# Patient Record
Sex: FEMALE | Race: WHITE | Employment: UNEMPLOYED | URBAN - METROPOLITAN AREA
[De-identification: names, ages, dates, MRNs, and addresses within clinical notes are randomized per-mention and may not be internally consistent; named-entity substitution may affect disease eponyms.]

---

## 2017-01-03 DIAGNOSIS — L60.9 NAIL ABNORMALITIES: Primary | ICD-10-CM

## 2017-02-06 RX ORDER — BLOOD SUGAR DIAGNOSTIC
STRIP MISCELLANEOUS
Qty: 300 STRIP | Refills: 2 | Status: SHIPPED | OUTPATIENT
Start: 2017-02-06 | End: 2018-01-07

## 2017-02-10 ENCOUNTER — OFFICE VISIT (OUTPATIENT)
Dept: FAMILY MEDICINE CLINIC | Facility: CLINIC | Age: 47
End: 2017-02-10

## 2017-02-10 VITALS
BODY MASS INDEX: 45.18 KG/M2 | WEIGHT: 251.81 LBS | SYSTOLIC BLOOD PRESSURE: 138 MMHG | HEART RATE: 82 BPM | DIASTOLIC BLOOD PRESSURE: 88 MMHG | HEIGHT: 62.6 IN

## 2017-02-10 DIAGNOSIS — E11.9 TYPE 2 DIABETES MELLITUS WITHOUT COMPLICATION, WITHOUT LONG-TERM CURRENT USE OF INSULIN (HCC): ICD-10-CM

## 2017-02-10 DIAGNOSIS — Z79.899 HIGH RISK MEDICATION USE: ICD-10-CM

## 2017-02-10 DIAGNOSIS — Z51.81 THERAPEUTIC DRUG MONITORING: Primary | ICD-10-CM

## 2017-02-10 DIAGNOSIS — E03.9 HYPOTHYROIDISM (ACQUIRED): ICD-10-CM

## 2017-02-10 DIAGNOSIS — Z71.3 ENCOUNTER FOR WEIGHT LOSS COUNSELING: ICD-10-CM

## 2017-02-10 DIAGNOSIS — E66.01 MORBID OBESITY WITH BMI OF 45.0-49.9, ADULT (HCC): ICD-10-CM

## 2017-02-10 PROCEDURE — 99214 OFFICE O/P EST MOD 30 MIN: CPT | Performed by: FAMILY MEDICINE

## 2017-02-10 RX ORDER — PHENTERMINE HYDROCHLORIDE 37.5 MG/1
TABLET ORAL
Qty: 30 TABLET | Refills: 0 | Status: SHIPPED | OUTPATIENT
Start: 2017-02-10 | End: 2017-03-10

## 2017-02-10 NOTE — PATIENT INSTRUCTIONS
Weight Management: Getting Started  Healthy bodies come in all shapes and sizes. Not all bodies are made to be thin. For some people, a healthy weight is higher than the average weight listed on weight charts.  Your healthcare provider can help you decide Encouragement from others can help make losing weight easier. Ask your family members and friends for support. They may even want to join you. Also look to your healthcare provider, registered dietitian, and  for help.  [facility]

## 2017-02-10 NOTE — PROGRESS NOTES
James Corcoran is a 40year old female. HPI:     Obesity: Morbidly obese for a few years now. She has been having difficulty losing weight. She is tried multiple diets.   She tried Victoza but stopped taking it because she did not see a significant zain MG Oral Tab Take 0.5 mg by mouth daily. Disp:  Rfl:    lansoprazole (PREVACID) 30 MG Oral Capsule Delayed Release TAKE 1 CAPSULE BY MOUTH EVERY MORNING BEFORE BREAKFAST. (Patient taking differently: TAKE 1  CAPSULE BY MOUTH TWICE DAILY. ) Disp: 30 capsule R 45.0-49.9, adult ([facility] [address] 7/13/2014   • Mixed hyperlipidemia 12/10/2014   • Chronic low back pain 3/8/2014   • Kidney stone      since age 16   • Abnormal EKG 1/24/2015   • Low HDL (under 40) 8/18/2015   • PONV (postoperative nausea and vomiting)    • Kidney chest pain on exertion   Gastrointestinal: Negative for nausea and vomiting. Skin: Negative for rash. Neurological: Negative for dizziness, light-headedness and headaches. Psychiatric/Behavioral: Negative for depressed mood.  The patient is not nervou verbalizes understanding. Questions encouraged and answered to patient's satisfaction.    - Phentermine HCl 37.5 MG Oral Tab; Start 1/2 tab po q am 30 mins prior to breakfast for 1 week, if tolerating, increase to 1 tab daily  Dispense: 30 tablet;  Refill:

## 2017-02-11 PROBLEM — E66.01 MORBID OBESITY WITH BMI OF 45.0-49.9, ADULT (HCC): Status: ACTIVE | Noted: 2017-02-11

## 2017-02-15 ENCOUNTER — TELEPHONE (OUTPATIENT)
Dept: FAMILY MEDICINE CLINIC | Facility: CLINIC | Age: 47
End: 2017-02-15

## 2017-02-15 NOTE — TELEPHONE ENCOUNTER
Hurt her back last week. She is looking for recommendations to what she can do? She is having lower back  Spasm's. She is not looking for meds.

## 2017-02-15 NOTE — TELEPHONE ENCOUNTER
Advised patient to avoid lifting heavy objects, Maintain moderate activity, avoid prolonged bed rest. Lower back stretches. Can try alternating ice and heat. Use ibuprofen/tylenol as needed. Follow-up in a couple days if no improvement.   Patient verbalizes

## 2017-02-21 ENCOUNTER — LAB ENCOUNTER (OUTPATIENT)
Dept: LAB | Age: 47
End: 2017-02-21
Attending: FAMILY MEDICINE
Payer: COMMERCIAL

## 2017-02-21 DIAGNOSIS — E03.9 HYPOTHYROIDISM (ACQUIRED): ICD-10-CM

## 2017-02-21 DIAGNOSIS — E11.9 TYPE 2 DIABETES MELLITUS WITHOUT COMPLICATION, WITHOUT LONG-TERM CURRENT USE OF INSULIN (HCC): ICD-10-CM

## 2017-02-21 DIAGNOSIS — E55.9 VITAMIN D DEFICIENCY: ICD-10-CM

## 2017-02-21 LAB
25-HYDROXYVITAMIN D (TOTAL): 23.5 NG/ML (ref 30–100)
ALBUMIN SERPL-MCNC: 3.5 G/DL (ref 3.5–4.8)
ALP LIVER SERPL-CCNC: 97 U/L (ref 37–98)
ALT SERPL-CCNC: 24 U/L (ref 14–54)
AST SERPL-CCNC: 13 U/L (ref 15–41)
BASOPHILS # BLD AUTO: 0.08 X10(3) UL (ref 0–0.1)
BASOPHILS NFR BLD AUTO: 1.2 %
BILIRUB SERPL-MCNC: 0.2 MG/DL (ref 0.1–2)
BUN BLD-MCNC: 14 MG/DL (ref 8–20)
CALCIUM BLD-MCNC: 8.7 MG/DL (ref 8.3–10.3)
CHLORIDE: 107 MMOL/L (ref 101–111)
CHOLEST SMN-MCNC: 152 MG/DL (ref ?–200)
CO2: 25 MMOL/L (ref 22–32)
CREAT BLD-MCNC: 0.72 MG/DL (ref 0.55–1.02)
CREAT UR-SCNC: 101 MG/DL
EOSINOPHIL # BLD AUTO: 0.22 X10(3) UL (ref 0–0.3)
EOSINOPHIL NFR BLD AUTO: 3.4 %
ERYTHROCYTE [DISTWIDTH] IN BLOOD BY AUTOMATED COUNT: 14.4 % (ref 11.5–16)
EST. AVERAGE GLUCOSE BLD GHB EST-MCNC: 134 MG/DL (ref 68–126)
FREE T4: 1.1 NG/DL (ref 0.9–1.8)
GLUCOSE BLD-MCNC: 104 MG/DL (ref 70–99)
HBA1C MFR BLD HPLC: 6.3 % (ref ?–5.7)
HCT VFR BLD AUTO: 38.7 % (ref 34–50)
HDLC SERPL-MCNC: 77 MG/DL (ref 45–?)
HDLC SERPL: 1.97 {RATIO} (ref ?–4.44)
HGB BLD-MCNC: 12.5 G/DL (ref 12–16)
IMMATURE GRANULOCYTE COUNT: 0.04 X10(3) UL (ref 0–1)
IMMATURE GRANULOCYTE RATIO %: 0.6 %
LDLC SERPL CALC-MCNC: 50 MG/DL (ref ?–130)
LYMPHOCYTES # BLD AUTO: 2.2 X10(3) UL (ref 0.9–4)
LYMPHOCYTES NFR BLD AUTO: 34.1 %
M PROTEIN MFR SERPL ELPH: 6.9 G/DL (ref 6.1–8.3)
MCH RBC QN AUTO: 30 PG (ref 27–33.2)
MCHC RBC AUTO-ENTMCNC: 32.3 G/DL (ref 31–37)
MCV RBC AUTO: 93 FL (ref 81–100)
MICROALBUMIN UR-MCNC: 0.8 MG/DL
MICROALBUMIN/CREAT 24H UR-RTO: 7.9 UG/MG (ref ?–30)
MONOCYTES # BLD AUTO: 0.32 X10(3) UL (ref 0.1–0.6)
MONOCYTES NFR BLD AUTO: 5 %
NEUTROPHIL ABS PRELIM: 3.59 X10 (3) UL (ref 1.3–6.7)
NEUTROPHILS # BLD AUTO: 3.59 X10(3) UL (ref 1.3–6.7)
NEUTROPHILS NFR BLD AUTO: 55.7 %
NONHDLC SERPL-MCNC: 75 MG/DL (ref ?–130)
PLATELET # BLD AUTO: 318 10(3)UL (ref 150–450)
POTASSIUM SERPL-SCNC: 3.9 MMOL/L (ref 3.6–5.1)
RBC # BLD AUTO: 4.16 X10(6)UL (ref 3.8–5.1)
RED CELL DISTRIBUTION WIDTH-SD: 49.4 FL (ref 35.1–46.3)
SODIUM SERPL-SCNC: 142 MMOL/L (ref 136–144)
TRIGLYCERIDES: 123 MG/DL (ref ?–150)
TSI SER-ACNC: 5.83 MIU/ML (ref 0.35–5.5)
VLDL: 25 MG/DL (ref 5–40)
WBC # BLD AUTO: 6.5 X10(3) UL (ref 4–13)

## 2017-02-21 PROCEDURE — 80061 LIPID PANEL: CPT

## 2017-02-21 PROCEDURE — 36415 COLL VENOUS BLD VENIPUNCTURE: CPT

## 2017-02-21 PROCEDURE — 84443 ASSAY THYROID STIM HORMONE: CPT

## 2017-02-21 PROCEDURE — 83036 HEMOGLOBIN GLYCOSYLATED A1C: CPT

## 2017-02-21 PROCEDURE — 85025 COMPLETE CBC W/AUTO DIFF WBC: CPT

## 2017-02-21 PROCEDURE — 82306 VITAMIN D 25 HYDROXY: CPT

## 2017-02-21 PROCEDURE — 80053 COMPREHEN METABOLIC PANEL: CPT

## 2017-02-21 PROCEDURE — 84439 ASSAY OF FREE THYROXINE: CPT

## 2017-02-21 PROCEDURE — 82043 UR ALBUMIN QUANTITATIVE: CPT

## 2017-02-21 PROCEDURE — 82570 ASSAY OF URINE CREATININE: CPT

## 2017-03-10 ENCOUNTER — OFFICE VISIT (OUTPATIENT)
Dept: FAMILY MEDICINE CLINIC | Facility: CLINIC | Age: 47
End: 2017-03-10

## 2017-03-10 VITALS
HEIGHT: 62.6 IN | OXYGEN SATURATION: 94 % | SYSTOLIC BLOOD PRESSURE: 128 MMHG | DIASTOLIC BLOOD PRESSURE: 78 MMHG | BODY MASS INDEX: 45.46 KG/M2 | HEART RATE: 85 BPM | WEIGHT: 253.38 LBS

## 2017-03-10 DIAGNOSIS — E66.01 MORBID OBESITY WITH BMI OF 45.0-49.9, ADULT (HCC): ICD-10-CM

## 2017-03-10 DIAGNOSIS — E03.9 HYPOTHYROIDISM (ACQUIRED): ICD-10-CM

## 2017-03-10 DIAGNOSIS — E11.9 TYPE 2 DIABETES MELLITUS WITHOUT COMPLICATION, WITHOUT LONG-TERM CURRENT USE OF INSULIN (HCC): Primary | ICD-10-CM

## 2017-03-10 DIAGNOSIS — E78.2 MIXED HYPERLIPIDEMIA: ICD-10-CM

## 2017-03-10 DIAGNOSIS — Z71.3 ENCOUNTER FOR WEIGHT LOSS COUNSELING: ICD-10-CM

## 2017-03-10 DIAGNOSIS — I10 ESSENTIAL HYPERTENSION: ICD-10-CM

## 2017-03-10 DIAGNOSIS — Z79.899 HIGH RISK MEDICATION USE: ICD-10-CM

## 2017-03-10 DIAGNOSIS — E78.6 LOW HDL (UNDER 40): ICD-10-CM

## 2017-03-10 DIAGNOSIS — M77.11 LATERAL EPICONDYLITIS OF RIGHT ELBOW: ICD-10-CM

## 2017-03-10 DIAGNOSIS — Z51.81 THERAPEUTIC DRUG MONITORING: ICD-10-CM

## 2017-03-10 PROCEDURE — 99214 OFFICE O/P EST MOD 30 MIN: CPT | Performed by: FAMILY MEDICINE

## 2017-03-10 RX ORDER — IBUPROFEN 600 MG/1
600 TABLET ORAL 3 TIMES DAILY
Qty: 30 TABLET | Refills: 0 | Status: SHIPPED | OUTPATIENT
Start: 2017-03-10 | End: 2017-04-07

## 2017-03-10 RX ORDER — LEVOTHYROXINE SODIUM 0.12 MG/1
125 TABLET ORAL
Qty: 90 TABLET | Refills: 0 | Status: SHIPPED | OUTPATIENT
Start: 2017-03-10 | End: 2017-05-28

## 2017-03-10 RX ORDER — LOSARTAN POTASSIUM 50 MG/1
50 TABLET ORAL EVERY EVENING
Qty: 90 TABLET | Refills: 1 | Status: SHIPPED | OUTPATIENT
Start: 2017-03-10 | End: 2017-11-19

## 2017-03-10 RX ORDER — PHENTERMINE HYDROCHLORIDE 37.5 MG/1
37.5 TABLET ORAL EVERY MORNING
Qty: 30 TABLET | Refills: 0 | Status: SHIPPED | OUTPATIENT
Start: 2017-03-10 | End: 2017-04-07

## 2017-03-10 RX ORDER — SIMVASTATIN 20 MG
20 TABLET ORAL NIGHTLY
Qty: 90 TABLET | Refills: 1 | Status: SHIPPED | OUTPATIENT
Start: 2017-03-10 | End: 2018-05-22

## 2017-03-10 NOTE — PATIENT INSTRUCTIONS
Please get your eye exam done before June 30, 2017. Weight Management: Overcoming Your Barriers  You may have many reasons why you’re not ready to lose weight. You may not feel you have the time or the skills.  You may be afraid of losing weight use it as an excuse for not losing weight. Ask your healthcare provider or dietitian about methods to lose weight that are safe for you. For example, even if you have severe arthritis, it may be easier for you to exercise in a pool.  Get advice from a vickie

## 2017-03-10 NOTE — PROGRESS NOTES
HPI:   Lynda Parks is a 40year old female    Hit right elbow on wall 3-4 weeks ago, now gradually getting worse. No pain at rest.  Pain is noticed when reaching for something or pulling on something. Pain up to 4-5 out of 10. It is a soreness. DIRECTED Disp: 300 strip Rfl: 2   Vitamin C 500 MG Oral Tab Take 500 mg by mouth daily.  Disp:  Rfl:    ACCU-CHEK MULTICLIX LANCETS Does not apply Misc Test blood sugars tid Disp: 306 each Rfl: 0   Blood Glucose Monitoring Suppl ([employer] CONTOUR NEXT MONITOR) Itching  Darvocet [Propoxyph*    Nausea and vomiting  Latex                   Rash, Swelling  Nuts                    Anaphylaxis    Comment:MARGOT/ABBI             Water chestnuts-Itching  Percocet [Oxycodone*    Rash  Ultram [Tramadol Hc*    Rash M. D. UPPER GI ENDOSCOPY PERFORMED  2008    REMOVAL OF KIDNEY STONE      Comment age 16, does not remember what kind of surgery. no abdominal or flank scar    HYSTERECTOMY  2006    Comment partial hystero.     OOPHORECTOMY Bilateral 2015    Comment ovarie Cholesterol      >45 mg/dL 77 69   LDL Cholesterol Calc      <130 mg/dL 50 79   VLDL      5-40 mg/dL 25 22   T.  CHOL/HDL RATIO      <4.44 1.97 2.46   NON HDL CHOL      <130 mg/dL 75 101   T4,Free (Direct)      0.9-1.8 ng/dL 1.1 1.2   TSH      0.350-5.500 m tablet; Refill: 0    4. Essential hypertension  Well controlled. Pt counseled on low sodium diet. - Losartan Potassium 50 MG Oral Tab; Take 1 tablet (50 mg total) by mouth every evening. Dispense: 90 tablet; Refill: 1    5.  Mixed hyperlipidemia  Control HCl 1000 MG Oral Tab 180 tablet 1      Sig: Take 1 tablet (1,000 mg total) by mouth 2 (two) times daily with meals. Phentermine HCl 37.5 MG Oral Tab 30 tablet 0      Sig: Take 1 tablet (37.5 mg total) by mouth every morning.       simvastatin 20 MG Ora

## 2017-03-13 ENCOUNTER — PATIENT MESSAGE (OUTPATIENT)
Dept: FAMILY MEDICINE CLINIC | Facility: CLINIC | Age: 47
End: 2017-03-13

## 2017-03-27 ENCOUNTER — OFFICE VISIT (OUTPATIENT)
Dept: FAMILY MEDICINE CLINIC | Facility: CLINIC | Age: 47
End: 2017-03-27

## 2017-03-27 VITALS
HEART RATE: 87 BPM | DIASTOLIC BLOOD PRESSURE: 84 MMHG | BODY MASS INDEX: 46 KG/M2 | SYSTOLIC BLOOD PRESSURE: 136 MMHG | WEIGHT: 257 LBS

## 2017-03-27 DIAGNOSIS — R00.2 PALPITATIONS: Primary | ICD-10-CM

## 2017-03-27 DIAGNOSIS — R30.0 DYSURIA: ICD-10-CM

## 2017-03-27 DIAGNOSIS — N30.01 ACUTE CYSTITIS WITH HEMATURIA: ICD-10-CM

## 2017-03-27 DIAGNOSIS — R07.89 CHEST PRESSURE: ICD-10-CM

## 2017-03-27 LAB
APPEARANCE: CLEAR
BILIRUBIN: NEGATIVE
GLUCOSE (URINE DIPSTICK): NEGATIVE MG/DL
KETONES (URINE DIPSTICK): NEGATIVE MG/DL
LEUKOCYTES: NEGATIVE
MULTISTIX LOT#: NORMAL NUMERIC
NITRITE, URINE: NEGATIVE
PH, URINE: 5.5 (ref 4.5–8)
PROTEIN (URINE DIPSTICK): NEGATIVE MG/DL
SPECIFIC GRAVITY: 1.02 (ref 1–1.03)
URINE-COLOR: YELLOW
UROBILINOGEN,SEMI-QN: 0.2 MG/DL (ref 0–1.9)

## 2017-03-27 PROCEDURE — 93000 ELECTROCARDIOGRAM COMPLETE: CPT | Performed by: FAMILY MEDICINE

## 2017-03-27 PROCEDURE — 99214 OFFICE O/P EST MOD 30 MIN: CPT | Performed by: FAMILY MEDICINE

## 2017-03-27 PROCEDURE — 81003 URINALYSIS AUTO W/O SCOPE: CPT | Performed by: FAMILY MEDICINE

## 2017-03-27 RX ORDER — NITROFURANTOIN 25; 75 MG/1; MG/1
100 CAPSULE ORAL 2 TIMES DAILY
Qty: 6 CAPSULE | Refills: 0 | Status: SHIPPED | OUTPATIENT
Start: 2017-03-27 | End: 2017-03-30

## 2017-03-27 RX ORDER — NITROFURANTOIN 25; 75 MG/1; MG/1
100 CAPSULE ORAL 2 TIMES DAILY
Qty: 6 CAPSULE | Refills: 0 | Status: SHIPPED | OUTPATIENT
Start: 2017-03-27 | End: 2017-03-27

## 2017-03-27 NOTE — PATIENT INSTRUCTIONS
Heart Palpitations    Palpitations are the feeling that your heart is beating hard, fast, or irregular. Some describe it as \"pounding\" or \"skipped beats. \" Palpitations may occur in someone with heart disease, but can also occur in a healthy person. Get prompt medical attention if you have palpitations and any of the following:  · Weakness  · Dizziness  · Lightheadedness  · Fainting  Date Last Reviewed: 4/27/2016  © 2014-5129 The [address] [place] [address].  All ri

## 2017-03-27 NOTE — PROGRESS NOTES
Sanam Hodgson is a 40year old female. HPI:       Burning with urination:  For a week and half to 2 weeks. Nothing seems to make it better or worse. Denies fever or chills. Denies nausea or vomiting    Palpitations associated with chest pressure.   [place] BREAKFAST. (Patient taking differently: TAKE 1  CAPSULE BY MOUTH TWICE DAILY. ) Disp: 30 capsule Rfl: 1   Cholecalciferol (VITAMIN D3) 2000 UNITS Oral Tab Take 1 tablet by mouth daily.    Disp:  Rfl:    Omega-3 Fatty Acids (OMEGA-3 OR) Take 115 mg by mouth d glaucoma of both eyes, moderate stage 6/1/2016   • Family history of skin cancer 7/13/2016     Multiple family members on maternal side including mother with large [address]    • History of hysterectomy 9/15/2015          Past Surgical History    TILT TABLE EVAL No distress. HENT:   Head: Normocephalic and atraumatic. Eyes: Conjunctivae and EOM are normal. Pupils are equal, round, and reactive to light. Cardiovascular: Normal rate, regular rhythm and normal heart sounds. No murmur heard.    Edema not prese maximal blood pressure of 176/78 mmHg and a maximal heart rate of 151 beats per minute. The exercise was discontinued due to fatigue.  The electrocardiographic response to exercise was normal. Th patient did complain of a \"dull ache\" in her left shoulder PROTEIN (URINE DIPSTICK) negative Negative/Trace mg/dL   UROBILINOGEN,SEMI-QN 0.2 0.0 - 1.9 mg/dL   NITRITE, URINE negative Negative   LEUKOCYTES negative Negative   APPEARANCE clear Clear   URINE-COLOR yellow Yellow   [employer] Lot# [id number] Numeric   Mul

## 2017-04-03 ENCOUNTER — HOSPITAL ENCOUNTER (OUTPATIENT)
Dept: CV DIAGNOSTICS | Facility: HOSPITAL | Age: 47
Discharge: HOME | End: 2017-04-03
Attending: FAMILY MEDICINE
Payer: COMMERCIAL

## 2017-04-03 DIAGNOSIS — R07.89 CHEST PRESSURE: ICD-10-CM

## 2017-04-03 DIAGNOSIS — R00.2 PALPITATIONS: ICD-10-CM

## 2017-04-03 PROCEDURE — 93226 XTRNL ECG REC<48 HR SCAN A/R: CPT

## 2017-04-03 PROCEDURE — 93227 XTRNL ECG REC<48 HR R&I: CPT | Performed by: INTERNAL MEDICINE

## 2017-04-03 PROCEDURE — 93225 XTRNL ECG REC<48 HRS REC: CPT

## 2017-04-07 ENCOUNTER — OFFICE VISIT (OUTPATIENT)
Dept: FAMILY MEDICINE CLINIC | Facility: CLINIC | Age: 47
End: 2017-04-07

## 2017-04-07 VITALS
HEART RATE: 83 BPM | BODY MASS INDEX: 45.64 KG/M2 | DIASTOLIC BLOOD PRESSURE: 82 MMHG | WEIGHT: 254.38 LBS | HEIGHT: 62.6 IN | SYSTOLIC BLOOD PRESSURE: 136 MMHG

## 2017-04-07 DIAGNOSIS — Z71.3 ENCOUNTER FOR WEIGHT LOSS COUNSELING: ICD-10-CM

## 2017-04-07 DIAGNOSIS — Z51.81 THERAPEUTIC DRUG MONITORING: Primary | ICD-10-CM

## 2017-04-07 DIAGNOSIS — E66.01 MORBID OBESITY WITH BMI OF 45.0-49.9, ADULT (HCC): ICD-10-CM

## 2017-04-07 DIAGNOSIS — Z79.899 HIGH RISK MEDICATION USE: ICD-10-CM

## 2017-04-07 PROCEDURE — 99213 OFFICE O/P EST LOW 20 MIN: CPT | Performed by: FAMILY MEDICINE

## 2017-04-07 RX ORDER — TOPIRAMATE 25 MG/1
TABLET ORAL
Qty: 70 TABLET | Refills: 0 | Status: SHIPPED | OUTPATIENT
Start: 2017-04-07 | End: 2017-06-02

## 2017-04-07 RX ORDER — PHENTERMINE HYDROCHLORIDE 37.5 MG/1
37.5 TABLET ORAL EVERY MORNING
Qty: 30 TABLET | Refills: 0 | Status: SHIPPED | OUTPATIENT
Start: 2017-04-07 | End: 2017-05-05

## 2017-04-07 NOTE — PROGRESS NOTES
Michael Rodriguez is a 40year old female. HPI:     Obesity: Lost 3# in the last 4 weeks. This is her 2nd month on the phentermine. pilar phentermine well, no SE x some dry mouth that is relieved with drinking more water.          Wt Readings from Last 6 Rfl: 1   Cholecalciferol (VITAMIN D3) 2000 UNITS Oral Tab Take 1 tablet by mouth daily. Disp:  Rfl:    Omega-3 Fatty Acids (OMEGA-3 OR) Take 115 mg by mouth daily.  Disp:  Rfl:    CycloSPORINE (RESTASIS) 0.05 % Ophthalmic Emulsion Place 1 drop into both e Melvina Montez M.D.    [place] age 16, does not remember what kind of surgery. no abdominal or flank scar    HYSTERECTOMY  2006    Comment partial hystero.     OOPHORECTOMY Bilateral 2015    C AND PLAN:       Therapeutic drug monitoring  (primary encounter diagnosis)  High risk medication use  Encounter for weight loss counseling  Morbid obesity with bmi of 45.0-49.9, adult (hcc)    1.  Therapeutic drug monitoring  Medication use, risks, benefits Prescriptions Disp Refills    Phentermine HCl 37.5 MG Oral Tab 30 tablet 0      Sig: Take 1 tablet (37.5 mg total) by mouth every morning.       topiramate 25 MG Oral Tab 70 tablet 0      Sig: Start 1 tab po qhs for 1 wk, then twice a day for 1 week, then o

## 2017-04-07 NOTE — PATIENT INSTRUCTIONS
Weight Management: Take It Off and Keep It Off  It’s easy to be motivated when you first start. The key is to stay motivated all along the way and to have realistic and achievable goals.  There are things you can do to keep yourself on the path to success · Make a list of the things that others like about you and that you like about yourself. Add something new from time to time. Keep this list to look at when you need a lift. Resources  · [employer]itness, Sports [employer] Nutritionwww. fitness. go

## 2017-05-05 ENCOUNTER — OFFICE VISIT (OUTPATIENT)
Dept: FAMILY MEDICINE CLINIC | Facility: CLINIC | Age: 47
End: 2017-05-05

## 2017-05-05 VITALS
OXYGEN SATURATION: 96 % | HEART RATE: 90 BPM | BODY MASS INDEX: 45 KG/M2 | WEIGHT: 253 LBS | DIASTOLIC BLOOD PRESSURE: 76 MMHG | SYSTOLIC BLOOD PRESSURE: 128 MMHG

## 2017-05-05 DIAGNOSIS — Z71.3 ENCOUNTER FOR WEIGHT LOSS COUNSELING: ICD-10-CM

## 2017-05-05 DIAGNOSIS — Z51.81 THERAPEUTIC DRUG MONITORING: Primary | ICD-10-CM

## 2017-05-05 DIAGNOSIS — Z79.899 HIGH RISK MEDICATION USE: ICD-10-CM

## 2017-05-05 DIAGNOSIS — E66.01 MORBID OBESITY WITH BMI OF 45.0-49.9, ADULT (HCC): ICD-10-CM

## 2017-05-05 DIAGNOSIS — K13.0 ANGULAR CHEILITIS: ICD-10-CM

## 2017-05-05 PROBLEM — R07.89 CHEST PRESSURE: Status: RESOLVED | Noted: 2017-03-27 | Resolved: 2017-05-05

## 2017-05-05 PROBLEM — N30.01 ACUTE CYSTITIS WITH HEMATURIA: Status: RESOLVED | Noted: 2017-03-27 | Resolved: 2017-05-05

## 2017-05-05 PROCEDURE — 99214 OFFICE O/P EST MOD 30 MIN: CPT | Performed by: FAMILY MEDICINE

## 2017-05-05 RX ORDER — CLOTRIMAZOLE AND BETAMETHASONE DIPROPIONATE 10; .64 MG/G; MG/G
1 CREAM TOPICAL 2 TIMES DAILY
Qty: 15 G | Refills: 0 | Status: SHIPPED | OUTPATIENT
Start: 2017-05-05 | End: 2017-08-31 | Stop reason: ALTCHOICE

## 2017-05-05 RX ORDER — PHENTERMINE HYDROCHLORIDE 37.5 MG/1
37.5 TABLET ORAL EVERY MORNING
Qty: 30 TABLET | Refills: 0 | Status: SHIPPED | OUTPATIENT
Start: 2017-05-05 | End: 2017-06-02

## 2017-05-05 NOTE — PROGRESS NOTES
Peter Roa is a 40year old female. HPI:     Obesity: Lost 1# in the last 4 weeks. This is her 3rd month on the phentermine 37.5mg. Karl phentermine well, no SE x some dry mouth that is relieved with drinking more water.      Did not start the t (NORCO) 5-325 MG Oral Tab Take 1-2 tablets by mouth every 6 (six) hours as needed for Pain. Disp: 40 tablet Rfl: 0   lansoprazole (PREVACID) 30 MG Oral Capsule Delayed Release TAKE 1 CAPSULE BY MOUTH EVERY MORNING BEFORE BREAKFAST.  (Patient taking differen moderate stage 6/1/2016   • Family history of skin cancer 7/13/2016     Multiple family members on maternal side including mother with large [address]    • History of hysterectomy 9/15/2015          Past Surgical History    TILT TABLE EVALUATION      Comment vei headaches/dizziness/fainting/weakness/change in vision  PSYCH: denies depression and anxiety      Immunization History  Administered            Date(s) Administered    TDAP                  05/14/2013      EXAM:   /76 mmHg  Pulse 90  Wt 253 lb  SpO2 total) by mouth every morning. Dispense: 30 tablet; Refill: 0              Meds & Refills for this Visit:  Signed Prescriptions Disp Refills    Phentermine HCl 37.5 MG Oral Tab 30 tablet 0      Sig: Take 1 tablet (37.5 mg total) by mouth every morning.

## 2017-05-07 NOTE — PATIENT INSTRUCTIONS
Weight Management: Overcoming Your Barriers  You may have many reasons why you’re not ready to lose weight. You may not feel you have the time or the skills. You may be afraid of losing weight and gaining it back again. Well, you can lose weight.  And you Do you have a health problem? If so, don’t use it as an excuse for not losing weight. Ask your healthcare provider or dietitian about methods to lose weight that are safe for you.  For example, even if you have severe arthritis, it may be easier for you to

## 2017-05-10 ENCOUNTER — TELEPHONE (OUTPATIENT)
Dept: FAMILY MEDICINE CLINIC | Facility: CLINIC | Age: 47
End: 2017-05-10

## 2017-05-10 NOTE — TELEPHONE ENCOUNTER
Headaches for 2 wks now, very tired. Stomach not feeling the best. Food not appealing. Wondering if it's something with her thyroid? She said it almost feels like she has a hangover but she's not drinking. She thought she was due for labs.  I told her no

## 2017-05-12 ENCOUNTER — APPOINTMENT (OUTPATIENT)
Dept: LAB | Age: 47
End: 2017-05-12
Attending: FAMILY MEDICINE
Payer: COMMERCIAL

## 2017-05-12 ENCOUNTER — OFFICE VISIT (OUTPATIENT)
Dept: FAMILY MEDICINE CLINIC | Facility: CLINIC | Age: 47
End: 2017-05-12

## 2017-05-12 VITALS
SYSTOLIC BLOOD PRESSURE: 130 MMHG | WEIGHT: 251 LBS | HEART RATE: 88 BPM | BODY MASS INDEX: 45.03 KG/M2 | DIASTOLIC BLOOD PRESSURE: 72 MMHG | RESPIRATION RATE: 18 BRPM | HEIGHT: 62.6 IN

## 2017-05-12 DIAGNOSIS — G43.009 MIGRAINE WITHOUT AURA AND WITHOUT STATUS MIGRAINOSUS, NOT INTRACTABLE: ICD-10-CM

## 2017-05-12 DIAGNOSIS — R53.83 FATIGUE, UNSPECIFIED TYPE: ICD-10-CM

## 2017-05-12 DIAGNOSIS — M25.50 POLYARTHRALGIA: ICD-10-CM

## 2017-05-12 DIAGNOSIS — M79.10 MYALGIA: ICD-10-CM

## 2017-05-12 DIAGNOSIS — G43.009 MIGRAINE WITHOUT AURA AND WITHOUT STATUS MIGRAINOSUS, NOT INTRACTABLE: Primary | ICD-10-CM

## 2017-05-12 PROCEDURE — 36415 COLL VENOUS BLD VENIPUNCTURE: CPT

## 2017-05-12 PROCEDURE — 85652 RBC SED RATE AUTOMATED: CPT

## 2017-05-12 PROCEDURE — 86140 C-REACTIVE PROTEIN: CPT

## 2017-05-12 PROCEDURE — 96372 THER/PROPH/DIAG INJ SC/IM: CPT | Performed by: FAMILY MEDICINE

## 2017-05-12 PROCEDURE — 99214 OFFICE O/P EST MOD 30 MIN: CPT | Performed by: FAMILY MEDICINE

## 2017-05-12 PROCEDURE — 86038 ANTINUCLEAR ANTIBODIES: CPT

## 2017-05-12 PROCEDURE — 82607 VITAMIN B-12: CPT

## 2017-05-12 RX ORDER — TIZANIDINE 4 MG/1
TABLET ORAL
Qty: 20 TABLET | Refills: 0 | Status: SHIPPED | OUTPATIENT
Start: 2017-05-12 | End: 2017-05-24

## 2017-05-12 RX ORDER — METHYLPREDNISOLONE 4 MG/1
TABLET ORAL
Qty: 1 PACKAGE | Refills: 0 | Status: SHIPPED | OUTPATIENT
Start: 2017-05-12 | End: 2017-05-24 | Stop reason: ALTCHOICE

## 2017-05-12 RX ORDER — KETOROLAC TROMETHAMINE 30 MG/ML
60 INJECTION, SOLUTION INTRAMUSCULAR; INTRAVENOUS ONCE
Status: COMPLETED | OUTPATIENT
Start: 2017-05-12 | End: 2017-05-12

## 2017-05-12 RX ADMIN — KETOROLAC TROMETHAMINE 60 MG: 30 INJECTION, SOLUTION INTRAMUSCULAR; INTRAVENOUS at 13:46:00

## 2017-05-12 NOTE — PROGRESS NOTES
Bryan Shi is a 40year old female. HPI:         Headache:    x 2 weeks   Feels all over, feels like she is wearing a headband. Pain is 6-7/10. No vomiting. Nausea but not sure if it is from one of her medications.   occ blurry vision when tired, with meals. Disp: 180 tablet Rfl: 1   simvastatin 20 MG Oral Tab Take 1 tablet (20 mg total) by mouth nightly.  Disp: 90 tablet Rfl: 1   ACCU-CHEK DARRICK PLUS In Vitro Strip USE TO TEST BLOOD 3 TIMES A DAY AS DIRECTED Disp: 300 strip Rfl: 2   ACCU-CHEK MULTI History of kidney stones 10/17/2013   • Essential hypertension, benign 7/13/2014   • Morbid obesity with BMI of 45.0-49.9, adult ([facility] [address] 7/13/2014   • Mixed hyperlipidemia 12/10/2014   • Chronic low back pain 3/8/2014   • Kidney stone      since age 16   • A swallowing. Eyes: Negative for pain and redness. Respiratory: Negative for cough and shortness of breath. Cardiovascular: Negative for chest pain and palpitations. Gastrointestinal: Negative for abdominal pain and diarrhea.    Genitourinary: Negat least 50% while in the office after receiving Toradol 60 mg IM ×1. Tizanidine to target possible muscular component. Medrol Dosepak to target inflammatory component. Check labs.     - Ketorolac Tromethamine (TORADOL) 60 MG/2ML injection 60 mg; Inject 2 m

## 2017-05-13 NOTE — PATIENT INSTRUCTIONS
Migraine Headache: Stages and Treatment    A migraine headache tends to progress in stages. Learning these stages can help you better understand what is happening. Then you can learn ways to reduce pain and relieve other symptoms.  Methods for relieving y Work with your healthcare provider to find the right medicines for you. Medicines for migraine may relieve pain (analgesics), relieve nausea, or attack the migraine's root causes (migraine-specific medicines).   Rebound headache  Taking analgesics each day,

## 2017-05-16 ENCOUNTER — PATIENT OUTREACH (OUTPATIENT)
Dept: FAMILY MEDICINE CLINIC | Facility: CLINIC | Age: 47
End: 2017-05-16

## 2017-05-24 ENCOUNTER — PRIOR ORIGINAL RECORDS (OUTPATIENT)
Dept: OTHER | Age: 47
End: 2017-05-24

## 2017-05-24 ENCOUNTER — OFFICE VISIT (OUTPATIENT)
Dept: FAMILY MEDICINE CLINIC | Facility: CLINIC | Age: 47
End: 2017-05-24

## 2017-05-24 ENCOUNTER — APPOINTMENT (OUTPATIENT)
Dept: LAB | Age: 47
End: 2017-05-24
Attending: FAMILY MEDICINE
Payer: COMMERCIAL

## 2017-05-24 VITALS
BODY MASS INDEX: 45.54 KG/M2 | WEIGHT: 253.81 LBS | RESPIRATION RATE: 16 BRPM | DIASTOLIC BLOOD PRESSURE: 84 MMHG | SYSTOLIC BLOOD PRESSURE: 122 MMHG | HEIGHT: 62.6 IN | HEART RATE: 93 BPM

## 2017-05-24 DIAGNOSIS — R53.81 MALAISE: ICD-10-CM

## 2017-05-24 DIAGNOSIS — E03.9 HYPOTHYROIDISM (ACQUIRED): ICD-10-CM

## 2017-05-24 DIAGNOSIS — G44.52 NEW DAILY PERSISTENT HEADACHE: Primary | ICD-10-CM

## 2017-05-24 DIAGNOSIS — E11.9 TYPE 2 DIABETES MELLITUS WITHOUT COMPLICATION, WITHOUT LONG-TERM CURRENT USE OF INSULIN (HCC): ICD-10-CM

## 2017-05-24 PROCEDURE — 83036 HEMOGLOBIN GLYCOSYLATED A1C: CPT

## 2017-05-24 PROCEDURE — 84439 ASSAY OF FREE THYROXINE: CPT

## 2017-05-24 PROCEDURE — 84443 ASSAY THYROID STIM HORMONE: CPT

## 2017-05-24 PROCEDURE — 36415 COLL VENOUS BLD VENIPUNCTURE: CPT

## 2017-05-24 PROCEDURE — 99213 OFFICE O/P EST LOW 20 MIN: CPT | Performed by: FAMILY MEDICINE

## 2017-05-24 RX ORDER — ESTRADIOL 1 MG/1
1 TABLET ORAL DAILY
COMMUNITY
End: 2019-05-09

## 2017-05-24 NOTE — PROGRESS NOTES
Josr Boles is a 40year old female. HPI:         Headache:    Started taking topiramate but it made her dizzy, when she stopped taking it the dizziness went away. The part that went away was when she laid down.   She was on the topiramate for a week Does not apply Misc Test blood sugars tid Disp: 306 each Rfl: 0   lansoprazole (PREVACID) 30 MG Oral Capsule Delayed Release TAKE 1 CAPSULE BY MOUTH EVERY MORNING BEFORE BREAKFAST. (Patient taking differently: TAKE 1  CAPSULE BY MOUTH TWICE DAILY. ) Disp: 3 since age 16   • Abnormal EKG 1/24/2015   • Low HDL (under 40) 8/18/2015   • PONV (postoperative nausea and vomiting)    • Kidney infection 11/30/15     currently on macrobid for kidney infection-instructed to inform surgeon of infection.    • Type 2 diabet Genitourinary: Negative for dysuria and urgency. Musculoskeletal:        As in HPI   Neurological: Negative for speech difficulty and weakness. Psychiatric/Behavioral: Negative for depressed mood. The patient is not nervous/anxious.         EXAM:   BP pg/mL         ASSESSMENT AND PLAN:     New daily persistent headache  (primary encounter diagnosis)  Malaise    1. New daily persistent headache  Maintain good hydration, she has been doing this. Sleep  Hygiene discussed.   Take the tizanidine that was rx'

## 2017-05-24 NOTE — PATIENT INSTRUCTIONS
Decrease phentermine to 1/2 tab daily for 3-4 days then discontinue for several days to see if that helps with the headaches and how your are feeling overall. As above, along with taking the tizanidine at night.     If not improving overall in the ne

## 2017-05-29 RX ORDER — LEVOTHYROXINE SODIUM 0.12 MG/1
TABLET ORAL
Qty: 90 TABLET | Refills: 0 | Status: SHIPPED | OUTPATIENT
Start: 2017-05-29 | End: 2017-09-03

## 2017-06-02 ENCOUNTER — OFFICE VISIT (OUTPATIENT)
Dept: FAMILY MEDICINE CLINIC | Facility: CLINIC | Age: 47
End: 2017-06-02

## 2017-06-02 VITALS
SYSTOLIC BLOOD PRESSURE: 134 MMHG | HEIGHT: 62.6 IN | WEIGHT: 254.63 LBS | RESPIRATION RATE: 16 BRPM | DIASTOLIC BLOOD PRESSURE: 82 MMHG | HEART RATE: 87 BPM | BODY MASS INDEX: 45.69 KG/M2

## 2017-06-02 DIAGNOSIS — G89.29 CHRONIC BILATERAL LOW BACK PAIN WITHOUT SCIATICA: ICD-10-CM

## 2017-06-02 DIAGNOSIS — E66.01 MORBID OBESITY WITH BMI OF 45.0-49.9, ADULT (HCC): ICD-10-CM

## 2017-06-02 DIAGNOSIS — Z82.49 FAMILY HISTORY OF CHF (CONGESTIVE HEART FAILURE): ICD-10-CM

## 2017-06-02 DIAGNOSIS — Z87.898 HISTORY OF PALPITATIONS: ICD-10-CM

## 2017-06-02 DIAGNOSIS — G44.52 NEW DAILY PERSISTENT HEADACHE: Primary | ICD-10-CM

## 2017-06-02 DIAGNOSIS — M70.61 TROCHANTERIC BURSITIS, RIGHT HIP: ICD-10-CM

## 2017-06-02 DIAGNOSIS — M54.50 CHRONIC BILATERAL LOW BACK PAIN WITHOUT SCIATICA: ICD-10-CM

## 2017-06-02 DIAGNOSIS — Z82.49 FAMILY HISTORY OF CARDIOMYOPATHY: ICD-10-CM

## 2017-06-02 PROCEDURE — 99214 OFFICE O/P EST MOD 30 MIN: CPT | Performed by: FAMILY MEDICINE

## 2017-06-02 RX ORDER — CETIRIZINE HYDROCHLORIDE 10 MG/1
10 TABLET ORAL DAILY PRN
COMMUNITY
End: 2017-08-31 | Stop reason: ALTCHOICE

## 2017-06-02 NOTE — PROGRESS NOTES
Linda Flores is a 40year old female. HPI:       HPI 5/24/2017  Headache:    Started taking topiramate but it made her dizzy, when she stopped taking it the dizziness went away. The part that went away was when she laid down.   She was on the topirama Allergies. Disp:  Rfl:    LEVOTHYROXINE SODIUM 125 MCG Oral Tab TAKE 1 TABLET (125 MCG TOTAL) BY MOUTH BEFORE BREAKFAST. Disp: 90 tablet Rfl: 0   estradiol 1 MG Oral Tab Take 1 mg by mouth daily.  Disp:  Rfl:    clotrimazole-betamethasone 1-0.05 % External Medical History   Diagnosis Date   • Glaucoma 1/7/2013   • Arthritis      back   • Hypothyroidism    • GERD without esophagitis      hiatal heia   • Dry eyes, bilateral    • Polycystic ovaries      ovary embedded in pelvic wall--right side   • Lung nodule of surgery. no abdominal or flank scar    HYSTERECTOMY  2006    Comment partial hystero.     OOPHORECTOMY Bilateral 2015    Comment ovaries removed after partial.      Social History:    Smoking Status: Never Smoker                      Smokeless Status: Ne no cervical adenopathy. Neurological: She is alert and oriented to person, place, and time. No cranial nerve deficit. Coordination normal.   Skin: Skin is warm and dry. Psychiatric: She has a normal mood and affect.  Her behavior is normal.           AS Family history of cardiomyopathy  Patient to follow-up with cardiologist  - Cardio Referral - In Network                Return in about 3 months (around 9/2/2017) for Chronic Conditions and as needed.

## 2017-06-02 NOTE — PATIENT INSTRUCTIONS
Exercise: Making the Most of Your Time  Your exercise goal is 30 minutes on most days. Aim for a total of 150 or more minutes a week. Not sure you can fit one 30-minute block of exercise time into your day?  Split it up into shorter 10- and 15-minute bloc · Set an alarm to tell you when it’s time for an activity break. Date Last Reviewed: 8/13/2015  © 7077-5716 The [address] [place] [address], [address] [place] [address]. All rights reserved.  This information is not intended as a substitute for pr

## 2017-06-04 PROBLEM — Z82.49 FAMILY HISTORY OF CHF (CONGESTIVE HEART FAILURE): Status: ACTIVE | Noted: 2017-06-04

## 2017-06-04 PROBLEM — Z87.898 HISTORY OF PALPITATIONS: Status: ACTIVE | Noted: 2017-06-04

## 2017-06-04 PROBLEM — Z82.49 FAMILY HISTORY OF CARDIOMYOPATHY: Status: ACTIVE | Noted: 2017-06-04

## 2017-06-08 ENCOUNTER — PRIOR ORIGINAL RECORDS (OUTPATIENT)
Dept: OTHER | Age: 47
End: 2017-06-08

## 2017-06-25 DIAGNOSIS — G43.009 MIGRAINE WITHOUT AURA AND WITHOUT STATUS MIGRAINOSUS, NOT INTRACTABLE: ICD-10-CM

## 2017-06-26 RX ORDER — TIZANIDINE 4 MG/1
TABLET ORAL
Qty: 20 TABLET | Refills: 0 | Status: SHIPPED | OUTPATIENT
Start: 2017-06-26 | End: 2017-08-06

## 2017-07-25 DIAGNOSIS — K92.1 MELENA: Primary | ICD-10-CM

## 2017-07-27 PROBLEM — M25.572 ACUTE LEFT ANKLE PAIN: Status: ACTIVE | Noted: 2017-07-27

## 2017-07-27 PROBLEM — S93.491A SPRAIN OF ANTERIOR TALOFIBULAR LIGAMENT OF RIGHT ANKLE: Status: ACTIVE | Noted: 2017-07-27

## 2017-07-27 PROBLEM — F33.0 MAJOR DEPRESSIVE DISORDER, RECURRENT EPISODE, MILD WITH ANXIOUS DISTRESS: Status: ACTIVE | Noted: 2017-07-27

## 2017-07-27 PROBLEM — M25.571 ACUTE RIGHT ANKLE PAIN: Status: ACTIVE | Noted: 2017-07-27

## 2017-07-27 PROBLEM — F33.0 MAJOR DEPRESSIVE DISORDER, RECURRENT EPISODE, MILD WITH ANXIOUS DISTRESS (HCC): Status: ACTIVE | Noted: 2017-07-27

## 2017-07-27 PROBLEM — M79.671 RIGHT FOOT PAIN: Status: ACTIVE | Noted: 2017-07-27

## 2017-07-27 NOTE — PATIENT INSTRUCTIONS
Treating Ankle Sprains  Treatment will depend on how bad your sprain is. For a severe sprain, healing may take 3 months or more. Right after your injury: Use R.I.C.E.  · Rest: At first, keep weight off the ankle as much as you can.  You may be given crut

## 2017-07-27 NOTE — PROGRESS NOTES
Torrie Prescott is a 40year old female. HPI:         Foot Pain:   Right foot, ongoing x1 month. Outside part of her foot that radiates towards the ankle. Pain up to 5-6 out of 10. Saw Dr. Ray Argueta and was told she had a partial tear of a tendon.   In tid Disp: 306 each Rfl: 0   HYDROcodone-acetaminophen (NORCO) 5-325 MG Oral Tab Take 1-2 tablets by mouth every 6 (six) hours as needed for Pain.  Disp: 40 tablet Rfl: 0   lansoprazole (PREVACID) 30 MG Oral Capsule Delayed Release TAKE 1 CAPSULE BY MOUTH EV persistent headache     Malaise     History of palpitations     Family history of CHF (congestive heart failure)     Family history of cardiomyopathy     Sprain of anterior talofibular ligament of right ankle     Right foot pain     Acute right ankle pain hyperlipidemia 12/10/2014   • Morbid obesity with BMI of 45.0-49.9, adult ([facility] [address] 7/13/2014   • Polycystic ovaries     ovary embedded in pelvic wall--right side   • PONV (postoperative nausea and vomiting)    • Primary open angle glaucoma of both eyes, moder Weight as of this encounter: 261 lb 12.8 oz. Physical Exam   Nursing note and vitals reviewed. Constitutional: She is oriented to person, place, and time and obese. She appears well-developed. No distress. HENT:   Head: Normocephalic and atraumatic. probably improve with use of walking boot and cane for opposing ankle/foot. Imaging & Consults:  BH NAVIGATOR    Return in about 4 weeks (around 8/24/2017) for 4-6  weeks for Right foot/ankle pain and as needed.

## 2017-08-01 LAB
FREE T4: 1.6 MG/DL
HEMOGLOBIN A1C: 6.4 %
THYROID STIMULATING HORMONE: 0.86 MLU/L

## 2017-08-02 ENCOUNTER — TELEPHONE (OUTPATIENT)
Dept: FAMILY MEDICINE CLINIC | Facility: CLINIC | Age: 47
End: 2017-08-02

## 2017-08-02 ENCOUNTER — HOSPITAL ENCOUNTER (OUTPATIENT)
Dept: CT IMAGING | Age: 47
Discharge: HOME | End: 2017-08-02
Attending: NURSE PRACTITIONER
Payer: COMMERCIAL

## 2017-08-02 DIAGNOSIS — G89.29 OTHER CHRONIC PAIN: ICD-10-CM

## 2017-08-02 DIAGNOSIS — M70.61 TROCHANTERIC BURSITIS, RIGHT HIP: ICD-10-CM

## 2017-08-02 DIAGNOSIS — M54.50 BILATERAL LOW BACK PAIN WITHOUT SCIATICA, UNSPECIFIED CHRONICITY: ICD-10-CM

## 2017-08-02 DIAGNOSIS — G44.52 NEW DAILY PERSISTENT HEADACHE: Primary | ICD-10-CM

## 2017-08-02 DIAGNOSIS — R10.9 LEFT FLANK PAIN: ICD-10-CM

## 2017-08-02 DIAGNOSIS — R10.32 LEFT LOWER QUADRANT PAIN: ICD-10-CM

## 2017-08-02 PROCEDURE — 74176 CT ABD & PELVIS W/O CONTRAST: CPT | Performed by: NURSE PRACTITIONER

## 2017-08-02 NOTE — TELEPHONE ENCOUNTER
Referral requested for more visits to see Dr. Sohail Martinez for treatment.   Referral placed in system

## 2017-08-06 DIAGNOSIS — G43.009 MIGRAINE WITHOUT AURA AND WITHOUT STATUS MIGRAINOSUS, NOT INTRACTABLE: ICD-10-CM

## 2017-08-07 RX ORDER — TIZANIDINE 4 MG/1
TABLET ORAL
Qty: 20 TABLET | Refills: 0 | Status: SHIPPED | OUTPATIENT
Start: 2017-08-07 | End: 2017-09-21

## 2017-08-31 ENCOUNTER — OFFICE VISIT (OUTPATIENT)
Dept: FAMILY MEDICINE CLINIC | Facility: CLINIC | Age: 47
End: 2017-08-31

## 2017-08-31 VITALS
SYSTOLIC BLOOD PRESSURE: 130 MMHG | BODY MASS INDEX: 47.37 KG/M2 | HEIGHT: 62.6 IN | HEART RATE: 84 BPM | DIASTOLIC BLOOD PRESSURE: 84 MMHG | WEIGHT: 264 LBS

## 2017-08-31 DIAGNOSIS — M25.572 ACUTE LEFT ANKLE PAIN: ICD-10-CM

## 2017-08-31 DIAGNOSIS — N76.0 VULVOVAGINITIS: Primary | ICD-10-CM

## 2017-08-31 DIAGNOSIS — Z28.21 INFLUENZA VACCINATION DECLINED: ICD-10-CM

## 2017-08-31 DIAGNOSIS — M79.671 RIGHT FOOT PAIN: ICD-10-CM

## 2017-08-31 DIAGNOSIS — E11.9 TYPE 2 DIABETES MELLITUS WITHOUT COMPLICATION, WITHOUT LONG-TERM CURRENT USE OF INSULIN (HCC): ICD-10-CM

## 2017-08-31 DIAGNOSIS — R30.0 BURNING WITH URINATION: ICD-10-CM

## 2017-08-31 LAB
APPEARANCE: CLEAR
BILIRUBIN: NEGATIVE
GLUCOSE (URINE DIPSTICK): NEGATIVE MG/DL
KETONES (URINE DIPSTICK): NEGATIVE MG/DL
LEUKOCYTES: NEGATIVE
MULTISTIX LOT#: NORMAL NUMERIC
NITRITE, URINE: NEGATIVE
PH, URINE: 5.5 (ref 4.5–8)
PROTEIN (URINE DIPSTICK): NEGATIVE MG/DL
SPECIFIC GRAVITY: 1.01 (ref 1–1.03)
URINE-COLOR: YELLOW
UROBILINOGEN,SEMI-QN: 0.2 MG/DL (ref 0–1.9)

## 2017-08-31 PROCEDURE — 81003 URINALYSIS AUTO W/O SCOPE: CPT | Performed by: FAMILY MEDICINE

## 2017-08-31 PROCEDURE — 99214 OFFICE O/P EST MOD 30 MIN: CPT | Performed by: FAMILY MEDICINE

## 2017-08-31 RX ORDER — LANSOPRAZOLE 15 MG/1
1 CAPSULE, DELAYED RELEASE ORAL DAILY
Refills: 0 | COMMUNITY
Start: 2017-08-01 | End: 2017-11-27 | Stop reason: ALTCHOICE

## 2017-08-31 RX ORDER — FLUCONAZOLE 150 MG/1
TABLET ORAL
Qty: 2 TABLET | Refills: 1 | Status: SHIPPED | OUTPATIENT
Start: 2017-08-31 | End: 2017-10-25

## 2017-08-31 NOTE — PROGRESS NOTES
Kian Tovar is a 40year old female. HPI:       Right foot pain:  Better, not 100%, but better. Wore boot for 2-3 weeks, stopped because it was throwing off her body mechanics. Left ankle pain:  Resolved.     Urogenital:  More itchiness than disco (RESTASIS) 0.05 % Ophthalmic Emulsion Place 1 drop into both eyes 2 (two) times daily. Disp:  Rfl:    HYDROcodone-acetaminophen (NORCO) 5-325 MG Oral Tab Take 1-2 tablets by mouth every 6 (six) hours as needed for Pain.  Disp: 40 tablet Rfl: 0      Parth anxious distress (HCC)       Berries                 Anaphylaxis  Casein                  Anaphylaxis  Levofloxacin            Rash    Comment:rash  Avocado                 Itching  Darvocet [Propoxyph*    Nausea and vomiting  Latex                   Rash, Past Surgical History:  No date: Trevon Warner      Comment: [facility]-24-12 [facility]  09/27/2013: COLONOSCOPY,BIOPSY      Comment: Sunshine Dawn M.D.   No date: [address] CERVICAL STUMP      Comment: 1996 and 1997 2006: HYSTERECTOMY      Commen regular rhythm and normal heart sounds. Pulmonary/Chest: Effort normal and breath sounds normal.   Abdominal: Soft. Bowel sounds are normal. There is no tenderness.    No CVA tenderness to percussion   Neurological: She is alert and oriented to person, p improved. 6. Acute left ankle pain  Resolved.             Orders Placed This Encounter      Urine Dip, auto without Micro      Comp Metabolic Panel (14) [E]      Hemoglobin A1C [E]      Lipid Panel [E]    Meds & Refills for this Visit:    Signed Prescrip

## 2017-09-05 RX ORDER — LEVOTHYROXINE SODIUM 0.12 MG/1
TABLET ORAL
Qty: 90 TABLET | Refills: 1 | Status: SHIPPED | OUTPATIENT
Start: 2017-09-05 | End: 2018-02-25

## 2017-09-06 ENCOUNTER — OFFICE VISIT (OUTPATIENT)
Dept: OCCUPATIONAL MEDICINE | Age: 47
End: 2017-09-06
Attending: PHYSICIAN ASSISTANT

## 2017-09-08 ENCOUNTER — OFFICE VISIT (OUTPATIENT)
Dept: OCCUPATIONAL MEDICINE | Age: 47
End: 2017-09-08
Attending: PHYSICIAN ASSISTANT

## 2017-09-12 ENCOUNTER — PATIENT OUTREACH (OUTPATIENT)
Dept: FAMILY MEDICINE CLINIC | Facility: CLINIC | Age: 47
End: 2017-09-12

## 2017-09-21 DIAGNOSIS — G43.009 MIGRAINE WITHOUT AURA AND WITHOUT STATUS MIGRAINOSUS, NOT INTRACTABLE: ICD-10-CM

## 2017-09-22 RX ORDER — TIZANIDINE 4 MG/1
TABLET ORAL
Qty: 20 TABLET | Refills: 0 | Status: SHIPPED | OUTPATIENT
Start: 2017-09-22 | End: 2017-11-26

## 2017-10-25 ENCOUNTER — OFFICE VISIT (OUTPATIENT)
Dept: INTERNAL MEDICINE CLINIC | Facility: CLINIC | Age: 47
End: 2017-10-25

## 2017-10-25 VITALS
HEART RATE: 78 BPM | WEIGHT: 261 LBS | DIASTOLIC BLOOD PRESSURE: 80 MMHG | HEIGHT: 61.5 IN | BODY MASS INDEX: 48.65 KG/M2 | SYSTOLIC BLOOD PRESSURE: 130 MMHG | RESPIRATION RATE: 16 BRPM

## 2017-10-25 DIAGNOSIS — E03.9 HYPOTHYROIDISM (ACQUIRED): ICD-10-CM

## 2017-10-25 DIAGNOSIS — Z51.81 THERAPEUTIC DRUG MONITORING: Primary | ICD-10-CM

## 2017-10-25 DIAGNOSIS — F33.0 MAJOR DEPRESSIVE DISORDER, RECURRENT EPISODE, MILD WITH ANXIOUS DISTRESS (HCC): ICD-10-CM

## 2017-10-25 DIAGNOSIS — E66.01 MORBID OBESITY WITH BMI OF 45.0-49.9, ADULT (HCC): ICD-10-CM

## 2017-10-25 PROCEDURE — 99214 OFFICE O/P EST MOD 30 MIN: CPT | Performed by: INTERNAL MEDICINE

## 2017-10-25 NOTE — PROGRESS NOTES
Maksim Leon is a 40year old female new to our office today.  Referred by Dr. Alma Scott phentermine a few times in the past  This most recent time it did not work well for her  Admits to stress eating as well     S:  Patient presents today with c warm, pink, dry without rashes to exposed area   EYES: conjunctiva pink, sclera non icteric, PERRLA  HEENT: atraumatic, normocephalic, O/p: Mallampati score- 2  NECK: supple, non tender, no adenopathy, no thyromegaly,   LUNGS: CTA in all fields, breathing Pal\" (other options are Lose it or [employer]) any of them will help you to monitor daily dietary intake and you will be able to see if you are eating the right amount of calories or too much or too little which would hinder weight loss.  When you set th

## 2017-10-26 NOTE — PATIENT INSTRUCTIONS
.Please try to work on the following dietary changes this first month  1. Drink lots of water and cut down on soda consumption if soda drinker  2.  Eat breakfast daily and focus on protein such as greek yogurt with fruit, cottage cheese, string cheese, hard

## 2017-11-01 ENCOUNTER — TELEPHONE (OUTPATIENT)
Dept: INTERNAL MEDICINE CLINIC | Facility: CLINIC | Age: 47
End: 2017-11-01

## 2017-11-01 NOTE — TELEPHONE ENCOUNTER
PA submitted to [facility] through [facility] for Contrave.  Awaiting approval or denial.

## 2017-11-01 NOTE — TELEPHONE ENCOUNTER
[id number] [place], [address] [facility] [address], [phone], [phone]    Patient states pharmacy is looking to clarify Contrave medication. Please f/u with patient.

## 2017-11-01 NOTE — TELEPHONE ENCOUNTER
Spoke with [facility] pharmacy who states that Contrave needs a prior Abad Grand. Karmen Ceballos  [phone]  Patient ID# [id number]    Please process.

## 2017-11-02 NOTE — TELEPHONE ENCOUNTER
Received notified from [facility] that Contrave was approved from 11/1/17-3/1/18. Called and notified pharmacy.

## 2017-11-06 ENCOUNTER — OFFICE VISIT (OUTPATIENT)
Dept: INTERNAL MEDICINE CLINIC | Facility: CLINIC | Age: 47
End: 2017-11-06

## 2017-11-06 VITALS — BODY MASS INDEX: 49 KG/M2 | WEIGHT: 261 LBS

## 2017-11-06 DIAGNOSIS — E66.01 OBESITY, CLASS III, BMI 40-49.9 (MORBID OBESITY) (HCC): ICD-10-CM

## 2017-11-06 PROCEDURE — 97802 MEDICAL NUTRITION INDIV IN: CPT | Performed by: DIETITIAN, REGISTERED

## 2017-11-06 NOTE — PROGRESS NOTES
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     Problem Morbid obesity with BMI of 45.0-49.9, adult (HCC)    Malaise    History of palpitations    Family history of CHF (congestive heart failure)    Family history of cardiomyopathy          Meds:    Current Outpatient Prescriptions on File Prior to Visit:  Cristo Rfl:    CycloSPORINE (RESTASIS) 0.05 % Ophthalmic Emulsion Place 1 drop into both eyes 2 (two) times daily. Disp:  Rfl:      No current facility-administered medications on file prior to visit.      Labs:   No components found for: HGBA1C    TRIGLYCERIDES peppers. Food/Beverage Intake: Oral recall  Breakfast: Eggs with vegetables, chorizo and toast today. Lunch: Eats at school on work days. If food contains allergen then she packs lunch  Dinner: Take out food most days. Emani Hamlin [facility] food. than eating    Monitoring/Evaluation:  Follow up appt scheduled with dietitian 12/4/17           Carlie Gonzalez MS, RD, LDN

## 2017-11-09 ENCOUNTER — APPOINTMENT (OUTPATIENT)
Dept: LAB | Age: 47
End: 2017-11-09
Attending: INTERNAL MEDICINE
Payer: COMMERCIAL

## 2017-11-09 DIAGNOSIS — E66.01 MORBID OBESITY WITH BMI OF 45.0-49.9, ADULT (HCC): ICD-10-CM

## 2017-11-09 PROCEDURE — 83036 HEMOGLOBIN GLYCOSYLATED A1C: CPT

## 2017-11-09 PROCEDURE — 82397 CHEMILUMINESCENT ASSAY: CPT

## 2017-11-09 PROCEDURE — 85025 COMPLETE CBC W/AUTO DIFF WBC: CPT

## 2017-11-09 PROCEDURE — 36415 COLL VENOUS BLD VENIPUNCTURE: CPT

## 2017-11-09 PROCEDURE — 84443 ASSAY THYROID STIM HORMONE: CPT

## 2017-11-09 PROCEDURE — 82607 VITAMIN B-12: CPT

## 2017-11-09 PROCEDURE — 84439 ASSAY OF FREE THYROXINE: CPT

## 2017-11-09 PROCEDURE — 82306 VITAMIN D 25 HYDROXY: CPT

## 2017-11-09 PROCEDURE — 80048 BASIC METABOLIC PNL TOTAL CA: CPT

## 2017-11-19 DIAGNOSIS — E11.9 TYPE 2 DIABETES MELLITUS WITHOUT COMPLICATION, WITHOUT LONG-TERM CURRENT USE OF INSULIN (HCC): ICD-10-CM

## 2017-11-19 DIAGNOSIS — I10 ESSENTIAL HYPERTENSION: ICD-10-CM

## 2017-11-20 RX ORDER — LOSARTAN POTASSIUM 50 MG/1
50 TABLET ORAL EVERY EVENING
Qty: 90 TABLET | Refills: 0 | Status: SHIPPED | OUTPATIENT
Start: 2017-11-20 | End: 2018-02-15

## 2017-11-21 ENCOUNTER — HOSPITAL ENCOUNTER (OUTPATIENT)
Dept: GENERAL RADIOLOGY | Age: 47
Discharge: HOME | End: 2017-11-21
Attending: FAMILY MEDICINE
Payer: COMMERCIAL

## 2017-11-21 ENCOUNTER — OFFICE VISIT (OUTPATIENT)
Dept: INTERNAL MEDICINE CLINIC | Facility: CLINIC | Age: 47
End: 2017-11-21

## 2017-11-21 ENCOUNTER — OFFICE VISIT (OUTPATIENT)
Dept: FAMILY MEDICINE CLINIC | Facility: CLINIC | Age: 47
End: 2017-11-21

## 2017-11-21 VITALS
HEART RATE: 80 BPM | DIASTOLIC BLOOD PRESSURE: 78 MMHG | RESPIRATION RATE: 16 BRPM | WEIGHT: 255 LBS | HEIGHT: 61.5 IN | BODY MASS INDEX: 47.53 KG/M2 | SYSTOLIC BLOOD PRESSURE: 122 MMHG

## 2017-11-21 VITALS
TEMPERATURE: 99 F | HEART RATE: 80 BPM | WEIGHT: 255 LBS | SYSTOLIC BLOOD PRESSURE: 152 MMHG | HEIGHT: 61.5 IN | DIASTOLIC BLOOD PRESSURE: 76 MMHG | RESPIRATION RATE: 16 BRPM | BODY MASS INDEX: 47.53 KG/M2

## 2017-11-21 DIAGNOSIS — E53.8 B12 DEFICIENCY: ICD-10-CM

## 2017-11-21 DIAGNOSIS — R10.2 PELVIC PAIN: Primary | ICD-10-CM

## 2017-11-21 DIAGNOSIS — Z87.19 HISTORY OF DIVERTICULITIS: ICD-10-CM

## 2017-11-21 DIAGNOSIS — Z51.81 THERAPEUTIC DRUG MONITORING: Primary | ICD-10-CM

## 2017-11-21 DIAGNOSIS — R10.2 PELVIC PAIN: ICD-10-CM

## 2017-11-21 DIAGNOSIS — Z87.442 HISTORY OF KIDNEY STONES: ICD-10-CM

## 2017-11-21 DIAGNOSIS — R31.29 MICROSCOPIC HEMATURIA: ICD-10-CM

## 2017-11-21 DIAGNOSIS — E66.01 MORBID OBESITY WITH BMI OF 45.0-49.9, ADULT (HCC): ICD-10-CM

## 2017-11-21 PROCEDURE — 74000 XR ABDOMEN (KUB) (1 AP VIEW)  (CPT=74000): CPT | Performed by: FAMILY MEDICINE

## 2017-11-21 PROCEDURE — 96372 THER/PROPH/DIAG INJ SC/IM: CPT | Performed by: INTERNAL MEDICINE

## 2017-11-21 PROCEDURE — 99213 OFFICE O/P EST LOW 20 MIN: CPT | Performed by: INTERNAL MEDICINE

## 2017-11-21 PROCEDURE — 87086 URINE CULTURE/COLONY COUNT: CPT | Performed by: FAMILY MEDICINE

## 2017-11-21 PROCEDURE — 81003 URINALYSIS AUTO W/O SCOPE: CPT | Performed by: FAMILY MEDICINE

## 2017-11-21 PROCEDURE — 99214 OFFICE O/P EST MOD 30 MIN: CPT | Performed by: FAMILY MEDICINE

## 2017-11-21 RX ORDER — CYANOCOBALAMIN 1000 UG/ML
1000 INJECTION INTRAMUSCULAR; SUBCUTANEOUS ONCE
Status: COMPLETED | OUTPATIENT
Start: 2017-11-21 | End: 2017-11-21

## 2017-11-21 RX ORDER — AMOXICILLIN AND CLAVULANATE POTASSIUM 875; 125 MG/1; MG/1
1 TABLET, FILM COATED ORAL EVERY 8 HOURS
Qty: 30 TABLET | Refills: 0 | Status: SHIPPED | OUTPATIENT
Start: 2017-11-21 | End: 2017-12-08 | Stop reason: SINTOL

## 2017-11-21 RX ORDER — ACETAMINOPHEN AND CODEINE PHOSPHATE 300; 30 MG/1; MG/1
TABLET ORAL
Qty: 40 TABLET | Refills: 0 | Status: SHIPPED | OUTPATIENT
Start: 2017-11-21 | End: 2018-01-18 | Stop reason: ALTCHOICE

## 2017-11-21 RX ADMIN — CYANOCOBALAMIN 1000 MCG: 1000 INJECTION INTRAMUSCULAR; SUBCUTANEOUS at 09:28:00

## 2017-11-21 NOTE — PATIENT INSTRUCTIONS
If you are feeling markedly better after completing 7 days of the antibiotic, stop taking it. Discharge Instructions for Diverticulitis  You have been diagnosed with diverticulitis.  This is a condition in which small pouches form in your colon your healthcare provider immediately if you have any of the following:  · Fever of 100.4°F (38.0°C) or higher, or as directed by your healthcare provider  · Chills  · Severe cramps in the belly, most commonly the lower left side  · Tenderness in the belly,

## 2017-11-21 NOTE — PROGRESS NOTES
Amita Perez is a 40year old female. HPI:       Pelvic pain:  Left. x2-3 days. Just above left groin. Not moving it is about a 5/10. Moving it goes up to an 8/10. Associated with nausea, no vomiting. Pressure on the area makes it worse.   Not li HYDROcodone-acetaminophen (NORCO) 5-325 MG Oral Tab Take 1-2 tablets by mouth every 6 (six) hours as needed for Pain.  Disp: 40 tablet Rfl: 0   lansoprazole (PREVACID) 30 MG Oral Capsule Delayed Release TAKE 1 CAPSULE BY MOUTH EVERY MORNING BEFORE BREAKFA Malaise     History of palpitations     Family history of CHF (congestive heart failure)     Family history of cardiomyopathy     Sprain of anterior talofibular ligament of right ankle     Right foot pain     Acute right ankle pain     Acute left ankle nancy pt unsure of which side   • Mixed hyperlipidemia 12/10/2014   • Morbid obesity with BMI of 45.0-49.9, adult ([facility] [address] 7/13/2014   • Polycystic ovaries     ovary embedded in pelvic wall--right side   • PONV (postoperative nausea and vomiting)    • Primary op 47.40 kg/m²  Estimated body mass index is 47.4 kg/m² as calculated from the following:    Height as of this encounter: 61.5\". Weight as of this encounter: 255 lb. Physical Exam   Nursing note and vitals reviewed.    Constitutional: She is oriented to p VIEW)  (CPT=74000)     INDICATIONS:  Y33.076 Personal history of urinary calculi R10.2 Pelvic and perineal pain     COMPARISON:  [facility], CT ABDOMEN+PELVIS(CPT=74176), 8/02/2017, 8:55. TECHNIQUE:  Supine AP view was obtained.      PATIENT STATED HIS hours  Dispense: 40 tablet; Refill: 0  - URINE CULTURE, ROUTINE; Future  - URINE CULTURE, ROUTINE    2. Microscopic hematuria  Urine culture pending.  - URINE CULTURE, ROUTINE; Future  - URINE CULTURE, ROUTINE    3.  History of diverticulitis  As above in #

## 2017-11-21 NOTE — PROGRESS NOTES
CC: Patient presents with:  Weight Check: down 6 lb       HPI:     Had some headaches and nausea. Drinking lots of water. 40 ounces a day.    About 20 ounces per day   Worked with [employer]           Current Outpatient Prescriptions:  Naltrexone-Bupropio CycloSPORINE (RESTASIS) 0.05 % Ophthalmic Emulsion Place 1 drop into both eyes 2 (two) times daily.    Disp:  Rfl:       Past Medical History:   Diagnosis Date   • Abnormal EKG 1/24/2015   • Arthritis     back   • Chronic low back pain 3/8/2014   • Colon medication use     Hypothyroidism (acquired)     Diverticulosis of large intestine without hemorrhage     Essential hypertension     Bilateral low back pain without sciatica     Bilateral thoracic back pain     Acute pain of both knees     Primary open ang CARDIO: RRR without murmur  GI: good BS's,NT/ND, no masses or HSM  EXTREMITIES: no cyanosis, no clubbing, no edema    No orders of the defined types were placed in this encounter.        Signed Prescriptions Disp Refills    Naltrexone-Bupropion HCl ER (CO

## 2017-11-26 DIAGNOSIS — G43.009 MIGRAINE WITHOUT AURA AND WITHOUT STATUS MIGRAINOSUS, NOT INTRACTABLE: ICD-10-CM

## 2017-11-26 PROBLEM — R31.29 MICROSCOPIC HEMATURIA: Status: ACTIVE | Noted: 2017-11-26

## 2017-11-26 PROBLEM — R10.2 PELVIC PAIN: Status: ACTIVE | Noted: 2017-11-26

## 2017-11-27 ENCOUNTER — TELEPHONE (OUTPATIENT)
Dept: FAMILY MEDICINE CLINIC | Facility: CLINIC | Age: 47
End: 2017-11-27

## 2017-11-27 ENCOUNTER — OFFICE VISIT (OUTPATIENT)
Dept: FAMILY MEDICINE CLINIC | Facility: CLINIC | Age: 47
End: 2017-11-27

## 2017-11-27 VITALS
SYSTOLIC BLOOD PRESSURE: 132 MMHG | BODY MASS INDEX: 47.12 KG/M2 | HEART RATE: 80 BPM | HEIGHT: 61.5 IN | WEIGHT: 252.81 LBS | DIASTOLIC BLOOD PRESSURE: 78 MMHG | RESPIRATION RATE: 16 BRPM | TEMPERATURE: 98 F

## 2017-11-27 DIAGNOSIS — G44.89 OTHER HEADACHE SYNDROME: ICD-10-CM

## 2017-11-27 DIAGNOSIS — R23.3 PETECHIAE: ICD-10-CM

## 2017-11-27 DIAGNOSIS — R10.32 LEFT LOWER QUADRANT PAIN: ICD-10-CM

## 2017-11-27 DIAGNOSIS — R11.2 NON-INTRACTABLE VOMITING WITH NAUSEA, UNSPECIFIED VOMITING TYPE: Primary | ICD-10-CM

## 2017-11-27 PROCEDURE — 99214 OFFICE O/P EST MOD 30 MIN: CPT | Performed by: FAMILY MEDICINE

## 2017-11-27 RX ORDER — ONDANSETRON 8 MG/1
8 TABLET, ORALLY DISINTEGRATING ORAL 2 TIMES DAILY PRN
Qty: 20 TABLET | Refills: 0 | Status: SHIPPED | OUTPATIENT
Start: 2017-11-27 | End: 2018-01-18

## 2017-11-27 NOTE — TELEPHONE ENCOUNTER
Spoke to patient and she said her blood sugar got low last night and then she threw up last night and now has some broken blood vessels in her face with the left side hurting more than the right. She was throwing up pretty hard and several times.     She s

## 2017-11-27 NOTE — PROGRESS NOTES
Cassy Haywood is a 40year old female. HPI:     Abdominal pain:  Left lower quadrant. Augmentin started 11/21/2017 at night, took 5 doses of the Augmentin. Last dose was 11/24/2017. The pain is \"finally a lot better\".   Nausea on and off started af 5-325 MG Oral Tab Take 1-2 tablets by mouth every 6 (six) hours as needed for Pain. Disp: 40 tablet Rfl: 0   lansoprazole (PREVACID) 30 MG Oral Capsule Delayed Release TAKE 1 CAPSULE BY MOUTH EVERY MORNING BEFORE BREAKFAST.  (Patient taking differently: ARMINDA knee     Trochanteric bursitis, right hip     Morbid obesity with BMI of 45.0-49.9, adult (HCC)     Palpitations     Angular cheilitis     Migraine without aura and without status migrainosus, not intractable     Polyarthralgia     Myalgia     New daily pe stones 10/17/2013   • History of palpitations 6/4/2017   • Hypothyroidism    • Kidney infection 11/30/15    currently on macrobid for kidney infection-instructed to inform surgeon of infection.    • Kidney stone     since age 16   • Low HDL (under 40) 8/18/ As in HPI   Genitourinary: Negative for dysuria, urgency, frequency and hematuria. Musculoskeletal: Negative for myalgias, joint swelling and joint pain.    Skin:        As in HPI   Neurological: Negative for dizziness, weakness, light-headedness a Supine AP view was obtained. PATIENT STATED HISTORY: (As transcribed by Technologist)  Lower left abdomen pain with  nausea for 2 days. History of left kidney stone 2016 and pelvic emoblism in 2006.           FINDINGS:    BOWEL GAS PATTERN:  Moderate to

## 2017-11-27 NOTE — TELEPHONE ENCOUNTER
Pankaj Love is calling to let Dr Shannen Valdovinos know that she is still not feeling well she got sick last night and broke blood vessels in her face, and she is having pain on her left side of her head, she said her whole head hurts but the left side is more noticeabl

## 2017-11-28 ENCOUNTER — TELEPHONE (OUTPATIENT)
Dept: FAMILY MEDICINE CLINIC | Facility: CLINIC | Age: 47
End: 2017-11-28

## 2017-11-28 RX ORDER — TIZANIDINE 4 MG/1
TABLET ORAL
Qty: 20 TABLET | Refills: 0 | Status: SHIPPED | OUTPATIENT
Start: 2017-11-28 | End: 2017-12-17

## 2017-11-29 ENCOUNTER — TELEPHONE (OUTPATIENT)
Dept: FAMILY MEDICINE CLINIC | Facility: CLINIC | Age: 47
End: 2017-11-29

## 2017-11-29 ENCOUNTER — HOSPITAL ENCOUNTER (OUTPATIENT)
Dept: CT IMAGING | Age: 47
Discharge: HOME | End: 2017-11-29
Attending: FAMILY MEDICINE
Payer: COMMERCIAL

## 2017-11-29 DIAGNOSIS — H53.419 VISUAL FIELD SCOTOMA, UNSPECIFIED LATERALITY: ICD-10-CM

## 2017-11-29 DIAGNOSIS — G44.84 EXERTIONAL HEADACHE: Primary | ICD-10-CM

## 2017-11-29 DIAGNOSIS — G44.84 EXERTIONAL HEADACHE: ICD-10-CM

## 2017-11-29 PROCEDURE — 70450 CT HEAD/BRAIN W/O DYE: CPT | Performed by: FAMILY MEDICINE

## 2017-11-29 NOTE — TELEPHONE ENCOUNTER
Giselle Cazares spoke to Dr. Samual Pallas last night. She was told to call and speak to Nicholas De Leon this a.m. Pt is still not feeling better.

## 2017-11-29 NOTE — TELEPHONE ENCOUNTER
Spoke to patient and she said she is still feeling the same. Her headache is just a little better but once she gets up it starts pounding again and getting nauseated.  She will contact Dr. Anabell Byrne office for an appt as she is still also experiencing

## 2017-11-29 NOTE — TELEPHONE ENCOUNTER
Spoke with the pt and informed her that Ct was negative for acute intracranial hemorrhage, per Dr. Linda Lundberg advised to call the office tomorrow with an update and if no better she needs to be seen in the office Friday.   Pt verbalized understanding and had n

## 2017-11-29 NOTE — TELEPHONE ENCOUNTER
Outgoing call made to pt. Acute headache different than her normal is not improving. As soon as she sits up she becomes nauseated and headache worsens. When she gets up to walk around headache worsens even further.   Denies numbness or tingling or weakne

## 2017-11-30 NOTE — PATIENT INSTRUCTIONS
Self-Care for Headaches  Most headaches aren't serious and can be relieved with self-care. But some headaches may be a sign of another health problem like eye trouble or high blood pressure.  To find the best treatment, learn what kind of headaches you ge · Bright spots, flashes, or other visual changes  · Pain or nausea so severe that you can't continue your daily activities  Call your healthcare provider   If you have any of the following symptoms, contact your healthcare provider:  · A headache that ling

## 2017-12-04 ENCOUNTER — OFFICE VISIT (OUTPATIENT)
Dept: INTERNAL MEDICINE CLINIC | Facility: CLINIC | Age: 47
End: 2017-12-04

## 2017-12-04 ENCOUNTER — TELEPHONE (OUTPATIENT)
Dept: FAMILY MEDICINE CLINIC | Facility: CLINIC | Age: 47
End: 2017-12-04

## 2017-12-04 VITALS — WEIGHT: 256 LBS | BODY MASS INDEX: 48 KG/M2

## 2017-12-04 DIAGNOSIS — E66.01 OBESITY, CLASS III, BMI 40-49.9 (MORBID OBESITY) (HCC): ICD-10-CM

## 2017-12-04 PROCEDURE — 97803 MED NUTRITION INDIV SUBSEQ: CPT | Performed by: DIETITIAN, REGISTERED

## 2017-12-04 NOTE — PROGRESS NOTES
FOLLOW UP NUTRITION CONSULTATION    Nutrition Assessment    Number of consultations with dietitian: 2    Height:  Ht Readings from Last 1 Encounters:  11/27/17 : 61.5\"      Weight:   Wt Readings from Last 2 Encounters:  12/04/17 : 256 lb  11/27/17 : 25

## 2017-12-04 NOTE — TELEPHONE ENCOUNTER
New RTW note printed  LMTCB-does patient want to  letter or would she like it faxed to employer.  Will need fax #

## 2017-12-04 NOTE — TELEPHONE ENCOUNTER
Salomón Dennis is calling to let Dr Jeanne Salcedo know that her headaches have gotten better, but she is still having them, she has not went back to work yet, so she will need a note for work, if Dr Jeanne Salcedo needs to see her again the note can wait till after she sees h

## 2017-12-08 ENCOUNTER — OFFICE VISIT (OUTPATIENT)
Dept: FAMILY MEDICINE CLINIC | Facility: CLINIC | Age: 47
End: 2017-12-08

## 2017-12-08 VITALS
SYSTOLIC BLOOD PRESSURE: 160 MMHG | OXYGEN SATURATION: 96 % | WEIGHT: 253 LBS | RESPIRATION RATE: 16 BRPM | DIASTOLIC BLOOD PRESSURE: 87 MMHG | BODY MASS INDEX: 47 KG/M2 | HEART RATE: 80 BPM

## 2017-12-08 DIAGNOSIS — G44.1 OTHER VASCULAR HEADACHE: Primary | ICD-10-CM

## 2017-12-08 DIAGNOSIS — R42 VERTIGO: ICD-10-CM

## 2017-12-08 DIAGNOSIS — R10.2 PELVIC PAIN: ICD-10-CM

## 2017-12-08 DIAGNOSIS — E11.9 TYPE 2 DIABETES MELLITUS WITHOUT COMPLICATION, WITHOUT LONG-TERM CURRENT USE OF INSULIN (HCC): ICD-10-CM

## 2017-12-08 DIAGNOSIS — H43.813 POSTERIOR VITREOUS DETACHMENT OF BOTH EYES: ICD-10-CM

## 2017-12-08 PROCEDURE — 81003 URINALYSIS AUTO W/O SCOPE: CPT | Performed by: FAMILY MEDICINE

## 2017-12-08 PROCEDURE — 99214 OFFICE O/P EST MOD 30 MIN: CPT | Performed by: FAMILY MEDICINE

## 2017-12-08 RX ORDER — MECLIZINE HYDROCHLORIDE 25 MG/1
25 TABLET ORAL 3 TIMES DAILY PRN
Qty: 21 TABLET | Refills: 0 | Status: SHIPPED | OUTPATIENT
Start: 2017-12-08 | End: 2017-12-16

## 2017-12-08 NOTE — PROGRESS NOTES
Linda Flores is a 56year old female. HPI:       Headache:  Improving. Headache started after severe Valsalva with vomiting. HA is about 50% better. \"at least now I can function\".   Having dizziness with standing and turning and with walking and tu Oils for allergies Disp:  Rfl:    simvastatin 20 MG Oral Tab Take 1 tablet (20 mg total) by mouth nightly.  Disp: 90 tablet Rfl: 1   ACCU-CHEK DARRICK PLUS In Vitro Strip USE TO TEST BLOOD 3 TIMES A DAY AS DIRECTED Disp: 300 strip Rfl: 2   ACCU-CHEK MULTICLIX Palpitations     Angular cheilitis     Migraine without aura and without status migrainosus, not intractable     Polyarthralgia     Myalgia     New daily persistent headache     Malaise     History of palpitations     Family history of CHF (congestive hear 10/17/2013   • History of palpitations 6/4/2017   • Hypothyroidism    • Kidney infection 11/30/15    currently on macrobid for kidney infection-instructed to inform surgeon of infection.    • Kidney stone     since age 16   • Low HDL (under 40) 8/18/2015 Negative for weakness and numbness. Psychiatric/Behavioral:        Mood is \"okay\" today.        EXAM:   /87 (BP Location: Left arm, Patient Position: Sitting, Cuff Size: large)   Pulse 80   Resp 16   Wt 253 lb   SpO2 96%   BMI 47.03 kg/m²  Estimat (H)   BUN      8 - 20 mg/dL 14   CREATININE      0.55 - 1.02 mg/dL 0.72   GFR      >=60 101   CALCIUM      8.3 - 10.3 mg/dL 8.7   ALKALINE PHOSPHATASE      37 - 98 U/L 97   AST (SGOT)      15 - 41 U/L 13 (L)   ALT (SGPT)      14 - 54 U/L 24   Total Bilirub instructed. 5. Type 2 diabetes mellitus without complication, without long-term current use of insulin (HCC)  Controlled. Continue metformin. Check labs in 3 months.    - CBC WITH DIFFERENTIAL WITH PLATELET; Future  - COMP METABOLIC PANEL (14);  Future

## 2017-12-16 DIAGNOSIS — R42 VERTIGO: ICD-10-CM

## 2017-12-17 DIAGNOSIS — G43.009 MIGRAINE WITHOUT AURA AND WITHOUT STATUS MIGRAINOSUS, NOT INTRACTABLE: ICD-10-CM

## 2017-12-18 RX ORDER — TIZANIDINE 4 MG/1
TABLET ORAL
Qty: 20 TABLET | Refills: 0 | Status: SHIPPED | OUTPATIENT
Start: 2017-12-18 | End: 2018-01-04

## 2017-12-18 RX ORDER — MECLIZINE HYDROCHLORIDE 25 MG/1
TABLET ORAL
Qty: 21 TABLET | Refills: 0 | Status: SHIPPED | OUTPATIENT
Start: 2017-12-18 | End: 2017-12-29

## 2017-12-21 ENCOUNTER — OFFICE VISIT (OUTPATIENT)
Dept: INTERNAL MEDICINE CLINIC | Facility: CLINIC | Age: 47
End: 2017-12-21

## 2017-12-21 VITALS
SYSTOLIC BLOOD PRESSURE: 103 MMHG | RESPIRATION RATE: 16 BRPM | WEIGHT: 252 LBS | HEIGHT: 61.5 IN | DIASTOLIC BLOOD PRESSURE: 86 MMHG | BODY MASS INDEX: 46.97 KG/M2 | HEART RATE: 80 BPM

## 2017-12-21 DIAGNOSIS — E53.8 VITAMIN B12 DEFICIENCY: ICD-10-CM

## 2017-12-21 DIAGNOSIS — Z51.81 THERAPEUTIC DRUG MONITORING: Primary | ICD-10-CM

## 2017-12-21 DIAGNOSIS — E66.01 MORBID OBESITY WITH BMI OF 45.0-49.9, ADULT (HCC): ICD-10-CM

## 2017-12-21 PROCEDURE — 96372 THER/PROPH/DIAG INJ SC/IM: CPT | Performed by: INTERNAL MEDICINE

## 2017-12-21 PROCEDURE — 99213 OFFICE O/P EST LOW 20 MIN: CPT | Performed by: INTERNAL MEDICINE

## 2017-12-21 RX ORDER — CYANOCOBALAMIN 1000 UG/ML
1000 INJECTION INTRAMUSCULAR; SUBCUTANEOUS ONCE
Status: COMPLETED | OUTPATIENT
Start: 2017-12-21 | End: 2017-12-21

## 2017-12-21 RX ADMIN — CYANOCOBALAMIN 1000 MCG: 1000 INJECTION INTRAMUSCULAR; SUBCUTANEOUS at 09:27:00

## 2017-12-21 NOTE — PROGRESS NOTES
CC: Patient presents with:  Weight Check: lost 3 pounds       HPI:     Having some nausea and headache over the past week. Saw her PCP and thought it was related to vertigo   Has been eating soup or toast for breakfast due to her nausea.          Pete Husain BREAKFAST. (Patient taking differently: TAKE 1  CAPSULE BY MOUTH  DAILY. ) Disp: 30 capsule Rfl: 1   Cholecalciferol (VITAMIN D3) 2000 UNITS Oral Tab Take 1 tablet by mouth daily.    Disp:  Rfl:    CycloSPORINE (RESTASIS) 0.05 % Ophthalmic Emulsion Place 1 d complication ([facility] [address] 10/3/8377      Patient Active Problem List:     Allergic rhinitis     History of kidney stones     H/O colonoscopy with polypectomy     Eczema     GERD (gastroesophageal reflux disease)     Chronic low back pain     Mixed hyperlipidemia chills or night sweats   RESPIRATORY: denies shortness of breath   CARDIOVASCULAR: denies chest pain  GI: denies abdominal pain    EXAM:   /86   Pulse 80   Resp 16   Ht 61.5\"   Wt 252 lb   BMI 46.84 kg/m²   GENERAL: well developed, well nourished,in

## 2017-12-26 RX ORDER — SIMVASTATIN 20 MG
TABLET ORAL
Qty: 90 TABLET | Refills: 0 | OUTPATIENT
Start: 2017-12-26

## 2017-12-29 DIAGNOSIS — R42 VERTIGO: ICD-10-CM

## 2017-12-29 RX ORDER — MECLIZINE HYDROCHLORIDE 25 MG/1
TABLET ORAL
Qty: 21 TABLET | Refills: 0 | Status: SHIPPED | OUTPATIENT
Start: 2017-12-29 | End: 2018-01-18

## 2018-01-02 RX ORDER — SIMVASTATIN 20 MG
TABLET ORAL
Qty: 90 TABLET | Refills: 0 | Status: SHIPPED | OUTPATIENT
Start: 2018-01-02 | End: 2018-01-18

## 2018-01-03 ENCOUNTER — PATIENT OUTREACH (OUTPATIENT)
Dept: FAMILY MEDICINE CLINIC | Facility: CLINIC | Age: 48
End: 2018-01-03

## 2018-01-04 ENCOUNTER — OFFICE VISIT (OUTPATIENT)
Dept: INTERNAL MEDICINE CLINIC | Facility: CLINIC | Age: 48
End: 2018-01-04

## 2018-01-04 VITALS — WEIGHT: 251 LBS | BODY MASS INDEX: 47 KG/M2

## 2018-01-04 DIAGNOSIS — G43.009 MIGRAINE WITHOUT AURA AND WITHOUT STATUS MIGRAINOSUS, NOT INTRACTABLE: ICD-10-CM

## 2018-01-04 DIAGNOSIS — E66.01 OBESITY, CLASS III, BMI 40-49.9 (MORBID OBESITY) (HCC): ICD-10-CM

## 2018-01-04 PROCEDURE — 97803 MED NUTRITION INDIV SUBSEQ: CPT | Performed by: DIETITIAN, REGISTERED

## 2018-01-04 RX ORDER — TIZANIDINE 4 MG/1
TABLET ORAL
Qty: 20 TABLET | Refills: 0 | Status: SHIPPED | OUTPATIENT
Start: 2018-01-04 | End: 2018-03-11

## 2018-01-04 NOTE — PROGRESS NOTES
FOLLOW UP NUTRITION CONSULTATION    Nutrition Assessment    Number of consultations with dietitian: 2    Height:  Ht Readings from Last 1 Encounters:  12/21/17 : 61.5\"      Weight:   Wt Readings from Last 2 Encounters:  01/04/18 : 251 lb  12/21/17 : 25

## 2018-01-08 ENCOUNTER — TELEPHONE (OUTPATIENT)
Dept: FAMILY MEDICINE CLINIC | Facility: CLINIC | Age: 48
End: 2018-01-08

## 2018-01-08 NOTE — TELEPHONE ENCOUNTER
Cecilia Chris is calling to see if she can be seen by Dr Adrian Tovar today, she has been shakey for the last 4 days and today she is having nausea with the shaking. Please call her at [phone] She would like something for later this afternoon.

## 2018-01-08 NOTE — TELEPHONE ENCOUNTER
Spoke to patient and she said that she has been \"supershaky\" for 4 days and today very nauseated and headache  Denies any cough, cold, fever. BS was 120 this AM per patient.   Will discuss with Dr. Francis Ramirez for further instructions

## 2018-01-08 NOTE — TELEPHONE ENCOUNTER
After discussing with Dr. Paty Lange pt is to start taking Vitamin C 500-1000mg twice a day, keep well hydrated and to call office tomorrow with an update. To also call office should she develop a fever. Pt verbalized understanding.   Per Dr. Paty Lange she may

## 2018-01-08 NOTE — TELEPHONE ENCOUNTER
Annamarie Carroll would like to know if Dr Don Hernandez can give her a note for work today since she was off, please send it through my chart and they can just print it off.

## 2018-01-09 NOTE — TELEPHONE ENCOUNTER
Pt is calling to let us know she is not doing better. Headache/slight fever -cough. Spoke to Dr. Ayoub Dom he said to continue with over the counter medication    I told Ev Gallegos to call us in a day or two if she is not getting better. Pt understood.

## 2018-01-12 ENCOUNTER — TELEPHONE (OUTPATIENT)
Dept: FAMILY MEDICINE CLINIC | Facility: CLINIC | Age: 48
End: 2018-01-12

## 2018-01-12 NOTE — TELEPHONE ENCOUNTER
Kiarra Chu is calling to see if Dr Colleen Desai can give her a note for work for this week and she will return to work on [address] Jan 16th, she would like it sent to My Chart so she can just print it off and take it to work

## 2018-01-15 ENCOUNTER — TELEPHONE (OUTPATIENT)
Dept: FAMILY MEDICINE CLINIC | Facility: CLINIC | Age: 48
End: 2018-01-15

## 2018-01-15 NOTE — TELEPHONE ENCOUNTER
Kiarra Chu is calling to see if Dr Colleen Desai can give her a note for work for this week, she thinks her flu came back and it is worse than last week, she is very shakey and cannot hold anything, she did make a appt for Thursday this week.

## 2018-01-18 ENCOUNTER — OFFICE VISIT (OUTPATIENT)
Dept: FAMILY MEDICINE CLINIC | Facility: CLINIC | Age: 48
End: 2018-01-18

## 2018-01-18 VITALS
HEART RATE: 83 BPM | OXYGEN SATURATION: 96 % | BODY MASS INDEX: 45 KG/M2 | DIASTOLIC BLOOD PRESSURE: 83 MMHG | SYSTOLIC BLOOD PRESSURE: 136 MMHG | WEIGHT: 242 LBS

## 2018-01-18 DIAGNOSIS — R11.2 NON-INTRACTABLE VOMITING WITH NAUSEA, UNSPECIFIED VOMITING TYPE: ICD-10-CM

## 2018-01-18 DIAGNOSIS — T50.905A ADVERSE EFFECT OF DRUG, INITIAL ENCOUNTER: ICD-10-CM

## 2018-01-18 DIAGNOSIS — E66.01 MORBID OBESITY WITH BMI OF 40.0-44.9, ADULT (HCC): ICD-10-CM

## 2018-01-18 DIAGNOSIS — R42 VERTIGO: Primary | ICD-10-CM

## 2018-01-18 DIAGNOSIS — R25.1 SHAKINESS: ICD-10-CM

## 2018-01-18 DIAGNOSIS — H43.813 POSTERIOR VITREOUS DETACHMENT OF BOTH EYES: ICD-10-CM

## 2018-01-18 PROCEDURE — 99214 OFFICE O/P EST MOD 30 MIN: CPT | Performed by: FAMILY MEDICINE

## 2018-01-18 RX ORDER — MECLIZINE HYDROCHLORIDE 25 MG/1
25 TABLET ORAL 3 TIMES DAILY PRN
Qty: 30 TABLET | Refills: 0 | Status: SHIPPED | OUTPATIENT
Start: 2018-01-18 | End: 2018-09-12

## 2018-01-18 RX ORDER — ONDANSETRON 8 MG/1
8 TABLET, ORALLY DISINTEGRATING ORAL 2 TIMES DAILY PRN
Qty: 20 TABLET | Refills: 0 | Status: SHIPPED | OUTPATIENT
Start: 2018-01-18 | End: 2018-09-12

## 2018-01-18 NOTE — PROGRESS NOTES
Jose Puentes is a 56year old female. HPI:         Patient has been feeling shaky,jittery,off balance x 3 weeks. Denies chest pain or palpitations. She feels it may be a side effect from the Contrave.     Request refill on meclizine, zofran for dizzi D3) 2000 UNITS Oral Tab Take 1 tablet by mouth daily. Disp:  Rfl:    CycloSPORINE (RESTASIS) 0.05 % Ophthalmic Emulsion Place 1 drop into both eyes 2 (two) times daily.    Disp:  Rfl:    ONETOUCH DELICA LANCETS 22O Does not apply Misc USE AS DIRECTED 3 TI ankle pain     Acute left ankle pain     Major depressive disorder, recurrent episode, mild with anxious distress (HCC)     B12 deficiency     Pelvic pain     Microscopic hematuria     Posterior vitreous detachment of both eyes     Vertigo     Non-intracta hyperlipidemia 12/10/2014   • Morbid obesity with BMI of 45.0-49.9, adult ([facility] [address] 7/13/2014   • Polycystic ovaries     ovary embedded in pelvic wall--right side   • PONV (postoperative nausea and vomiting)    • Posterior vitreous detachment of both eyes 12/8 136/83 (BP Location: Left arm, Patient Position: Sitting, Cuff Size: large)   Pulse 83   Wt 242 lb   SpO2 96%   BMI 44.99 kg/m²  Estimated body mass index is 44.99 kg/m² as calculated from the following:    Height as of 12/21/17: 61.5\".     Weight as of th (three) times daily as needed. Dispense: 30 tablet; Refill: 0    2. Non-intractable vomiting with nausea, unspecified vomiting type  Ondansetron to take on an as-needed basis when she feels nauseated and feels like she may vomit.     - ondansetron 8 MG Ora

## 2018-01-22 RX ORDER — LANCETS 33 GAUGE
EACH MISCELLANEOUS
Qty: 300 EACH | Refills: 0 | Status: SHIPPED | OUTPATIENT
Start: 2018-01-22 | End: 2018-06-25

## 2018-01-23 ENCOUNTER — OFFICE VISIT (OUTPATIENT)
Dept: INTERNAL MEDICINE CLINIC | Facility: CLINIC | Age: 48
End: 2018-01-23

## 2018-01-23 VITALS
HEART RATE: 78 BPM | BODY MASS INDEX: 45.48 KG/M2 | HEIGHT: 61.5 IN | DIASTOLIC BLOOD PRESSURE: 78 MMHG | WEIGHT: 244 LBS | RESPIRATION RATE: 16 BRPM | SYSTOLIC BLOOD PRESSURE: 126 MMHG

## 2018-01-23 DIAGNOSIS — E53.8 B12 DEFICIENCY: ICD-10-CM

## 2018-01-23 DIAGNOSIS — Z51.81 THERAPEUTIC DRUG MONITORING: Primary | ICD-10-CM

## 2018-01-23 DIAGNOSIS — E66.01 MORBID OBESITY WITH BMI OF 45.0-49.9, ADULT (HCC): ICD-10-CM

## 2018-01-23 PROBLEM — T50.905A DRUG REACTION: Status: ACTIVE | Noted: 2018-01-23

## 2018-01-23 PROBLEM — R42 VERTIGO: Status: ACTIVE | Noted: 2018-01-23

## 2018-01-23 PROBLEM — R25.1 SHAKINESS: Status: ACTIVE | Noted: 2018-01-23

## 2018-01-23 PROBLEM — R11.2 NON-INTRACTABLE VOMITING WITH NAUSEA: Status: ACTIVE | Noted: 2018-01-23

## 2018-01-23 PROCEDURE — 99213 OFFICE O/P EST LOW 20 MIN: CPT | Performed by: INTERNAL MEDICINE

## 2018-01-23 PROCEDURE — 96372 THER/PROPH/DIAG INJ SC/IM: CPT | Performed by: INTERNAL MEDICINE

## 2018-01-23 RX ORDER — CYANOCOBALAMIN 1000 UG/ML
1000 INJECTION INTRAMUSCULAR; SUBCUTANEOUS ONCE
Status: COMPLETED | OUTPATIENT
Start: 2018-01-23 | End: 2018-01-23

## 2018-01-23 RX ADMIN — CYANOCOBALAMIN 1000 MCG: 1000 INJECTION INTRAMUSCULAR; SUBCUTANEOUS at 17:01:00

## 2018-01-23 NOTE — PROGRESS NOTES
CC: Patient presents with:  Weight Check: down 8 lb       HPI:     Contrave was discontinued due to nausea and vomiting. Feeling much better today. Went back to work this week.  Not needing the meclizine anymore  Denies any chest pain or shortness of br 3/8/2014   • Colon polyp 09/27/2013    Prasanna Hwang M.D.   • Diverticulitis of sigmoid colon 10/17/2013   • Diverticulosis 09/27/2013    Prasanna Hwang M.D.   • Dry eyes, bilateral    • Essential hypertension 3/5/2016    New diagnosis    • Es sciatica     Bilateral thoracic back pain     Acute pain of both knees     Primary open angle glaucoma of both eyes, moderate stage     Morbid obesity with BMI of 40.0-44.9, adult (HCC)     Posterior pain of hip     Lateral pain of right hip     Bursitis supple,no adenopathy  LUNGS: clear to auscultation bilaterally   CARDIO: RRR without murmur  GI: good BS's,NT/ND, no masses or HSM  EXTREMITIES: no cyanosis, no clubbing, no edema    No orders of the defined types were placed in this encounter.        No pr

## 2018-01-25 ENCOUNTER — PATIENT MESSAGE (OUTPATIENT)
Dept: INTERNAL MEDICINE CLINIC | Facility: CLINIC | Age: 48
End: 2018-01-25

## 2018-01-25 NOTE — TELEPHONE ENCOUNTER
I tried to call patient but it went straight to voicemail. This is really common coming off contrave especially cold turkey. I would take it easy for the next week or so and symptoms should resolve.  I also noticed Star Christie has an appt with Nel Herndon today,

## 2018-01-25 NOTE — TELEPHONE ENCOUNTER
From: Malathi Hughes  To: Kim Rosario MD  Sent: 1/25/2018 8:39 AM CST  Subject: Visit Follow-up Question    Good morning Dr. Drew Patton,     I'm writing to ask if stopping the Contrave without weaning off like I should have can cause depression type feelings a

## 2018-01-25 NOTE — TELEPHONE ENCOUNTER
Please read patient message and advise. LOV: 1/23/18  lost 17 lbs thus far but no longer tolerating contrave.  Will continue with lifestyle modification   -encouraged exercising once patient is feeling better  -patient is highly interested in bariatric s

## 2018-02-15 ENCOUNTER — OFFICE VISIT (OUTPATIENT)
Dept: FAMILY MEDICINE CLINIC | Facility: CLINIC | Age: 48
End: 2018-02-15

## 2018-02-15 VITALS
SYSTOLIC BLOOD PRESSURE: 128 MMHG | HEART RATE: 72 BPM | HEIGHT: 61.5 IN | BODY MASS INDEX: 45.78 KG/M2 | RESPIRATION RATE: 16 BRPM | WEIGHT: 245.63 LBS | DIASTOLIC BLOOD PRESSURE: 88 MMHG

## 2018-02-15 DIAGNOSIS — M75.22 BICEPS TENDINITIS OF LEFT UPPER EXTREMITY: ICD-10-CM

## 2018-02-15 DIAGNOSIS — I10 ESSENTIAL HYPERTENSION: ICD-10-CM

## 2018-02-15 DIAGNOSIS — E11.9 TYPE 2 DIABETES MELLITUS WITHOUT COMPLICATION, WITHOUT LONG-TERM CURRENT USE OF INSULIN (HCC): ICD-10-CM

## 2018-02-15 DIAGNOSIS — R42 VERTIGO: ICD-10-CM

## 2018-02-15 DIAGNOSIS — H43.813 POSTERIOR VITREOUS DETACHMENT OF BOTH EYES: ICD-10-CM

## 2018-02-15 DIAGNOSIS — M25.512 ACUTE PAIN OF LEFT SHOULDER: Primary | ICD-10-CM

## 2018-02-15 PROCEDURE — 99214 OFFICE O/P EST MOD 30 MIN: CPT | Performed by: FAMILY MEDICINE

## 2018-02-15 RX ORDER — LOSARTAN POTASSIUM 50 MG/1
50 TABLET ORAL EVERY EVENING
Qty: 90 TABLET | Refills: 0 | Status: SHIPPED | OUTPATIENT
Start: 2018-02-15 | End: 2018-05-17

## 2018-02-15 NOTE — PROGRESS NOTES
Kal Hampton is a 56year old female. HPI:     Vertigo:  Much improved. Only used the meclizine twice since last ov. Has not had any further nausea and vomiting. Patient had such severe vomiting that she injured her eyes.   She has a follow-up appoin mouth 2 (two) times daily as needed for Nausea. Disp: 20 tablet Rfl: 0   Meclizine HCl 25 MG Oral Tab Take 1 tablet (25 mg total) by mouth 3 (three) times daily as needed.  Disp: 30 tablet Rfl: 0      Patient Active Problem List:     Allergic rhinitis     H anxious distress (HCC)     B12 deficiency     Pelvic pain     Microscopic hematuria     Posterior vitreous detachment of both eyes     Vertigo     Non-intractable vomiting with nausea     Shakiness     Drug reaction       Berries                 Anaphylaxi ovaries     ovary embedded in pelvic wall--right side   • PONV (postoperative nausea and vomiting)    • Posterior vitreous detachment of both eyes 12/8/2017    Right > Left   • Primary open angle glaucoma of both eyes, moderate stage 6/1/2016   • Type 2 di Constitutional: She is oriented to person, place, and time and obese. She appears well-developed. No distress. HENT:   Head: Normocephalic and atraumatic.    Mouth/Throat: Oropharynx is clear and moist.   Eyes: Conjunctivae and EOM are normal. No sclera

## 2018-02-16 ENCOUNTER — OFFICE VISIT (OUTPATIENT)
Dept: INTERNAL MEDICINE CLINIC | Facility: CLINIC | Age: 48
End: 2018-02-16

## 2018-02-16 VITALS
WEIGHT: 245 LBS | RESPIRATION RATE: 16 BRPM | HEIGHT: 61.5 IN | HEART RATE: 98 BPM | BODY MASS INDEX: 45.66 KG/M2 | SYSTOLIC BLOOD PRESSURE: 134 MMHG | DIASTOLIC BLOOD PRESSURE: 80 MMHG

## 2018-02-16 DIAGNOSIS — E66.01 MORBID OBESITY WITH BMI OF 40.0-44.9, ADULT (HCC): ICD-10-CM

## 2018-02-16 DIAGNOSIS — L30.4 INTERTRIGO: ICD-10-CM

## 2018-02-16 DIAGNOSIS — E53.8 VITAMIN B 12 DEFICIENCY: ICD-10-CM

## 2018-02-16 DIAGNOSIS — Z51.81 THERAPEUTIC DRUG MONITORING: Primary | ICD-10-CM

## 2018-02-16 PROCEDURE — 99213 OFFICE O/P EST LOW 20 MIN: CPT | Performed by: INTERNAL MEDICINE

## 2018-02-16 PROCEDURE — 96372 THER/PROPH/DIAG INJ SC/IM: CPT | Performed by: INTERNAL MEDICINE

## 2018-02-16 RX ORDER — CYANOCOBALAMIN 1000 UG/ML
1000 INJECTION INTRAMUSCULAR; SUBCUTANEOUS ONCE
Status: COMPLETED | OUTPATIENT
Start: 2018-02-16 | End: 2018-02-16

## 2018-02-16 RX ADMIN — CYANOCOBALAMIN 1000 MCG: 1000 INJECTION INTRAMUSCULAR; SUBCUTANEOUS at 12:53:00

## 2018-02-16 NOTE — PROGRESS NOTES
CC: Patient presents with:  Weight Check: up 1 lb       HPI:     Feels well overall   Denies any issues  Nausea is resolved  Feels that she is continuing to eat well.    Denies any regular exercise  Still leaning towards pursing bariatric surgery option (RESTASIS) 0.05 % Ophthalmic Emulsion Place 1 drop into both eyes 2 (two) times daily.    Disp:  Rfl:       Past Medical History:   Diagnosis Date   • Abnormal EKG 1/24/2015   • Arthritis     back   • Chronic low back pain 3/8/2014   • Colon polyp 09/27/201 adhesions     Influenza vaccination declined     High risk medication use     Hypothyroidism (acquired)     Diverticulosis of large intestine without hemorrhage     Essential hypertension     Bilateral low back pain without sciatica     Bilateral thoracic Resp 16   Ht 61.5\"   Wt 245 lb   BMI 45.54 kg/m²   GENERAL: well developed, well nourished,in no apparent distress, A/O x3  SKIN: no rashes,no suspicious lesions  HEENT: atraumatic, normocephalic, OP-clear, PERRLA  NECK: supple,no adenopathy  LUNGS: clear

## 2018-02-19 ENCOUNTER — OFFICE VISIT (OUTPATIENT)
Dept: INTERNAL MEDICINE CLINIC | Facility: CLINIC | Age: 48
End: 2018-02-19

## 2018-02-19 DIAGNOSIS — E66.01 OBESITY, CLASS III, BMI 40-49.9 (MORBID OBESITY) (HCC): ICD-10-CM

## 2018-02-19 PROCEDURE — 97803 MED NUTRITION INDIV SUBSEQ: CPT | Performed by: DIETITIAN, REGISTERED

## 2018-02-19 NOTE — PROGRESS NOTES
FOLLOW UP NUTRITION CONSULTATION    Nutrition Assessment    Number of consultations with dietitian: 4    Height:  Ht Readings from Last 1 Encounters:  02/16/18 : 61.5\"      Weight:   Wt Readings from Last 2 Encounters:  02/16/18 : 245 lb  02/15/18 : 24

## 2018-02-26 RX ORDER — LEVOTHYROXINE SODIUM 0.12 MG/1
TABLET ORAL
Qty: 90 TABLET | Refills: 0 | Status: SHIPPED | OUTPATIENT
Start: 2018-02-26 | End: 2018-06-03

## 2018-02-27 ENCOUNTER — TELEPHONE (OUTPATIENT)
Dept: INTERNAL MEDICINE CLINIC | Facility: CLINIC | Age: 48
End: 2018-02-27

## 2018-02-27 NOTE — TELEPHONE ENCOUNTER
Please print out the checklist so that when I am in the office on Wednesday, February 28, 2018 it can be completed in order to put in the referral for bariatric surgery.

## 2018-02-27 NOTE — TELEPHONE ENCOUNTER
We see González Leslie at the [facility]. She needs a referral for Bariatric Surgery. Dr Church Resides tried to put in the referral but it got denied since she is IHP the ref needs to come from the PCP.  Please call our office with any questions, than

## 2018-02-27 NOTE — TELEPHONE ENCOUNTER
We see Irvin Hudson at the [facility]. She needs a referral for Bariatric Surgery. Dr Emmie Louis tried to put in the referral but it got denied since she is IHP the ref needs to come from the PCP.  Please call our office with any questions, than

## 2018-02-28 ENCOUNTER — HOSPITAL ENCOUNTER (OUTPATIENT)
Dept: GENERAL RADIOLOGY | Age: 48
Discharge: HOME | End: 2018-02-28
Attending: FAMILY MEDICINE
Payer: COMMERCIAL

## 2018-02-28 ENCOUNTER — OFFICE VISIT (OUTPATIENT)
Dept: FAMILY MEDICINE CLINIC | Facility: CLINIC | Age: 48
End: 2018-02-28

## 2018-02-28 VITALS
HEART RATE: 80 BPM | WEIGHT: 245.81 LBS | HEIGHT: 61.5 IN | BODY MASS INDEX: 45.82 KG/M2 | DIASTOLIC BLOOD PRESSURE: 78 MMHG | RESPIRATION RATE: 16 BRPM | SYSTOLIC BLOOD PRESSURE: 122 MMHG

## 2018-02-28 DIAGNOSIS — M79.671 ACUTE FOOT PAIN, RIGHT: ICD-10-CM

## 2018-02-28 DIAGNOSIS — M79.671 ACUTE FOOT PAIN, RIGHT: Primary | ICD-10-CM

## 2018-02-28 PROCEDURE — 73630 X-RAY EXAM OF FOOT: CPT | Performed by: FAMILY MEDICINE

## 2018-02-28 PROCEDURE — 99213 OFFICE O/P EST LOW 20 MIN: CPT | Performed by: FAMILY MEDICINE

## 2018-02-28 NOTE — PROGRESS NOTES
Sanam Hodgson is a 56year old female. HPI:       Foot pain:  Right. Started 2 nights ago. Was walking on a flat surface at home and she heard a pop, then the pain started right away. Elevated and iced the foot.   Intermittently wearing a boot that Ophthalmic Emulsion Place 1 drop into both eyes 2 (two) times daily.    Disp:  Rfl:       Patient Active Problem List:     Allergic rhinitis     History of kidney stones     H/O colonoscopy with polypectomy     Eczema     GERD (gastroesophageal reflux disea detachment of both eyes     Vertigo     Non-intractable vomiting with nausea     Shakiness     Drug reaction       Berries                 Anaphylaxis  Casein                  Anaphylaxis  Levofloxacin            Rash    Comment:rash  Augmentin [Amoxicil* Posterior vitreous detachment of both eyes 12/8/2017    Right > Left   • Primary open angle glaucoma of both eyes, moderate stage 6/1/2016   • Type 2 diabetes mellitus without complication ([facility] 77/2/0623      Past Surgical History:  No date: ANGIOGRAM metatarsal with moderate to moderately severe tenderness to palpation. No edema or increased warmth   Neurological: She is alert and oriented to person, place, and time. No sensory deficit. Skin: Skin is warm and dry.    Skin of right foot and ankle: norm

## 2018-03-08 ENCOUNTER — HOSPITAL ENCOUNTER (OUTPATIENT)
Dept: MRI IMAGING | Facility: HOSPITAL | Age: 48
Discharge: HOME | End: 2018-03-08
Attending: PODIATRIST
Payer: COMMERCIAL

## 2018-03-08 DIAGNOSIS — M66.871 NONTRAUMATIC RUPTURE OF POSTERIOR TIBIAL TENDON, RIGHT: ICD-10-CM

## 2018-03-08 PROCEDURE — 73721 MRI JNT OF LWR EXTRE W/O DYE: CPT | Performed by: PODIATRIST

## 2018-03-11 DIAGNOSIS — G43.009 MIGRAINE WITHOUT AURA AND WITHOUT STATUS MIGRAINOSUS, NOT INTRACTABLE: ICD-10-CM

## 2018-03-12 RX ORDER — TIZANIDINE 4 MG/1
TABLET ORAL
Qty: 20 TABLET | Refills: 0 | Status: SHIPPED | OUTPATIENT
Start: 2018-03-12 | End: 2018-04-13

## 2018-03-20 ENCOUNTER — TELEPHONE (OUTPATIENT)
Dept: FAMILY MEDICINE CLINIC | Facility: CLINIC | Age: 48
End: 2018-03-20

## 2018-03-20 DIAGNOSIS — E11.9 TYPE 2 DIABETES MELLITUS WITHOUT COMPLICATION, WITHOUT LONG-TERM CURRENT USE OF INSULIN (HCC): Primary | ICD-10-CM

## 2018-03-20 NOTE — TELEPHONE ENCOUNTER
[facility]   Cc: [facility] Referral Pool             .Reason for the order/referral: visual field   PCP: Iza Mendes   Refer to Provider (first and last name): Usha Lopez   Specialty: ophthamology   Patient Insurance

## 2018-03-22 ENCOUNTER — TELEPHONE (OUTPATIENT)
Dept: FAMILY MEDICINE CLINIC | Facility: CLINIC | Age: 48
End: 2018-03-22

## 2018-03-22 ENCOUNTER — OFFICE VISIT (OUTPATIENT)
Dept: INTERNAL MEDICINE CLINIC | Facility: CLINIC | Age: 48
End: 2018-03-22

## 2018-03-22 VITALS
BODY MASS INDEX: 45.85 KG/M2 | RESPIRATION RATE: 16 BRPM | HEART RATE: 80 BPM | HEIGHT: 61.5 IN | WEIGHT: 246 LBS | SYSTOLIC BLOOD PRESSURE: 122 MMHG | DIASTOLIC BLOOD PRESSURE: 80 MMHG

## 2018-03-22 DIAGNOSIS — E66.01 MORBID OBESITY WITH BMI OF 45.0-49.9, ADULT (HCC): Primary | ICD-10-CM

## 2018-03-22 DIAGNOSIS — L30.4 INTERTRIGO: ICD-10-CM

## 2018-03-22 DIAGNOSIS — E66.01 MORBID OBESITY WITH BMI OF 45.0-49.9, ADULT (HCC): ICD-10-CM

## 2018-03-22 DIAGNOSIS — Z51.81 THERAPEUTIC DRUG MONITORING: Primary | ICD-10-CM

## 2018-03-22 DIAGNOSIS — E53.8 VITAMIN B 12 DEFICIENCY: ICD-10-CM

## 2018-03-22 PROCEDURE — 99213 OFFICE O/P EST LOW 20 MIN: CPT | Performed by: INTERNAL MEDICINE

## 2018-03-22 PROCEDURE — 96372 THER/PROPH/DIAG INJ SC/IM: CPT | Performed by: INTERNAL MEDICINE

## 2018-03-22 RX ORDER — CYANOCOBALAMIN 1000 UG/ML
1000 INJECTION INTRAMUSCULAR; SUBCUTANEOUS ONCE
Status: COMPLETED | OUTPATIENT
Start: 2018-03-22 | End: 2018-03-22

## 2018-03-22 RX ADMIN — CYANOCOBALAMIN 1000 MCG: 1000 INJECTION INTRAMUSCULAR; SUBCUTANEOUS at 12:08:00

## 2018-03-22 NOTE — PROGRESS NOTES
CC: Patient presents with:  Weight Check: up 1 lb       HPI:     Continues to feel healthy and balanced with what she is eating. Denies any chest pain or shortness of breath  Feels tired and fatigued.    Not exercising currently     Due for B12 inject by mouth daily. Disp:  Rfl:    CycloSPORINE (RESTASIS) 0.05 % Ophthalmic Emulsion Place 1 drop into both eyes 2 (two) times daily.    Disp:  Rfl:       Past Medical History:   Diagnosis Date   • Abnormal EKG 1/24/2015   • Arthritis     back   • Chronic lo Low HDL (under 40)     Lower abdominal adhesions     Influenza vaccination declined     High risk medication use     Hypothyroidism (acquired)     Diverticulosis of large intestine without hemorrhage     Essential hypertension     Bilateral low back pain w pain    EXAM:   /80   Pulse 80   Resp 16   Ht 61.5\"   Wt 246 lb   BMI 45.73 kg/m²   GENERAL: well developed, well nourished,in no apparent distress, A/O x3  SKIN: no rashes,no suspicious lesions  HEENT: atraumatic, normocephalic, OP-clear, PERRLA  N

## 2018-03-22 NOTE — TELEPHONE ENCOUNTER
Please enter a referral for patient to see Bariatric surgeon. Patient has [facility] insurance so referral needs to be entered by PCP. Patient has had 6 consecutive [facility] visits. 10/25/17  11/21/17  12/21/17  1/23/18  2/16/18  3/22/18    Dx:  Morbid Obesity wit

## 2018-03-23 NOTE — TELEPHONE ENCOUNTER
The bariatric referral MUST have specifics on it. Please copy and paste the below and place it in the comments section of referral, please and thank you.        Patient has been seen 6 consecutive months in the weight loss clinic:  10/25/17 - 261# BMI 48.52

## 2018-03-28 ENCOUNTER — TELEPHONE (OUTPATIENT)
Dept: FAMILY MEDICINE CLINIC | Facility: CLINIC | Age: 48
End: 2018-03-28

## 2018-03-28 NOTE — TELEPHONE ENCOUNTER
Wilmer You is calling to let Dr Juliocesar Johnson know that she has a spot by her temple, she thinks it may be eczema, but it does not look like any of her other eczema spots, she would like a referral to her derm, or if Dr Juliocesar Johnson needs to see her she would like to co

## 2018-03-29 ENCOUNTER — OFFICE VISIT (OUTPATIENT)
Dept: INTERNAL MEDICINE CLINIC | Facility: CLINIC | Age: 48
End: 2018-03-29

## 2018-03-29 DIAGNOSIS — E66.01 CLASS 3 SEVERE OBESITY DUE TO EXCESS CALORIES WITH SERIOUS COMORBIDITY AND BODY MASS INDEX (BMI) OF 45.0 TO 49.9 IN ADULT (HCC): Primary | ICD-10-CM

## 2018-03-29 PROCEDURE — 97803 MED NUTRITION INDIV SUBSEQ: CPT | Performed by: DIETITIAN, REGISTERED

## 2018-03-29 NOTE — TELEPHONE ENCOUNTER
Recommend observation for now, observe for 2 weeks. If does not resolve please have patient make an appointment to see me.

## 2018-03-30 ENCOUNTER — TELEPHONE (OUTPATIENT)
Dept: SURGERY | Facility: CLINIC | Age: 48
End: 2018-03-30

## 2018-03-30 NOTE — TELEPHONE ENCOUNTER
Spoke with pt regarding incoming referral for bariatric surgery consult. Pt registered for bariatric seminar on April 9, 2018. Faxed ID and insurance info to Jacob at [facility] on 3/30/18.

## 2018-04-01 VITALS — WEIGHT: 249.81 LBS | BODY MASS INDEX: 46 KG/M2

## 2018-04-01 NOTE — PROGRESS NOTES
FOLLOW UP NUTRITION CONSULTATION    Nutrition Assessment    Number of consultations with dietitian: 6    Height:  Ht Readings from Last 1 Encounters:  03/22/18 : 61.5\"      Weight:   Wt Readings from Last 2 Encounters:  03/29/18 : 249 lb 12.8 oz  03/22

## 2018-04-02 ENCOUNTER — TELEPHONE (OUTPATIENT)
Dept: SURGERY | Facility: CLINIC | Age: 48
End: 2018-04-02

## 2018-04-02 RX ORDER — SIMVASTATIN 20 MG
TABLET ORAL
Qty: 30 TABLET | Refills: 0 | Status: SHIPPED | OUTPATIENT
Start: 2018-04-02 | End: 2018-04-19

## 2018-04-02 NOTE — TELEPHONE ENCOUNTER
Simvastatin approved qty 30 NR  Patient past due to complete labs  Copy of lab orders mailed to patient

## 2018-04-05 ENCOUNTER — TELEPHONE (OUTPATIENT)
Dept: FAMILY MEDICINE CLINIC | Facility: CLINIC | Age: 48
End: 2018-04-05

## 2018-04-05 DIAGNOSIS — E11.9 TYPE 2 DIABETES MELLITUS WITHOUT COMPLICATION, WITHOUT LONG-TERM CURRENT USE OF INSULIN (HCC): Primary | ICD-10-CM

## 2018-04-05 NOTE — TELEPHONE ENCOUNTER
Tye Seymour [facility] Clinical Staff   Cc: Simona Fraga [facility]   Phone Number: [phone]             Patient Name: Phong Viramontes   : 72   Reason for the order/referral:   PCP: Schuyler Gipson,   Refer to Provider (first and la

## 2018-04-13 DIAGNOSIS — G43.009 MIGRAINE WITHOUT AURA AND WITHOUT STATUS MIGRAINOSUS, NOT INTRACTABLE: ICD-10-CM

## 2018-04-13 RX ORDER — TIZANIDINE 4 MG/1
TABLET ORAL
Qty: 20 TABLET | Refills: 0 | Status: SHIPPED | OUTPATIENT
Start: 2018-04-13 | End: 2018-05-07

## 2018-04-18 ENCOUNTER — OFFICE VISIT (OUTPATIENT)
Dept: INTERNAL MEDICINE CLINIC | Facility: CLINIC | Age: 48
End: 2018-04-18

## 2018-04-18 ENCOUNTER — TELEPHONE (OUTPATIENT)
Dept: SURGERY | Facility: CLINIC | Age: 48
End: 2018-04-18

## 2018-04-18 ENCOUNTER — OFFICE VISIT (OUTPATIENT)
Dept: SURGERY | Facility: CLINIC | Age: 48
End: 2018-04-18

## 2018-04-18 ENCOUNTER — TELEPHONE (OUTPATIENT)
Dept: FAMILY MEDICINE CLINIC | Facility: CLINIC | Age: 48
End: 2018-04-18

## 2018-04-18 VITALS
HEIGHT: 62 IN | DIASTOLIC BLOOD PRESSURE: 94 MMHG | HEART RATE: 81 BPM | SYSTOLIC BLOOD PRESSURE: 143 MMHG | WEIGHT: 246.88 LBS | BODY MASS INDEX: 45.43 KG/M2 | RESPIRATION RATE: 16 BRPM

## 2018-04-18 VITALS
BODY MASS INDEX: 46.04 KG/M2 | HEART RATE: 78 BPM | SYSTOLIC BLOOD PRESSURE: 128 MMHG | DIASTOLIC BLOOD PRESSURE: 80 MMHG | HEIGHT: 61.5 IN | WEIGHT: 247 LBS | RESPIRATION RATE: 16 BRPM

## 2018-04-18 DIAGNOSIS — Z01.810 ENCOUNTER FOR PRE-OPERATIVE CARDIOVASCULAR CLEARANCE: ICD-10-CM

## 2018-04-18 DIAGNOSIS — E66.01 MORBID OBESITY WITH BMI OF 40.0-44.9, ADULT (HCC): ICD-10-CM

## 2018-04-18 DIAGNOSIS — Z71.3 ENCOUNTER FOR WEIGHT LOSS COUNSELING: ICD-10-CM

## 2018-04-18 DIAGNOSIS — E11.9 TYPE 2 DIABETES MELLITUS WITHOUT COMPLICATION, WITHOUT LONG-TERM CURRENT USE OF INSULIN (HCC): ICD-10-CM

## 2018-04-18 DIAGNOSIS — Z82.49 FAMILY HISTORY OF CHF (CONGESTIVE HEART FAILURE): ICD-10-CM

## 2018-04-18 DIAGNOSIS — Z51.81 THERAPEUTIC DRUG MONITORING: Primary | ICD-10-CM

## 2018-04-18 DIAGNOSIS — E78.2 MIXED HYPERLIPIDEMIA: Primary | ICD-10-CM

## 2018-04-18 DIAGNOSIS — Z82.49 FAMILY HISTORY OF CARDIOMYOPATHY: ICD-10-CM

## 2018-04-18 DIAGNOSIS — R00.2 PALPITATIONS: ICD-10-CM

## 2018-04-18 DIAGNOSIS — E53.8 B12 DEFICIENCY: ICD-10-CM

## 2018-04-18 DIAGNOSIS — E66.01 MORBID OBESITY WITH BMI OF 45.0-49.9, ADULT (HCC): Primary | ICD-10-CM

## 2018-04-18 DIAGNOSIS — Z01.811 ENCOUNTER FOR PRE-OPERATIVE RESPIRATORY CLEARANCE: ICD-10-CM

## 2018-04-18 DIAGNOSIS — L30.4 INTERTRIGO: ICD-10-CM

## 2018-04-18 DIAGNOSIS — I10 ESSENTIAL HYPERTENSION: ICD-10-CM

## 2018-04-18 DIAGNOSIS — K21.9 GASTROESOPHAGEAL REFLUX DISEASE, ESOPHAGITIS PRESENCE NOT SPECIFIED: ICD-10-CM

## 2018-04-18 PROCEDURE — 99213 OFFICE O/P EST LOW 20 MIN: CPT | Performed by: INTERNAL MEDICINE

## 2018-04-18 RX ORDER — CYANOCOBALAMIN 1000 UG/ML
1000 INJECTION INTRAMUSCULAR; SUBCUTANEOUS ONCE
Status: COMPLETED | OUTPATIENT
Start: 2018-04-18 | End: 2018-04-19

## 2018-04-18 NOTE — H&P
[address], [facility]  [address] [place] [address]  Dept: [phone]    4/18/2018  Bariatric New Patient Evaluation    Chief Complaint:  Morbid obesity     History of Present Illness: 10/17/2013   • History of palpitations 6/4/2017   • Hypothyroidism    • Kidney infection 11/30/15    currently on macrobid for kidney infection-instructed to inform surgeon of infection.    • Kidney stone     since age 16   • Low HDL (under 40) 8/18/2015 bone   • Cancer Cousin      esoph,adenocarcinoma   • Cancer Cousin      Non Hodgkins Lymphoma   • Cancer Cousin      basal cell   • Cancer Paternal Aunt      lung       Social History:  Social History    Marital status:              Spouse name: breakfast., Disp: 30 capsule, Rfl: 3  •  estradiol 1 MG Oral Tab, Take 1 mg by mouth daily. , Disp: , Rfl:   •  PATIENT SUPPLIED MEDICATION, Essential Oils for allergies, Disp: , Rfl:   •  simvastatin 20 MG Oral Tab, Take 1 tablet (20 mg total) by mouth nig without assist device  Neuro: no focal deficits, cranial nerves grossly intact  Psych: alert and oriented, normal affect  Skin: warm, dry    Assessment: 56year old female with chronic morbid obesity, diabetes, GERD, HH, and HTN who would benefit from sign

## 2018-04-18 NOTE — TELEPHONE ENCOUNTER
Plan: The patient appears to be an excellent candidate for bariatric surgery. I have referred the patient for routine pre-operative cardiac, pulmonary, nutrition, and psychology evaluations.   She is due for colonoscopy now 5 years after her previous so I

## 2018-04-18 NOTE — PROGRESS NOTES
CC: Patient presents with:  Weight Check: up 1 lb       HPI:     Weight is the same from previous. Saw Dr. Micah Garrison in the morning. Needs clearance from cardiology, pulmonary, and GI. Recommended for EGD/ colonoscopy.    Dr. Georgina Cox is gastroenter UNITS Oral Tab Take 1 tablet by mouth daily. Disp:  Rfl:    CycloSPORINE (RESTASIS) 0.05 % Ophthalmic Emulsion Place 1 drop into both eyes 2 (two) times daily.    Disp:  Rfl:       Past Medical History:   Diagnosis Date   • Abnormal EKG 1/24/2015   • Arth Mixed hyperlipidemia     Low HDL (under 40)     Lower abdominal adhesions     Influenza vaccination declined     High risk medication use     Hypothyroidism (acquired)     Diverticulosis of large intestine without hemorrhage     Essential hypertension pain  GI: denies abdominal pain    EXAM:   /80   Pulse 78   Resp 16   Ht 61.5\"   Wt 247 lb   BMI 45.91 kg/m²   GENERAL: well developed, well nourished,in no apparent distress, A/O x3  SKIN: no rashes,no suspicious lesions  HEENT: atraumatic, normoce

## 2018-04-19 ENCOUNTER — PATIENT MESSAGE (OUTPATIENT)
Dept: INTERNAL MEDICINE CLINIC | Facility: CLINIC | Age: 48
End: 2018-04-19

## 2018-04-19 ENCOUNTER — PATIENT MESSAGE (OUTPATIENT)
Dept: FAMILY MEDICINE CLINIC | Facility: CLINIC | Age: 48
End: 2018-04-19

## 2018-04-19 ENCOUNTER — OFFICE VISIT (OUTPATIENT)
Dept: FAMILY MEDICINE CLINIC | Facility: CLINIC | Age: 48
End: 2018-04-19

## 2018-04-19 VITALS
SYSTOLIC BLOOD PRESSURE: 132 MMHG | DIASTOLIC BLOOD PRESSURE: 84 MMHG | RESPIRATION RATE: 16 BRPM | BODY MASS INDEX: 45.89 KG/M2 | TEMPERATURE: 98 F | HEIGHT: 61.5 IN | WEIGHT: 246.19 LBS | HEART RATE: 72 BPM

## 2018-04-19 DIAGNOSIS — Z12.11 ENCOUNTER FOR SCREENING COLONOSCOPY FOR NON-HIGH-RISK PATIENT: Primary | ICD-10-CM

## 2018-04-19 DIAGNOSIS — L30.9 DERMATITIS: Primary | ICD-10-CM

## 2018-04-19 DIAGNOSIS — J06.9 ACUTE UPPER RESPIRATORY INFECTION: ICD-10-CM

## 2018-04-19 PROCEDURE — 96372 THER/PROPH/DIAG INJ SC/IM: CPT | Performed by: INTERNAL MEDICINE

## 2018-04-19 PROCEDURE — 99214 OFFICE O/P EST MOD 30 MIN: CPT | Performed by: FAMILY MEDICINE

## 2018-04-19 RX ORDER — CETIRIZINE HYDROCHLORIDE 10 MG/1
10 TABLET ORAL DAILY
COMMUNITY
End: 2018-05-22 | Stop reason: ALTCHOICE

## 2018-04-19 RX ADMIN — CYANOCOBALAMIN 1000 MCG: 1000 INJECTION INTRAMUSCULAR; SUBCUTANEOUS at 13:04:00

## 2018-04-19 NOTE — TELEPHONE ENCOUNTER
----- Message from Sinai Ramirez sent at 4/19/2018 10:51 AM CDT -----  Regarding: Referral Request  Contact: [phone]  Good morning,     I saw the message about the referrals, thank you, but I'm wondering if you could please put in a referral for t

## 2018-04-19 NOTE — PROGRESS NOTES
Michael Rodriguez is a 56year old female. HPI:     URI:  ST x2 days. Right ear pain x 2 days. Positive for runny nose and nasal congestion. No sneezing. Possible post nasal drip. Cough dry for a couple of days.     c/o spot on her right temple x 3 mon D3) 2000 UNITS Oral Tab Take 1 tablet by mouth daily. Disp:  Rfl:    CycloSPORINE (RESTASIS) 0.05 % Ophthalmic Emulsion Place 1 drop into both eyes 2 (two) times daily.    Disp:  Rfl:    simvastatin 20 MG Oral Tab Take 1 tablet (20 mg total) by mouth mark pain     Acute left ankle pain     Major depressive disorder, recurrent episode, mild with anxious distress (HCC)     B12 deficiency     Pelvic pain     Microscopic hematuria     Posterior vitreous detachment of both eyes     Vertigo     Non-intractable vo nodule     pt unsure of which side   • Mixed hyperlipidemia 12/10/2014   • Morbid obesity with BMI of 45.0-49.9, adult ([facility] [address] 7/13/2014   • Polycystic ovaries     ovary embedded in pelvic wall--right side   • PONV (postoperative nausea and vomiting)    • Po mass index is 45.77 kg/m² as calculated from the following:    Height as of this encounter: 61.5\". Weight as of this encounter: 246 lb 3.2 oz. Physical Exam   Vitals reviewed. Constitutional: She is oriented to person, place, and time and obese.  She needed.

## 2018-04-19 NOTE — TELEPHONE ENCOUNTER
From: Cassy Haywood  To: Iftikhar Crockett MD  Sent: 4/19/2018 10:47 AM CDT  Subject: Visit Follow-up Question    Good morning Dr. Susan Brennan,     I don't remember if you put in an order for the labs that Dr Dinora Chapa wanted?  I want to schedule my blood draw soon and wo

## 2018-04-21 PROBLEM — L30.9 DERMATITIS: Status: ACTIVE | Noted: 2018-04-21

## 2018-04-21 PROBLEM — J06.9 ACUTE UPPER RESPIRATORY INFECTION: Status: ACTIVE | Noted: 2018-04-21

## 2018-04-24 ENCOUNTER — OFFICE VISIT (OUTPATIENT)
Dept: SURGERY | Facility: CLINIC | Age: 48
End: 2018-04-24

## 2018-04-24 DIAGNOSIS — E11.9 TYPE 2 DIABETES MELLITUS WITHOUT COMPLICATION, WITHOUT LONG-TERM CURRENT USE OF INSULIN (HCC): Primary | ICD-10-CM

## 2018-04-24 DIAGNOSIS — E66.01 MORBID OBESITY WITH BMI OF 45.0-49.9, ADULT (HCC): ICD-10-CM

## 2018-04-24 PROCEDURE — 97802 MEDICAL NUTRITION INDIV IN: CPT | Performed by: DIETITIAN, REGISTERED

## 2018-04-25 VITALS — HEIGHT: 61.5 IN | WEIGHT: 242.63 LBS | BODY MASS INDEX: 45.23 KG/M2

## 2018-04-25 NOTE — PROGRESS NOTES
[facility] AND WEIGHT LOSS CLINIC  [address] [place] [address]  Dept: [phone]  Loc: [phone]    04/25/18    Bariatric Initial Nutrition Assessment    Carly Watts is a 56year old female.     Refe history of cardiomyopathy     Sprain of anterior talofibular ligament of right ankle     Right foot pain     Acute right ankle pain     Acute left ankle pain     Major depressive disorder, recurrent episode, mild with anxious distress (HCC)     B12 deficie Primary open angle glaucoma of both eyes, moderate stage 6/1/2016   • Type 2 diabetes mellitus without complication ([facility] 65/3/3072       Weight history:  Struggled with weight  most of adult life, Pt's highest adult weight 270 lbs at 39 y/o, Pt's lowest a Anaphylaxis  Levofloxacin            Rash    Comment:rash  Augmentin [Amoxicil*    Nausea and vomiting  Avocado                 Itching  Darvocet [Propoxyph*    Nausea and vomiting  Latex                   Rash, Swelling  Nuts                    Anaphylaxi 02/21/2017   VLDL 25 02/21/2017   TCJACINTOO 1.97 02/21/2017   [place] [address] 02/21/2017   LENNY 4.1 08/18/2015       Vitamins/Minerals:    Lab Results  Component Value Date   B12 399 11/09/2017       Lab Results  Component Value Date   VITD 35.8 11/09/ MG Oral Tab, Take 1 tablet (20 mg total) by mouth nightly., Disp: 90 tablet, Rfl: 1  •  Cholecalciferol (VITAMIN D3) 2000 UNITS Oral Tab, Take 1 tablet by mouth daily.   , Disp: , Rfl:   •  CycloSPORINE (RESTASIS) 0.05 % Ophthalmic Emulsion, Place 1 drop in past. Had multiple side effects from weight loss medications and now wants surgery so she is not reliant on medication for weight maintenance.  Patient with extensive family hx of cancer and having DM2 herself, is seeking weight loss surgery to reduce the w dairy free yogurt with protein powder + carrot sticks, protein shake)   4. Increase PA. Walk on home treadmill 3 x week for 30 minutes.      Monitor/Evaluate     · Food/fluid intake/choices  · Nutrition Rx  · Anthropometric measurements  · Body composition-

## 2018-04-30 ENCOUNTER — APPOINTMENT (OUTPATIENT)
Dept: GENERAL RADIOLOGY | Age: 48
End: 2018-04-30
Attending: NURSE PRACTITIONER
Payer: COMMERCIAL

## 2018-04-30 ENCOUNTER — HOSPITAL ENCOUNTER (OUTPATIENT)
Age: 48
Discharge: HOME | End: 2018-04-30
Payer: COMMERCIAL

## 2018-04-30 VITALS
SYSTOLIC BLOOD PRESSURE: 147 MMHG | OXYGEN SATURATION: 100 % | TEMPERATURE: 98 F | DIASTOLIC BLOOD PRESSURE: 84 MMHG | RESPIRATION RATE: 20 BRPM | HEART RATE: 83 BPM

## 2018-04-30 DIAGNOSIS — S61.259A DOG BITE OF FINGER, INITIAL ENCOUNTER: Primary | ICD-10-CM
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
## 2018-04-30 PROCEDURE — 99214 OFFICE O/P EST MOD 30 MIN: CPT

## 2018-04-30 PROCEDURE — 99213 OFFICE O/P EST LOW 20 MIN: CPT

## 2018-04-30 PROCEDURE — 73140 X-RAY EXAM OF FINGER(S): CPT | Performed by: NURSE PRACTITIONER

## 2018-04-30 RX ORDER — CLINDAMYCIN HYDROCHLORIDE 300 MG/1
300 CAPSULE ORAL 3 TIMES DAILY
Qty: 21 CAPSULE | Refills: 0 | Status: SHIPPED | OUTPATIENT
Start: 2018-04-30 | End: 2018-05-07

## 2018-04-30 RX ORDER — DOXYCYCLINE HYCLATE 100 MG/1
100 CAPSULE ORAL 2 TIMES DAILY
Qty: 14 CAPSULE | Refills: 0 | Status: SHIPPED | OUTPATIENT
Start: 2018-04-30 | End: 2018-05-07

## 2018-04-30 NOTE — ED PROVIDER NOTES
Patient Seen in: [address] [facility] END    History   Patient presents with:  Bite (integumentary)    Stated Complaint: LACERATION LEFT INDEX FINGER    67-year-old female who presents to the the care with complaints of lacerations to the lef since age 16   • Low HDL (under 36) 8/18/2015   • Lung nodule     pt unsure of which side   • Mixed hyperlipidemia 12/10/2014   • Morbid obesity with BMI of 45.0-49.9, adult ([facility] 7/13/2014   • Polycystic ovaries     ovary embedded in pelvic wall--right s All other systems reviewed and negative except as noted above.     Physical Exam   ED Triage Vitals [04/30/18 1354]  BP: 147/84  Pulse: 83  Resp: 20  Temp: 98.1 °F (36.7 °C)  Temp src: Temporal  SpO2: 100 %  O2 Device: None (Room air)    Current:/84 CONCLUSION:  Soft tissue swelling proximally. See above description.      Dictated by: Saranya Garcia MD on 4/30/2018 at 14:34     Approved by: Saranya Garcia MD            ED Course as of Apr 30 1708  ------------------------------------------------------------

## 2018-05-03 ENCOUNTER — PATIENT OUTREACH (OUTPATIENT)
Dept: FAMILY MEDICINE CLINIC | Facility: CLINIC | Age: 48
End: 2018-05-03

## 2018-05-07 ENCOUNTER — PRIOR ORIGINAL RECORDS (OUTPATIENT)
Dept: OTHER | Age: 48
End: 2018-05-07

## 2018-05-07 ENCOUNTER — LABORATORY ENCOUNTER (OUTPATIENT)
Dept: LAB | Age: 48
End: 2018-05-07
Attending: FAMILY MEDICINE
Payer: COMMERCIAL

## 2018-05-07 DIAGNOSIS — E11.9 TYPE 2 DIABETES MELLITUS WITHOUT COMPLICATION, WITHOUT LONG-TERM CURRENT USE OF INSULIN (HCC): ICD-10-CM

## 2018-05-07 DIAGNOSIS — G43.009 MIGRAINE WITHOUT AURA AND WITHOUT STATUS MIGRAINOSUS, NOT INTRACTABLE: ICD-10-CM

## 2018-05-07 PROCEDURE — 84439 ASSAY OF FREE THYROXINE: CPT

## 2018-05-07 PROCEDURE — 83036 HEMOGLOBIN GLYCOSYLATED A1C: CPT

## 2018-05-07 PROCEDURE — 80061 LIPID PANEL: CPT

## 2018-05-07 PROCEDURE — 85025 COMPLETE CBC W/AUTO DIFF WBC: CPT

## 2018-05-07 PROCEDURE — 82043 UR ALBUMIN QUANTITATIVE: CPT

## 2018-05-07 PROCEDURE — 36415 COLL VENOUS BLD VENIPUNCTURE: CPT

## 2018-05-07 PROCEDURE — 82570 ASSAY OF URINE CREATININE: CPT

## 2018-05-07 PROCEDURE — 80053 COMPREHEN METABOLIC PANEL: CPT

## 2018-05-07 PROCEDURE — 84443 ASSAY THYROID STIM HORMONE: CPT

## 2018-05-08 RX ORDER — SIMVASTATIN 20 MG
TABLET ORAL
Qty: 90 TABLET | Refills: 0 | Status: SHIPPED | OUTPATIENT
Start: 2018-05-08 | End: 2018-08-04

## 2018-05-08 RX ORDER — TIZANIDINE 4 MG/1
TABLET ORAL
Qty: 20 TABLET | Refills: 0 | Status: SHIPPED | OUTPATIENT
Start: 2018-05-08 | End: 2018-06-11

## 2018-05-11 ENCOUNTER — PRIOR ORIGINAL RECORDS (OUTPATIENT)
Dept: OTHER | Age: 48
End: 2018-05-11

## 2018-05-11 ENCOUNTER — MYAURORA ACCOUNT LINK (OUTPATIENT)
Dept: OTHER | Age: 48
End: 2018-05-11

## 2018-05-14 LAB
ALBUMIN: 3.3 G/DL
BILIRUBIN TOTAL: 0.4 MG/DL
BUN: 19 MG/DL
CALCIUM: 8.8 MG/DL
CHLORIDE: 109 MEQ/L
CHOLESTEROL, TOTAL: 160 MG/DL
CREATININE, SERUM: 0.65 MG/DL
GLUCOSE: 92 MG/DL
HDL CHOLESTEROL: 65 MG/DL
HEMATOCRIT: 39.8 %
HEMOGLOBIN A1C: 6 %
HEMOGLOBIN: 12.3 G/DL
LDL CHOLESTEROL: 70 MG/DL
PLATELETS: 309 K/UL
POTASSIUM, SERUM: 4.3 MEQ/L
PROTEIN, TOTAL: 7 G/DL
RED BLOOD COUNT: 4.3 X 10-6/U
SGOT (AST): 12 IU/L
SGPT (ALT): 16 IU/L
SODIUM: 140 MEQ/L
THYROID STIMULATING HORMONE: 1.5 MLU/L
TRIGLYCERIDES: 126 MG/DL
WHITE BLOOD COUNT: 6.7 X 10-3/U

## 2018-05-17 DIAGNOSIS — E11.9 TYPE 2 DIABETES MELLITUS WITHOUT COMPLICATION, WITHOUT LONG-TERM CURRENT USE OF INSULIN (HCC): ICD-10-CM

## 2018-05-17 DIAGNOSIS — I10 ESSENTIAL HYPERTENSION: ICD-10-CM

## 2018-05-17 RX ORDER — LOSARTAN POTASSIUM 50 MG/1
50 TABLET ORAL EVERY EVENING
Qty: 90 TABLET | Refills: 0 | Status: SHIPPED | OUTPATIENT
Start: 2018-05-17 | End: 2018-08-16

## 2018-05-22 ENCOUNTER — OFFICE VISIT (OUTPATIENT)
Dept: FAMILY MEDICINE CLINIC | Facility: CLINIC | Age: 48
End: 2018-05-22

## 2018-05-22 VITALS
BODY MASS INDEX: 45 KG/M2 | SYSTOLIC BLOOD PRESSURE: 122 MMHG | DIASTOLIC BLOOD PRESSURE: 74 MMHG | HEART RATE: 78 BPM | WEIGHT: 243 LBS

## 2018-05-22 DIAGNOSIS — S86.301A INJURY OF PERONEAL TENDON OF RIGHT FOOT, INITIAL ENCOUNTER: Primary | ICD-10-CM

## 2018-05-22 PROCEDURE — 99213 OFFICE O/P EST LOW 20 MIN: CPT | Performed by: FAMILY MEDICINE

## 2018-05-22 RX ORDER — METHYLPREDNISOLONE 4 MG/1
TABLET ORAL
Qty: 1 KIT | Refills: 0 | Status: SHIPPED | OUTPATIENT
Start: 2018-05-22 | End: 2018-05-31 | Stop reason: ALTCHOICE

## 2018-05-22 NOTE — PROGRESS NOTES
Zaire Dacosta is a 56year old female. HPI:   Patient presents with: Foot Pain: pt states was walking and states she hurt her foot and now hurts on the top and side of the right foot. happened  last Saturday. denies swelling.  not iced at all. trying to 1 TABLET (125 MCG TOTAL) BY MOUTH BEFORE BREAKFAST.  Disp: 90 tablet Rfl: 0   ONETOUCH DELICA LANCETS 43E Does not apply Misc USE AS DIRECTED 3 TIMES A DAY Disp: 300 each Rfl: 0   ondansetron 8 MG Oral Tablet Dispersible Take 1 tablet (8 mg total) by mouth Kidney infection 11/30/15    currently on macrobid for kidney infection-instructed to inform surgeon of infection.    • Kidney stone     since age 16   • Low HDL (under 40) 8/18/2015   • Lung nodule     pt unsure of which side   • Mixed hyperlipidemia 12/10 Neurological: nerves II through XII grossly intact no sensorimotor deficit  Psychological: Mood and affect are normal.  Good communication skills.   ASSESSMENT AND PLAN:   Injury of peroneal tendon of right foot, initial encounter  (primary encounter diag

## 2018-05-23 ENCOUNTER — OFFICE VISIT (OUTPATIENT)
Dept: SURGERY | Facility: CLINIC | Age: 48
End: 2018-05-23

## 2018-05-23 ENCOUNTER — TELEPHONE (OUTPATIENT)
Dept: SURGERY | Facility: CLINIC | Age: 48
End: 2018-05-23

## 2018-05-23 VITALS
RESPIRATION RATE: 16 BRPM | SYSTOLIC BLOOD PRESSURE: 174 MMHG | HEIGHT: 62 IN | DIASTOLIC BLOOD PRESSURE: 90 MMHG | HEART RATE: 85 BPM | WEIGHT: 243.88 LBS | BODY MASS INDEX: 44.88 KG/M2

## 2018-05-23 DIAGNOSIS — E66.01 MORBID OBESITY WITH BMI OF 45.0-49.9, ADULT (HCC): ICD-10-CM

## 2018-05-23 DIAGNOSIS — K21.9 GASTROESOPHAGEAL REFLUX DISEASE, ESOPHAGITIS PRESENCE NOT SPECIFIED: ICD-10-CM

## 2018-05-23 DIAGNOSIS — E78.2 MIXED HYPERLIPIDEMIA: ICD-10-CM

## 2018-05-23 DIAGNOSIS — E11.9 TYPE 2 DIABETES MELLITUS WITHOUT COMPLICATION, WITHOUT LONG-TERM CURRENT USE OF INSULIN (HCC): Primary | ICD-10-CM

## 2018-05-23 PROBLEM — M79.671 RIGHT FOOT PAIN: Status: RESOLVED | Noted: 2017-07-27 | Resolved: 2018-05-23

## 2018-05-23 PROBLEM — M25.571 ACUTE RIGHT ANKLE PAIN: Status: RESOLVED | Noted: 2017-07-27 | Resolved: 2018-05-23

## 2018-05-23 PROBLEM — R25.1 SHAKINESS: Status: RESOLVED | Noted: 2018-01-23 | Resolved: 2018-05-23

## 2018-05-23 PROBLEM — J06.9 ACUTE UPPER RESPIRATORY INFECTION: Status: RESOLVED | Noted: 2018-04-21 | Resolved: 2018-05-23

## 2018-05-23 PROBLEM — S86.301A INJURY OF PERONEAL TENDON OF RIGHT FOOT: Status: ACTIVE | Noted: 2018-05-23

## 2018-05-23 PROBLEM — S93.491A SPRAIN OF ANTERIOR TALOFIBULAR LIGAMENT OF RIGHT ANKLE: Status: RESOLVED | Noted: 2017-07-27 | Resolved: 2018-05-23

## 2018-05-23 PROBLEM — R53.81 MALAISE: Status: RESOLVED | Noted: 2017-05-24 | Resolved: 2018-05-23

## 2018-05-30 ENCOUNTER — OFFICE VISIT (OUTPATIENT)
Dept: SURGERY | Facility: CLINIC | Age: 48
End: 2018-05-30

## 2018-05-30 VITALS — HEIGHT: 62 IN | BODY MASS INDEX: 43.89 KG/M2 | WEIGHT: 238.5 LBS

## 2018-05-30 DIAGNOSIS — E66.01 MORBID OBESITY WITH BMI OF 40.0-44.9, ADULT (HCC): ICD-10-CM

## 2018-05-30 DIAGNOSIS — E11.9 TYPE 2 DIABETES MELLITUS WITHOUT COMPLICATION, WITHOUT LONG-TERM CURRENT USE OF INSULIN (HCC): Primary | ICD-10-CM

## 2018-05-30 PROCEDURE — 97803 MED NUTRITION INDIV SUBSEQ: CPT | Performed by: DIETITIAN, REGISTERED

## 2018-05-30 PROCEDURE — 0358T BIA WHOLE BODY: CPT | Performed by: DIETITIAN, REGISTERED

## 2018-05-30 NOTE — PROGRESS NOTES
[facility] AND WEIGHT LOSS CLINIC  [address] [place] [address]  Dept: [phone]  Loc: [phone]    05/30/18      Bariatric Follow-up Nutrition Session    Marcela Nichole is a 56year old female.      Silvia Vitamins/Minerals:    Lab Results  Component Value Date   B12 399 11/09/2017       Lab Results  Component Value Date   VITD 35.8 11/09/2017   VITD25 24 (L) 12/21/2013     No results found for: THIAMINE   No results found for: VITB1  No results found fo (VITAMIN D3) 2000 UNITS Oral Tab, Take 1 tablet by mouth daily. , Disp: , Rfl:   •  CycloSPORINE (RESTASIS) 0.05 % Ophthalmic Emulsion, Place 1 drop into both eyes 2 (two) times daily.   , Disp: , Rfl:     Height:  Ht Readings from Last 1 Encounters:  05/3 this month. Patient noticed weight gain following 1400 kcal; per body comp BMR 1444, suggested to taper to 1200 kcal per day. Patient pleased with weight loss, though aware of needed changes.  Would benefit from one more RD visit prior to liquid protein to

## 2018-05-31 PROBLEM — Z80.0 FAMILY HISTORY OF COLON CANCER: Status: ACTIVE | Noted: 2018-05-31

## 2018-05-31 PROBLEM — Z86.010 PERSONAL HISTORY OF COLONIC POLYPS: Status: ACTIVE | Noted: 2018-05-31

## 2018-05-31 PROBLEM — Z86.0100 PERSONAL HISTORY OF COLONIC POLYPS: Status: ACTIVE | Noted: 2018-05-31

## 2018-05-31 PROBLEM — R12 HEARTBURN: Status: ACTIVE | Noted: 2018-05-31

## 2018-06-04 RX ORDER — LEVOTHYROXINE SODIUM 0.12 MG/1
TABLET ORAL
Qty: 90 TABLET | Refills: 1 | Status: SHIPPED | OUTPATIENT
Start: 2018-06-04 | End: 2018-11-19

## 2018-06-05 ENCOUNTER — HOSPITAL ENCOUNTER (OUTPATIENT)
Dept: MAMMOGRAPHY | Age: 48
Discharge: HOME | End: 2018-06-05
Attending: OBSTETRICS & GYNECOLOGY
Payer: COMMERCIAL

## 2018-06-05 DIAGNOSIS — Z12.39 SCREENING FOR MALIGNANT NEOPLASM OF BREAST: ICD-10-CM

## 2018-06-05 PROCEDURE — 77063 BREAST TOMOSYNTHESIS BI: CPT | Performed by: OBSTETRICS & GYNECOLOGY

## 2018-06-05 PROCEDURE — 77067 SCR MAMMO BI INCL CAD: CPT | Performed by: OBSTETRICS & GYNECOLOGY

## 2018-06-11 DIAGNOSIS — G43.009 MIGRAINE WITHOUT AURA AND WITHOUT STATUS MIGRAINOSUS, NOT INTRACTABLE: ICD-10-CM

## 2018-06-11 RX ORDER — TIZANIDINE 4 MG/1
TABLET ORAL
Qty: 20 TABLET | Refills: 0 | Status: SHIPPED | OUTPATIENT
Start: 2018-06-11 | End: 2018-07-30

## 2018-06-20 ENCOUNTER — TELEPHONE (OUTPATIENT)
Dept: SURGERY | Facility: CLINIC | Age: 48
End: 2018-06-20

## 2018-06-20 ENCOUNTER — OFFICE VISIT (OUTPATIENT)
Dept: SURGERY | Facility: CLINIC | Age: 48
End: 2018-06-20

## 2018-06-20 VITALS
HEIGHT: 62 IN | HEART RATE: 61 BPM | RESPIRATION RATE: 16 BRPM | SYSTOLIC BLOOD PRESSURE: 150 MMHG | BODY MASS INDEX: 44.7 KG/M2 | WEIGHT: 242.88 LBS | DIASTOLIC BLOOD PRESSURE: 95 MMHG

## 2018-06-20 DIAGNOSIS — Z71.3 ENCOUNTER FOR WEIGHT LOSS COUNSELING: ICD-10-CM

## 2018-06-20 DIAGNOSIS — E66.01 MORBID OBESITY WITH BMI OF 45.0-49.9, ADULT (HCC): ICD-10-CM

## 2018-06-20 DIAGNOSIS — I10 ESSENTIAL HYPERTENSION: ICD-10-CM

## 2018-06-20 DIAGNOSIS — E03.9 HYPOTHYROIDISM (ACQUIRED): ICD-10-CM

## 2018-06-20 DIAGNOSIS — E11.9 TYPE 2 DIABETES MELLITUS WITHOUT COMPLICATION, WITHOUT LONG-TERM CURRENT USE OF INSULIN (HCC): ICD-10-CM

## 2018-06-20 DIAGNOSIS — E53.8 B12 DEFICIENCY: ICD-10-CM

## 2018-06-20 DIAGNOSIS — K21.9 GASTROESOPHAGEAL REFLUX DISEASE, ESOPHAGITIS PRESENCE NOT SPECIFIED: ICD-10-CM

## 2018-06-20 DIAGNOSIS — E78.2 MIXED HYPERLIPIDEMIA: Primary | ICD-10-CM

## 2018-06-20 NOTE — PROGRESS NOTES
[address] [place] [address] Mike Guille [address]  Dept: [phone]      Bariatric Patient Follow-up Evaluation    Chief Complaint:  Morbid obesity     History of Present Illness:  Ca of urine    • Low HDL (under 40) 8/18/2015   • Lung nodule     pt unsure of which side   • Mixed hyperlipidemia 12/10/2014   • Morbid obesity with BMI of 45.0-49.9, adult ([facility] 7/13/2014   • Polycystic ovaries     ovary embedded in pelvic wall--right side Cousin      basal cell   • Cancer Paternal Aunt      lung       Social History:  Social History    Marital status:              Spouse name:                       Years of education:                 Number of children:               Social History M 3 (three) times daily as needed. , Disp: 30 tablet, Rfl: 0  •  ONETOUCH ULTRA BLUE In Vitro Strip, USE AS DIRECTED 3 TIMES A DAY, Disp: 300 strip, Rfl: 1  •  lansoprazole 30 MG Oral Capsule Delayed Release, Take 1 capsule (30 mg total) by mouth every mornin procedures  Extremities: normal strength, normal ROM, walks without assist device  Neuro: no focal deficits, cranial nerves grossly intact  Psych: alert and oriented, normal affect  Skin: warm, dry    Assessment: 56year old female with chronic morbid obes

## 2018-06-25 ENCOUNTER — OFFICE VISIT (OUTPATIENT)
Dept: FAMILY MEDICINE CLINIC | Facility: CLINIC | Age: 48
End: 2018-06-25

## 2018-06-25 VITALS
SYSTOLIC BLOOD PRESSURE: 128 MMHG | WEIGHT: 242.19 LBS | HEIGHT: 62 IN | DIASTOLIC BLOOD PRESSURE: 80 MMHG | RESPIRATION RATE: 16 BRPM | HEART RATE: 80 BPM | TEMPERATURE: 98 F | BODY MASS INDEX: 44.57 KG/M2

## 2018-06-25 DIAGNOSIS — I10 ESSENTIAL HYPERTENSION: ICD-10-CM

## 2018-06-25 DIAGNOSIS — E11.9 TYPE 2 DIABETES MELLITUS WITHOUT COMPLICATION, WITHOUT LONG-TERM CURRENT USE OF INSULIN (HCC): ICD-10-CM

## 2018-06-25 DIAGNOSIS — H00.014 HORDEOLUM EXTERNUM OF LEFT UPPER EYELID: Primary | ICD-10-CM

## 2018-06-25 PROCEDURE — 99214 OFFICE O/P EST MOD 30 MIN: CPT | Performed by: FAMILY MEDICINE

## 2018-06-25 RX ORDER — LANCETS 33 GAUGE
EACH MISCELLANEOUS
Qty: 100 EACH | Refills: 1 | Status: SHIPPED | OUTPATIENT
Start: 2018-06-25 | End: 2019-04-08

## 2018-06-25 NOTE — PROGRESS NOTES
Mariia Valladares is a 56year old female. HPI:       Complains of a stye of her left upper eyelid. Patient states she \"popped a pimple\" or \"stye\" of her left upper eyelid. Having some discomfort, swelling and redness. Denies changes in her vision. by mouth 3 (three) times daily as needed.  Disp: 30 tablet Rfl: 0   lansoprazole 30 MG Oral Capsule Delayed Release Take 1 capsule (30 mg total) by mouth every morning before breakfast. Disp: 30 capsule Rfl: 3   estradiol 1 MG Oral Tab Take 1 mg by mouth da surgeon of infection.    • Kidney stone     since age 16   • Leaking of urine    • Low HDL (under 40) 8/18/2015   • Lung nodule     pt unsure of which side   • Mixed hyperlipidemia 12/10/2014   • Morbid obesity with BMI of 45.0-49.9, adult ([facility] 7/13/2014 Father      colon 1998 and 2008/liver 1998/prostate 2010   • Cancer Maternal Aunt      basal cell   • Cancer Maternal Aunt      basal cell   • Cancer Maternal Aunt      bone   • Cancer Cousin      esoph,adenocarcinoma   • Cancer Cousin      Non Hodgkins Ly CHOLESTEROL, TOTAL      <200 mg/dL 160   152   Triglycerides      <150 mg/dL 126   123   HDL Cholesterol      >45 mg/dL 65   77   LDL Cholesterol Calc      <130 mg/dL 70   50   VLDL      5 - 40 mg/dL 25   25   T.  CHOL/HDL RATIO      <4.44 2.46   1.97   N about 4 weeks (around 7/23/2018) for Annual Wellness Visit and as needed or indicated.

## 2018-06-26 ENCOUNTER — OFFICE VISIT (OUTPATIENT)
Dept: SURGERY | Facility: CLINIC | Age: 48
End: 2018-06-26

## 2018-06-26 ENCOUNTER — TELEPHONE (OUTPATIENT)
Dept: FAMILY MEDICINE CLINIC | Facility: CLINIC | Age: 48
End: 2018-06-26

## 2018-06-26 VITALS — BODY MASS INDEX: 44.46 KG/M2 | HEIGHT: 62 IN | WEIGHT: 241.63 LBS

## 2018-06-26 DIAGNOSIS — E66.01 MORBID OBESITY WITH BMI OF 40.0-44.9, ADULT (HCC): ICD-10-CM

## 2018-06-26 DIAGNOSIS — E11.9 TYPE 2 DIABETES MELLITUS WITHOUT COMPLICATION, WITHOUT LONG-TERM CURRENT USE OF INSULIN (HCC): Primary | ICD-10-CM

## 2018-06-26 PROCEDURE — 97803 MED NUTRITION INDIV SUBSEQ: CPT | Performed by: DIETITIAN, REGISTERED

## 2018-06-26 RX ORDER — ERYTHROMYCIN 5 MG/G
OINTMENT OPHTHALMIC
Qty: 1 G | Refills: 0 | Status: SHIPPED | OUTPATIENT
Start: 2018-06-26 | End: 2018-06-28

## 2018-06-26 NOTE — TELEPHONE ENCOUNTER
Doc,    The antibiotic eye ointment you sent in yesterday to [facility] is on  backorder.      Please advise another treatment-thanks

## 2018-06-26 NOTE — PROGRESS NOTES
[facility] AND WEIGHT LOSS CLINIC  [address] [place] [address]  Dept: [phone]  Loc: [phone]    06/26/18      Bariatric Follow-up Nutrition Session    Ryley Anali is a 56year old female.      Silvia Vitamins/Minerals:    Lab Results  Component Value Date   B12 399 11/09/2017       Lab Results  Component Value Date   VITD 35.8 11/09/2017   VITD25 24 (L) 12/21/2013     No results found for: THIAMINE   No results found for: VITB1  No results found fo MG Oral Tab, Take 1 mg by mouth daily. , Disp: , Rfl:   •  PATIENT SUPPLIED MEDICATION, Essential Oils for allergies, Disp: , Rfl:   •  Cholecalciferol (VITAMIN D3) 2000 UNITS Oral Tab, Take 1 tablet by mouth daily.   , Disp: , Rfl:   •  CycloSPORINE (RESTAS psychologist before surgery. Pt did not tolerate protein shakes; discussed alternatives. Discussed post-op eating and drinking techniques.  Patient expressed mixed feelings about her readiness for surgery; states she wants surgery but continues to struggle

## 2018-06-27 ENCOUNTER — TELEPHONE (OUTPATIENT)
Dept: SURGERY | Facility: CLINIC | Age: 48
End: 2018-06-27

## 2018-06-27 ENCOUNTER — TELEPHONE (OUTPATIENT)
Dept: INTERNAL MEDICINE CLINIC | Facility: CLINIC | Age: 48
End: 2018-06-27

## 2018-06-27 DIAGNOSIS — E66.01 MORBID OBESITY WITH BODY MASS INDEX (BMI) OF 40.0 TO 44.9 IN ADULT (HCC): Primary | ICD-10-CM

## 2018-06-27 DIAGNOSIS — Z01.812 ENCOUNTER FOR PRE-OPERATIVE LABORATORY TESTING: ICD-10-CM

## 2018-06-27 PROBLEM — H00.014 HORDEOLUM EXTERNUM OF LEFT UPPER EYELID: Status: ACTIVE | Noted: 2018-06-27

## 2018-06-27 NOTE — TELEPHONE ENCOUNTER
FW: Pre-op labs   Received: 1 week ago   Message Contents   MD Adrianne Gill, RN             Can you order thanks!     Previous Messages      ----- Message -----   From: Sonali Ramirez RD   Sent: 6/20/2018  11:50 AM   To: Marsha Medel MD

## 2018-06-28 NOTE — TELEPHONE ENCOUNTER
Spoke to pt and she said that her eye seems to be getting better, she is using essential oils that act as an antibiotic and the swelling has gone done. She will continue to use these oils and call our office on Monday if not doing better.     Med cancelled

## 2018-06-28 NOTE — PATIENT INSTRUCTIONS
Sty (or Stye)  A sty is an infection of the oil gland of the eyelid. It may develop into a small pocket of pus (an abscess). This can cause pain, redness, and swelling.  In early stages, a sty is treated with antibiotic cream, eye drops, or a small towel © 8776-7232 [employer] [address]. [facility] [facility] [employer]. All rights reserved. This information is not intended as a substitute for professional medical care. Always follow your healthcare professional's instructions.

## 2018-07-03 ENCOUNTER — HOSPITAL ENCOUNTER (OUTPATIENT)
Dept: CV DIAGNOSTICS | Facility: HOSPITAL | Age: 48
Discharge: HOME | End: 2018-07-03
Attending: INTERNAL MEDICINE
Payer: COMMERCIAL

## 2018-07-03 DIAGNOSIS — I10 HTN (HYPERTENSION): ICD-10-CM

## 2018-07-03 DIAGNOSIS — R07.9 CHEST PAIN: ICD-10-CM

## 2018-07-03 DIAGNOSIS — Z01.818 PREOP TESTING: ICD-10-CM

## 2018-07-03 PROCEDURE — 93350 STRESS TTE ONLY: CPT | Performed by: INTERNAL MEDICINE

## 2018-07-03 PROCEDURE — 93017 CV STRESS TEST TRACING ONLY: CPT | Performed by: INTERNAL MEDICINE

## 2018-07-03 PROCEDURE — 93018 CV STRESS TEST I&R ONLY: CPT | Performed by: INTERNAL MEDICINE

## 2018-07-06 ENCOUNTER — PRIOR ORIGINAL RECORDS (OUTPATIENT)
Dept: OTHER | Age: 48
End: 2018-07-06

## 2018-07-10 ENCOUNTER — PRIOR ORIGINAL RECORDS (OUTPATIENT)
Dept: OTHER | Age: 48
End: 2018-07-10

## 2018-07-18 ENCOUNTER — PRIOR ORIGINAL RECORDS (OUTPATIENT)
Dept: OTHER | Age: 48
End: 2018-07-18

## 2018-07-18 ENCOUNTER — MYAURORA ACCOUNT LINK (OUTPATIENT)
Dept: OTHER | Age: 48
End: 2018-07-18

## 2018-07-19 PROCEDURE — 88305 TISSUE EXAM BY PATHOLOGIST: CPT | Performed by: STUDENT IN AN ORGANIZED HEALTH CARE EDUCATION/TRAINING PROGRAM

## 2018-07-20 ENCOUNTER — LAB REQUISITION (OUTPATIENT)
Dept: LAB | Facility: HOSPITAL | Age: 48
End: 2018-07-20
Payer: COMMERCIAL

## 2018-07-20 DIAGNOSIS — Z12.11 ENCOUNTER FOR SCREENING FOR MALIGNANT NEOPLASM OF COLON: ICD-10-CM

## 2018-07-20 DIAGNOSIS — Z80.0 FAMILY HISTORY OF MALIGNANT NEOPLASM OF DIGESTIVE ORGAN: ICD-10-CM

## 2018-07-20 DIAGNOSIS — K44.9 DIAPHRAGMATIC HERNIA WITHOUT OBSTRUCTION OR GANGRENE: ICD-10-CM

## 2018-07-24 ENCOUNTER — OFFICE VISIT (OUTPATIENT)
Dept: NUTRITION/OBESITY MEDICINE | Facility: HOSPITAL | Age: 48
End: 2018-07-24
Attending: SINGLE SPECIALTY
Payer: COMMERCIAL

## 2018-07-24 DIAGNOSIS — E66.01 MORBID OBESITY DUE TO EXCESS CALORIES (HCC): Primary | ICD-10-CM

## 2018-07-24 PROCEDURE — 96101 PSYCL TSTG PR HR F2F TIME W/PT: CPT | Performed by: OTHER

## 2018-07-24 NOTE — PROGRESS NOTES
Psychological Evaluation    Patient Name: Nitish Fam  Visit Date:  2018  :   1970    Reason for Referral:    Harshad Sterling is a 56year old   female who was referred for a psychological evaluation prior to being scheduled for ba issues, Chantel Cunningham noted that she suffers from GERD, hyperlipidemia, diabetes, hypothyroidism, hypertension, diverticulosis, bursitis, and a B12 deficiency.   Her current medications include: Tizanidine, Levothyroxine, Losartan, Metformin, Simvastatin, Meclizi portions. Regarding eating patterns, Blane Gómez reported that she has some issues with stress eating, generally once weekly, but she is working on curtailing this behavior.     Mental Status Exam:  Blane Gómez agreed to participate in the interview and she was co and Physical Condition factors.   Regarding the former, she endorsed holding negative feelings regarding her appetite, thighs, buttocks, legs, stomach,and weight, while regarding the latter she endorsed holding negative feelings regarding her physical stami Other psychosocial problems (health issues due to being overweight)  AXIS V: GAF=  (current)    Conclusions:    Kiarra Chu is a 56year old   female with a long history of weight problems, which began when she was a child.   She is concerned ab

## 2018-07-25 ENCOUNTER — OFFICE VISIT (OUTPATIENT)
Dept: SURGERY | Facility: CLINIC | Age: 48
End: 2018-07-25

## 2018-07-25 VITALS — HEIGHT: 62 IN | WEIGHT: 243.69 LBS | BODY MASS INDEX: 44.85 KG/M2

## 2018-07-25 DIAGNOSIS — E66.01 MORBID OBESITY WITH BMI OF 40.0-44.9, ADULT (HCC): ICD-10-CM

## 2018-07-25 DIAGNOSIS — E11.9 TYPE 2 DIABETES MELLITUS WITHOUT COMPLICATION, WITHOUT LONG-TERM CURRENT USE OF INSULIN (HCC): Primary | ICD-10-CM

## 2018-07-25 PROCEDURE — 97803 MED NUTRITION INDIV SUBSEQ: CPT | Performed by: DIETITIAN, REGISTERED

## 2018-07-25 NOTE — PROGRESS NOTES
[address] [place] [address] AND WEIGHT LOSS CLINIC  [address] [place] [address]  Dept: [phone]  Loc: [phone]    07/25/18      Bariatric Follow-up Nutrition Session    Mayer Curling is a 56year old female.      Silvia Vitamins/Minerals:    Lab Results  Component Value Date   B12 399 11/09/2017       Lab Results  Component Value Date   VITD 35.8 11/09/2017   VITD25 24 (L) 12/21/2013     No results found for: THIAMINE   No results found for: VITB1  No results found fo allergies, Disp: , Rfl:   •  Cholecalciferol (VITAMIN D3) 2000 UNITS Oral Tab, Take 1 tablet by mouth daily. , Disp: , Rfl:   •  CycloSPORINE (RESTASIS) 0.05 % Ophthalmic Emulsion, Place 1 drop into both eyes 2 (two) times daily.   , Disp: , Rfl:     Hetrudy discussed. Discussed appropriate goals, patient agreed. Would benefit from continued RD follow-up prior to liquid protein diet instruction.     Nutrition Diagnosis     Nutrition Diagnosis: Morbid obesity: Unresolved     Intervention     Recommendations/goal

## 2018-07-30 DIAGNOSIS — G43.009 MIGRAINE WITHOUT AURA AND WITHOUT STATUS MIGRAINOSUS, NOT INTRACTABLE: ICD-10-CM

## 2018-07-30 RX ORDER — TIZANIDINE 4 MG/1
TABLET ORAL
Qty: 20 TABLET | Refills: 0 | Status: SHIPPED | OUTPATIENT
Start: 2018-07-30 | End: 2018-11-01

## 2018-08-01 ENCOUNTER — TELEPHONE (OUTPATIENT)
Dept: SURGERY | Facility: CLINIC | Age: 48
End: 2018-08-01

## 2018-08-01 ENCOUNTER — TELEPHONE (OUTPATIENT)
Dept: INTERNAL MEDICINE CLINIC | Facility: CLINIC | Age: 48
End: 2018-08-01

## 2018-08-01 ENCOUNTER — OFFICE VISIT (OUTPATIENT)
Dept: SURGERY | Facility: CLINIC | Age: 48
End: 2018-08-01

## 2018-08-01 VITALS
HEIGHT: 62 IN | HEART RATE: 80 BPM | SYSTOLIC BLOOD PRESSURE: 163 MMHG | DIASTOLIC BLOOD PRESSURE: 91 MMHG | WEIGHT: 242.69 LBS | RESPIRATION RATE: 16 BRPM | BODY MASS INDEX: 44.66 KG/M2

## 2018-08-01 DIAGNOSIS — E53.8 B12 DEFICIENCY: ICD-10-CM

## 2018-08-01 DIAGNOSIS — E66.01 MORBID OBESITY WITH BMI OF 45.0-49.9, ADULT (HCC): ICD-10-CM

## 2018-08-01 DIAGNOSIS — E78.2 MIXED HYPERLIPIDEMIA: Primary | ICD-10-CM

## 2018-08-01 DIAGNOSIS — K21.9 GASTROESOPHAGEAL REFLUX DISEASE, ESOPHAGITIS PRESENCE NOT SPECIFIED: ICD-10-CM

## 2018-08-01 DIAGNOSIS — I10 ESSENTIAL HYPERTENSION: ICD-10-CM

## 2018-08-01 DIAGNOSIS — E11.9 TYPE 2 DIABETES MELLITUS WITHOUT COMPLICATION, WITHOUT LONG-TERM CURRENT USE OF INSULIN (HCC): ICD-10-CM

## 2018-08-01 NOTE — TELEPHONE ENCOUNTER
JUSTINA and sent [employer] message to patient. Patient needs to schedule a bariatric pre op surgery med review appt with Dr. Yaima Blackburn for the first or second week in September. 20 minute slot is OK per Dr. Yaima Blackburn.

## 2018-08-01 NOTE — PROGRESS NOTES
[address], [address]  Dept: [phone]      Bariatric Patient Follow-up Evaluation    Chief Complaint:  Morbid obesity     History of Present Illness:  Ca Low HDL (under 40) 8/18/2015   • Lung nodule     pt unsure of which side   • Mixed hyperlipidemia 12/10/2014   • Morbid obesity with BMI of 45.0-49.9, adult ([facility] 7/13/2014   • Polycystic ovaries     ovary embedded in pelvic wall--right side   • PONV (post cell   • Cancer Paternal Aunt      lung       Social History:  Social History    Marital status:              Spouse name:                       Years of education:                 Number of children:               Social History Main Topics    Smok total) by mouth 2 (two) times daily as needed for Nausea., Disp: 20 tablet, Rfl: 0  •  Meclizine HCl 25 MG Oral Tab, Take 1 tablet (25 mg total) by mouth 3 (three) times daily as needed. , Disp: 30 tablet, Rfl: 0  •  lansoprazole 30 MG Oral Capsule Delayed hernia/mass/organomegaly, well healed laparoscopic incisions for gyn procedures  Extremities: normal strength, normal ROM, walks without assist device  Neuro: no focal deficits, cranial nerves grossly intact  Psych: alert and oriented, normal affect  Skin:

## 2018-08-01 NOTE — TELEPHONE ENCOUNTER
MD Irene Nuñez RD; Payal Del Castillo, RN             Can we check if she has a med rec appt?    Dr. Cole Terry    Previous Messages      ----- Message -----   From: Jh Rubin MD   Sent: 8/1/2018  11:23 AM   To: Jose Pacheco DO, Neha Sh

## 2018-08-02 ENCOUNTER — TELEPHONE (OUTPATIENT)
Dept: SURGERY | Facility: CLINIC | Age: 48
End: 2018-08-02

## 2018-08-06 RX ORDER — SIMVASTATIN 20 MG
TABLET ORAL
Qty: 90 TABLET | Refills: 1 | Status: SHIPPED | OUTPATIENT
Start: 2018-08-06 | End: 2019-01-27

## 2018-08-13 ENCOUNTER — APPOINTMENT (OUTPATIENT)
Dept: LAB | Age: 48
End: 2018-08-13
Attending: INTERNAL MEDICINE
Payer: COMMERCIAL

## 2018-08-13 DIAGNOSIS — E66.01 MORBID OBESITY WITH BODY MASS INDEX (BMI) OF 40.0 TO 44.9 IN ADULT (HCC): ICD-10-CM

## 2018-08-13 DIAGNOSIS — Z01.812 ENCOUNTER FOR PRE-OPERATIVE LABORATORY TESTING: ICD-10-CM

## 2018-08-13 LAB
DEPRECATED HBV CORE AB SER IA-ACNC: 23.1 NG/ML (ref 12–240)
FOLATE SERPL-MCNC: 6.8 NG/ML (ref 5.9–?)
HAV IGM SER QL: 1.8 MG/DL (ref 1.8–2.5)
IRON SATURATION: 23 % (ref 15–50)
IRON: 91 UG/DL (ref 28–170)
PHOSPHATE SERPL-MCNC: 2.7 MG/DL (ref 2.5–4.9)
TOTAL IRON BINDING CAPACITY: 401 UG/DL (ref 240–450)
TRANSFERRIN SERPL-MCNC: 269 MG/DL (ref 200–360)

## 2018-08-13 PROCEDURE — 83735 ASSAY OF MAGNESIUM: CPT

## 2018-08-13 PROCEDURE — 36415 COLL VENOUS BLD VENIPUNCTURE: CPT

## 2018-08-13 PROCEDURE — 82728 ASSAY OF FERRITIN: CPT

## 2018-08-13 PROCEDURE — 84100 ASSAY OF PHOSPHORUS: CPT

## 2018-08-13 PROCEDURE — 83540 ASSAY OF IRON: CPT

## 2018-08-13 PROCEDURE — 83550 IRON BINDING TEST: CPT

## 2018-08-13 PROCEDURE — 84425 ASSAY OF VITAMIN B-1: CPT

## 2018-08-13 PROCEDURE — 82746 ASSAY OF FOLIC ACID SERUM: CPT

## 2018-08-16 DIAGNOSIS — E11.9 TYPE 2 DIABETES MELLITUS WITHOUT COMPLICATION, WITHOUT LONG-TERM CURRENT USE OF INSULIN (HCC): ICD-10-CM

## 2018-08-16 DIAGNOSIS — I10 ESSENTIAL HYPERTENSION: ICD-10-CM

## 2018-08-16 RX ORDER — LOSARTAN POTASSIUM 50 MG/1
50 TABLET ORAL EVERY EVENING
Qty: 90 TABLET | Refills: 0 | Status: SHIPPED | OUTPATIENT
Start: 2018-08-16 | End: 2018-10-18

## 2018-08-18 LAB — VITAMIN B1 (THIAMINE), WHOLE B: 110 NMOL/L

## 2018-08-22 ENCOUNTER — OFFICE VISIT (OUTPATIENT)
Dept: SURGERY | Facility: CLINIC | Age: 48
End: 2018-08-22

## 2018-08-22 VITALS — WEIGHT: 245.69 LBS | BODY MASS INDEX: 45.21 KG/M2 | HEIGHT: 62 IN

## 2018-08-22 DIAGNOSIS — E11.9 TYPE 2 DIABETES MELLITUS WITHOUT COMPLICATION, WITHOUT LONG-TERM CURRENT USE OF INSULIN (HCC): Primary | ICD-10-CM

## 2018-08-22 DIAGNOSIS — E66.01 MORBID OBESITY WITH BMI OF 40.0-44.9, ADULT (HCC): ICD-10-CM

## 2018-08-22 PROCEDURE — 97803 MED NUTRITION INDIV SUBSEQ: CPT | Performed by: DIETITIAN, REGISTERED

## 2018-08-22 NOTE — PROGRESS NOTES
[address] [place] [address] AND WEIGHT LOSS CLINIC  [address] [place] [address]  Dept: [phone]  Loc: [phone]    08/22/18      Bariatric Follow-up Nutrition Session    Benjamin Rosario is a 56year old female.      Silvia 05/07/2018   [place] [address] 05/07/2018   CHOLHDLRATIO 4.1 08/18/2015        Vitamins/Minerals:    Lab Results  Component Value Date   B12 399 11/09/2017       Lab Results  Component Value Date   VITD 35.8 11/09/2017   VITD25 24 (L) 12/21/2013       Lab Results breakfast., Disp: 30 capsule, Rfl: 3  •  estradiol 1 MG Oral Tab, Take 1 mg by mouth daily. , Disp: , Rfl:   •  PATIENT SUPPLIED MEDICATION, Essential Oils for allergies, Disp: , Rfl:   •  Cholecalciferol (VITAMIN D3) 2000 UNITS Oral Tab, Take 1 tablet by m diet. Reassured pt of her progress in adopting various bariatric lifestyle behaviors. Discussed post-op protein goals, reviewed post-op diet progression and answered pts questions regarding foods to avoid and/or consume in moderation after surgery.  Pt conc

## 2018-08-23 ENCOUNTER — OFFICE VISIT (OUTPATIENT)
Dept: FAMILY MEDICINE CLINIC | Facility: CLINIC | Age: 48
End: 2018-08-23

## 2018-08-23 VITALS
DIASTOLIC BLOOD PRESSURE: 78 MMHG | WEIGHT: 244 LBS | BODY MASS INDEX: 44.9 KG/M2 | RESPIRATION RATE: 16 BRPM | HEIGHT: 62 IN | HEART RATE: 91 BPM | SYSTOLIC BLOOD PRESSURE: 132 MMHG

## 2018-08-23 DIAGNOSIS — D22.60 MELANOCYTIC NEVUS OF UPPER EXTREMITY, UNSPECIFIED LATERALITY: ICD-10-CM

## 2018-08-23 DIAGNOSIS — L30.9 DERMATITIS: Primary | ICD-10-CM

## 2018-08-23 DIAGNOSIS — L73.8 SEBACEOUS HYPERPLASIA OF FACE: ICD-10-CM

## 2018-08-23 DIAGNOSIS — D18.00 ANGIOMA: ICD-10-CM

## 2018-08-23 DIAGNOSIS — L81.3 CAFE AU LAIT SPOTS: ICD-10-CM

## 2018-08-23 PROCEDURE — 99214 OFFICE O/P EST MOD 30 MIN: CPT | Performed by: FAMILY MEDICINE

## 2018-08-23 RX ORDER — CLOTRIMAZOLE AND BETAMETHASONE DIPROPIONATE 10; .64 MG/G; MG/G
1 CREAM TOPICAL 2 TIMES DAILY
Qty: 45 G | Refills: 0 | Status: SHIPPED | OUTPATIENT
Start: 2018-08-23 | End: 2018-09-11 | Stop reason: ALTCHOICE

## 2018-08-23 NOTE — PROGRESS NOTES
Zeynep Rodriguez is a 56year old female. HPI:       Rash:  Has had similar in the past.  But this is more widespread and more itchy. Patient states she was diagnosed with eczema in the past, but again this is worse. OTCs without relief.   Patient use h tablet (25 mg total) by mouth 3 (three) times daily as needed.  Disp: 30 tablet Rfl: 0   lansoprazole 30 MG Oral Capsule Delayed Release Take 1 capsule (30 mg total) by mouth every morning before breakfast. Disp: 30 capsule Rfl: 3   estradiol 1 MG Oral Tab Leaking of urine    • Low HDL (under 40) 8/18/2015   • Lung nodule     pt unsure of which side   • Mixed hyperlipidemia 12/10/2014   • Morbid obesity with BMI of 45.0-49.9, adult ([facility] 7/13/2014   • Polycystic ovaries     ovary embedded in pelvic wall--rig Cancer Maternal Aunt      basal cell   • Cancer Maternal Aunt      basal cell   • Cancer Maternal Aunt      bone   • Cancer Cousin      esoph,adenocarcinoma   • Cancer Cousin      Non Hodgkins Lymphoma   • Cancer Cousin      basal cell   • Cancer Paternal with dermatologist  - DERM - INTERNAL      Medication use, risks, benefits, side effects and precautions discussed, patient verbalizes understanding. Questions encouraged and answered to patient's satisfaction.         Meds & Refills for this Visit:  Signed

## 2018-08-25 PROBLEM — D18.00 ANGIOMA: Status: ACTIVE | Noted: 2018-08-25

## 2018-08-25 PROBLEM — L81.3 CAFE AU LAIT SPOTS: Status: ACTIVE | Noted: 2018-08-25

## 2018-08-25 PROBLEM — D22.60 MELANOCYTIC NEVUS OF UPPER EXTREMITY: Status: ACTIVE | Noted: 2018-08-25

## 2018-08-25 PROBLEM — L73.8 SEBACEOUS HYPERPLASIA OF FACE: Status: ACTIVE | Noted: 2018-08-25

## 2018-08-31 PROCEDURE — 88305 TISSUE EXAM BY PATHOLOGIST: CPT | Performed by: PHYSICIAN ASSISTANT

## 2018-09-01 ENCOUNTER — LAB REQUISITION (OUTPATIENT)
Dept: LAB | Facility: HOSPITAL | Age: 48
End: 2018-09-01
Attending: PHYSICIAN ASSISTANT
Payer: COMMERCIAL

## 2018-09-01 DIAGNOSIS — D48.5 NEOPLASM OF UNCERTAIN BEHAVIOR OF SKIN: ICD-10-CM

## 2018-09-05 ENCOUNTER — HOSPITAL ENCOUNTER (OUTPATIENT)
Age: 48
Discharge: HOME | End: 2018-09-05
Attending: EMERGENCY MEDICINE
Payer: COMMERCIAL

## 2018-09-05 ENCOUNTER — OFFICE VISIT (OUTPATIENT)
Dept: FAMILY MEDICINE CLINIC | Facility: CLINIC | Age: 48
End: 2018-09-05

## 2018-09-05 ENCOUNTER — TELEPHONE (OUTPATIENT)
Dept: FAMILY MEDICINE CLINIC | Facility: CLINIC | Age: 48
End: 2018-09-05

## 2018-09-05 VITALS
OXYGEN SATURATION: 98 % | DIASTOLIC BLOOD PRESSURE: 88 MMHG | WEIGHT: 244 LBS | HEART RATE: 92 BPM | HEIGHT: 62 IN | RESPIRATION RATE: 20 BRPM | BODY MASS INDEX: 44.9 KG/M2 | TEMPERATURE: 98 F | SYSTOLIC BLOOD PRESSURE: 122 MMHG

## 2018-09-05 VITALS
DIASTOLIC BLOOD PRESSURE: 72 MMHG | HEART RATE: 87 BPM | RESPIRATION RATE: 16 BRPM | SYSTOLIC BLOOD PRESSURE: 130 MMHG | TEMPERATURE: 98 F | OXYGEN SATURATION: 98 %

## 2018-09-05 DIAGNOSIS — M25.571 ACUTE BILATERAL ANKLE PAIN: Primary | ICD-10-CM

## 2018-09-05 DIAGNOSIS — M25.572 ACUTE BILATERAL ANKLE PAIN: Primary | ICD-10-CM

## 2018-09-05 PROCEDURE — 99212 OFFICE O/P EST SF 10 MIN: CPT

## 2018-09-05 PROCEDURE — 99213 OFFICE O/P EST LOW 20 MIN: CPT

## 2018-09-05 NOTE — PATIENT INSTRUCTIONS
Make an appt to see your PCP tomorrow. Arthralgia    Arthralgia is the term for pain in or around the joint. It is a symptom, not a disease. This pain may involve one or more joints. In some cases, the pain moves from joint to joint.   There are many

## 2018-09-05 NOTE — ED INITIAL ASSESSMENT (HPI)
Bilateral ankle - swelling started Monday, right ankle pain started yesterday, left ankle pain started today. Denies any injury.

## 2018-09-05 NOTE — TELEPHONE ENCOUNTER
Spoke to patient and informed her no availability and advised her to go the immediate care and pt verbalized understanding

## 2018-09-05 NOTE — TELEPHONE ENCOUNTER
Patient states she is having swelling/pain in both ankles but the right ankle hurts more than the left. States she really needs to be seen today or tomorrow.

## 2018-09-05 NOTE — PROGRESS NOTES
Chief Complaint  Adriano Bolton is a 56year old female. Patient presents with:  Swelling: bilateral 2 days 800mg of Motrin with ice  Ankle Pain: bilateral since today      HPI:   The patient complains of  bilat ankle pain.  Sx's started  2 days ago in 20 MG Oral Tab TAKE 1 TABLET BY MOUTH EVERY EVENING Disp: 90 tablet Rfl: 1   TIZANIDINE HCL 4 MG Oral Tab TAKE 1/2 TO 1 TABLET BY MOUTH DAILY AT BEDTIME AS NEEDED Disp: 20 tablet Rfl: 0   ONETOUCH DELICA LANCETS 26T Does not apply Misc Test once to twice a skin cancer 7/13/2016    Multiple family members on maternal side including mother with large [facility]    • GERD without esophagitis     hiatal heia   • Glaucoma 1/7/2013   • Hemorrhoids    • High cholesterol    • History of diverticulitis 9/15/2015   • History or edema; negative Puneet's sign bilat; no calf swelling, redness, or tenderness. ANKLE: slight non-pitting edema of lateral malleolus area bilat; No ecchymosis, erythema, or increased warmth. No joint crepitus.   Bilat lateral malleolus tender to palpati

## 2018-09-05 NOTE — ED PROVIDER NOTES
Patient Seen in: THE MEDICAL [facility]    History   Patient presents with:  Swelling  Ankle Pain    Stated Complaint: maria r ankle pain/swelling    HPI    Patient was to the walk-in clinic office today.   She was complaining of bilateral ankle cardiomyopathy 2017    2nd cousin  age 33yo    • Family history of CHF (congestive heart failure) 2017   • Family history of skin cancer 2016    Multiple family members on maternal side including mother with large BCC    • GERD without eso ENDOSCOPY PERFORMED    Family history reviewed and is not pertinent to presenting problem. Smoking status: Never Smoker                                                              Smokeless tobacco: Never Used                      Alcohol use:  Yes indiscretions with high salt foods/  restaurant food over the holiday weekend. I find no evidence of DVT as her calves are completely soft, nonswollen, nontender. I discussed with patient I do not believe that ultrasound imaging is indicated.   Patient ha

## 2018-09-11 ENCOUNTER — TELEPHONE (OUTPATIENT)
Dept: SURGERY | Facility: CLINIC | Age: 48
End: 2018-09-11

## 2018-09-11 ENCOUNTER — OFFICE VISIT (OUTPATIENT)
Dept: FAMILY MEDICINE CLINIC | Facility: CLINIC | Age: 48
End: 2018-09-11

## 2018-09-11 VITALS
SYSTOLIC BLOOD PRESSURE: 128 MMHG | HEART RATE: 76 BPM | HEIGHT: 62 IN | BODY MASS INDEX: 45.08 KG/M2 | DIASTOLIC BLOOD PRESSURE: 82 MMHG | WEIGHT: 245 LBS

## 2018-09-11 DIAGNOSIS — M25.572 ACUTE BILATERAL ANKLE PAIN: Primary | ICD-10-CM

## 2018-09-11 DIAGNOSIS — M25.571 ACUTE BILATERAL ANKLE PAIN: Primary | ICD-10-CM

## 2018-09-11 PROCEDURE — 99214 OFFICE O/P EST MOD 30 MIN: CPT | Performed by: FAMILY MEDICINE

## 2018-09-11 RX ORDER — CLOBETASOL PROPIONATE 0.5 MG/G
OINTMENT TOPICAL
COMMUNITY
Start: 2018-08-30 | End: 2018-10-02

## 2018-09-11 RX ORDER — KETOCONAZOLE 20 MG/ML
SHAMPOO TOPICAL
COMMUNITY
Start: 2018-08-30 | End: 2019-04-08

## 2018-09-11 NOTE — PROGRESS NOTES
Matias Reese is a 56year old female here for Patient presents with:  Edema: Bilateral ankle swelling & pain. Right is worse. Started approximately 1 week ago.  The patient started using a shampoo & states that's the only difference since the swelling o Morbid obesity with BMI of 45.0-49.9, adult ([facility] [address] 7/13/2014   • Polycystic ovaries     ovary embedded in pelvic wall--right side   • PONV (postoperative nausea and vomiting)    • Posterior vitreous detachment of both eyes 12/8/2017    Right > Left   • Prim status: Never Smoker      Smokeless tobacco: Never Used    Alcohol use:  Yes      Alcohol/week: 0.0 oz      Comment: three times per week-2 shots Tequila    Drug use: Yes      Types: Cannabis      Comment: rare       Medications (Active prior to today's vis 30 tablet Rfl: 0       Allergies:    Berries                 ANAPHYLAXIS  Casein                  ANAPHYLAXIS  Levofloxacin            RASH    Comment:rash  Augmentin [Amoxicil*    NAUSEA AND VOMITING  Avocado                 ITCHING  Darvocet [Propoxyph*

## 2018-09-11 NOTE — PATIENT INSTRUCTIONS
--ibuprofen 400mg-800mg 3x/day with food consistently for 2-3 days, then only as needed for pain (do not use for prolonged period)  -- ice daily  -- stretching  -- consider ankle brace  -- using hard soled shoes to help with support  -- limit anything th

## 2018-09-12 ENCOUNTER — OFFICE VISIT (OUTPATIENT)
Dept: INTERNAL MEDICINE CLINIC | Facility: CLINIC | Age: 48
End: 2018-09-12

## 2018-09-12 ENCOUNTER — OFFICE VISIT (OUTPATIENT)
Dept: SURGERY | Facility: CLINIC | Age: 48
End: 2018-09-12

## 2018-09-12 VITALS — WEIGHT: 246.13 LBS | BODY MASS INDEX: 45.87 KG/M2 | HEIGHT: 61.5 IN

## 2018-09-12 VITALS
HEIGHT: 61.5 IN | HEART RATE: 82 BPM | SYSTOLIC BLOOD PRESSURE: 140 MMHG | DIASTOLIC BLOOD PRESSURE: 84 MMHG | WEIGHT: 244 LBS | BODY MASS INDEX: 45.48 KG/M2 | RESPIRATION RATE: 16 BRPM

## 2018-09-12 DIAGNOSIS — E66.01 MORBID OBESITY WITH BMI OF 45.0-49.9, ADULT (HCC): Primary | ICD-10-CM

## 2018-09-12 DIAGNOSIS — E11.9 TYPE 2 DIABETES MELLITUS WITHOUT COMPLICATION, WITHOUT LONG-TERM CURRENT USE OF INSULIN (HCC): ICD-10-CM

## 2018-09-12 DIAGNOSIS — Z51.81 THERAPEUTIC DRUG MONITORING: Primary | ICD-10-CM

## 2018-09-12 DIAGNOSIS — I10 ESSENTIAL HYPERTENSION: ICD-10-CM

## 2018-09-12 DIAGNOSIS — E78.2 MIXED HYPERLIPIDEMIA: ICD-10-CM

## 2018-09-12 DIAGNOSIS — E03.9 HYPOTHYROIDISM (ACQUIRED): ICD-10-CM

## 2018-09-12 DIAGNOSIS — E66.01 MORBID OBESITY WITH BMI OF 40.0-44.9, ADULT (HCC): ICD-10-CM

## 2018-09-12 PROCEDURE — 97803 MED NUTRITION INDIV SUBSEQ: CPT | Performed by: DIETITIAN, REGISTERED

## 2018-09-12 PROCEDURE — 99214 OFFICE O/P EST MOD 30 MIN: CPT | Performed by: INTERNAL MEDICINE

## 2018-09-12 NOTE — PROGRESS NOTES
[address] [place] [address] AND WEIGHT LOSS CLINIC  [address] [place] [address]  Dept: [phone]  Loc: [phone]    09/12/18    Bariatric Liquid Protein Nutrition Session    Calvin Mclaughlin is a 56year old female    A Value Date    .0 05/07/2018    .0 11/09/2017    .0 02/21/2017        Diabetes:    HGBA1C:    Lab Results   Component Value Date    A1C 6.0 (H) 05/07/2018    A1C 6.2 (H) 11/09/2017    A1C 6.4 (H) 05/24/2017     05/07/2018       L (125 MCG TOTAL) BY MOUTH BEFORE BREAKFAST., Disp: 90 tablet, Rfl: 1  •  ondansetron 8 MG Oral Tablet Dispersible, Take 1 tablet (8 mg total) by mouth 2 (two) times daily as needed for Nausea., Disp: 20 tablet, Rfl: 0  •  Meclizine HCl 25 MG Oral Tab, Take (unable to obtain info on-line), speak with urologist for calcium recommendations d/t kidney stones and use of aspirin; add [address] as needed (min 1200 mg/day). A1C continues to improve.     Pt instructed to avoid Protein broth while i

## 2018-09-12 NOTE — PROGRESS NOTES
CC: Patient presents with:  Weight Check: down 3 lb       HPI:     Overall doing well today   Is down 3 lb   Has been unable to exercise due to ankle issues  Has been working on weight loss over the past month   Saw Connor Berkowitz today for liquid diet recomme Colon polyp 09/27/2013    Gilda Snellen, M.D.   • Decorative tattoo    • Diabetes mellitus [facility])    • Diverticulitis of sigmoid colon 10/17/2013   • Diverticulosis 09/27/2013    Gilda Snellen, M.D.   • Dry eyes, bilateral    • Essential hyperte (acquired)     Diverticulosis of large intestine without hemorrhage     Essential hypertension     Bilateral low back pain without sciatica     Primary open angle glaucoma of both eyes, moderate stage     Bursitis     Family history of skin cancer     Enco PERRLA  NECK: supple,no adenopathy  LUNGS: clear to auscultation bilaterally   CARDIO: RRR without murmur  GI: good BS's,NT/ND, no masses or HSM  EXTREMITIES: no cyanosis, no clubbing, no edema    No orders of the defined types were placed in this encounte

## 2018-09-12 NOTE — PATIENT INSTRUCTIONS
Outpatient Encounter Medications as of 9/12/2018:  Ketoconazole 2 % External Shampoo     Clobetasol Propionate 0.05 % External Ointment     LANSOPRAZOLE 30 MG Oral Capsule Delayed Release TAKE ONE CAPSULE BY MOUTH EVERY DAY Note (9/12/2018): Pre OP:  Ashlyn Griggs 2000 UNITS Oral Tab Take 1 tablet by mouth daily. Note (9/12/2018): Pre OP:  Hold day before surgery Post OP: Ok to resume on discharge form hospital     CycloSPORINE (RESTASIS) 0.05 % Ophthalmic Emulsion Place 1 drop into both eyes 2 (two) times daily.

## 2018-09-19 ENCOUNTER — TELEPHONE (OUTPATIENT)
Dept: FAMILY MEDICINE CLINIC | Facility: CLINIC | Age: 48
End: 2018-09-19

## 2018-09-19 DIAGNOSIS — Z87.442 HISTORY OF KIDNEY STONES: Primary | ICD-10-CM

## 2018-09-19 NOTE — TELEPHONE ENCOUNTER
[address] Clinical Staff   Cc: Ye [facility]   Phone Number: [phone]             .Reason for the order/referral: office visit   PCP:  Dr Raford Cogan   Refer to Provider (first and last name): Dr Elena Yepez

## 2018-09-22 DIAGNOSIS — E11.9 TYPE 2 DIABETES MELLITUS WITHOUT COMPLICATION, WITHOUT LONG-TERM CURRENT USE OF INSULIN (HCC): ICD-10-CM

## 2018-09-26 ENCOUNTER — OFFICE VISIT (OUTPATIENT)
Dept: SURGERY | Facility: CLINIC | Age: 48
End: 2018-09-26

## 2018-09-26 VITALS
BODY MASS INDEX: 44.66 KG/M2 | WEIGHT: 242.69 LBS | RESPIRATION RATE: 16 BRPM | DIASTOLIC BLOOD PRESSURE: 91 MMHG | HEIGHT: 62 IN | SYSTOLIC BLOOD PRESSURE: 173 MMHG | HEART RATE: 69 BPM

## 2018-09-26 NOTE — PROGRESS NOTES
[address] [place] [address]  [address] [address]  Dept: [phone]    9/26/2018     Bariatric Patient Follow-up Evaluation    Chief Complaint:  Morbid obesity     History of Present urine    • Low HDL (under 40) 8/18/2015   • Lung nodule     pt unsure of which side   • Mixed hyperlipidemia 12/10/2014   • Morbid obesity with BMI of 45.0-49.9, adult ([facility] [address] 7/13/2014   • Polycystic ovaries     ovary embedded in pelvic wall--right side   • basal cell   • Cancer Maternal Aunt         bone   • Cancer Cousin         esoph,adenocarcinoma   • Cancer Cousin         Non Hodgkins Lymphoma   • Cancer Cousin         basal cell   • Cancer Paternal Aunt         lung       Social History:  Social Release, TAKE ONE CAPSULE BY MOUTH EVERY DAY, Disp: 90 capsule, Rfl: 1  •  LOSARTAN POTASSIUM 50 MG Oral Tab, TAKE 1 TABLET (50 MG TOTAL) BY MOUTH EVERY EVENING.  (Patient taking differently: Take 50 mg by mouth 2 (two) times daily.  ), Disp: 90 tablet, Rfl negative. Physical Exam:  Vital Signs:  BP (!) 173/91   Pulse 69   Resp 16   Ht 5' 2\" (1.575 m)   Wt 242 lb 11.2 oz (110.1 kg)   BMI 44.39 kg/m²   BMI:  Body mass index is 44.39 kg/m².   General: no acute distress, well-nourished  HENT: normocephalic, re-herniation discussed as well. Pt is excited for her upcoming travel plans later this month. Med rec done by Dr. Jose Alberto Braun 9/12. Still planning to proceed to surgery on 10/15.      Nakita Grier, 09/26/18, 10:30 AM

## 2018-10-03 ENCOUNTER — OFFICE VISIT (OUTPATIENT)
Dept: FAMILY MEDICINE CLINIC | Facility: CLINIC | Age: 48
End: 2018-10-03

## 2018-10-03 ENCOUNTER — LAB ENCOUNTER (OUTPATIENT)
Dept: LAB | Age: 48
End: 2018-10-03
Attending: SINGLE SPECIALTY
Payer: COMMERCIAL

## 2018-10-03 VITALS
TEMPERATURE: 98 F | BODY MASS INDEX: 43.87 KG/M2 | RESPIRATION RATE: 16 BRPM | SYSTOLIC BLOOD PRESSURE: 142 MMHG | HEIGHT: 62 IN | DIASTOLIC BLOOD PRESSURE: 76 MMHG | HEART RATE: 60 BPM | WEIGHT: 238.38 LBS

## 2018-10-03 DIAGNOSIS — Z01.818 PREOP TESTING: ICD-10-CM

## 2018-10-03 DIAGNOSIS — R51.9 ACUTE NONINTRACTABLE HEADACHE, UNSPECIFIED HEADACHE TYPE: ICD-10-CM

## 2018-10-03 DIAGNOSIS — R42 VERTIGO: Primary | ICD-10-CM

## 2018-10-03 PROCEDURE — 86901 BLOOD TYPING SEROLOGIC RH(D): CPT

## 2018-10-03 PROCEDURE — 86900 BLOOD TYPING SEROLOGIC ABO: CPT

## 2018-10-03 PROCEDURE — 86850 RBC ANTIBODY SCREEN: CPT

## 2018-10-03 PROCEDURE — 99214 OFFICE O/P EST MOD 30 MIN: CPT | Performed by: FAMILY MEDICINE

## 2018-10-03 RX ORDER — MECLIZINE HYDROCHLORIDE 25 MG/1
25 TABLET ORAL 3 TIMES DAILY PRN
Qty: 30 TABLET | Refills: 0 | Status: SHIPPED | OUTPATIENT
Start: 2018-10-03 | End: 2020-06-19

## 2018-10-03 NOTE — PROGRESS NOTES
Alexia Smallwood is a 56year old female. HPI:       Headache:  Cannot take NSAIDs due to upcoming bariatric surgery. Decreased caffeine intake slowly over the last approx 3-4 months.   Tried slightly increasing intake of caffeine yesterday, that did not 2000 UNITS Oral Tab Take 1 tablet by mouth daily. Disp:  Rfl:    CycloSPORINE (RESTASIS) 0.05 % Ophthalmic Emulsion Place 1 drop into both eyes 2 (two) times daily.    Disp:  Rfl:       Patient Active Problem List:     Allergic rhinitis     History of kid face     Cafe au lait spots     Melanocytic nevus of upper extremity     Angioma     Acute nonintractable headache       Berries                 ANAPHYLAXIS  Casein                  ANAPHYLAXIS  Levofloxacin            RASH    Comment:rash  Augmentin [Amox OOPHORECTOMY; Bilateral      Comment:  ovaries removed after partial.  No date: REMOVAL OF KIDNEY STONE      Comment:  age 16, does not remember what kind of surgery.  no                abdominal or flank scar  12/10/2015: ROBOT-ASSISTED LAPAROSCOPIC CYSTEC clear and moist.   Eyes: Conjunctivae and EOM are normal. Pupils are equal, round, and reactive to light. Neck: Neck supple. Cardiovascular: Normal rate, regular rhythm and normal heart sounds. Edema not present.   Pulmonary/Chest: Effort normal and

## 2018-10-04 ENCOUNTER — TELEPHONE (OUTPATIENT)
Dept: SURGERY | Facility: CLINIC | Age: 48
End: 2018-10-04

## 2018-10-08 ENCOUNTER — OFFICE VISIT (OUTPATIENT)
Dept: INTERNAL MEDICINE CLINIC | Facility: CLINIC | Age: 48
End: 2018-10-08

## 2018-10-08 VITALS
WEIGHT: 237 LBS | DIASTOLIC BLOOD PRESSURE: 80 MMHG | RESPIRATION RATE: 16 BRPM | SYSTOLIC BLOOD PRESSURE: 122 MMHG | HEIGHT: 61.5 IN | HEART RATE: 80 BPM | BODY MASS INDEX: 44.17 KG/M2

## 2018-10-08 DIAGNOSIS — I10 ESSENTIAL HYPERTENSION: ICD-10-CM

## 2018-10-08 DIAGNOSIS — Z51.81 THERAPEUTIC DRUG MONITORING: Primary | ICD-10-CM

## 2018-10-08 DIAGNOSIS — E66.01 MORBID OBESITY WITH BMI OF 40.0-44.9, ADULT (HCC): ICD-10-CM

## 2018-10-08 PROCEDURE — 99213 OFFICE O/P EST LOW 20 MIN: CPT | Performed by: INTERNAL MEDICINE

## 2018-10-08 NOTE — PROGRESS NOTES
CC: Patient presents with:  Weight Check: down 7 lb       HPI:     Overall doing well today   Down 7 lbs from previous   Struggling with some cravings and thoughts about food with upcoming surgery   Worried about foods she may miss.    Concerned about t daily.   Disp:  Rfl:       Past Medical History:   Diagnosis Date   • Anxiety state    • Back problem    • Calculus of kidney    • Colon polyp 09/27/2013    Kleber Laurent M.D.   • Decorative tattoo    • Diabetes [facility])    • Disorder of thyroid    • D Chondromalacia patellae, right knee     Morbid obesity with BMI of 45.0-49.9, adult (HCC)     Palpitations     Angular cheilitis     Migraine without aura and without status migrainosus, not intractable     Polyarthralgia     Myalgia     New daily persiste [facility])  Essential hypertension     PLAN:  Therapeutic drug monitoring  (primary encounter diagnosis)  Morbid obesity with BMI of 40.0-44.9, adult ([address]  Essential hypertension   -initial consult: 261 lb   -down 24 lbs total   -plan for VSG with Dr Cassia Dela Cruz on 1

## 2018-10-15 ENCOUNTER — ANESTHESIA (OUTPATIENT)
Dept: SURGERY | Facility: HOSPITAL | Age: 48
DRG: 621 | End: 2018-10-15
Payer: COMMERCIAL

## 2018-10-15 ENCOUNTER — HOSPITAL ENCOUNTER (INPATIENT)
Facility: HOSPITAL | Age: 48
LOS: 2 days | Discharge: HOME | DRG: 621 | End: 2018-10-17
Attending: SINGLE SPECIALTY | Admitting: SINGLE SPECIALTY
Payer: COMMERCIAL

## 2018-10-15 ENCOUNTER — ANESTHESIA EVENT (OUTPATIENT)
Dept: SURGERY | Facility: HOSPITAL | Age: 48
DRG: 621 | End: 2018-10-15
Payer: COMMERCIAL

## 2018-10-15 DIAGNOSIS — Z01.818 PREOP TESTING: Primary | ICD-10-CM

## 2018-10-15 PROCEDURE — 0BQT4ZZ REPAIR DIAPHRAGM, PERCUTANEOUS ENDOSCOPIC APPROACH: ICD-10-PCS | Performed by: SINGLE SPECIALTY

## 2018-10-15 PROCEDURE — 0DB64Z3 EXCISION OF STOMACH, PERCUTANEOUS ENDOSCOPIC APPROACH, VERTICAL: ICD-10-PCS | Performed by: SINGLE SPECIALTY

## 2018-10-15 PROCEDURE — 99232 SBSQ HOSP IP/OBS MODERATE 35: CPT | Performed by: HOSPITALIST

## 2018-10-15 RX ORDER — CLINDAMYCIN PHOSPHATE 150 MG/ML
INJECTION, SOLUTION INTRAVENOUS AS NEEDED
Status: DISCONTINUED | OUTPATIENT
Start: 2018-10-15 | End: 2018-10-15 | Stop reason: SURG

## 2018-10-15 RX ORDER — SODIUM CHLORIDE, SODIUM LACTATE, POTASSIUM CHLORIDE, CALCIUM CHLORIDE 600; 310; 30; 20 MG/100ML; MG/100ML; MG/100ML; MG/100ML
INJECTION, SOLUTION INTRAVENOUS CONTINUOUS
Status: DISCONTINUED | OUTPATIENT
Start: 2018-10-15 | End: 2018-10-15 | Stop reason: HOSPADM

## 2018-10-15 RX ORDER — BUPIVACAINE HYDROCHLORIDE 2.5 MG/ML
INJECTION, SOLUTION EPIDURAL; INFILTRATION; INTRACAUDAL AS NEEDED
Status: DISCONTINUED | OUTPATIENT
Start: 2018-10-15 | End: 2018-10-15 | Stop reason: HOSPADM

## 2018-10-15 RX ORDER — MIDAZOLAM HYDROCHLORIDE 1 MG/ML
INJECTION INTRAMUSCULAR; INTRAVENOUS AS NEEDED
Status: DISCONTINUED | OUTPATIENT
Start: 2018-10-15 | End: 2018-10-15 | Stop reason: SURG

## 2018-10-15 RX ORDER — PANTOPRAZOLE SODIUM 40 MG/1
40 TABLET, DELAYED RELEASE ORAL
Status: DISCONTINUED | OUTPATIENT
Start: 2018-10-16 | End: 2018-10-17

## 2018-10-15 RX ORDER — MORPHINE SULFATE 2 MG/ML
2 INJECTION, SOLUTION INTRAMUSCULAR; INTRAVENOUS EVERY 2 HOUR PRN
Status: DISCONTINUED | OUTPATIENT
Start: 2018-10-15 | End: 2018-10-17

## 2018-10-15 RX ORDER — MORPHINE SULFATE 2 MG/ML
1 INJECTION, SOLUTION INTRAMUSCULAR; INTRAVENOUS EVERY 2 HOUR PRN
Status: DISCONTINUED | OUTPATIENT
Start: 2018-10-15 | End: 2018-10-17

## 2018-10-15 RX ORDER — ONDANSETRON 2 MG/ML
4 INJECTION INTRAMUSCULAR; INTRAVENOUS ONCE AS NEEDED
Status: DISCONTINUED | OUTPATIENT
Start: 2018-10-15 | End: 2018-10-15 | Stop reason: HOSPADM

## 2018-10-15 RX ORDER — 0.9 % SODIUM CHLORIDE 0.9 %
VIAL (ML) INJECTION
Status: COMPLETED
Start: 2018-10-15 | End: 2018-10-15

## 2018-10-15 RX ORDER — SODIUM CHLORIDE, SODIUM LACTATE, POTASSIUM CHLORIDE, CALCIUM CHLORIDE 600; 310; 30; 20 MG/100ML; MG/100ML; MG/100ML; MG/100ML
INJECTION, SOLUTION INTRAVENOUS CONTINUOUS
Status: DISCONTINUED | OUTPATIENT
Start: 2018-10-15 | End: 2018-10-16

## 2018-10-15 RX ORDER — SODIUM CHLORIDE 0.9 % (FLUSH) 0.9 %
10 SYRINGE (ML) INJECTION AS NEEDED
Status: DISCONTINUED | OUTPATIENT
Start: 2018-10-15 | End: 2018-10-17

## 2018-10-15 RX ORDER — METOCLOPRAMIDE HYDROCHLORIDE 5 MG/ML
10 INJECTION INTRAMUSCULAR; INTRAVENOUS EVERY 6 HOURS PRN
Status: DISCONTINUED | OUTPATIENT
Start: 2018-10-15 | End: 2018-10-17

## 2018-10-15 RX ORDER — NALOXONE HYDROCHLORIDE 0.4 MG/ML
80 INJECTION, SOLUTION INTRAMUSCULAR; INTRAVENOUS; SUBCUTANEOUS AS NEEDED
Status: DISCONTINUED | OUTPATIENT
Start: 2018-10-15 | End: 2018-10-15 | Stop reason: HOSPADM

## 2018-10-15 RX ORDER — HEPARIN SODIUM 5000 [USP'U]/ML
5000 INJECTION, SOLUTION INTRAVENOUS; SUBCUTANEOUS EVERY 8 HOURS SCHEDULED
Status: DISCONTINUED | OUTPATIENT
Start: 2018-10-15 | End: 2018-10-15 | Stop reason: HOSPADM

## 2018-10-15 RX ORDER — CLINDAMYCIN PHOSPHATE 900 MG/50ML
900 INJECTION INTRAVENOUS ONCE
Status: DISCONTINUED | OUTPATIENT
Start: 2018-10-15 | End: 2018-10-15 | Stop reason: HOSPADM

## 2018-10-15 RX ORDER — ROCURONIUM BROMIDE 10 MG/ML
INJECTION, SOLUTION INTRAVENOUS AS NEEDED
Status: DISCONTINUED | OUTPATIENT
Start: 2018-10-15 | End: 2018-10-15 | Stop reason: SURG

## 2018-10-15 RX ORDER — KETOROLAC TROMETHAMINE 15 MG/ML
15 INJECTION, SOLUTION INTRAMUSCULAR; INTRAVENOUS EVERY 6 HOURS
Status: DISCONTINUED | OUTPATIENT
Start: 2018-10-15 | End: 2018-10-17

## 2018-10-15 RX ORDER — METOCLOPRAMIDE 10 MG/1
10 TABLET ORAL ONCE
Status: COMPLETED | OUTPATIENT
Start: 2018-10-15 | End: 2018-10-15

## 2018-10-15 RX ORDER — DEXTROSE AND SODIUM CHLORIDE 5; .45 G/100ML; G/100ML
INJECTION, SOLUTION INTRAVENOUS CONTINUOUS
Status: DISCONTINUED | OUTPATIENT
Start: 2018-10-15 | End: 2018-10-16

## 2018-10-15 RX ORDER — FAMOTIDINE 20 MG/1
20 TABLET ORAL ONCE
Status: COMPLETED | OUTPATIENT
Start: 2018-10-15 | End: 2018-10-15

## 2018-10-15 RX ORDER — HALOPERIDOL 5 MG/ML
0.25 INJECTION INTRAMUSCULAR ONCE AS NEEDED
Status: DISCONTINUED | OUTPATIENT
Start: 2018-10-15 | End: 2018-10-15 | Stop reason: HOSPADM

## 2018-10-15 RX ORDER — KETOROLAC TROMETHAMINE 30 MG/ML
30 INJECTION, SOLUTION INTRAMUSCULAR; INTRAVENOUS EVERY 6 HOURS
Status: DISCONTINUED | OUTPATIENT
Start: 2018-10-15 | End: 2018-10-17

## 2018-10-15 RX ORDER — DEXTROSE MONOHYDRATE 25 G/50ML
50 INJECTION, SOLUTION INTRAVENOUS
Status: DISCONTINUED | OUTPATIENT
Start: 2018-10-15 | End: 2018-10-15 | Stop reason: HOSPADM

## 2018-10-15 RX ORDER — MORPHINE SULFATE 4 MG/ML
4 INJECTION, SOLUTION INTRAMUSCULAR; INTRAVENOUS EVERY 10 MIN PRN
Status: DISCONTINUED | OUTPATIENT
Start: 2018-10-15 | End: 2018-10-15 | Stop reason: HOSPADM

## 2018-10-15 RX ORDER — HYDROCODONE BITARTRATE AND ACETAMINOPHEN 5; 325 MG/1; MG/1
2 TABLET ORAL AS NEEDED
Status: DISCONTINUED | OUTPATIENT
Start: 2018-10-15 | End: 2018-10-15 | Stop reason: HOSPADM

## 2018-10-15 RX ORDER — MORPHINE SULFATE 4 MG/ML
4 INJECTION, SOLUTION INTRAMUSCULAR; INTRAVENOUS EVERY 2 HOUR PRN
Status: DISCONTINUED | OUTPATIENT
Start: 2018-10-15 | End: 2018-10-17

## 2018-10-15 RX ORDER — ONDANSETRON 2 MG/ML
INJECTION INTRAMUSCULAR; INTRAVENOUS AS NEEDED
Status: DISCONTINUED | OUTPATIENT
Start: 2018-10-15 | End: 2018-10-15 | Stop reason: SURG

## 2018-10-15 RX ORDER — ENOXAPARIN SODIUM 100 MG/ML
40 INJECTION SUBCUTANEOUS DAILY
Status: DISCONTINUED | OUTPATIENT
Start: 2018-10-15 | End: 2018-10-16

## 2018-10-15 RX ORDER — MORPHINE SULFATE 10 MG/ML
6 INJECTION, SOLUTION INTRAMUSCULAR; INTRAVENOUS EVERY 10 MIN PRN
Status: DISCONTINUED | OUTPATIENT
Start: 2018-10-15 | End: 2018-10-15 | Stop reason: HOSPADM

## 2018-10-15 RX ORDER — DEXTROSE MONOHYDRATE 25 G/50ML
50 INJECTION, SOLUTION INTRAVENOUS AS NEEDED
Status: DISCONTINUED | OUTPATIENT
Start: 2018-10-15 | End: 2018-10-17

## 2018-10-15 RX ORDER — ACETAMINOPHEN 10 MG/ML
1000 INJECTION, SOLUTION INTRAVENOUS EVERY 6 HOURS
Status: COMPLETED | OUTPATIENT
Start: 2018-10-15 | End: 2018-10-16

## 2018-10-15 RX ORDER — MORPHINE SULFATE 4 MG/ML
2 INJECTION, SOLUTION INTRAMUSCULAR; INTRAVENOUS EVERY 10 MIN PRN
Status: DISCONTINUED | OUTPATIENT
Start: 2018-10-15 | End: 2018-10-15 | Stop reason: HOSPADM

## 2018-10-15 RX ORDER — LIDOCAINE HYDROCHLORIDE 10 MG/ML
INJECTION, SOLUTION EPIDURAL; INFILTRATION; INTRACAUDAL; PERINEURAL AS NEEDED
Status: DISCONTINUED | OUTPATIENT
Start: 2018-10-15 | End: 2018-10-15 | Stop reason: SURG

## 2018-10-15 RX ORDER — HYDROCODONE BITARTRATE AND ACETAMINOPHEN 5; 325 MG/1; MG/1
1 TABLET ORAL AS NEEDED
Status: DISCONTINUED | OUTPATIENT
Start: 2018-10-15 | End: 2018-10-15 | Stop reason: HOSPADM

## 2018-10-15 RX ORDER — ONDANSETRON 2 MG/ML
4 INJECTION INTRAMUSCULAR; INTRAVENOUS EVERY 6 HOURS PRN
Status: DISCONTINUED | OUTPATIENT
Start: 2018-10-15 | End: 2018-10-17

## 2018-10-15 RX ORDER — DEXAMETHASONE SODIUM PHOSPHATE 4 MG/ML
VIAL (ML) INJECTION AS NEEDED
Status: DISCONTINUED | OUTPATIENT
Start: 2018-10-15 | End: 2018-10-15 | Stop reason: SURG

## 2018-10-15 RX ORDER — ACETAMINOPHEN 500 MG
1000 TABLET ORAL ONCE
Status: COMPLETED | OUTPATIENT
Start: 2018-10-15 | End: 2018-10-15

## 2018-10-15 RX ADMIN — SODIUM CHLORIDE, SODIUM LACTATE, POTASSIUM CHLORIDE, CALCIUM CHLORIDE: 600; 310; 30; 20 INJECTION, SOLUTION INTRAVENOUS at 11:43:00

## 2018-10-15 RX ADMIN — DEXAMETHASONE SODIUM PHOSPHATE 4 MG: 4 MG/ML VIAL (ML) INJECTION at 10:41:00

## 2018-10-15 RX ADMIN — SODIUM CHLORIDE, SODIUM LACTATE, POTASSIUM CHLORIDE, CALCIUM CHLORIDE: 600; 310; 30; 20 INJECTION, SOLUTION INTRAVENOUS at 12:00:00

## 2018-10-15 RX ADMIN — LIDOCAINE HYDROCHLORIDE 50 MG: 10 INJECTION, SOLUTION EPIDURAL; INFILTRATION; INTRACAUDAL; PERINEURAL at 10:35:00

## 2018-10-15 RX ADMIN — ROCURONIUM BROMIDE 35 MG: 10 INJECTION, SOLUTION INTRAVENOUS at 10:35:00

## 2018-10-15 RX ADMIN — SODIUM CHLORIDE, SODIUM LACTATE, POTASSIUM CHLORIDE, CALCIUM CHLORIDE: 600; 310; 30; 20 INJECTION, SOLUTION INTRAVENOUS at 11:44:00

## 2018-10-15 RX ADMIN — CLINDAMYCIN PHOSPHATE 900 MG: 150 INJECTION, SOLUTION INTRAVENOUS at 10:32:00

## 2018-10-15 RX ADMIN — ROCURONIUM BROMIDE 5 MG: 10 INJECTION, SOLUTION INTRAVENOUS at 11:23:00

## 2018-10-15 RX ADMIN — ONDANSETRON 4 MG: 2 INJECTION INTRAMUSCULAR; INTRAVENOUS at 11:39:00

## 2018-10-15 RX ADMIN — ONDANSETRON 4 MG: 2 INJECTION INTRAMUSCULAR; INTRAVENOUS at 12:00:00

## 2018-10-15 RX ADMIN — SODIUM CHLORIDE, SODIUM LACTATE, POTASSIUM CHLORIDE, CALCIUM CHLORIDE: 600; 310; 30; 20 INJECTION, SOLUTION INTRAVENOUS at 10:30:00

## 2018-10-15 RX ADMIN — MIDAZOLAM HYDROCHLORIDE 2 MG: 1 INJECTION INTRAMUSCULAR; INTRAVENOUS at 10:30:00

## 2018-10-15 NOTE — H&P
Romelia Ag / Elvira Rolle / Handy Hurt / Gurwinder Ho / Flavio Go / Ananda Salas - [phone]  Answering Service [phone]        Cesar Freedman Patient Status:  Surgery Admit    1970 MRN [id number]   [facility] Date Noted: 06/04/2017      Family history of cardiomyopathy         Date Noted: 06/04/2017      New daily persistent headache         Date Noted: 05/24/2017      Migraine without aura and without status migrainosus, not intractable         Date Noted: 0 Noted: 03/08/2014      Eczema         Date Noted: 11/03/2013      History of kidney stones         Date Noted: 10/17/2013      H/O colonoscopy with polypectomy         Date Noted: 10/17/2013      Allergic rhinitis         Date Noted: 09/02/2013      Past M Medications Prior to Admission:  Multiple Vitamins-Minerals (BARIATRIC MULTIVITAMINS/IRON OR) Take by mouth.  Disp:  Rfl:  10/14/2018 at Unknown time   Ketoconazole 2 % External Shampoo  Disp:  Rfl:  Taking   LANSOPRAZOLE 30 MG Oral Capsule Delayed Rele NAUSEA AND VOMITING  Avocado                 ITCHING  Darvocet [Propoxyph*    NAUSEA AND VOMITING  Latex                   RASH, SWELLING  Nuts                    ANAPHYLAXIS    Comment:PECANS/CASHEWS             Water chestnuts-Itching  Ultram [Tramadol H regular rate and rhythm, no murmur detected  Abdomen: soft, obese, non-tender, non-distended, no hernia/mass/organomegaly, lap incisions for gyn  Extremities: normal strength, normal ROM, walks without assist device  Neuro: no focal deficits, cranial nerve

## 2018-10-15 NOTE — ANESTHESIA PREPROCEDURE EVALUATION
Anesthesia PreOp Note    HPI:     Felicia Watts is a 56year old female who presents for preoperative consultation requested by: John Watkins MD    Date of Surgery: 10/15/2018    Procedure(s):  BARIATRIC SLEEVE GASTRECTOMY LAPAROSCOPIC  Indication:  Morb Noted: 06/04/2017      Family history of cardiomyopathy         Date Noted: 06/04/2017      New daily persistent headache         Date Noted: 05/24/2017      Migraine without aura and without status migrainosus, not intractable         Date Noted: 05/12/20 03/08/2014      Eczema         Date Noted: 11/03/2013      History of kidney stones         Date Noted: 10/17/2013      H/O colonoscopy with polypectomy         Date Noted: 10/17/2013      Allergic rhinitis         Date Noted: 09/02/2013        Past [facility]ica Medications Prior to Admission:  Multiple Vitamins-Minerals (BARIATRIC MULTIVITAMINS/IRON OR) Take by mouth.  Disp:  Rfl:  10/14/2018 at Unknown time   Ketoconazole 2 % External Shampoo  Disp:  Rfl:  Taking   LANSOPRAZOLE 30 MG Oral Capsule Delayed mL/hr at 10/15/18 0821   Heparin Sodium (Porcine) 5000 UNIT/ML injection 5,000 Units 5,000 Units Subcutaneous [facility] Torri Collins MD 5,000 Units at 10/15/18 0820   clindamycin (CLEOCIN) in D5W 900mg/50ml premix 900 mg Intravenous Once Torri Collins MD - medical: Not on file      Transportation needs - non-medical: Not on file    Occupational History      Not on file    Tobacco Use      Smoking status: Never Smoker      Smokeless tobacco: Never Used    Substance and Sexual Activity      Alcohol use:  Yes Endo/Other    (+) diabetes mellitus,   Abdominal   (+) obese,              Anesthesia Plan:   ASA:  3  Plan:   General  Informed Consent Plan and Risks Discussed With:  Patient      I have informed Faythe  and/or legal guardian or family member of

## 2018-10-15 NOTE — OPERATIVE REPORT
Phoebe Scott / Samir Morataya / Leslee Espinoza / Jacob Zambrano / Leesa Zheng / [facility] [phone]  [facility] Service [phone]        Date: 10/15/2018  Preop Diagnosis: Morbid Obesity secondary to excess calories   Postop and agrees to proceed. Operative Summary: Patient was brought to the operating room and placed under general anesthesia. Preoperative antibiotics and heparin were given and was secured to the operating room table.   Prepped and draped in a sterile fashi antrum. We stopped the dissection at the starting point of our sleeve gastrectomy, which was about 6 cm proximal to the pylorus. We then advanced a 40 [place] Melly Visi G bougie further down along the lesser curve towards the antrum.   We then performed our s

## 2018-10-15 NOTE — PLAN OF CARE
Diabetes/Glucose Control    • Glucose maintained within prescribed range Progressing        Accu checks q6h with SS coverage. Npo  D5 0.45 NS at 150ml an hour.   A1C in am    DISCHARGE PLANNING    • Discharge to home or other facility with appropriate re Ambulated 1 complete lap around the floor late this afternoon.

## 2018-10-15 NOTE — ANESTHESIA PROCEDURE NOTES
ANESTHESIA INTUBATION  Urgency: elective      General Information and Staff    Patient location during procedure: OR  Anesthesiologist: Azul Hernandez MD  Resident/CRNA: Altagracia Segal CRNA  Performed: CRNA     Indications and Patient Condition  Indic

## 2018-10-15 NOTE — PROGRESS NOTES
[facility]    Progress Note    Malathi Hughes Patient Status:  Surgery Admit    1970 MRN [id number]   Location [address] UNIT Attending Ricardo Donohue MD   Hosp Day # 0 PCP Raoul Sheridan DO Hypothyroidism  CONT HOME MEDS WHEN ABLE TO TOLERATE PO. Essential hypertension  CONT HOME MEDS WHEN ABLE TO TOLERATE PO. Diabetes mellitus, type 2 ([facility] [address]  MONITOR ACCU CHECKS.              Results:     Lab Results   Component Value Date    WBC 6

## 2018-10-16 PROCEDURE — 99232 SBSQ HOSP IP/OBS MODERATE 35: CPT | Performed by: HOSPITALIST

## 2018-10-16 RX ORDER — ENOXAPARIN SODIUM 100 MG/ML
30 INJECTION SUBCUTANEOUS EVERY 12 HOURS SCHEDULED
Status: DISCONTINUED | OUTPATIENT
Start: 2018-10-16 | End: 2018-10-17

## 2018-10-16 RX ORDER — SODIUM CHLORIDE 9 MG/ML
INJECTION, SOLUTION INTRAVENOUS CONTINUOUS
Status: DISCONTINUED | OUTPATIENT
Start: 2018-10-16 | End: 2018-10-17

## 2018-10-16 RX ORDER — HYDRALAZINE HYDROCHLORIDE 20 MG/ML
10 INJECTION INTRAMUSCULAR; INTRAVENOUS EVERY 4 HOURS PRN
Status: DISCONTINUED | OUTPATIENT
Start: 2018-10-16 | End: 2018-10-17

## 2018-10-16 RX ORDER — 0.9 % SODIUM CHLORIDE 0.9 %
VIAL (ML) INJECTION
Status: COMPLETED
Start: 2018-10-16 | End: 2018-10-16

## 2018-10-16 RX ORDER — LOSARTAN POTASSIUM 50 MG/1
50 TABLET ORAL 2 TIMES DAILY
Status: DISCONTINUED | OUTPATIENT
Start: 2018-10-16 | End: 2018-10-17

## 2018-10-16 RX ORDER — ECHINACEA PURPUREA EXTRACT 125 MG
1 TABLET ORAL
Status: DISCONTINUED | OUTPATIENT
Start: 2018-10-16 | End: 2018-10-17

## 2018-10-16 NOTE — BH NUTRITION NOTE
Bariatric Inpatient Nutrition Note      Daya Hunter is a 56year old female.     Procedure: Sleeve gastrectomy  Surgery Date: 10/15/18  Medical Diagnosis: Morbid obesity    Height:  Ht Readings from Last 1 Encounters:  10/02/18 : 5' 2\" (1.575 m) R) 100 UNIT/ML injection 1-7 Units 1-7 Units Subcutaneous TID AC and HS   lactated ringers infusion  Intravenous Continuous   [COMPLETED] acetaminophen (TYLENOL EXTRA STRENGTH) tab 1,000 mg 1,000 mg Oral Once   [COMPLETED] famoTIDine (PEPCID) tab 20 mg 20 instructed, Call the registered dietitian for questions and Return to clinic  3-4 weeks post-op    Monitor/Evaluate: Anthropometric measurements, Food/fluid intake/choices, Food intolerances, Activity level, Vitamin/mineral supplementation, Reinforce goals

## 2018-10-16 NOTE — PLAN OF CARE
Diabetes/Glucose Control    • Glucose maintained within prescribed range Progressing    q6 accuchecks     DISCHARGE PLANNING    • Discharge to home or other facility with appropriate resources Progressing    Plan for home when stable    GASTROINTESTINAL -

## 2018-10-16 NOTE — PROGRESS NOTES
[facility] Note:  Weight Dose Adjustment for: enoxaparin     Irene Méndez is a 56year old female who has been prescribed enoxaparin  40 mg every 24 hours. CrCl is estimated creatinine clearance is 79.4 mL/min (based on SCr of 0.7 mg/dL).  and pt has

## 2018-10-16 NOTE — PROGRESS NOTES
Romelia Ag / Elvira Rolle / Handy Hurt / Gurwinder Ho / Flavio Go / Ananda Salas - [phone]  Answering Service [phone]      Progress Note    Cesar Freedman Patient Status:  Inpatient    1970 MRN [id number] Q6H PRN   Metoclopramide HCl (REGLAN) injection 10 mg 10 mg Intravenous Q6H PRN   Pantoprazole Sodium (PROTONIX) EC tab 40 mg 40 mg Oral QAM AC   dextrose 50 % injection 50 mL 50 mL Intravenous PRN   Glucose-Vitamin C (DEX-4) 4-0.006 g chewable tab 4 table

## 2018-10-16 NOTE — PROGRESS NOTES
[facility]  Progress Note     Lynda Parks  : 1970    Status: Inpatient  Day #: 1    Attending: Debby Schreiber MD  PCP: Daniel Ortega DO      Assessment and Plan     Morbid obesity Body mass index is 42.92 kg/m².  -s/p laparoscop 30 mg Intravenous Q6H   • acetaminophen  1,000 mg Intravenous Q6H   • Pantoprazole Sodium  40 mg Oral QAM AC      PRN Meds: Normal Saline Flush, HYDROcodone-acetaminophen **OR** HYDROcodone-acetaminophen, morphINE sulfate **OR** morphINE sulfate **OR** mor

## 2018-10-17 VITALS
SYSTOLIC BLOOD PRESSURE: 144 MMHG | WEIGHT: 230.88 LBS | TEMPERATURE: 98 F | HEIGHT: 62 IN | RESPIRATION RATE: 16 BRPM | HEART RATE: 80 BPM | BODY MASS INDEX: 42.49 KG/M2 | DIASTOLIC BLOOD PRESSURE: 83 MMHG | OXYGEN SATURATION: 98 %

## 2018-10-17 PROCEDURE — 99239 HOSP IP/OBS DSCHRG MGMT >30: CPT | Performed by: HOSPITALIST

## 2018-10-17 RX ORDER — PANTOPRAZOLE SODIUM 40 MG/1
40 TABLET, DELAYED RELEASE ORAL
Qty: 30 TABLET | Refills: 2 | Status: SHIPPED | OUTPATIENT
Start: 2018-10-18 | End: 2019-04-15

## 2018-10-17 NOTE — PLAN OF CARE
Diabetes/Glucose Control    • Glucose maintained within prescribed range Progressing    accuchecks achs    DISCHARGE PLANNING    • Discharge to home or other facility with appropriate resources Progressing    Plan for home 10/17    GASTROINTESTINAL - ADULT

## 2018-10-17 NOTE — DISCHARGE PLANNING
Discharge Instructions    Seen patient in hospital to discuss discharge care. Covered the postoperative sleeve gastrectomy discharge instructions. *Covered fluid intake of 64 oz/day and to call if not meeting at 30-40 oz/day.   *Monitor incision for any r

## 2018-10-17 NOTE — PROGRESS NOTES
Lul Yo / Daisha Dailey / Oj Haney / Tahir Echevarria / Ashley Dunn / Melony economy - [phone]  Answering Service [phone]      Progress Note    Hair Real Patient Status:  Inpatient    1970 MRN [id number] mL Oral Q4H PRN   Or      HYDROcodone-acetaminophen 7.5-325 MG/15ML solution 20 mL 20 mL Oral Q4H PRN   morphINE sulfate (PF) 2 MG/ML injection 1 mg 1 mg Intravenous Q2H PRN   Or      morphINE sulfate (PF) 2 MG/ML injection 2 mg 2 mg Intravenous Q2H PRN

## 2018-10-18 DIAGNOSIS — E11.9 TYPE 2 DIABETES MELLITUS WITHOUT COMPLICATION, WITHOUT LONG-TERM CURRENT USE OF INSULIN (HCC): ICD-10-CM

## 2018-10-18 DIAGNOSIS — I10 ESSENTIAL HYPERTENSION: ICD-10-CM

## 2018-10-18 NOTE — DISCHARGE SUMMARY
[place] [facility]    Discharge Summary    Felicia Watts Patient Status:  Inpatient    1970 MRN [id number]   Location [facility] 4W/SW/SE Attending No att. providers found   Hosp Day # 2 PCP Susan Valles, DO     Date of Admi Major depressive disorder, recurrent episode, mild with anxious distress (HCC)     B12 deficiency     Pelvic pain     Microscopic hematuria     Posterior vitreous detachment of both eyes     Vertigo     Non-intractable vomiting with nausea     Drug reactio medications      Instructions Prescription details   Losartan Potassium 50 MG Tabs  Commonly known as:  COZAAR  What changed:  when to take this      TAKE 1 TABLET (50 MG TOTAL) BY MOUTH EVERY EVENING.    Quantity:  90 tablet  Refills:  0        CONTINUE ta location directed by your doctor or nurse    Bring a paper prescription for each of these medications  · HYDROcodone-acetaminophen 7.5-325 MG/15ML Soln  · Pantoprazole Sodium 40 MG Tbec         Follow up Visits:     Follow-up Information     [facility]

## 2018-10-19 RX ORDER — LOSARTAN POTASSIUM 50 MG/1
50 TABLET ORAL EVERY EVENING
Qty: 90 TABLET | Refills: 0 | Status: SHIPPED | OUTPATIENT
Start: 2018-10-19 | End: 2018-11-07

## 2018-10-24 ENCOUNTER — TELEPHONE (OUTPATIENT)
Dept: SURGERY | Facility: CLINIC | Age: 48
End: 2018-10-24

## 2018-10-24 ENCOUNTER — OFFICE VISIT (OUTPATIENT)
Dept: SURGERY | Facility: CLINIC | Age: 48
End: 2018-10-24

## 2018-10-24 VITALS
RESPIRATION RATE: 16 BRPM | WEIGHT: 227.19 LBS | BODY MASS INDEX: 41.81 KG/M2 | HEIGHT: 62 IN | HEART RATE: 79 BPM | SYSTOLIC BLOOD PRESSURE: 160 MMHG | DIASTOLIC BLOOD PRESSURE: 96 MMHG

## 2018-10-24 PROBLEM — Z98.84 S/P LAPAROSCOPIC SLEEVE GASTRECTOMY: Status: ACTIVE | Noted: 2018-10-24

## 2018-10-24 NOTE — PROGRESS NOTES
[address], [address] [address]  Dept: [phone]    10/24/2018     Bariatric Patient Post-op Evaluation    Chief Complaint:  Post-op, sleeve/HH, 242 lbs preop     H Laterality Date   • ANGIOGRAM      2-24-12 [place]   • ANGIOPLASTY (CORONARY)     • BARIATRIC SLEEVE GASTRECTOMY LAPAROSCOPIC N/A 10/15/2018    Performed by Whit Ross MD at [facility] OR   • COLONOSCOPY     • COLONOSCOPY  07/19/2018   • [facility] on file    Occupational History      Not on file    Tobacco Use      Smoking status: Never Smoker      Smokeless tobacco: Never Used    Substance and Sexual Activity      Alcohol use:  Yes        Alcohol/week: 0.0 oz        Comment: three times per week-2 s Solution, Take 10 mL by mouth every 4 (four) hours as needed. , Disp: 300 mL, Rfl: 0  •  Meclizine HCl 25 MG Oral Tab, Take 1 tablet (25 mg total) by mouth 3 (three) times daily as needed for Dizziness. , Disp: 30 tablet, Rfl: 0  •  Glucose Blood (ONETOUCH U normal ROM, walks without assist device  Neuro: no focal deficits, cranial nerves grossly intact  Psych: alert and oriented, normal affect  Skin: warm, dry    Assessment: 56year old female with chronic morbid obesity, diabetes, GERD, HH, and HTN, doing we

## 2018-10-26 ENCOUNTER — TELEPHONE (OUTPATIENT)
Dept: SURGERY | Facility: CLINIC | Age: 48
End: 2018-10-26

## 2018-10-31 ENCOUNTER — OFFICE VISIT (OUTPATIENT)
Dept: SURGERY | Facility: CLINIC | Age: 48
End: 2018-10-31

## 2018-10-31 VITALS
WEIGHT: 222.31 LBS | TEMPERATURE: 97 F | SYSTOLIC BLOOD PRESSURE: 123 MMHG | RESPIRATION RATE: 16 BRPM | DIASTOLIC BLOOD PRESSURE: 83 MMHG | HEIGHT: 62 IN | BODY MASS INDEX: 40.91 KG/M2 | HEART RATE: 85 BPM

## 2018-10-31 VITALS — BODY MASS INDEX: 40.85 KG/M2 | HEIGHT: 62 IN | WEIGHT: 222 LBS

## 2018-10-31 DIAGNOSIS — E66.01 CLASS 3 SEVERE OBESITY DUE TO EXCESS CALORIES WITH SERIOUS COMORBIDITY AND BODY MASS INDEX (BMI) OF 40.0 TO 44.9 IN ADULT (HCC): ICD-10-CM

## 2018-10-31 DIAGNOSIS — Z98.84 S/P LAPAROSCOPIC SLEEVE GASTRECTOMY: Primary | ICD-10-CM

## 2018-10-31 PROCEDURE — 97803 MED NUTRITION INDIV SUBSEQ: CPT | Performed by: DIETITIAN, REGISTERED

## 2018-10-31 RX ORDER — PYRIDOXINE HCL (VITAMIN B6) 100 MG
1000 TABLET ORAL
COMMUNITY
End: 2021-04-16 | Stop reason: ALTCHOICE

## 2018-10-31 NOTE — PROGRESS NOTES
[address], [address]  [address] [address]  Dept: [phone]    10/31/2018     Bariatric Patient Post-op Evaluation    Chief Complaint:  Post-op, sleeve/HH, 242 lbs preop     H [place]   • ANGIOPLASTY (CORONARY)     • BARIATRIC SLEEVE GASTRECTOMY LAPAROSCOPIC N/A 10/15/2018    Performed by Gabby Freed MD at [facility] OR   • COLONOSCOPY     • COLONOSCOPY  07/19/2018   • [address] [place] [address] file    Tobacco Use      Smoking status: Never Smoker      Smokeless tobacco: Never Used    Substance and Sexual Activity      Alcohol use:  Yes        Alcohol/week: 0.0 oz        Comment: three times per week-2 shots Tequila      Drug use: No      Sexual a daily., Disp: , Rfl:   •  Cholecalciferol (VITAMIN D3) 2000 UNITS Oral Tab, Take 1 tablet by mouth daily. , Disp: , Rfl:   •  CycloSPORINE (RESTASIS) 0.05 % Ophthalmic Emulsion, Place 1 drop into both eyes 2 (two) times daily.   , Disp: , Rfl:   •  HYDROco bit of superficial redness, not spreading, left lateral in addition has some superficial dehiscence, no drainage expressed from either one, rest of incisions are well-healed  Extremities: normal strength, normal ROM, walks without assist device  Neuro: no

## 2018-10-31 NOTE — PROGRESS NOTES
[address]  [address] [address]  Dept: [phone]  Loc: [phone]    10/31/18      Bariatric Follow-up Nutrition Session    Bryan Shi is a 56year old female.      As 05/07/2018    HDL 65 05/07/2018    LDL 70 05/07/2018    VLDL 25 05/07/2018    TCJORGE L 2.46 05/07/2018    [place] 95 05/07/2018    CHOLJORGE L 4.1 08/18/2015        Vitamins/Minerals:  Lab Results   Component Value Date    B12 399 11/09/2017     Lab Res Rfl:   •  PATIENT SUPPLIED MEDICATION, Essential Oils for allergies, Disp: , Rfl:   •  Cholecalciferol (VITAMIN D3) 2000 UNITS Oral Tab, Take 1 tablet by mouth daily.   , Disp: , Rfl:   •  CycloSPORINE (RESTASIS) 0.05 % Ophthalmic Emulsion, Place 1 drop int sugars, especially before exercise. Pt eating between 450-600 kcal per day, 60g of protein, 30-50g CHO, and 54oz of fluids.  Pt also following 30 minute rule with fluids and is taking one Procare chewable per day + 1.5 Calcium citrate tablets daily (1500 mg

## 2018-11-01 DIAGNOSIS — G43.009 MIGRAINE WITHOUT AURA AND WITHOUT STATUS MIGRAINOSUS, NOT INTRACTABLE: ICD-10-CM

## 2018-11-01 RX ORDER — TIZANIDINE 4 MG/1
TABLET ORAL
Qty: 20 TABLET | Refills: 0 | Status: SHIPPED | OUTPATIENT
Start: 2018-11-01 | End: 2019-01-18

## 2018-11-01 NOTE — TELEPHONE ENCOUNTER
This writer made a scheduling error, as the patient was scheduled for today, but this writer did not have Ms. Kitty Cintron in her schedule.   The client was scheduled for 11:30 and this writer's 12:00 appointment cancelled so she was offered this time, but was no

## 2018-11-07 ENCOUNTER — OFFICE VISIT (OUTPATIENT)
Dept: INTERNAL MEDICINE CLINIC | Facility: CLINIC | Age: 48
End: 2018-11-07

## 2018-11-07 VITALS
WEIGHT: 221 LBS | HEART RATE: 78 BPM | DIASTOLIC BLOOD PRESSURE: 70 MMHG | SYSTOLIC BLOOD PRESSURE: 118 MMHG | RESPIRATION RATE: 16 BRPM | HEIGHT: 61.5 IN | BODY MASS INDEX: 41.19 KG/M2

## 2018-11-07 DIAGNOSIS — T81.89XA SUTURE REACTION, INITIAL ENCOUNTER: ICD-10-CM

## 2018-11-07 DIAGNOSIS — Z51.81 THERAPEUTIC DRUG MONITORING: Primary | ICD-10-CM

## 2018-11-07 DIAGNOSIS — E03.9 HYPOTHYROIDISM (ACQUIRED): ICD-10-CM

## 2018-11-07 DIAGNOSIS — E11.9 TYPE 2 DIABETES MELLITUS WITHOUT COMPLICATION, WITHOUT LONG-TERM CURRENT USE OF INSULIN (HCC): ICD-10-CM

## 2018-11-07 DIAGNOSIS — I10 ESSENTIAL HYPERTENSION: ICD-10-CM

## 2018-11-07 DIAGNOSIS — E66.01 MORBID OBESITY WITH BMI OF 40.0-44.9, ADULT (HCC): ICD-10-CM

## 2018-11-07 PROCEDURE — 99214 OFFICE O/P EST MOD 30 MIN: CPT | Performed by: INTERNAL MEDICINE

## 2018-11-07 NOTE — PROGRESS NOTES
CC: Patient presents with:  Weight Check: down 16 lb       HPI:     Overall doing well today. Underwent bariatric surgery on 10/15/18. Weight on surgery date: 230 lbs.    Down 16 lbs since discharge  Doing good fluid intake and vitamin intake is shaan (VITAMIN D3) 2000 UNITS Oral Tab Take 1 tablet by mouth daily. Disp:  Rfl:    CycloSPORINE (RESTASIS) 0.05 % Ophthalmic Emulsion Place 1 drop into both eyes 2 (two) times daily.    Disp:  Rfl:       Past Medical History:   Diagnosis Date   • Anxiety state weight loss counseling     Iliotibial band tendinitis of both sides     Trochanteric bursitis of both hips     Chronic pain of both knees     Diabetes mellitus, type 2 (HCC)     Chondromalacia patellae, right knee     Morbid obesity with BMI of 45.0-49.9, edema    No orders of the defined types were placed in this encounter.   vge   Requested Prescriptions      No prescriptions requested or ordered in this encounter      None     ASSESSMENT:   Therapeutic drug monitoring  (primary encounter diagnosis)  Glenn Bolton

## 2018-11-07 NOTE — PATIENT INSTRUCTIONS
Decrease losartan to 25 mg q day over the next 2 weeks.   Goal bp less then 140/90   Low blood pressure: 120/80

## 2018-11-14 ENCOUNTER — OFFICE VISIT (OUTPATIENT)
Dept: SURGERY | Facility: CLINIC | Age: 48
End: 2018-11-14

## 2018-11-14 VITALS
SYSTOLIC BLOOD PRESSURE: 137 MMHG | RESPIRATION RATE: 16 BRPM | DIASTOLIC BLOOD PRESSURE: 86 MMHG | HEIGHT: 62 IN | WEIGHT: 219 LBS | HEART RATE: 70 BPM | BODY MASS INDEX: 40.3 KG/M2

## 2018-11-14 DIAGNOSIS — Z98.84 S/P LAPAROSCOPIC SLEEVE GASTRECTOMY: Primary | ICD-10-CM

## 2018-11-14 NOTE — PROGRESS NOTES
[address], [facility]  [address] [address]  Dept: [phone]    11/14/2018     Bariatric Patient Post-op Evaluation    Chief Complaint:  Post-op, sleeve/HH, 242 lbs preop     H BARIATRIC SLEEVE GASTRECTOMY LAPAROSCOPIC N/A 10/15/2018    Performed by John Watkins MD at [facility] MAIN OR   • COLONOSCOPY     • COLONOSCOPY  07/19/2018   • [address] and 1997   • HYSTERECTOMY  2006    partial hystero.    • Never Smoker      Smokeless tobacco: Never Used    Substance and Sexual Activity      Alcohol use:  Yes        Alcohol/week: 0.0 oz        Comment: three times per week-2 shots Tequila      Drug use: No      Sexual activity: Not on file    Other Topics TABLET (125 MCG TOTAL) BY MOUTH BEFORE BREAKFAST., Disp: 90 tablet, Rfl: 1  •  estradiol 1 MG Oral Tab, Take 1 mg by mouth daily. , Disp: , Rfl:   •  PATIENT SUPPLIED MEDICATION, Essential Oils for allergies, Disp: , Rfl:   •  Cholecalciferol (VITAMIN D3) 2 assist device  Neuro: no focal deficits, cranial nerves grossly intact  Psych: alert and oriented, normal affect  Skin: warm, dry    Assessment: 56year old female with chronic morbid obesity, diabetes, GERD, HH, and HTN, doing ok s/p lap sleeve with HH re

## 2018-11-15 DIAGNOSIS — E11.9 TYPE 2 DIABETES MELLITUS WITHOUT COMPLICATION, WITHOUT LONG-TERM CURRENT USE OF INSULIN (HCC): ICD-10-CM

## 2018-11-15 NOTE — TELEPHONE ENCOUNTER
Left message for patient to call office regarding refill of metformin  Metformin discontinued on 10/17/18 by Dr Camille Gifford. Refill request received  For metformin. Does patient need this refilled? Patient is no longer taking metformin. Refill denied.

## 2018-11-19 ENCOUNTER — TELEPHONE (OUTPATIENT)
Dept: FAMILY MEDICINE CLINIC | Facility: CLINIC | Age: 48
End: 2018-11-19

## 2018-11-19 DIAGNOSIS — E03.9 HYPOTHYROIDISM, UNSPECIFIED TYPE: Primary | ICD-10-CM

## 2018-11-19 RX ORDER — LEVOTHYROXINE SODIUM 0.12 MG/1
TABLET ORAL
Qty: 90 TABLET | Refills: 0 | Status: SHIPPED | OUTPATIENT
Start: 2018-11-19 | End: 2019-02-18

## 2018-11-30 ENCOUNTER — TELEPHONE (OUTPATIENT)
Dept: FAMILY MEDICINE CLINIC | Facility: CLINIC | Age: 48
End: 2018-11-30

## 2018-11-30 NOTE — TELEPHONE ENCOUNTER
Pt called back and she said she is going to try OTC magnesium citrate and if that does not produce results she will call the office

## 2018-11-30 NOTE — TELEPHONE ENCOUNTER
Winston Gardner is calling to see what Dr Julio C Gonzalez wants her to do, she is having a very painful constipation, she tried taking Miralax and it just made her cramp really bad and nothing happened, she feels like something is stuck, she would like a call from the off

## 2018-11-30 NOTE — TELEPHONE ENCOUNTER
Called pt and lmom that will forward this message on to Dr. Heike Ding and will contact pt back later with instructions. If pt had more she wanted to add then to please call our office as well. Doc,    Please advise.

## 2018-12-05 ENCOUNTER — TELEPHONE (OUTPATIENT)
Dept: SURGERY | Facility: CLINIC | Age: 48
End: 2018-12-05

## 2018-12-12 ENCOUNTER — HOSPITAL ENCOUNTER (OUTPATIENT)
Age: 48
Discharge: HOME | End: 2018-12-12
Payer: COMMERCIAL

## 2018-12-12 ENCOUNTER — APPOINTMENT (OUTPATIENT)
Dept: GENERAL RADIOLOGY | Age: 48
End: 2018-12-12
Attending: PHYSICIAN ASSISTANT
Payer: COMMERCIAL

## 2018-12-12 ENCOUNTER — PATIENT MESSAGE (OUTPATIENT)
Dept: INTERNAL MEDICINE CLINIC | Facility: CLINIC | Age: 48
End: 2018-12-12

## 2018-12-12 VITALS
TEMPERATURE: 98 F | DIASTOLIC BLOOD PRESSURE: 102 MMHG | BODY MASS INDEX: 37 KG/M2 | OXYGEN SATURATION: 98 % | HEART RATE: 65 BPM | SYSTOLIC BLOOD PRESSURE: 154 MMHG | WEIGHT: 205 LBS | RESPIRATION RATE: 18 BRPM

## 2018-12-12 DIAGNOSIS — Z51.81 ENCOUNTER FOR THERAPEUTIC DRUG MONITORING: Primary | ICD-10-CM

## 2018-12-12 DIAGNOSIS — M79.671 RIGHT FOOT PAIN: Primary | ICD-10-CM

## 2018-12-12 DIAGNOSIS — Z98.84 S/P LAPAROSCOPIC SLEEVE GASTRECTOMY: ICD-10-CM

## 2018-12-12 DIAGNOSIS — E66.01 MORBID OBESITY WITH BMI OF 40.0-44.9, ADULT (HCC): ICD-10-CM

## 2018-12-12 PROCEDURE — 99213 OFFICE O/P EST LOW 20 MIN: CPT

## 2018-12-12 PROCEDURE — 73630 X-RAY EXAM OF FOOT: CPT | Performed by: PHYSICIAN ASSISTANT

## 2018-12-12 RX ORDER — MULTIVITAMIN WITH IRON
250 TABLET ORAL DAILY
COMMUNITY

## 2018-12-12 NOTE — ED INITIAL ASSESSMENT (HPI)
Pt. With with Rt. Foot pain since this weekend. Pt. States she does have a lateral tendon that has popped in the past. Has seen a podiatrist. Pt. Denies known popping of that area or injury. She was walking at the [place] [address] this past weekend.

## 2018-12-12 NOTE — TELEPHONE ENCOUNTER
From: Suman Rubio  To: Gudelia Caba MD  Sent: 12/12/2018 9:02 AM CST  Subject: Non-Urgent Medical Question    Good morning Dr. Myra Israel,     I have an appointment coming up with you in a couple weeks and I can't remember if I'm supposed to have a blood gabo

## 2018-12-12 NOTE — ED PROVIDER NOTES
Patient Seen in: [facility]    History   Patient presents with:   Foot Pain: Rt.    Stated Complaint: right foot x 3 days    HPI    CHIEF COMPLAINT: Right foot pain for the past 3 days     HISTORY OF PRESENT ILLNESS: Recent is a Gudelia Ryan M.D.   • Dry eyes, bilateral    • Endometriosis    • Esophageal reflux    • Glaucoma 1/7/2013   • Hemorrhoids    • High blood pressure    • High cholesterol    • Incontinence    • Kidney infection 11/30/15    currently on macrobid for All other systems reviewed and negative except as noted above.     Physical Exam     ED Triage Vitals [12/12/18 1049]   BP (!) 133/93   Pulse 68   Resp 18   Temp 98 °F (36.7 °C)   Temp src Oral   SpO2 98 %   O2 Device None (Room air)       Current:BP Constancia George pain for the past 3 days. Patient had an x-ray that was negative. She has a postop shoe at home from the last time this happened. For now we will Ace wrap her for comfort. She can ice and elevate the affected area. Wear the postop shoe as needed.   Act

## 2018-12-19 ENCOUNTER — LAB ENCOUNTER (OUTPATIENT)
Dept: LAB | Age: 48
End: 2018-12-19
Attending: INTERNAL MEDICINE
Payer: COMMERCIAL

## 2018-12-19 DIAGNOSIS — E03.9 HYPOTHYROIDISM, UNSPECIFIED TYPE: ICD-10-CM

## 2018-12-19 DIAGNOSIS — E66.01 MORBID OBESITY WITH BMI OF 40.0-44.9, ADULT (HCC): ICD-10-CM

## 2018-12-19 DIAGNOSIS — Z98.84 S/P LAPAROSCOPIC SLEEVE GASTRECTOMY: ICD-10-CM

## 2018-12-19 DIAGNOSIS — Z51.81 ENCOUNTER FOR THERAPEUTIC DRUG MONITORING: ICD-10-CM

## 2018-12-19 PROCEDURE — 84443 ASSAY THYROID STIM HORMONE: CPT

## 2018-12-19 PROCEDURE — 82607 VITAMIN B-12: CPT

## 2018-12-19 PROCEDURE — 82728 ASSAY OF FERRITIN: CPT

## 2018-12-19 PROCEDURE — 83550 IRON BINDING TEST: CPT

## 2018-12-19 PROCEDURE — 83540 ASSAY OF IRON: CPT

## 2018-12-19 PROCEDURE — 82306 VITAMIN D 25 HYDROXY: CPT

## 2018-12-19 PROCEDURE — 80061 LIPID PANEL: CPT

## 2018-12-19 PROCEDURE — 85025 COMPLETE CBC W/AUTO DIFF WBC: CPT

## 2018-12-19 PROCEDURE — 84425 ASSAY OF VITAMIN B-1: CPT

## 2018-12-19 PROCEDURE — 36415 COLL VENOUS BLD VENIPUNCTURE: CPT

## 2018-12-19 PROCEDURE — 82746 ASSAY OF FOLIC ACID SERUM: CPT

## 2018-12-19 PROCEDURE — 83735 ASSAY OF MAGNESIUM: CPT

## 2018-12-19 PROCEDURE — 80053 COMPREHEN METABOLIC PANEL: CPT

## 2018-12-19 PROCEDURE — 84439 ASSAY OF FREE THYROXINE: CPT

## 2018-12-20 ENCOUNTER — OFFICE VISIT (OUTPATIENT)
Dept: FAMILY MEDICINE CLINIC | Facility: CLINIC | Age: 48
End: 2018-12-20

## 2018-12-20 VITALS
BODY MASS INDEX: 37 KG/M2 | SYSTOLIC BLOOD PRESSURE: 120 MMHG | DIASTOLIC BLOOD PRESSURE: 86 MMHG | WEIGHT: 205 LBS | HEART RATE: 86 BPM

## 2018-12-20 DIAGNOSIS — M77.31 CALCANEAL SPUR OF RIGHT FOOT: ICD-10-CM

## 2018-12-20 DIAGNOSIS — M79.671 ACUTE FOOT PAIN, RIGHT: Primary | ICD-10-CM

## 2018-12-20 DIAGNOSIS — M77.41 METATARSALGIA OF RIGHT FOOT: ICD-10-CM

## 2018-12-20 PROCEDURE — 99213 OFFICE O/P EST LOW 20 MIN: CPT | Performed by: FAMILY MEDICINE

## 2018-12-20 NOTE — PROGRESS NOTES
James Corcoran is a 53year old female. HPI:       Foot pain:  Started while walking about 10 days ago. Has stayed about the same. Pain up to about 2-3/10, end of day 5-6/10. Tylenol without relief.   She has had similar or same pain in the past. PATIENT SUPPLIED MEDICATION Essential Oils for allergies Disp:  Rfl:    Cholecalciferol (VITAMIN D3) 2000 UNITS Oral Tab Take 1 tablet by mouth daily.    Disp:  Rfl:    CycloSPORINE (RESTASIS) 0.05 % Ophthalmic Emulsion Place 1 drop into both eyes 2 (two) remember what kind of surgery. no abdominal or flank scar   • ROBOT-ASSISTED LAPAROSCOPIC CYSTECTOMY Left 12/10/2015    Performed by Angelika Mercado MD at [address]   • TILT TABLE EVALUATION      veinogram 7-6-12 Deann Anthony.  General   • UPPER GI ENDOSCO right foot. PSYCH: affect normal, normal thought content. DATA:    Interpretation   PROCEDURE:  XR FOOT, COMPLETE (MIN 3 VIEWS), RIGHT (CPT=27910)     TECHNIQUE:  AP, oblique, and lateral views were obtained.      COMPARISON:  [facility], XR FOOT,

## 2018-12-31 ENCOUNTER — OFFICE VISIT (OUTPATIENT)
Dept: INTERNAL MEDICINE CLINIC | Facility: CLINIC | Age: 48
End: 2018-12-31

## 2018-12-31 VITALS
RESPIRATION RATE: 16 BRPM | BODY MASS INDEX: 38.02 KG/M2 | HEIGHT: 61.5 IN | DIASTOLIC BLOOD PRESSURE: 78 MMHG | HEART RATE: 84 BPM | SYSTOLIC BLOOD PRESSURE: 122 MMHG | WEIGHT: 204 LBS

## 2018-12-31 DIAGNOSIS — E78.2 MIXED HYPERLIPIDEMIA: ICD-10-CM

## 2018-12-31 DIAGNOSIS — E53.8 B12 DEFICIENCY: ICD-10-CM

## 2018-12-31 DIAGNOSIS — Z51.81 ENCOUNTER FOR THERAPEUTIC DRUG MONITORING: Primary | ICD-10-CM

## 2018-12-31 DIAGNOSIS — R21 RASH: ICD-10-CM

## 2018-12-31 DIAGNOSIS — E66.01 MORBID OBESITY WITH BMI OF 40.0-44.9, ADULT (HCC): ICD-10-CM

## 2018-12-31 DIAGNOSIS — E11.9 TYPE 2 DIABETES MELLITUS WITHOUT COMPLICATION, WITHOUT LONG-TERM CURRENT USE OF INSULIN (HCC): ICD-10-CM

## 2018-12-31 DIAGNOSIS — Z98.84 S/P LAPAROSCOPIC SLEEVE GASTRECTOMY: ICD-10-CM

## 2018-12-31 PROCEDURE — 99214 OFFICE O/P EST MOD 30 MIN: CPT | Performed by: INTERNAL MEDICINE

## 2018-12-31 NOTE — PROGRESS NOTES
CC: Patient presents with:  Weight Check: down 17 lb       HPI:     Overall doing well today. Underwent bariatric surgery on 10/15/18. Weight on surgery date: 230 lbs.    Down 17 lb from previous  Does do shakes and eggs/ sausage for breakfast.   Cu Rfl: 1   estradiol 1 MG Oral Tab Take 1 mg by mouth daily. Disp:  Rfl:    PATIENT SUPPLIED MEDICATION Essential Oils for allergies Disp:  Rfl:    Cholecalciferol (VITAMIN D3) 2000 UNITS Oral Tab Take 1 tablet by mouth daily.    Disp:  Rfl:    CycloSPORINE ( angle glaucoma of both eyes, moderate stage     Morbid obesity with BMI of 40.0-44.9, adult ([facility] [address]     Bursitis     Family history of skin cancer     Encounter for weight loss counseling     Iliotibial band tendinitis of both sides     Trochanteric bursitis atraumatic, normocephalic, OP-clear, PERRLA  NECK: supple,no adenopathy  LUNGS: clear to auscultation bilaterally   CARDIO: RRR without murmur  GI: good BS, denies any constipation or diarrhea    EXTREMITIES: no cyanosis, no clubbing, no edema    Orders Pl wnl  Recheck lipid panel   Will switch rx to noascobal.             The patient indicates understanding of these issues and agrees to the plan. Return in about 4 weeks (around 1/28/2019) for weight management.

## 2019-01-02 ENCOUNTER — OFFICE VISIT (OUTPATIENT)
Dept: SURGERY | Facility: CLINIC | Age: 49
End: 2019-01-02

## 2019-01-02 VITALS
WEIGHT: 204.13 LBS | HEART RATE: 76 BPM | BODY MASS INDEX: 37.56 KG/M2 | RESPIRATION RATE: 16 BRPM | DIASTOLIC BLOOD PRESSURE: 94 MMHG | SYSTOLIC BLOOD PRESSURE: 143 MMHG | HEIGHT: 62 IN

## 2019-01-02 VITALS — BODY MASS INDEX: 37.51 KG/M2 | WEIGHT: 203.81 LBS | HEIGHT: 62 IN

## 2019-01-02 DIAGNOSIS — K21.9 GASTROESOPHAGEAL REFLUX DISEASE, ESOPHAGITIS PRESENCE NOT SPECIFIED: ICD-10-CM

## 2019-01-02 DIAGNOSIS — M25.562 CHRONIC PAIN OF BOTH KNEES: ICD-10-CM

## 2019-01-02 DIAGNOSIS — M25.561 CHRONIC PAIN OF BOTH KNEES: ICD-10-CM

## 2019-01-02 DIAGNOSIS — E78.2 MIXED HYPERLIPIDEMIA: Primary | ICD-10-CM

## 2019-01-02 DIAGNOSIS — Z98.84 S/P LAPAROSCOPIC SLEEVE GASTRECTOMY: ICD-10-CM

## 2019-01-02 DIAGNOSIS — G89.29 CHRONIC PAIN OF BOTH KNEES: ICD-10-CM

## 2019-01-02 DIAGNOSIS — Z87.442 HISTORY OF KIDNEY STONES: ICD-10-CM

## 2019-01-02 DIAGNOSIS — K66.0 LOWER ABDOMINAL ADHESIONS: ICD-10-CM

## 2019-01-02 DIAGNOSIS — I10 ESSENTIAL HYPERTENSION: ICD-10-CM

## 2019-01-02 DIAGNOSIS — Z79.899 HIGH RISK MEDICATION USE: ICD-10-CM

## 2019-01-02 DIAGNOSIS — E53.8 B12 DEFICIENCY: ICD-10-CM

## 2019-01-02 DIAGNOSIS — E66.01 CLASS 2 SEVERE OBESITY DUE TO EXCESS CALORIES WITH SERIOUS COMORBIDITY AND BODY MASS INDEX (BMI) OF 37.0 TO 37.9 IN ADULT (HCC): Primary | ICD-10-CM

## 2019-01-02 PROCEDURE — 97803 MED NUTRITION INDIV SUBSEQ: CPT | Performed by: DIETITIAN, REGISTERED

## 2019-01-02 NOTE — PROGRESS NOTES
[address] [id number] [facility] [place] [address]  Dept: [phone]    1/2/2019     Bariatric Patient Post-op Evaluation    Chief Complaint:  Post-op, sleeve/HH, 242 lbs preop     His Laterality Date   • ANGIOGRAM      2-24-12 [place]   • ANGIOPLASTY (CORONARY)     • BARIATRIC SLEEVE GASTRECTOMY LAPAROSCOPIC N/A 10/15/2018    Performed by Paul Lacy MD at [facility] OR   • COLONOSCOPY     • COLONOSCOPY  07/19/2018   • [address] Alcohol use:  Yes        Alcohol/week: 0.0 oz        Comment: three times per week-2 shots Tequila      Drug use: No    Other Topics      Concerns:        Caffeine Concern: Yes          8oz coffee per day        Exercise: No      Medications:   Current Outp Disp: , Rfl:   •  HYDROcodone-acetaminophen 7.5-325 MG/15ML Oral Solution, Take 10 mL by mouth every 4 (four) hours as needed. , Disp: 300 mL, Rfl: 0     Allergies:    Berries                 ANAPHYLAXIS  Casein                  ANAPHYLAXIS  Levofloxacin migraine not ideal as it has ASA, recommended to seek alternatives from her PCP, in the meantime if absolutely needed can be used sparingly as bridge therapy  Exercise - from a surgical standpoint has no restrictions however various lower body injuries may

## 2019-01-02 NOTE — PROGRESS NOTES
[address] [place] [address] AND [facility] CLINIC  [address] [place] [address]  Dept: [phone]  Loc: [phone]    01/02/19      Bariatric Follow-up Nutrition Session    Calvin Mclaughlin is a 53year old female.      As Component Value Date    CHOLEST 159 12/19/2018    TRIG 113 12/19/2018    HDL 53 12/19/2018    LDL 83 12/19/2018    VLDL 23 12/19/2018    TCHDLRATIO 2.46 05/07/2018    NONHDLC 106 12/19/2018    CHOLHDLRATIO 4.1 08/18/2015        Vitamins/Minerals:  Lab Re EVENING, Disp: 90 tablet, Rfl: 1  •  ONETOUCH DELICA LANCETS 95A Does not apply Misc, Test once to twice a day, Disp: 100 each, Rfl: 1  •  estradiol 1 MG Oral Tab, Take 1 mg by mouth daily. , Disp: , Rfl:   •  PATIENT SUPPLIED MEDICATION, Essential Oils for be switching to BariActiv vitamins since they are covered by her insurance. Blood sugars are good, are under 100 mg/dL most days. Pt was walking for 30 minutes five days per week, but has hurt her foot- has found an alternative w/ rowing and biking.  Pt w/

## 2019-01-08 ENCOUNTER — OFFICE VISIT (OUTPATIENT)
Dept: PODIATRY CLINIC | Facility: CLINIC | Age: 49
End: 2019-01-08

## 2019-01-08 DIAGNOSIS — M76.71 PERONEAL TENDINITIS OF RIGHT LOWER EXTREMITY: Primary | ICD-10-CM

## 2019-01-08 PROCEDURE — 99213 OFFICE O/P EST LOW 20 MIN: CPT | Performed by: PODIATRIST

## 2019-01-13 NOTE — PROGRESS NOTES
Jose Puentes is a 53year old female. Patient presents with: Foot Pain: right -- Xray taken 12/12/18. Onset for about 1 mth. Denies injury. Rates pain 2-6/10. Seen at [facility] office.          HPI:   Patient returns to the clinic at today's visit s (VITAMIN D3) 2000 UNITS Oral Tab Take 1 tablet by mouth daily. Disp:  Rfl:    CycloSPORINE (RESTASIS) 0.05 % Ophthalmic Emulsion Place 1 drop into both eyes 2 (two) times daily.    Disp:  Rfl:    Cyanocobalamin (NASCOBAL) 500 MCG/0.1ML Nasal Solution 1 sp COLONOSCOPY  07/19/2018   • DILATION & CURETTAGE CERVICAL STUMP      1996 and 1997   • HERNIA SURGERY  10/18/2018    Hiatal hernia Dr. Bianca Darby    • HYSTERECTOMY  2006    partial hystero.    • LAP SLEEVE GASTRECTOMY  10/18/2018    Dr. Baltazar Monsalve SYSTEMS:   Review of Systems  GENERAL HEALTH: feels well otherwise  SKIN: denies any unusual skin lesions or rashes  RESPIRATORY: denies shortness of breath with exertion  CARDIOVASCULAR: denies chest pain on exertion  GI: denies abdominal pain and denies

## 2019-01-14 ENCOUNTER — TELEPHONE (OUTPATIENT)
Dept: PODIATRY CLINIC | Facility: CLINIC | Age: 49
End: 2019-01-14

## 2019-01-14 NOTE — TELEPHONE ENCOUNTER
Called boris at  and [facility] that referrals are processed through [facility] and left phone # to [facility] if any q's or concerns about referral.

## 2019-01-17 ENCOUNTER — TELEPHONE (OUTPATIENT)
Dept: SURGERY | Facility: CLINIC | Age: 49
End: 2019-01-17

## 2019-01-18 ENCOUNTER — OFFICE VISIT (OUTPATIENT)
Dept: FAMILY MEDICINE CLINIC | Facility: CLINIC | Age: 49
End: 2019-01-18

## 2019-01-18 VITALS
SYSTOLIC BLOOD PRESSURE: 126 MMHG | HEART RATE: 66 BPM | WEIGHT: 198 LBS | DIASTOLIC BLOOD PRESSURE: 82 MMHG | HEIGHT: 62 IN | BODY MASS INDEX: 36.44 KG/M2

## 2019-01-18 DIAGNOSIS — E11.9 TYPE 2 DIABETES MELLITUS WITHOUT COMPLICATION, WITHOUT LONG-TERM CURRENT USE OF INSULIN (HCC): ICD-10-CM

## 2019-01-18 DIAGNOSIS — G43.009 MIGRAINE WITHOUT AURA AND WITHOUT STATUS MIGRAINOSUS, NOT INTRACTABLE: Primary | ICD-10-CM

## 2019-01-18 DIAGNOSIS — I10 ESSENTIAL HYPERTENSION: ICD-10-CM

## 2019-01-18 DIAGNOSIS — Z76.89 ENCOUNTER FOR NEW MEDICATION PRESCRIPTION: ICD-10-CM

## 2019-01-18 PROCEDURE — 99214 OFFICE O/P EST MOD 30 MIN: CPT | Performed by: FAMILY MEDICINE

## 2019-01-18 RX ORDER — TIZANIDINE 4 MG/1
4 TABLET ORAL 2 TIMES DAILY PRN
Qty: 30 TABLET | Refills: 1 | Status: SHIPPED | OUTPATIENT
Start: 2019-01-18 | End: 2019-03-16

## 2019-01-18 RX ORDER — TOPIRAMATE 25 MG/1
TABLET ORAL
Qty: 70 TABLET | Refills: 0 | Status: SHIPPED | OUTPATIENT
Start: 2019-01-18 | End: 2019-03-15 | Stop reason: SINTOL

## 2019-01-18 NOTE — PROGRESS NOTES
Luis Fernando Argueta is a 53year old female. HPI:       Migraines:  More frequent HAs since bariatric surgery 10/15/2018. Increased frequency and intensity. Unable to take Excedrine for Migraine.   At least 6-9 days out of the last 30 days she has had a Glucose Blood (ONETOUCH ULTRA BLUE) In Vitro Strip USE TO TEST ONCE TO TWICE A DAY Disp: 100 strip Rfl: 1   Ketoconazole 2 % External Shampoo  Disp:  Rfl:    SIMVASTATIN 20 MG Oral Tab TAKE 1 TABLET BY MOUTH EVERY EVENING Disp: 90 tablet Rfl: 1   ONETOUC Mayra Martinez MD at [address] [facility]   • COLONOSCOPY     • COLONOSCOPY  2018   • [address] and    • HERNIA SURGERY  10/18/2018    Hiatal hernia Dr. Andrena Apgar    • HYSTERECTOMY  2006    partial hystero.    • LAP SLEEVE ESTEVAN abdominal pain/nausea/vomiting/blood in stool/black stool/bloating/constipation/diarrhea  : no urinary symptoms  NEURO: As in HPI.   PSYCH: denies depression and anxiety      Immunization History  Administered            Date(s) Administered    TDAP for 1 week, then one tab in the morning and 2 tabs qhs for week, then 2 tabs twice a day. Dispense: 70 tablet;  Refill: 0          Meds & Refills for this Visit:  Requested Prescriptions     Signed Prescriptions Disp Refills   • TiZANidine HCl 4 MG Oral Ta

## 2019-01-21 RX ORDER — LOSARTAN POTASSIUM 50 MG/1
50 TABLET ORAL EVERY EVENING
Qty: 90 TABLET | Refills: 0 | OUTPATIENT
Start: 2019-01-21

## 2019-01-22 ENCOUNTER — OFFICE VISIT (OUTPATIENT)
Dept: PHYSICAL THERAPY | Age: 49
End: 2019-01-22
Attending: PODIATRIST
Payer: COMMERCIAL

## 2019-01-22 DIAGNOSIS — M76.71 PERONEAL TENDINITIS OF RIGHT LOWER EXTREMITY: ICD-10-CM

## 2019-01-22 PROCEDURE — 97161 PT EVAL LOW COMPLEX 20 MIN: CPT

## 2019-01-22 PROCEDURE — 97112 NEUROMUSCULAR REEDUCATION: CPT

## 2019-01-22 NOTE — PROGRESS NOTES
ANKLE EVALUATION:     Referring Physician: Dr. Carmenza Pimentel  Diagnosis: peroneal tendonitis of right lower extremity.       Date of Service: 1/22/2019     PATIENT Dina Rangel is a 53year old y/o female who presents to therapy today with complain report a reduction in pain and continues with high reported pain with strength testing. Pt has partial tear of peroneal muscle per MD note. It is medically necessary for pt to continue PT to reach functional goals.      In agreement with FOTO score and cl Frequency / Duration: Patient will be seen for 2 x/week or a total of 8 visits over a 90 day period. Treatment will include: Manual Therapy; Therapeutic Exercises; Neuromuscular Re-education;  Therapeutic Activity; Gait Training; Pt education; Home exe

## 2019-01-23 ENCOUNTER — APPOINTMENT (OUTPATIENT)
Dept: LAB | Age: 49
End: 2019-01-23
Attending: INTERNAL MEDICINE
Payer: COMMERCIAL

## 2019-01-23 DIAGNOSIS — Z98.84 S/P LAPAROSCOPIC SLEEVE GASTRECTOMY: ICD-10-CM

## 2019-01-23 DIAGNOSIS — E78.2 MIXED HYPERLIPIDEMIA: ICD-10-CM

## 2019-01-23 DIAGNOSIS — R21 RASH: ICD-10-CM

## 2019-01-23 DIAGNOSIS — E11.9 TYPE 2 DIABETES MELLITUS WITHOUT COMPLICATION, WITHOUT LONG-TERM CURRENT USE OF INSULIN (HCC): ICD-10-CM

## 2019-01-23 DIAGNOSIS — E53.8 B12 DEFICIENCY: ICD-10-CM

## 2019-01-23 DIAGNOSIS — Z51.81 ENCOUNTER FOR THERAPEUTIC DRUG MONITORING: ICD-10-CM

## 2019-01-23 DIAGNOSIS — E66.01 MORBID OBESITY WITH BMI OF 40.0-44.9, ADULT (HCC): ICD-10-CM

## 2019-01-23 LAB
CHOLEST SMN-MCNC: 161 MG/DL (ref ?–200)
EST. AVERAGE GLUCOSE BLD GHB EST-MCNC: 117 MG/DL (ref 68–126)
HBA1C MFR BLD HPLC: 5.7 % (ref ?–5.7)
HDLC SERPL-MCNC: 60 MG/DL (ref 40–59)
LDLC SERPL CALC-MCNC: 83 MG/DL (ref ?–100)
NONHDLC SERPL-MCNC: 101 MG/DL (ref ?–130)
TRIGL SERPL-MCNC: 92 MG/DL (ref 30–149)
VLDLC SERPL CALC-MCNC: 18 MG/DL (ref 0–30)

## 2019-01-23 PROCEDURE — 36415 COLL VENOUS BLD VENIPUNCTURE: CPT

## 2019-01-23 PROCEDURE — 80061 LIPID PANEL: CPT

## 2019-01-23 PROCEDURE — 82525 ASSAY OF COPPER: CPT

## 2019-01-23 PROCEDURE — 84630 ASSAY OF ZINC: CPT

## 2019-01-23 PROCEDURE — 83036 HEMOGLOBIN GLYCOSYLATED A1C: CPT

## 2019-01-24 ENCOUNTER — OFFICE VISIT (OUTPATIENT)
Dept: PHYSICAL THERAPY | Age: 49
End: 2019-01-24
Attending: PODIATRIST
Payer: COMMERCIAL

## 2019-01-24 DIAGNOSIS — M76.71 PERONEAL TENDINITIS OF RIGHT LOWER EXTREMITY: ICD-10-CM

## 2019-01-24 PROCEDURE — 97140 MANUAL THERAPY 1/> REGIONS: CPT

## 2019-01-24 PROCEDURE — 97112 NEUROMUSCULAR REEDUCATION: CPT

## 2019-01-24 PROCEDURE — 97110 THERAPEUTIC EXERCISES: CPT

## 2019-01-24 NOTE — PROGRESS NOTES
Dx: peroneal tendonitis of right lower extremity.          Authorized # of Visits:  8         Next MD visit: none scheduled  Fall Risk: standard         Precautions: n/a             Subjective: Pt states after the last  Visit her ankle was reallybothering h Total Timed Treatment: 35 min  Total Treatment Time: 45 min

## 2019-01-25 LAB
COPPER, SERUM: 131 UG/DL
ZINC: 79 UG/DL

## 2019-01-28 ENCOUNTER — APPOINTMENT (OUTPATIENT)
Dept: PHYSICAL THERAPY | Age: 49
End: 2019-01-28
Attending: PODIATRIST
Payer: COMMERCIAL

## 2019-01-28 RX ORDER — SIMVASTATIN 20 MG
TABLET ORAL
Qty: 90 TABLET | Refills: 1 | Status: SHIPPED | OUTPATIENT
Start: 2019-01-28 | End: 2019-07-19

## 2019-01-29 ENCOUNTER — APPOINTMENT (OUTPATIENT)
Dept: PHYSICAL THERAPY | Age: 49
End: 2019-01-29
Attending: PODIATRIST
Payer: COMMERCIAL

## 2019-01-30 ENCOUNTER — APPOINTMENT (OUTPATIENT)
Dept: PHYSICAL THERAPY | Age: 49
End: 2019-01-30
Attending: PODIATRIST
Payer: COMMERCIAL

## 2019-01-31 ENCOUNTER — APPOINTMENT (OUTPATIENT)
Dept: PHYSICAL THERAPY | Age: 49
End: 2019-01-31
Attending: PODIATRIST
Payer: COMMERCIAL

## 2019-02-01 ENCOUNTER — HOSPITAL ENCOUNTER (EMERGENCY)
Facility: HOSPITAL | Age: 49
Discharge: LEFT BEFORE DISCHARGE | End: 2019-02-01
Payer: COMMERCIAL

## 2019-02-01 ENCOUNTER — OFFICE VISIT (OUTPATIENT)
Dept: INTERNAL MEDICINE CLINIC | Facility: CLINIC | Age: 49
End: 2019-02-01

## 2019-02-01 ENCOUNTER — TELEPHONE (OUTPATIENT)
Dept: FAMILY MEDICINE CLINIC | Facility: CLINIC | Age: 49
End: 2019-02-01

## 2019-02-01 VITALS
WEIGHT: 195 LBS | HEART RATE: 65 BPM | HEIGHT: 62 IN | SYSTOLIC BLOOD PRESSURE: 160 MMHG | TEMPERATURE: 96 F | RESPIRATION RATE: 16 BRPM | BODY MASS INDEX: 35.88 KG/M2 | OXYGEN SATURATION: 98 % | DIASTOLIC BLOOD PRESSURE: 81 MMHG

## 2019-02-01 VITALS
RESPIRATION RATE: 16 BRPM | WEIGHT: 197 LBS | HEART RATE: 90 BPM | BODY MASS INDEX: 36.72 KG/M2 | SYSTOLIC BLOOD PRESSURE: 148 MMHG | DIASTOLIC BLOOD PRESSURE: 80 MMHG | HEIGHT: 61.5 IN

## 2019-02-01 DIAGNOSIS — R10.2 PELVIC PAIN: ICD-10-CM

## 2019-02-01 DIAGNOSIS — R10.31 RIGHT LOWER QUADRANT ABDOMINAL PAIN: ICD-10-CM

## 2019-02-01 DIAGNOSIS — Z98.84 S/P LAPAROSCOPIC SLEEVE GASTRECTOMY: ICD-10-CM

## 2019-02-01 DIAGNOSIS — L30.4 INTERTRIGO: Primary | ICD-10-CM

## 2019-02-01 DIAGNOSIS — E66.01 MORBID OBESITY WITH BMI OF 40.0-44.9, ADULT (HCC): ICD-10-CM

## 2019-02-01 PROCEDURE — 99214 OFFICE O/P EST MOD 30 MIN: CPT | Performed by: INTERNAL MEDICINE

## 2019-02-01 RX ORDER — NYSTATIN 100000 [USP'U]/G
1 POWDER TOPICAL 4 TIMES DAILY
Qty: 60 G | Refills: 1 | Status: SHIPPED | OUTPATIENT
Start: 2019-02-01 | End: 2021-05-19

## 2019-02-01 NOTE — PROGRESS NOTES
CC: Patient presents with:  Weight Check: down 7 lb       HPI:     Overall doing well today. Underwent bariatric surgery on 10/15/18. Weight on surgery date: 230 lbs.    Down  7 lb from previous   Having severe pelvic pain   Blood pressure up   Naus (three) times daily as needed for Dizziness.  Disp: 30 tablet Rfl: 0   Glucose Blood (ONETOUCH ULTRA BLUE) In Vitro Strip USE TO TEST ONCE TO TWICE A DAY Disp: 100 strip Rfl: 1   Ketoconazole 2 % External Shampoo  Disp:  Rfl:    ONETOUCH DELICA LANCETS 54R Mixed hyperlipidemia     Low HDL (under 40)     Lower abdominal adhesions     Influenza vaccination declined     High risk medication use     Hypothyroidism (acquired)     Diverticulosis of large intestine without hemorrhage     Essential hypertension shortness of breath   CARDIOVASCULAR: denies chest pain  GI: denies abdominal pain    EXAM:   /80   Pulse 90   Resp 16   Ht 61.5\"   Wt 197 lb   BMI 36.62 kg/m²   GENERAL: well developed, well nourished,in no apparent distress, A/O x3  SKIN: + bilate

## 2019-02-01 NOTE — TELEPHONE ENCOUNTER
Wilmer You is calling to let Dr Juliocesar Johnson know that she experienced lower front pelvic pain with nausea, the pain has subsided, she does have a appt in 1 hour with her weight loss  she is just wondering if she should keep her appt with her weight loss Dr uriel groves

## 2019-02-01 NOTE — TELEPHONE ENCOUNTER
Noted.  Pt was instructed to keep her appt because her symptoms had subsided.   Pt should continue to monitor the symptoms and if they return pt to call office

## 2019-02-04 ENCOUNTER — OFFICE VISIT (OUTPATIENT)
Dept: PHYSICAL THERAPY | Age: 49
End: 2019-02-04
Attending: PODIATRIST
Payer: COMMERCIAL

## 2019-02-04 PROCEDURE — 97110 THERAPEUTIC EXERCISES: CPT

## 2019-02-04 PROCEDURE — 97140 MANUAL THERAPY 1/> REGIONS: CPT

## 2019-02-04 PROCEDURE — 97112 NEUROMUSCULAR REEDUCATION: CPT

## 2019-02-04 NOTE — PROGRESS NOTES
Dx: peroneal tendonitis of right lower extremity. Authorized # of Visits:  8         Next MD visit: none scheduled  Fall Risk: standard         Precautions: n/a             Subjective: Pt states she went [place] and did some hiking.   She sta bridges 2x10 bridges 2x10        SB TA 2sx20 SB TA 2sx20        Manual   Tool assisted STM to lateral leg  TC and subtalar light distraction Seated gastroc stretch 3x30s        MHPx10 min Shuttle DLP 4c 20x         Manual   Tool assisted STM to lateral l

## 2019-02-05 ENCOUNTER — OFFICE VISIT (OUTPATIENT)
Dept: FAMILY MEDICINE CLINIC | Facility: CLINIC | Age: 49
End: 2019-02-05

## 2019-02-05 ENCOUNTER — HOSPITAL ENCOUNTER (OUTPATIENT)
Dept: CT IMAGING | Age: 49
Discharge: HOME | End: 2019-02-05
Attending: FAMILY MEDICINE
Payer: COMMERCIAL

## 2019-02-05 VITALS
HEART RATE: 76 BPM | DIASTOLIC BLOOD PRESSURE: 80 MMHG | WEIGHT: 195.38 LBS | TEMPERATURE: 98 F | BODY MASS INDEX: 35.95 KG/M2 | SYSTOLIC BLOOD PRESSURE: 130 MMHG | HEIGHT: 61.81 IN

## 2019-02-05 DIAGNOSIS — J00 ACUTE NASOPHARYNGITIS: ICD-10-CM

## 2019-02-05 DIAGNOSIS — R10.31 RLQ ABDOMINAL PAIN: ICD-10-CM

## 2019-02-05 DIAGNOSIS — R10.31 RLQ ABDOMINAL PAIN: Primary | ICD-10-CM

## 2019-02-05 DIAGNOSIS — R30.0 DYSURIA: ICD-10-CM

## 2019-02-05 LAB — CREAT SERPL-MCNC: 0.9 MG/DL (ref 0.55–1.02)

## 2019-02-05 PROCEDURE — 82565 ASSAY OF CREATININE: CPT

## 2019-02-05 PROCEDURE — 81003 URINALYSIS AUTO W/O SCOPE: CPT | Performed by: FAMILY MEDICINE

## 2019-02-05 PROCEDURE — 99215 OFFICE O/P EST HI 40 MIN: CPT | Performed by: FAMILY MEDICINE

## 2019-02-05 PROCEDURE — 74177 CT ABD & PELVIS W/CONTRAST: CPT | Performed by: FAMILY MEDICINE

## 2019-02-05 PROCEDURE — 87086 URINE CULTURE/COLONY COUNT: CPT | Performed by: FAMILY MEDICINE

## 2019-02-05 NOTE — PROGRESS NOTES
Matias Reese is a 53year old female here for Patient presents with:  Abdominal Pain: Lower right abdominal pain      HPI:       Abd Pain/Discomfort  -Location: RLQ  -Present for 3 days ago - seems to be improving   -Symptoms include dull pain at basel 1996 and 1997   • HERNIA SURGERY  10/18/2018    Hiatal hernia Dr. Angie De La O    • HYSTERECTOMY  2006    partial hystero.    • LAP SLEEVE GASTRECTOMY  10/18/2018    Dr. Elda mooney    • OOPHORECTOMY Bilateral 2015    ovaries removed after partial.   • REMOVAL twice a day for 1 week, then one tab in the morning and 2 tabs qhs for week, then 2 tabs twice a day. Disp: 70 tablet Rfl: 0   magnesium 250 MG Oral Tab Take 250 mg by mouth. Disp:  Rfl:    Pyridoxine HCl (VITAMIN B-6 OR) Take by mouth.  Disp:  Rfl:    LEVO VOMITING  Avocado                 ITCHING  Darvocet [Propoxyph*    NAUSEA AND VOMITING  Latex                   RASH, SWELLING  Nuts                    ANAPHYLAXIS    Comment:PECANS/CASHEWS             Water chestnuts-Itching  Ultram [Tramadol Hc*    RASH

## 2019-02-05 NOTE — PROGRESS NOTES
Informed patient of CT scan results. Informed patient to start Miralax qd and to take Ibuprofen or Tylenol for the pain. Advised patient to schedule appointment for 1 week, if she feels worse to come back before the weekend.  Patient understood and no furth

## 2019-02-05 NOTE — PATIENT INSTRUCTIONS
-- we will call with urine culture tests   -- go to get CT done today  -- we will let you know of the results  -- if negative, we will get an ultrasound

## 2019-02-06 ENCOUNTER — OFFICE VISIT (OUTPATIENT)
Dept: PHYSICAL THERAPY | Age: 49
End: 2019-02-06
Attending: PODIATRIST
Payer: COMMERCIAL

## 2019-02-06 LAB
BILIRUB UR QL STRIP.AUTO: NEGATIVE
CLARITY UR REFRACT.AUTO: CLEAR
COLOR UR AUTO: YELLOW
GLUCOSE UR STRIP.AUTO-MCNC: NEGATIVE MG/DL
KETONES UR STRIP.AUTO-MCNC: NEGATIVE MG/DL
LEUKOCYTE ESTERASE UR QL STRIP.AUTO: NEGATIVE
NITRITE UR QL STRIP.AUTO: NEGATIVE
PH UR STRIP.AUTO: 7 [PH] (ref 4.5–8)
PROT UR STRIP.AUTO-MCNC: NEGATIVE MG/DL
RBC UR QL AUTO: NEGATIVE
SP GR UR STRIP.AUTO: 1.02 (ref 1–1.03)
UROBILINOGEN UR STRIP.AUTO-MCNC: <2 MG/DL

## 2019-02-06 PROCEDURE — 97112 NEUROMUSCULAR REEDUCATION: CPT

## 2019-02-06 PROCEDURE — 97110 THERAPEUTIC EXERCISES: CPT

## 2019-02-06 PROCEDURE — 97140 MANUAL THERAPY 1/> REGIONS: CPT

## 2019-02-06 NOTE — PROGRESS NOTES
Dx: peroneal tendonitis of right lower extremity.          Authorized # of Visits:  8         Next MD visit: none scheduled  Fall Risk: standard         Precautions: n/a             Subjective: Pt states      Objective: Strength/MMT:From IE  Ankle Hip   Renny stretch 3x30s       MHPx10 min Shuttle DLP 4c 20x Shuttle DLP 4c 20x        Manual   Tool assisted STM to lateral leg  TC and subtalar light distraction Manual   Tool assisted STM to lateral leg  TC and subtalar light distraction  Cuboid MET        MHPx10

## 2019-02-11 ENCOUNTER — OFFICE VISIT (OUTPATIENT)
Dept: PHYSICAL THERAPY | Age: 49
End: 2019-02-11
Attending: PODIATRIST
Payer: COMMERCIAL

## 2019-02-11 PROCEDURE — 97140 MANUAL THERAPY 1/> REGIONS: CPT

## 2019-02-11 PROCEDURE — 97112 NEUROMUSCULAR REEDUCATION: CPT

## 2019-02-11 PROCEDURE — 97110 THERAPEUTIC EXERCISES: CPT

## 2019-02-11 NOTE — PROGRESS NOTES
Dx: peroneal tendonitis of right lower extremity.          Authorized # of Visits:  8         Next MD visit: none scheduled  Fall Risk: standard         Precautions: n/a             Subjective: Pt states she was able to wear heels for the first time in over 2x10 SB HS curl 2x10 Seated wobble board A/P and M/L 10x ea slow and controlled pace      prone quad stretch 3x30s prone quad stretch 3x30s prone quad stretch 3x30s -      YTB eversion 3x10 YTB eversion 3x10 YTB eversion 3x10 RTB eversion 2x10      bridges

## 2019-02-13 ENCOUNTER — OFFICE VISIT (OUTPATIENT)
Dept: PHYSICAL THERAPY | Age: 49
End: 2019-02-13
Attending: PODIATRIST
Payer: COMMERCIAL

## 2019-02-13 PROCEDURE — 97112 NEUROMUSCULAR REEDUCATION: CPT

## 2019-02-13 PROCEDURE — 97110 THERAPEUTIC EXERCISES: CPT

## 2019-02-13 PROCEDURE — 97140 MANUAL THERAPY 1/> REGIONS: CPT

## 2019-02-13 NOTE — PROGRESS NOTES
Dx: peroneal tendonitis of right lower extremity. Authorized # of Visits:  8         Next MD visit: none scheduled  Fall Risk: standard         Precautions: LATEX ALLERGY             Subjective: Pt states her foot is bothering her right now.   She s 3x30s prone quad stretch 3x30s - Tandem walk 20 steps     YTB eversion 3x10 YTB eversion 3x10 YTB eversion 3x10 RTB eversion 2x10 RTB eversion 2x10     bridges 2x10 bridges 2x10 RTB clams 2x10 ea RTB clams 2x10 ea RTB clams 2x10 ea     SB TA 2sx20 SB TA 2s

## 2019-02-15 ENCOUNTER — OFFICE VISIT (OUTPATIENT)
Dept: FAMILY MEDICINE CLINIC | Facility: CLINIC | Age: 49
End: 2019-02-15

## 2019-02-15 VITALS
SYSTOLIC BLOOD PRESSURE: 120 MMHG | HEART RATE: 74 BPM | BODY MASS INDEX: 35.7 KG/M2 | HEIGHT: 61.8 IN | WEIGHT: 194 LBS | DIASTOLIC BLOOD PRESSURE: 76 MMHG

## 2019-02-15 DIAGNOSIS — M76.71 PERONEAL TENDINITIS OF RIGHT LOWER EXTREMITY: ICD-10-CM

## 2019-02-15 DIAGNOSIS — G43.009 MIGRAINE WITHOUT AURA AND WITHOUT STATUS MIGRAINOSUS, NOT INTRACTABLE: Primary | ICD-10-CM

## 2019-02-15 DIAGNOSIS — R10.31 RIGHT LOWER QUADRANT ABDOMINAL PAIN: ICD-10-CM

## 2019-02-15 PROCEDURE — 99214 OFFICE O/P EST MOD 30 MIN: CPT | Performed by: FAMILY MEDICINE

## 2019-02-15 RX ORDER — TOPIRAMATE 50 MG/1
50 TABLET, FILM COATED ORAL 2 TIMES DAILY
Qty: 60 TABLET | Refills: 0 | Status: SHIPPED | OUTPATIENT
Start: 2019-02-15 | End: 2019-03-15 | Stop reason: SINTOL

## 2019-02-15 RX ORDER — ONDANSETRON 8 MG/1
8 TABLET, ORALLY DISINTEGRATING ORAL EVERY 8 HOURS PRN
COMMUNITY
End: 2019-05-22

## 2019-02-15 NOTE — PROGRESS NOTES
Binu Quintanilla is a 53year old female. HPI:       Migraines:  Still having frequent headaches. Headaches increased in frequency after bariatric surgery. Taking tizanidine at bedtime some nights with some partial temporary relief.   Patient was pres BEFORE BREAKFAST. Disp: 90 tablet Rfl: 0   Calcium 500-125 MG-UNIT Oral Tab Take 1,000 mg by mouth. Disp:  Rfl:    HYDROcodone-acetaminophen 7.5-325 MG/15ML Oral Solution Take 10 mL by mouth every 4 (four) hours as needed.  Disp: 300 mL Rfl: 0   Pantoprazol Hemorrhoids    • High blood pressure    • High cholesterol    • Incontinence    • Kidney infection 11/30/15    currently on macrobid for kidney infection-instructed to inform surgeon of infection.    • Lung nodule     pt unsure of which side   • Migraines her 50's. • Ovarian Cancer Maternal Aunt 60        In her 63's. • Cancer Maternal Aunt         breast, basal cell   • Breast Cancer Maternal Aunt 68        Late 60's.    • Cancer Mother         basal cell X 3; LUNG   • Cancer Father         colon 1998 a enlargement, atrophy, abnormal density, or significant focal lesion. BILIARY:  No visible dilatation or calcification. PANCREAS:  No lesion, fluid collection, ductal dilatation, or atrophy. SPLEEN:  No enlargement or focal lesion.     KIDNEYS:  5 m weakness  PSYCH: affect normal, normal thought content.           ASSESSMENT AND PLAN:       Migraine without aura and without status migrainosus, not intractable  (primary encounter diagnosis)  Peroneal tendinitis of right lower extremity  Right lower quad

## 2019-02-18 ENCOUNTER — OFFICE VISIT (OUTPATIENT)
Dept: PHYSICAL THERAPY | Age: 49
End: 2019-02-18
Attending: PODIATRIST
Payer: COMMERCIAL

## 2019-02-18 ENCOUNTER — TELEPHONE (OUTPATIENT)
Dept: FAMILY MEDICINE CLINIC | Facility: CLINIC | Age: 49
End: 2019-02-18

## 2019-02-18 PROCEDURE — 97140 MANUAL THERAPY 1/> REGIONS: CPT

## 2019-02-18 PROCEDURE — 97112 NEUROMUSCULAR REEDUCATION: CPT

## 2019-02-18 PROCEDURE — 97110 THERAPEUTIC EXERCISES: CPT

## 2019-02-18 RX ORDER — LEVOTHYROXINE SODIUM 0.12 MG/1
TABLET ORAL
Qty: 90 TABLET | Refills: 0 | Status: SHIPPED | OUTPATIENT
Start: 2019-02-18 | End: 2019-05-23

## 2019-02-18 NOTE — PROGRESS NOTES
ANKLE EVALUATION:     Referring Physician: Dr. Adam Geiger  Diagnosis: peroneal tendonitis of right lower extremity.       Date of Service: 1/22/2019     PATIENT Luisito nKox is a 53year old y/o female who presents to therapy today with complain joint        AROM:    Ankle    Dorsiflexion: R 13; L 8   Plantarflexion: R 53;  L 55  Inversion: R29; L35  Eversion: R16; L18  Great toe ext: R37; L42     Flexibility:   Hamstrings: R min; L min  Gastroc-soleus: R min; L min  Quads: R mod; L mod    Strength subtalar light distraction Manual   Tool assisted STM to lateral leg  TC and subtalar light distraction  Cuboid MET Manual   Tool assisted STM to lateral leg  TC and subtalar light distraction  Gastroc STM Manual   Tool assisted STM to lateral leg  TC and under my care.     X___________________________________________________ Date____________________    Certification PXSF:3/90/8735 To 5/18/2019

## 2019-02-20 ENCOUNTER — OFFICE VISIT (OUTPATIENT)
Dept: PHYSICAL THERAPY | Age: 49
End: 2019-02-20
Attending: PODIATRIST
Payer: COMMERCIAL

## 2019-02-20 PROCEDURE — 97140 MANUAL THERAPY 1/> REGIONS: CPT

## 2019-02-20 PROCEDURE — 97110 THERAPEUTIC EXERCISES: CPT

## 2019-02-20 PROCEDURE — 97112 NEUROMUSCULAR REEDUCATION: CPT

## 2019-02-20 NOTE — PROGRESS NOTES
Dx: peroneal tendonitis of right lower extremity.          Authorized # of Visits:  8         Next MD visit: none scheduled  Fall Risk: standard         Precautions: LATEX ALLERGY             Subjective: Pt states she did the treadmill for 5 min 2 sets and min L1 Bike 5 min L1 Bike 5 min L1 Bike 5 min L1 Bike 5 min L1   SB HS curl 2x10 SB HS curl 2x10 SB HS curl 2x10 Seated wobble board A/P and M/L 10x ea slow and controlled pace Seated wobble board A/P and M/L 10x ea slow and controlled pace Seated wobble b x1, therex x1, neuro x1       Total Timed Treatment: 50 min  Total Treatment Time: 60 min

## 2019-02-25 ENCOUNTER — APPOINTMENT (OUTPATIENT)
Dept: PHYSICAL THERAPY | Age: 49
End: 2019-02-25
Attending: PODIATRIST
Payer: COMMERCIAL

## 2019-02-27 ENCOUNTER — OFFICE VISIT (OUTPATIENT)
Dept: PHYSICAL THERAPY | Age: 49
End: 2019-02-27
Attending: PODIATRIST
Payer: COMMERCIAL

## 2019-02-27 ENCOUNTER — TELEPHONE (OUTPATIENT)
Dept: PODIATRY CLINIC | Facility: CLINIC | Age: 49
End: 2019-02-27

## 2019-02-27 ENCOUNTER — APPOINTMENT (OUTPATIENT)
Dept: PHYSICAL THERAPY | Age: 49
End: 2019-02-27
Attending: PODIATRIST
Payer: COMMERCIAL

## 2019-02-27 PROCEDURE — 97110 THERAPEUTIC EXERCISES: CPT

## 2019-02-27 PROCEDURE — 97112 NEUROMUSCULAR REEDUCATION: CPT

## 2019-02-27 PROCEDURE — 97140 MANUAL THERAPY 1/> REGIONS: CPT

## 2019-02-27 NOTE — TELEPHONE ENCOUNTER
requesting additional 8 visits for PT - pls put into epic -     Clinical notes need to be sent to [facility] -  Can attach to Current referral # [id number]

## 2019-02-27 NOTE — PROGRESS NOTES
Dx: peroneal tendonitis of right lower extremity.          Authorized # of Visits:  8         Next MD visit: none scheduled  Fall Risk: standard         Precautions: LATEX ALLERGY             Subjective: Pt states she went shopping with her mom and she was 8 min L1 Bike 5 min L1 Bike 5 min L1 Bike 5 min L1 Bike 5 min L1 Bike 5 min L1 Bike 5 min L1 Bike 5 min L1   SB HS curl 2x10 SB HS curl 2x10 SB HS curl 2x10 Seated wobble board A/P and M/L 10x ea slow and controlled pace Seated wobble board A/P and M/L 10x distraction  Gastroc STM Manual   TC and subtalar light distraction  Gastroc STM  Anterior tib STM Manual   TC and subtalar light distraction  Gastroc STM  Anterior tib STM  Tool assisted STM to lateral leg     MHPx10 min MHPx10 min MHPx10 min MHPx10 min M

## 2019-02-27 NOTE — TELEPHONE ENCOUNTER
Because the patient is experienced a pop and she is having further ankle pain I should really evaluate her before she has any more physical therapy I saw that when I reviewed the last physical therapy note thank you

## 2019-02-27 NOTE — TELEPHONE ENCOUNTER
S/w jovi and informed her per [facility] message and she states pt scheduled next on 3/6. I informed her I would let pt know and pt will f/u w/ PT if [facility] wants more PT or not.    Called pt and informed her per message from PT and [facility] wanting pt to hold off un

## 2019-02-28 ENCOUNTER — OFFICE VISIT (OUTPATIENT)
Dept: PODIATRY CLINIC | Facility: CLINIC | Age: 49
End: 2019-02-28

## 2019-02-28 VITALS
HEART RATE: 63 BPM | RESPIRATION RATE: 16 BRPM | WEIGHT: 243 LBS | DIASTOLIC BLOOD PRESSURE: 84 MMHG | SYSTOLIC BLOOD PRESSURE: 124 MMHG | HEIGHT: 62 IN | BODY MASS INDEX: 44.72 KG/M2

## 2019-02-28 VITALS
DIASTOLIC BLOOD PRESSURE: 80 MMHG | SYSTOLIC BLOOD PRESSURE: 112 MMHG | HEART RATE: 80 BPM | BODY MASS INDEX: 43.05 KG/M2 | HEIGHT: 63 IN | WEIGHT: 243 LBS | RESPIRATION RATE: 16 BRPM

## 2019-02-28 DIAGNOSIS — M76.71 PERONEAL TENDINITIS OF RIGHT LOWER EXTREMITY: Primary | ICD-10-CM

## 2019-02-28 PROCEDURE — 99213 OFFICE O/P EST LOW 20 MIN: CPT | Performed by: PODIATRIST

## 2019-02-28 RX ORDER — LEVOTHYROXINE SODIUM 0.12 MG/1
TABLET ORAL
Qty: 90 TABLET | Refills: 0 | OUTPATIENT
Start: 2019-02-28

## 2019-03-01 ENCOUNTER — APPOINTMENT (OUTPATIENT)
Dept: PHYSICAL THERAPY | Age: 49
End: 2019-03-01
Attending: PODIATRIST
Payer: COMMERCIAL

## 2019-03-01 ENCOUNTER — TELEPHONE (OUTPATIENT)
Dept: ORTHOPEDICS CLINIC | Facility: CLINIC | Age: 49
End: 2019-03-01

## 2019-03-01 VITALS
WEIGHT: 257 LBS | HEART RATE: 76 BPM | RESPIRATION RATE: 16 BRPM | HEIGHT: 62 IN | SYSTOLIC BLOOD PRESSURE: 148 MMHG | BODY MASS INDEX: 47.29 KG/M2 | DIASTOLIC BLOOD PRESSURE: 92 MMHG

## 2019-03-01 DIAGNOSIS — M76.71 PERONEAL TENDINITIS OF RIGHT LOWER EXTREMITY: Primary | ICD-10-CM

## 2019-03-01 NOTE — TELEPHONE ENCOUNTER
8 more sessions of therapy ordered. Order placed. Call to [facility] Informed of order placed for 8 more sessions.    Verbalizes understanding
[phone]

## 2019-03-01 NOTE — TELEPHONE ENCOUNTER
Order for 8 more PT visits generated. Call to [facility] Notified of order in system. Verbalizes understanding

## 2019-03-01 NOTE — TELEPHONE ENCOUNTER
Maximo Padron from out patient PT following up on referral. States she needs referral for 8 more visits now that patient has been seen on 2/28.   Referral number# [id number]    Maximo Padron states patient has PT appt on 3/5

## 2019-03-03 NOTE — PROGRESS NOTES
Hair Real is a 53year old female. Patient presents with: Foot Pain: Pt is here for a follow up on right foot pain. Pt seen in January,. Was doing therapy and feeling better. Last week was making a lot of errands.  Up and down stairs alot Also drive week, then 2 tabs twice a day. Disp: 70 tablet Rfl: 0   Cyanocobalamin (NASCOBAL) 500 MCG/0.1ML Nasal Solution 1 spray by Nasal route once a week. Disp: 1 Bottle Rfl: 5   magnesium 250 MG Oral Tab Take 250 mg by mouth.  Disp:  Rfl:    Pyridoxine HCl (VITAMI • Incontinence    • Kidney infection 11/30/15    currently on macrobid for kidney infection-instructed to inform surgeon of infection.    • Lung nodule     pt unsure of which side   • Migraines    • Morbid obesity with BMI of 45.0-49.9, adult ([facility] [address] 7/13/2 basal cell   • Cancer Maternal Aunt         bone   • Cancer Cousin         esoph,adenocarcinoma   • Cancer Cousin         Non Hodgkins Lymphoma   • Cancer Cousin         basal cell   • Cancer Paternal Aunt         lung      Social History    Socioeconomi enlargement of the tendon consistent with damage therefore would recommend continued physical therapy and follow-up in 4 weeks. The patient indicates understanding of these issues and agrees to the plan. Return in about 4 weeks (around 3/28/2019).

## 2019-03-05 ENCOUNTER — PATIENT MESSAGE (OUTPATIENT)
Dept: FAMILY MEDICINE CLINIC | Facility: CLINIC | Age: 49
End: 2019-03-05

## 2019-03-06 ENCOUNTER — OFFICE VISIT (OUTPATIENT)
Dept: PHYSICAL THERAPY | Age: 49
End: 2019-03-06
Attending: PODIATRIST
Payer: COMMERCIAL

## 2019-03-06 PROCEDURE — 97112 NEUROMUSCULAR REEDUCATION: CPT

## 2019-03-06 PROCEDURE — 97140 MANUAL THERAPY 1/> REGIONS: CPT

## 2019-03-06 PROCEDURE — 97110 THERAPEUTIC EXERCISES: CPT

## 2019-03-06 NOTE — PROGRESS NOTES
Dx: peroneal tendonitis of right lower extremity.          Authorized # of Visits:  8         Next MD visit: none scheduled  Fall Risk: standard         Precautions: LATEX ALLERGY             Subjective: Pt states she went to the doctor who said he is not w HS curl 2x10 SB HS curl 2x10 Seated wobble board A/P and M/L 10x ea slow and controlled pace Seated wobble board A/P and M/L 10x ea slow and controlled pace Seated wobble board A/P and M/L 10x ea slow and controlled pace Seated wobble board A/P and M/L 10x to lateral leg  TC and subtalar light distraction  Gastroc STM Manual   TC and subtalar light distraction  Gastroc STM Manual   TC and subtalar light distraction  Gastroc STM  Anterior tib STM Manual   TC and subtalar light distraction  Gastroc STM  Gayleen Guadalupe

## 2019-03-07 NOTE — TELEPHONE ENCOUNTER
From: Kraig Cast  To:  Liss Garcia DO  Sent: 3/5/2019 10:23 AM CST  Subject: Visit Follow-up Question    Good morning Dr. Megan Bolton -     My gastro doc isn't on here and I don't think this in an emergency question so I'm starting with you I hope t

## 2019-03-08 ENCOUNTER — OFFICE VISIT (OUTPATIENT)
Dept: PHYSICAL THERAPY | Age: 49
End: 2019-03-08
Attending: PODIATRIST
Payer: COMMERCIAL

## 2019-03-08 PROCEDURE — 97140 MANUAL THERAPY 1/> REGIONS: CPT

## 2019-03-08 PROCEDURE — 97110 THERAPEUTIC EXERCISES: CPT

## 2019-03-08 PROCEDURE — 97112 NEUROMUSCULAR REEDUCATION: CPT

## 2019-03-08 NOTE — PROGRESS NOTES
Dx: peroneal tendonitis of right lower extremity.          Authorized # of Visits:  8         Next MD visit: none scheduled  Fall Risk: standard         Precautions: LATEX ALLERGY             Subjective: Pt states she went to the doctor who said he is not w pace Seated wobble board A/P and M/L 10x ea slow and controlled pace Seated wobble board A/P and M/L 10x ea, X's 5x slow and controlled pace - Seated wobble board A/P and M/L 10x ea, X's 5x slow and controlled pace Seated wobble board A/P and M/L 10x ea, X to lateral leg  TC and subtalar light distraction  Gastroc STM Manual   Tool assisted STM to lateral leg  TC and subtalar light distraction  Gastroc STM Manual   TC and subtalar light distraction  Gastroc STM Manual   TC and subtalar light distraction  Gas

## 2019-03-09 ENCOUNTER — HOSPITAL ENCOUNTER (OUTPATIENT)
Dept: ULTRASOUND IMAGING | Age: 49
Discharge: HOME | End: 2019-03-09
Attending: NURSE PRACTITIONER
Payer: COMMERCIAL

## 2019-03-09 DIAGNOSIS — R10.11 RIGHT UPPER QUADRANT ABDOMINAL PAIN: ICD-10-CM

## 2019-03-09 PROCEDURE — 76700 US EXAM ABDOM COMPLETE: CPT | Performed by: NURSE PRACTITIONER

## 2019-03-09 NOTE — PROGRESS NOTES
Results reviewed. Resulted to MyChart. Left nephrolithiasis. Otherwise unremarkable abdominal ultrasound.

## 2019-03-11 ENCOUNTER — TELEPHONE (OUTPATIENT)
Dept: FAMILY MEDICINE CLINIC | Facility: CLINIC | Age: 49
End: 2019-03-11

## 2019-03-11 NOTE — TELEPHONE ENCOUNTER
Rhianna Schreiber has a appt on Friday with Dr hCasidy Ware for Migraine's, she is having a issue with her hands going numb that just started a couple of days and she would like to know if she can discuss this with Dr Chasidy Ware on Friday or if Dr Chasidy Ware wants to see her s

## 2019-03-12 ENCOUNTER — APPOINTMENT (OUTPATIENT)
Dept: PHYSICAL THERAPY | Age: 49
End: 2019-03-12
Payer: COMMERCIAL

## 2019-03-13 ENCOUNTER — OFFICE VISIT (OUTPATIENT)
Dept: PHYSICAL THERAPY | Age: 49
End: 2019-03-13
Attending: PODIATRIST
Payer: COMMERCIAL

## 2019-03-13 PROCEDURE — 97112 NEUROMUSCULAR REEDUCATION: CPT

## 2019-03-13 PROCEDURE — 97140 MANUAL THERAPY 1/> REGIONS: CPT

## 2019-03-13 PROCEDURE — 97110 THERAPEUTIC EXERCISES: CPT

## 2019-03-13 NOTE — PROGRESS NOTES
Dx: peroneal tendonitis of right lower extremity.          Authorized # of Visits:  8         Next MD visit: none scheduled  Fall Risk: standard         Precautions: LATEX ALLERGY             Subjective: Pt states she went to the doctor who said he is not w L1 Bike 5 min L1   SB HS curl 2x10 SB HS curl 2x10 SB HS curl 2x10 Seated wobble board A/P and M/L 10x ea slow and controlled pace Seated wobble board A/P and M/L 10x ea slow and controlled pace Seated wobble board A/P and M/L 10x ea slow and controlled pa 4c 20x, double leg heel raises 2c x15 Shuttle DLP 4c 20x, double leg heel raises 2c x15  hold Shuttle DLP 4c 20x, double leg heel raises 2c x15 - Lateral walks 30ft x2 Lateral walks YTB 2x10 Ladder drills 2 in 1 out 3 laps increasing speed each lap     Man

## 2019-03-15 ENCOUNTER — OFFICE VISIT (OUTPATIENT)
Dept: FAMILY MEDICINE CLINIC | Facility: CLINIC | Age: 49
End: 2019-03-15

## 2019-03-15 ENCOUNTER — OFFICE VISIT (OUTPATIENT)
Dept: PHYSICAL THERAPY | Age: 49
End: 2019-03-15
Attending: PODIATRIST
Payer: COMMERCIAL

## 2019-03-15 VITALS
WEIGHT: 184 LBS | SYSTOLIC BLOOD PRESSURE: 120 MMHG | BODY MASS INDEX: 33.86 KG/M2 | HEART RATE: 87 BPM | HEIGHT: 61.8 IN | DIASTOLIC BLOOD PRESSURE: 78 MMHG

## 2019-03-15 DIAGNOSIS — G43.909 MIGRAINE WITHOUT STATUS MIGRAINOSUS, NOT INTRACTABLE, UNSPECIFIED MIGRAINE TYPE: Primary | ICD-10-CM

## 2019-03-15 PROCEDURE — 97140 MANUAL THERAPY 1/> REGIONS: CPT

## 2019-03-15 PROCEDURE — 97110 THERAPEUTIC EXERCISES: CPT

## 2019-03-15 PROCEDURE — 99213 OFFICE O/P EST LOW 20 MIN: CPT | Performed by: FAMILY MEDICINE

## 2019-03-15 PROCEDURE — 97112 NEUROMUSCULAR REEDUCATION: CPT

## 2019-03-15 NOTE — PROGRESS NOTES
Dx: peroneal tendonitis of right lower extremity.          Authorized # of Visits:  8         Next MD visit: none scheduled  Fall Risk: standard         Precautions: LATEX ALLERGY             Subjective: Pt states she feels like her ankle is a little better board A/P and M/L 10x ea, X's 5x slow and controlled pace Seated wobble board A/P and M/L 10x ea, X's 5x slow and controlled pace.  Middle setting -   - Tandem walk 20 steps  tandem walk 2 laps in parallel bars tandem walk 2 laps in parallel bars lito araujo and subtalar light distraction  Gastroc STM  Anterior tib STM  Tool assisted STM to lateral leg Single leg stance with pivot 5x - -   MHPx10 min MHPx10 min MHPx10 min MHPx10 min -  Manual   TC and subtalar light distraction  Gastroc STM  Anterior tib STM M

## 2019-03-15 NOTE — PROGRESS NOTES
Jameson Charlton is a 53year old female. HPI:       Migraines/HAs:  Titrated up on the topiramate to 50 mg twice a day, last dose was yesterday morning, pt stopped 2nd to SEs numbness and tingling of both arms.   Also, face and mouth started with numbn morning before breakfast. Disp: 30 tablet Rfl: 2   Multiple Vitamins-Minerals (BARIATRIC MULTIVITAMINS/IRON OR) Take by mouth. Disp:  Rfl:    Meclizine HCl 25 MG Oral Tab Take 1 tablet (25 mg total) by mouth 3 (three) times daily as needed for Dizziness.  D Surgical History:   Procedure Laterality Date   • ANGIOGRAM      2-24-12 [place]   • ANGIOPLASTY (CORONARY)     • BARIATRIC SLEEVE GASTRECTOMY LAPAROSCOPIC N/A 10/15/2018    Performed by Iman Ross MD at [address] [place] [address] MAIN OR   • COLONOSCOPY     • COLONOSCOPY  07 overall feels well overall otherwise  SKIN: denies any unusual skin lesions or rashes   RESPIRATORY: Denies: JOSE/POLK/Cough  CARDIOVASCULAR: Denies CP/palpitations  GI: Denies abdominal pain  NEURO: As in HPI  PSYCH: denies depression and anxiety      Immun

## 2019-03-16 DIAGNOSIS — G43.009 MIGRAINE WITHOUT AURA AND WITHOUT STATUS MIGRAINOSUS, NOT INTRACTABLE: ICD-10-CM

## 2019-03-17 PROBLEM — G43.909 MIGRAINE WITHOUT STATUS MIGRAINOSUS, NOT INTRACTABLE: Status: ACTIVE | Noted: 2019-03-17

## 2019-03-18 RX ORDER — TIZANIDINE 4 MG/1
4 TABLET ORAL NIGHTLY
Qty: 30 TABLET | Refills: 0 | Status: SHIPPED | OUTPATIENT
Start: 2019-03-18 | End: 2019-04-16

## 2019-03-19 ENCOUNTER — TELEPHONE (OUTPATIENT)
Dept: FAMILY MEDICINE CLINIC | Facility: CLINIC | Age: 49
End: 2019-03-19

## 2019-03-19 ENCOUNTER — OFFICE VISIT (OUTPATIENT)
Dept: PHYSICAL THERAPY | Age: 49
End: 2019-03-19
Attending: PODIATRIST
Payer: COMMERCIAL

## 2019-03-19 DIAGNOSIS — E11.9 DIABETIC EYE EXAM (HCC): Primary | ICD-10-CM

## 2019-03-19 DIAGNOSIS — Z01.00 DIABETIC EYE EXAM (HCC): Primary | ICD-10-CM

## 2019-03-19 PROCEDURE — 97112 NEUROMUSCULAR REEDUCATION: CPT

## 2019-03-19 PROCEDURE — 97140 MANUAL THERAPY 1/> REGIONS: CPT

## 2019-03-19 PROCEDURE — 97110 THERAPEUTIC EXERCISES: CPT

## 2019-03-19 NOTE — PROGRESS NOTES
Dx: peroneal tendonitis of right lower extremity. Authorized # of Visits:  8         Next MD visit: none scheduled  Fall Risk: standard         Precautions: LATEX ALLERGY             Subjective: Pt states she feels like her ankle is getting better. min L1 Bike 5 min L1 Bike 5 min L1   Seated wobble board A/P and M/L 10x ea slow and controlled pace Seated wobble board A/P and M/L 10x ea slow and controlled pace Seated wobble board A/P and M/L 10x ea slow and controlled pace Seated wobble board A/P and double leg heel raises 2c x15  hold Shuttle DLP 4c 20x, double leg heel raises 2c x15 - Lateral walks 30ft x2 Lateral walks YTB 2x10 Ladder drills 2 in 1 out 3 laps increasing speed each lap - Walking marches in parallel bars 1 lap   Manual   Tool assisted

## 2019-03-19 NOTE — TELEPHONE ENCOUNTER
Reason for the order/referral: Diabetes eye exam   PCP: Jared Obregon   Refer to Provider (first and last name): Afia Meneses   Specialty: Opthamology   Patient Insurance: Payor: [facility] [place]/ADVCAP / Plan: [id number] / Product Type: HMO /   Duration/Summary of Sympt

## 2019-03-22 ENCOUNTER — OFFICE VISIT (OUTPATIENT)
Dept: PHYSICAL THERAPY | Age: 49
End: 2019-03-22
Attending: PODIATRIST
Payer: COMMERCIAL

## 2019-03-22 PROCEDURE — 97110 THERAPEUTIC EXERCISES: CPT

## 2019-03-22 PROCEDURE — 97140 MANUAL THERAPY 1/> REGIONS: CPT

## 2019-03-22 PROCEDURE — 97112 NEUROMUSCULAR REEDUCATION: CPT

## 2019-03-25 NOTE — ANESTHESIA POSTPROCEDURE EVALUATION
Patient: Maksim Leon    Procedure Summary     Date:  10/15/18 Room / Location:  [facility] MAIN OR 01 / [address] [facility] MAIN OR    Anesthesia Start:  1030 Anesthesia Stop:  4741    Procedure:  BARIATRIC SLEEVE GASTRECTOMY LAPAROSCOPIC (N/A ) Diagnosis:  (Morbid obesity,
Dr. Garth Augustin,  [phone]

## 2019-03-26 ENCOUNTER — OFFICE VISIT (OUTPATIENT)
Dept: PHYSICAL THERAPY | Age: 49
End: 2019-03-26
Attending: PODIATRIST
Payer: COMMERCIAL

## 2019-03-26 ENCOUNTER — TELEPHONE (OUTPATIENT)
Dept: PODIATRY CLINIC | Facility: CLINIC | Age: 49
End: 2019-03-26

## 2019-03-26 DIAGNOSIS — M76.71 PERONEAL TENDINITIS OF RIGHT LOWER EXTREMITY: Primary | ICD-10-CM

## 2019-03-26 PROCEDURE — 97140 MANUAL THERAPY 1/> REGIONS: CPT

## 2019-03-26 PROCEDURE — 97110 THERAPEUTIC EXERCISES: CPT

## 2019-03-26 PROCEDURE — 97112 NEUROMUSCULAR REEDUCATION: CPT

## 2019-03-26 NOTE — TELEPHONE ENCOUNTER
[facility] calling asking for order for 5 more visits. States they sent request notes to office 3/22. Asking for 5 visits be added to referral # [id number] and can be sent directly to [facility].  Pts next visit is 3/28

## 2019-03-26 NOTE — PROGRESS NOTES
Dx: peroneal tendonitis of right lower extremity.          Authorized # of Visits:  12         Next MD visit: none scheduled  Fall Risk: standard         Precautions: LATEX ALLERGY             Subjective: Pt states she feels like her ankle was sore after th slow and controlled pace - Seated wobble board A/P and M/L 10x ea, X's 5x slow and controlled pace Seated wobble board A/P and M/L 10x ea, X's 5x slow and controlled pace Seated wobble board A/P and M/L 10x ea, X's 5x slow and controlled pace.  Middle setti tib STM Manual   TC and subtalar light distraction  Gastroc STM  Anterior tib STM  Tool assisted STM to lateral leg Manual   TC and subtalar light distraction  Gastroc STM  Anterior tib STM  Tool assisted STM to lateral leg Single leg stance with pivot 5x

## 2019-03-27 ENCOUNTER — OFFICE VISIT (OUTPATIENT)
Dept: FAMILY MEDICINE CLINIC | Facility: CLINIC | Age: 49
End: 2019-03-27

## 2019-03-27 VITALS
DIASTOLIC BLOOD PRESSURE: 72 MMHG | HEIGHT: 61.8 IN | WEIGHT: 184 LBS | HEART RATE: 65 BPM | BODY MASS INDEX: 33.86 KG/M2 | SYSTOLIC BLOOD PRESSURE: 120 MMHG

## 2019-03-27 DIAGNOSIS — M17.11 PATELLOFEMORAL ARTHRITIS OF RIGHT KNEE: ICD-10-CM

## 2019-03-27 DIAGNOSIS — M25.561 ACUTE PAIN OF RIGHT KNEE: Primary | ICD-10-CM

## 2019-03-27 PROCEDURE — 99214 OFFICE O/P EST MOD 30 MIN: CPT | Performed by: FAMILY MEDICINE

## 2019-03-27 NOTE — PROGRESS NOTES
Jennifer Ramirez is a 53year old female. HPI:       Knee pain:  Right. Duration is 5 days. Worsening. Carlus Bile is achy. Tried staying off it yesterday with some improvement until she started walking.   Feels like a pressure like she needs to pop it b 3 (three) times daily as needed for Dizziness.  Disp: 30 tablet Rfl: 0   Glucose Blood (ONETOUCH ULTRA BLUE) In Vitro Strip USE TO TEST ONCE TO TWICE A DAY Disp: 100 strip Rfl: 1   Ketoconazole 2 % External Shampoo  Disp:  Rfl:    Eduar Isabel LANCETS 33 OR   • COLONOSCOPY     • COLONOSCOPY  07/19/2018   • DILATION & CURETTAGE CERVICAL STUMP      1996 and 1997   • HERNIA SURGERY  10/18/2018    Hiatal hernia Dr. Steven Chris    • HYSTERECTOMY  2006    partial hystero.    • LAP SLEEVE GASTRECTOMY  10/18/2018 LEs        Immunization History  Administered            Date(s) Administered    TDAP                  05/14/2013      EXAM:   /72 (BP Location: Left arm, Patient Position: Sitting, Cuff Size: adult)   Pulse 65   Ht 61.8\"   Wt 184 lb   BMI 33.87 kg/ Tylenol. We will avoid NSAIDs in this patient who has had bariatric surgery. Rest.  Hold off on physical therapy for 1 week. Follow-up in 10-14 days if not improving.     Case discussed with physical therapist.            Return in about 14 days (around

## 2019-03-28 ENCOUNTER — APPOINTMENT (OUTPATIENT)
Dept: PHYSICAL THERAPY | Age: 49
End: 2019-03-28
Attending: PODIATRIST
Payer: COMMERCIAL

## 2019-04-03 ENCOUNTER — TELEPHONE (OUTPATIENT)
Dept: FAMILY MEDICINE CLINIC | Facility: CLINIC | Age: 49
End: 2019-04-03

## 2019-04-03 ENCOUNTER — OFFICE VISIT (OUTPATIENT)
Dept: PHYSICAL THERAPY | Age: 49
End: 2019-04-03
Attending: PODIATRIST
Payer: COMMERCIAL

## 2019-04-03 DIAGNOSIS — E66.01 MORBID OBESITY WITH BMI OF 40.0-44.9, ADULT (HCC): Primary | ICD-10-CM

## 2019-04-03 PROCEDURE — 97110 THERAPEUTIC EXERCISES: CPT

## 2019-04-03 PROCEDURE — 97140 MANUAL THERAPY 1/> REGIONS: CPT

## 2019-04-03 PROCEDURE — 97112 NEUROMUSCULAR REEDUCATION: CPT

## 2019-04-03 NOTE — TELEPHONE ENCOUNTER
Anirudh Pineda states office needs to call [facility] to add visits to existing referral, states new referral put in is in open status, pt has appt today. Pls advise thank you.

## 2019-04-03 NOTE — PROGRESS NOTES
Dx: peroneal tendonitis of right lower extremity. Authorized # of Visits:  12         Next MD visit: none scheduled  Fall Risk: standard         Precautions: LATEX ALLERGY             Subjective: Pt states her knee is still really bothering her.   Mk Hanson and M/L 10x ea, X's 5x slow and controlled pace Seated wobble board A/P and M/L 10x ea, X's 5x slow and controlled pace. Middle setting - Seated wobble board A/P and M/L 10x ea, X's 5x slow and controlled pace.  Middle setting - SB TA squeeze and SB bridge TC and subtalar light distraction  Gastroc STM  Anterior tib STM  Tool assisted STM to lateral leg Manual   TC and subtalar light distraction  Gastroc STM  Anterior tib STM  Tool assisted STM to lateral leg Single leg stance with pivot 5x - - Single leg

## 2019-04-03 NOTE — TELEPHONE ENCOUNTER
[facility]   Cc: [facility] Referral Pool   Phone Number: [phone]             .Reason for the order/referral: Follow up   PCP: Shannen Valdovinos   Refer to Provider (first and last name): Samir Wild   Specialty: [place]   Breana

## 2019-04-05 ENCOUNTER — APPOINTMENT (OUTPATIENT)
Dept: PHYSICAL THERAPY | Age: 49
End: 2019-04-05
Attending: PODIATRIST
Payer: COMMERCIAL

## 2019-04-08 ENCOUNTER — OFFICE VISIT (OUTPATIENT)
Dept: FAMILY MEDICINE CLINIC | Facility: CLINIC | Age: 49
End: 2019-04-08

## 2019-04-08 ENCOUNTER — HOSPITAL ENCOUNTER (OUTPATIENT)
Dept: GENERAL RADIOLOGY | Age: 49
Discharge: HOME | End: 2019-04-08
Attending: FAMILY MEDICINE
Payer: COMMERCIAL

## 2019-04-08 VITALS
HEIGHT: 61.8 IN | DIASTOLIC BLOOD PRESSURE: 80 MMHG | TEMPERATURE: 98 F | BODY MASS INDEX: 33.86 KG/M2 | WEIGHT: 184 LBS | SYSTOLIC BLOOD PRESSURE: 126 MMHG | HEART RATE: 68 BPM

## 2019-04-08 VITALS
TEMPERATURE: 98 F | BODY MASS INDEX: 33.86 KG/M2 | WEIGHT: 184 LBS | SYSTOLIC BLOOD PRESSURE: 126 MMHG | HEIGHT: 61.8 IN | DIASTOLIC BLOOD PRESSURE: 80 MMHG

## 2019-04-08 DIAGNOSIS — M25.561 ACUTE PAIN OF RIGHT KNEE: ICD-10-CM

## 2019-04-08 DIAGNOSIS — Z02.9 ADMINISTRATIVE ENCOUNTER: Primary | ICD-10-CM

## 2019-04-08 DIAGNOSIS — H40.003 GLAUCOMA SUSPECT OF BOTH EYES: ICD-10-CM

## 2019-04-08 DIAGNOSIS — H40.053 INTRAOCULAR PRESSURE INCREASE, BILATERAL: ICD-10-CM

## 2019-04-08 DIAGNOSIS — M17.11 PATELLOFEMORAL ARTHRITIS OF RIGHT KNEE: ICD-10-CM

## 2019-04-08 DIAGNOSIS — M25.561 ACUTE PAIN OF RIGHT KNEE: Primary | ICD-10-CM

## 2019-04-08 PROCEDURE — 99214 OFFICE O/P EST MOD 30 MIN: CPT | Performed by: FAMILY MEDICINE

## 2019-04-08 PROCEDURE — 73560 X-RAY EXAM OF KNEE 1 OR 2: CPT | Performed by: FAMILY MEDICINE

## 2019-04-08 NOTE — PROGRESS NOTES
Willie Tirado is a 53year old female. HPI:     Knee pain:  Right knee. Knee pain is the same. Most noticeable with stairs, does not have stairs at home. Pain with walking. Not catching or sticking.   Feels like a pressure sometimes like it needs Disp:  Rfl:    Pyridoxine HCl (VITAMIN B-6 OR) Take by mouth. Disp:  Rfl:    Calcium 500-125 MG-UNIT Oral Tab Take 1,000 mg by mouth. Disp:  Rfl:    HYDROcodone-acetaminophen 7.5-325 MG/15ML Oral Solution Take 10 mL by mouth every 4 (four) hours as needed. • Keratoconjunctivitis sicca not specified as Sjogren's, bilateral 03/27/2019    Fran Parker M.D.   • Kidney infection 11/30/15    currently on macrobid for kidney infection-instructed to inform surgeon of infection.    • Lung nodule     pt unsure of tobacco: Never Used    Alcohol use:  Yes      Alcohol/week: 0.0 oz      Frequency: 2-4 times a month      Drinks per session: 1 or 2      Comment: 6 oz of hard liqour weekends    Drug use: No        Family History   Problem Relation Age of Onset   • Cancer sensation to soft touch. PSYCH: affect normal, normal thought content. DATA:    Interpretation   PROCEDURE:  XR KNEE (1 OR 2 VIEWS), RIGHT (CPT=73560)     COMPARISON:  None. INDICATIONS:  G89.29 Other chronic pain M25.562 Pain in left knee M25.

## 2019-04-09 ENCOUNTER — OFFICE VISIT (OUTPATIENT)
Dept: PHYSICAL THERAPY | Age: 49
End: 2019-04-09
Attending: PODIATRIST
Payer: COMMERCIAL

## 2019-04-09 PROCEDURE — 97112 NEUROMUSCULAR REEDUCATION: CPT

## 2019-04-09 PROCEDURE — 97110 THERAPEUTIC EXERCISES: CPT

## 2019-04-09 PROCEDURE — 97140 MANUAL THERAPY 1/> REGIONS: CPT

## 2019-04-09 NOTE — PROGRESS NOTES
Dx: peroneal tendonitis of right lower extremity. Authorized # of Visits:  12         Next MD visit: none scheduled  Fall Risk: standard         Precautions: LATEX ALLERGY             Subjective: Pt states her knee is still really bothering her.   Alonso Venegas M/L 10x ea, X's 5x slow and controlled pace Seated wobble board A/P and M/L 10x ea, X's 5x slow and controlled pace. Middle setting - Seated wobble board A/P and M/L 10x ea, X's 5x slow and controlled pace.  Middle setting - SB TA squeeze and SB bridge 2x10 bars 1 lap Stair climb 1 flight Slant board stretch 3x30s - Slant board stretch 3x30s   Manual   TC and subtalar light distraction  Gastroc STM  Anterior tib STM Manual   TC and subtalar light distraction  Gastroc STM  Anterior tib STM  Tool assisted STM t

## 2019-04-10 ENCOUNTER — OFFICE VISIT (OUTPATIENT)
Dept: SURGERY | Facility: CLINIC | Age: 49
End: 2019-04-10

## 2019-04-10 VITALS
DIASTOLIC BLOOD PRESSURE: 92 MMHG | RESPIRATION RATE: 16 BRPM | SYSTOLIC BLOOD PRESSURE: 140 MMHG | HEIGHT: 62 IN | HEART RATE: 66 BPM | WEIGHT: 184.31 LBS | BODY MASS INDEX: 33.92 KG/M2

## 2019-04-10 VITALS — HEIGHT: 62 IN | WEIGHT: 184.31 LBS | BODY MASS INDEX: 33.92 KG/M2

## 2019-04-10 DIAGNOSIS — E66.09 CLASS 1 OBESITY DUE TO EXCESS CALORIES WITH SERIOUS COMORBIDITY AND BODY MASS INDEX (BMI) OF 33.0 TO 33.9 IN ADULT: Primary | ICD-10-CM

## 2019-04-10 PROCEDURE — 0358T BIA WHOLE BODY: CPT | Performed by: DIETITIAN, REGISTERED

## 2019-04-10 PROCEDURE — 97803 MED NUTRITION INDIV SUBSEQ: CPT | Performed by: DIETITIAN, REGISTERED

## 2019-04-10 RX ORDER — KETOCONAZOLE 20 MG/ML
SHAMPOO TOPICAL
Qty: 120 ML | Refills: 1 | Status: SHIPPED | OUTPATIENT
Start: 2019-04-10 | End: 2019-10-01

## 2019-04-10 NOTE — PROGRESS NOTES
[address] [place] [address] AND WE[facility] LOSS CLINIC  [address] [place] [address]  Dept: [phone]  Loc: [phone]    04/10/19      Bariatric Follow-up Nutrition Session    Alexia Smallwood is a 53year old female.      As Date    CHOLEST 161 01/23/2019    TRIG 92 01/23/2019    HDL 60 (H) 01/23/2019    LDL 83 01/23/2019    VLDL 18 01/23/2019    TCHDLRATIO 2.46 05/07/2018    NONHDLC 101 01/23/2019    CHOLHDLRATIO 4.1 08/18/2015        Vitamins/Minerals:  Lab Results   Compone Tab, Take 1 tablet (25 mg total) by mouth 3 (three) times daily as needed for Dizziness. , Disp: 30 tablet, Rfl: 0  •  estradiol 1 MG Oral Tab, Take 1 mg by mouth daily. , Disp: , Rfl:   •  PATIENT SUPPLIED MEDICATION, Essential Oils for allergies, Disp: , R fluid needs most days and taking MVs as instructed. Kcal can be a little high some days for post-op stage, however, rate of weight loss has been good at 47.9%. Pt has seen a big improvement in blood sugars, avg reading in the AM is between 80-95 mg/dL.  Sti

## 2019-04-10 NOTE — PROGRESS NOTES
[address], [address] [address]  Dept: [phone]    4/10/2019     Bariatric Patient Post-op Evaluation    Chief Complaint:  Post-op, sleeve/HH 10/15/18, 242 lbs pre CERVICAL STUMP      1996 and 1997   • HERNIA SURGERY  10/18/2018    Hiatal hernia Dr. Rita Chaudhry    • HYSTERECTOMY  2006    partial hystero.    • LAP SLEEVE GASTRECTOMY  10/18/2018    Dr. Shereen mooney    • OOPHORECTOMY Bilateral 2015    ovaries removed after Topics      Concerns:        Caffeine Concern: Yes          16 oz of half coffee per day        Exercise: No        Seat Belt: Yes      Medications:   Current Outpatient Medications:   •  Ketoconazole 2 % External Shampoo, Use as directed, Disp: 120 mL, Rf both eyes 2 (two) times daily.   , Disp: , Rfl:      Allergies:    Berries                 ANAPHYLAXIS  Casein                  ANAPHYLAXIS  Levofloxacin            RASH    Comment:rash  Augmentin [Amoxicil*    NAUSEA AND VOMITING  Avocado                 I various lower body injuries may limit. If she does require foot surgery can proceed from a bariatric standpoint. Post-op from that should avoid steroids and NSAIDs of course. Follow-up in 6 months for 12 month routine checkup.   Labs ordered at last visi

## 2019-04-12 ENCOUNTER — TELEPHONE (OUTPATIENT)
Dept: FAMILY MEDICINE CLINIC | Facility: CLINIC | Age: 49
End: 2019-04-12

## 2019-04-12 DIAGNOSIS — H40.053 INCREASED INTRAOCULAR PRESSURE, BILATERAL: ICD-10-CM

## 2019-04-12 DIAGNOSIS — H40.003 GLAUCOMA SUSPECT OF BOTH EYES: Primary | ICD-10-CM

## 2019-04-12 NOTE — TELEPHONE ENCOUNTER
[facility]; [facility] Referral Pool             Hello,     This provider is out of network. Member should be utilizing in network providers.  If there are no in network providers, member should be utilzing tertiary [facility]

## 2019-04-16 ENCOUNTER — APPOINTMENT (OUTPATIENT)
Dept: PHYSICAL THERAPY | Age: 49
End: 2019-04-16
Attending: PODIATRIST
Payer: COMMERCIAL

## 2019-04-16 ENCOUNTER — OFFICE VISIT (OUTPATIENT)
Dept: PODIATRY CLINIC | Facility: CLINIC | Age: 49
End: 2019-04-16

## 2019-04-16 ENCOUNTER — OFFICE VISIT (OUTPATIENT)
Dept: PHYSICAL THERAPY | Age: 49
End: 2019-04-16
Attending: FAMILY MEDICINE
Payer: COMMERCIAL

## 2019-04-16 DIAGNOSIS — G43.009 MIGRAINE WITHOUT AURA AND WITHOUT STATUS MIGRAINOSUS, NOT INTRACTABLE: ICD-10-CM

## 2019-04-16 DIAGNOSIS — M76.71 PERONEAL TENDINITIS OF RIGHT LOWER EXTREMITY: Primary | ICD-10-CM

## 2019-04-16 DIAGNOSIS — M25.561 ACUTE PAIN OF RIGHT KNEE: ICD-10-CM

## 2019-04-16 PROCEDURE — 97140 MANUAL THERAPY 1/> REGIONS: CPT

## 2019-04-16 PROCEDURE — 97112 NEUROMUSCULAR REEDUCATION: CPT

## 2019-04-16 PROCEDURE — 99213 OFFICE O/P EST LOW 20 MIN: CPT | Performed by: PODIATRIST

## 2019-04-16 PROCEDURE — 97161 PT EVAL LOW COMPLEX 20 MIN: CPT

## 2019-04-16 RX ORDER — TIZANIDINE 4 MG/1
TABLET ORAL
Qty: 30 TABLET | Refills: 0 | Status: SHIPPED | OUTPATIENT
Start: 2019-04-16 | End: 2019-05-21

## 2019-04-16 NOTE — PROGRESS NOTES
KNEE EVALUATION:   Referring Physician: Dr. Genaro Gonzáles  Diagnosis: Acute pain of right knee     Date of Service: 4/16/2019      PATIENT SUMMARY   Jose Puentes is a 53year old y/o female who presents to therapy today with complaints of right knee pain. deficits include but are not limited to walking distance, stairs, transfers in and out of truck, pivoting while walking and standing time. Pt and PT discuss HEP, pathology, POC and PT findings.   Pt voiced understanding and performs HEP correctly without r instructed in and issued a HEP for heel slides 20x, SLR 2x10  SB HS curls 20x, gastroc stretch and HS stretch 3x30s.  Manual- STM roller to quad, PF mobs and TF distraction     Charges: PT Eval Low Complexity, Manual x1, neuro x1      Total Timed Treatment:

## 2019-04-16 NOTE — PROGRESS NOTES
KNEE EVALUATION:   Referring Physician: Dr. Jimi Swan  Diagnosis: Acute pain of right knee     Date of Service: 4/16/2019     PATIENT Scarlet Tripathi is a 53year old y/o female who presents to therapy today with complaints of right knee pain. not limited to ***. Pt and PT discuss HEP, pathology, POC and ***. Pt voiced understanding and performs HEP correctly without reported pain. *** It is medically necessary for pt to continue PT to reach functional goals.      In agreement with FOTO score a Exercises; Neuromuscular Re-education;  Therapeutic Activity; Gait Training; Pt education; Home exercise program instructions; ***    Education or treatment limitation: {TX_LIMIT:1927}  Rehab Potential:{GOOD:115}    FOTO: ***/100    Patient/Family/Caregiver

## 2019-04-17 NOTE — TELEPHONE ENCOUNTER
Please call [facility] and inquire as to which glaucoma specialist is in network for this patient.

## 2019-04-18 ENCOUNTER — OFFICE VISIT (OUTPATIENT)
Dept: INTERNAL MEDICINE CLINIC | Facility: CLINIC | Age: 49
End: 2019-04-18

## 2019-04-18 VITALS
HEART RATE: 78 BPM | SYSTOLIC BLOOD PRESSURE: 128 MMHG | DIASTOLIC BLOOD PRESSURE: 80 MMHG | RESPIRATION RATE: 16 BRPM | HEIGHT: 61.5 IN | WEIGHT: 180 LBS | BODY MASS INDEX: 33.55 KG/M2

## 2019-04-18 DIAGNOSIS — K21.9 GASTROESOPHAGEAL REFLUX DISEASE, ESOPHAGITIS PRESENCE NOT SPECIFIED: ICD-10-CM

## 2019-04-18 DIAGNOSIS — R73.01 IFG (IMPAIRED FASTING GLUCOSE): ICD-10-CM

## 2019-04-18 DIAGNOSIS — Z98.84 S/P LAPAROSCOPIC SLEEVE GASTRECTOMY: ICD-10-CM

## 2019-04-18 DIAGNOSIS — L30.4 INTERTRIGO: ICD-10-CM

## 2019-04-18 DIAGNOSIS — Z51.81 THERAPEUTIC DRUG MONITORING: Primary | ICD-10-CM

## 2019-04-18 DIAGNOSIS — E66.01 MORBID OBESITY WITH BMI OF 40.0-44.9, ADULT (HCC): ICD-10-CM

## 2019-04-18 DIAGNOSIS — E78.5 HYPERLIPIDEMIA, UNSPECIFIED HYPERLIPIDEMIA TYPE: ICD-10-CM

## 2019-04-18 DIAGNOSIS — Z71.3 DIETARY COUNSELING AND SURVEILLANCE: ICD-10-CM

## 2019-04-18 PROCEDURE — 99401 PREV MED CNSL INDIV APPRX 15: CPT | Performed by: INTERNAL MEDICINE

## 2019-04-18 PROCEDURE — 99214 OFFICE O/P EST MOD 30 MIN: CPT | Performed by: INTERNAL MEDICINE

## 2019-04-18 NOTE — PROGRESS NOTES
CC: Patient presents with:  Weight Check: down 17  lb       HPI:     Overall doing well today. Underwent bariatric surgery on 10/15/18. Weight on surgery date: 230 lbs.    Down  17 lb from previous   60-90 grams of protein on average  Water intake i Rfl:    HYDROcodone-acetaminophen 7.5-325 MG/15ML Oral Solution Take 10 mL by mouth every 4 (four) hours as needed. Disp: 300 mL Rfl: 0   Multiple Vitamins-Minerals (BARIATRIC MULTIVITAMINS/IRON OR) Take by mouth.  Disp:  Rfl:    Meclizine HCl 25 MG Oral T embedded in pelvic wall--right side   • PONV (postoperative nausea and vomiting)    • Posterior vitreous detachment of both eyes 12/8/2017    Right > Left   • Primary open angle glaucoma of both eyes, moderate stage 6/1/2016   • Visual impairment    • Vitr Family history of colon cancer     Personal history of colonic polyps     Hordeolum externum of left upper eyelid     Sebaceous hyperplasia of face     Cafe au lait spots     Melanocytic nevus of upper extremity     Angioma     Acute nonintractable headac glucose)  Hyperlipidemia, unspecified hyperlipidemia type  Gastroesophageal reflux disease, esophagitis presence not specified  Dietary counseling and surveillance  Intertrigo   -initial consult: 261 lb , sleeve gastrectomy on 10/15/ 18  -down 7 lbs since

## 2019-04-18 NOTE — PROGRESS NOTES
[facility] for Diet and Exercise     /80   Pulse 78   Resp 16   Ht 61.5\"   Wt 180 lb   BMI 33.46 kg/m²   Body mass index is 33.46 kg/m². Wt Readings from Last 6 Encounters:  04/18/19 : 180 lb  04/15/19 : 180 lb  04/10/19 : 184 lb 4. 8

## 2019-04-19 ENCOUNTER — OFFICE VISIT (OUTPATIENT)
Dept: PHYSICAL THERAPY | Age: 49
End: 2019-04-19
Attending: FAMILY MEDICINE
Payer: COMMERCIAL

## 2019-04-19 PROCEDURE — 97140 MANUAL THERAPY 1/> REGIONS: CPT

## 2019-04-19 PROCEDURE — 97110 THERAPEUTIC EXERCISES: CPT

## 2019-04-19 NOTE — TELEPHONE ENCOUNTER
Luis Lay called back and states there is no glaucoma specialist in [address] [facility] so pt will have to go to [facility]. Once find a specialist affiliated with [facility] place referral and then she will discuss with medical director.     According to [facility] website 2 enrique

## 2019-04-19 NOTE — PROGRESS NOTES
Dx: Acute pain of right knee          Authorized # of Visits:  8         Next MD visit: none scheduled  Fall Risk: standard         Precautions: n/a             Subjective: Pt states her knee is not feeling any better or worse.   She states she went to see

## 2019-04-20 PROBLEM — H40.003 GLAUCOMA SUSPECT OF BOTH EYES: Status: ACTIVE | Noted: 2019-04-20

## 2019-04-20 PROBLEM — H40.053 INTRAOCULAR PRESSURE INCREASE, BILATERAL: Status: ACTIVE | Noted: 2019-04-20

## 2019-04-22 NOTE — PROGRESS NOTES
Nanci Zuniga is a 53year old female. Patient presents with: Foot Pain: right -- Went to PT. States pain is 70 - 80 % better. Rates pain 2/10.          HPI:   Stating that her pain is greatly improved after physical therapy about 70-80% rates pain is 2 (BARIATRIC MULTIVITAMINS/IRON OR) Take by mouth. Disp:  Rfl:    Meclizine HCl 25 MG Oral Tab Take 1 tablet (25 mg total) by mouth 3 (three) times daily as needed for Dizziness. Disp: 30 tablet Rfl: 0   estradiol 1 MG Oral Tab Take 1 mg by mouth daily.  Disp PONV (postoperative nausea and vomiting)    • Posterior vitreous detachment of both eyes 12/8/2017    Right > Left   • Primary open angle glaucoma of both eyes, moderate stage 6/1/2016   • Visual impairment    • Vitreous degeneration, right eye 03/27/2019 status:       Spouse name: Not on file      Number of children: Not on file      Years of education: Not on file      Highest education level: Not on file    Tobacco Use      Smoking status: Never Smoker      Smokeless tobacco: Never Used    Substan will return to the clinic after that in the meantime she can perform most work duties and she can ambulate in athletic shoes. Recommended against exercising yet at this time. The patient indicates understanding of these issues and agrees to the plan.

## 2019-04-23 ENCOUNTER — OFFICE VISIT (OUTPATIENT)
Dept: PHYSICAL THERAPY | Age: 49
End: 2019-04-23
Attending: FAMILY MEDICINE
Payer: COMMERCIAL

## 2019-04-23 PROCEDURE — 97110 THERAPEUTIC EXERCISES: CPT

## 2019-04-23 PROCEDURE — 97140 MANUAL THERAPY 1/> REGIONS: CPT

## 2019-04-23 NOTE — PROGRESS NOTES
Dx: Acute pain of right knee          Authorized # of Visits:  8         Next MD visit: none scheduled  Fall Risk: standard         Precautions: n/a             Subjective: Pt states her knee is not feeling any better or worse.   She states she will roll it quad, PF mobs and TF distraction  hastroc STM Bridges 2x10        CPx10 min Manual- STM roller to quad, PF mobs and TF distraction  hastroc STM         CPx10 min            Charges: manual x1, therex x  2  Total Timed Treatment: 45 min  Total Treatment Radha Moya

## 2019-04-24 ENCOUNTER — HOSPITAL ENCOUNTER (OUTPATIENT)
Dept: MRI IMAGING | Age: 49
Discharge: HOME | End: 2019-04-24
Attending: PODIATRIST
Payer: COMMERCIAL

## 2019-04-24 DIAGNOSIS — M76.71 PERONEAL TENDINITIS OF RIGHT LOWER EXTREMITY: ICD-10-CM

## 2019-04-24 PROCEDURE — 73721 MRI JNT OF LWR EXTRE W/O DYE: CPT | Performed by: PODIATRIST

## 2019-04-26 ENCOUNTER — TELEPHONE (OUTPATIENT)
Dept: FAMILY MEDICINE CLINIC | Facility: CLINIC | Age: 49
End: 2019-04-26

## 2019-04-26 ENCOUNTER — APPOINTMENT (OUTPATIENT)
Dept: PHYSICAL THERAPY | Age: 49
End: 2019-04-26
Attending: FAMILY MEDICINE
Payer: COMMERCIAL

## 2019-04-26 DIAGNOSIS — H43.813 POSTERIOR VITREOUS DETACHMENT OF BOTH EYES: ICD-10-CM

## 2019-04-26 DIAGNOSIS — H40.1132 PRIMARY OPEN ANGLE GLAUCOMA OF BOTH EYES, MODERATE STAGE: Primary | ICD-10-CM

## 2019-04-26 NOTE — TELEPHONE ENCOUNTER
Ashly Stringer [facility] Staff; [facility] Referral Pool             Hello,     This referral was reviewed by the Medical Director Radha Abdalla. Per Radha Abdalla, patient should be directed to  second opinion in network.  Please discuss this with th

## 2019-04-26 NOTE — TELEPHONE ENCOUNTER
Please call patient and inform her that [facility] does not have an in network glaucoma specialist and an outside referral (out of network) to a glaucoma specialist was denied. However, we can refer her to another ophthalmologist for a second opinion.   Referral

## 2019-04-29 ENCOUNTER — TELEPHONE (OUTPATIENT)
Dept: PODIATRY CLINIC | Facility: CLINIC | Age: 49
End: 2019-04-29

## 2019-04-29 NOTE — TELEPHONE ENCOUNTER
I phoned the patient had a conversation with her about the results comparing it to the MRI she had approximately a year ago showing that there is now a split tear in the Jose C us brevis I do not know that this is a recent injury I think that it is just t

## 2019-04-29 NOTE — TELEPHONE ENCOUNTER
Pt requesting results for MRI done on 4/24. States she will set up an appt if [facility] feels it is necessary.

## 2019-04-30 ENCOUNTER — APPOINTMENT (OUTPATIENT)
Dept: PHYSICAL THERAPY | Age: 49
End: 2019-04-30
Attending: FAMILY MEDICINE
Payer: COMMERCIAL

## 2019-05-03 ENCOUNTER — TELEPHONE (OUTPATIENT)
Dept: FAMILY MEDICINE CLINIC | Facility: CLINIC | Age: 49
End: 2019-05-03

## 2019-05-03 ENCOUNTER — APPOINTMENT (OUTPATIENT)
Dept: PHYSICAL THERAPY | Age: 49
End: 2019-05-03
Attending: FAMILY MEDICINE

## 2019-05-03 DIAGNOSIS — M25.561 RIGHT KNEE PAIN, UNSPECIFIED CHRONICITY: Primary | ICD-10-CM

## 2019-05-03 NOTE — TELEPHONE ENCOUNTER
Sally Lentz [facility] Staff   Cc: [facility] Referral Pool   Phone Number: [phone]             .Reason for the order/referral: (R) KNEE PAIN   PCP: Basilio Jeter   Refer to Provider (first and last name): Nico Moran   Specialty: Sophia Wyatt

## 2019-05-06 DIAGNOSIS — R11.2 NON-INTRACTABLE VOMITING WITH NAUSEA, UNSPECIFIED VOMITING TYPE: ICD-10-CM

## 2019-05-07 ENCOUNTER — APPOINTMENT (OUTPATIENT)
Dept: PHYSICAL THERAPY | Age: 49
End: 2019-05-07
Attending: FAMILY MEDICINE

## 2019-05-07 RX ORDER — ONDANSETRON 8 MG/1
8 TABLET, ORALLY DISINTEGRATING ORAL 2 TIMES DAILY PRN
Qty: 20 TABLET | Refills: 0 | OUTPATIENT
Start: 2019-05-07

## 2019-05-13 ENCOUNTER — TELEPHONE (OUTPATIENT)
Dept: FAMILY MEDICINE CLINIC | Facility: CLINIC | Age: 49
End: 2019-05-13

## 2019-05-13 RX ORDER — ALPRAZOLAM 0.5 MG/1
TABLET ORAL
Qty: 10 TABLET | Refills: 0 | Status: SHIPPED | OUTPATIENT
Start: 2019-05-13

## 2019-05-13 NOTE — TELEPHONE ENCOUNTER
Joelle Cabezas is calling to see if Dr Jeromy Tong would be willing to call in [facility] for her, she is going to be getting on a plane in the next couple of weeks and she is having a lot of anxiety, she would like about 10 [facility], [place]

## 2019-05-14 ENCOUNTER — APPOINTMENT (OUTPATIENT)
Dept: PHYSICAL THERAPY | Age: 49
End: 2019-05-14
Attending: FAMILY MEDICINE

## 2019-05-21 ENCOUNTER — LAB ENCOUNTER (OUTPATIENT)
Dept: LAB | Age: 49
End: 2019-05-21
Attending: SINGLE SPECIALTY
Payer: COMMERCIAL

## 2019-05-21 ENCOUNTER — HOSPITAL ENCOUNTER (OUTPATIENT)
Dept: MRI IMAGING | Age: 49
Discharge: HOME | End: 2019-05-21
Attending: ORTHOPAEDIC SURGERY
Payer: COMMERCIAL

## 2019-05-21 DIAGNOSIS — G89.29 CHRONIC PAIN OF BOTH KNEES: ICD-10-CM

## 2019-05-21 DIAGNOSIS — K66.0 LOWER ABDOMINAL ADHESIONS: ICD-10-CM

## 2019-05-21 DIAGNOSIS — I10 ESSENTIAL HYPERTENSION: ICD-10-CM

## 2019-05-21 DIAGNOSIS — M25.561 RIGHT KNEE PAIN, UNSPECIFIED CHRONICITY: ICD-10-CM

## 2019-05-21 DIAGNOSIS — Z79.899 HIGH RISK MEDICATION USE: ICD-10-CM

## 2019-05-21 DIAGNOSIS — Z98.84 S/P LAPAROSCOPIC SLEEVE GASTRECTOMY: ICD-10-CM

## 2019-05-21 DIAGNOSIS — R10.11 RIGHT UPPER QUADRANT ABDOMINAL PAIN: ICD-10-CM

## 2019-05-21 DIAGNOSIS — M25.562 CHRONIC PAIN OF BOTH KNEES: ICD-10-CM

## 2019-05-21 DIAGNOSIS — E53.8 B12 DEFICIENCY: ICD-10-CM

## 2019-05-21 DIAGNOSIS — M25.561 CHRONIC PAIN OF BOTH KNEES: ICD-10-CM

## 2019-05-21 DIAGNOSIS — R26.2 DIFFICULTY WALKING: ICD-10-CM

## 2019-05-21 DIAGNOSIS — E78.2 MIXED HYPERLIPIDEMIA: ICD-10-CM

## 2019-05-21 DIAGNOSIS — Z87.442 HISTORY OF KIDNEY STONES: ICD-10-CM

## 2019-05-21 DIAGNOSIS — G43.009 MIGRAINE WITHOUT AURA AND WITHOUT STATUS MIGRAINOSUS, NOT INTRACTABLE: ICD-10-CM

## 2019-05-21 DIAGNOSIS — K21.9 GASTROESOPHAGEAL REFLUX DISEASE, ESOPHAGITIS PRESENCE NOT SPECIFIED: ICD-10-CM

## 2019-05-21 PROCEDURE — 82306 VITAMIN D 25 HYDROXY: CPT

## 2019-05-21 PROCEDURE — 83690 ASSAY OF LIPASE: CPT

## 2019-05-21 PROCEDURE — 82728 ASSAY OF FERRITIN: CPT

## 2019-05-21 PROCEDURE — 80053 COMPREHEN METABOLIC PANEL: CPT

## 2019-05-21 PROCEDURE — 84100 ASSAY OF PHOSPHORUS: CPT

## 2019-05-21 PROCEDURE — 83550 IRON BINDING TEST: CPT

## 2019-05-21 PROCEDURE — 85025 COMPLETE CBC W/AUTO DIFF WBC: CPT

## 2019-05-21 PROCEDURE — 84425 ASSAY OF VITAMIN B-1: CPT

## 2019-05-21 PROCEDURE — 82746 ASSAY OF FOLIC ACID SERUM: CPT

## 2019-05-21 PROCEDURE — 36415 COLL VENOUS BLD VENIPUNCTURE: CPT

## 2019-05-21 PROCEDURE — 83540 ASSAY OF IRON: CPT

## 2019-05-21 PROCEDURE — 83036 HEMOGLOBIN GLYCOSYLATED A1C: CPT

## 2019-05-21 PROCEDURE — 82607 VITAMIN B-12: CPT

## 2019-05-21 PROCEDURE — 73721 MRI JNT OF LWR EXTRE W/O DYE: CPT | Performed by: ORTHOPAEDIC SURGERY

## 2019-05-21 PROCEDURE — 84443 ASSAY THYROID STIM HORMONE: CPT

## 2019-05-21 PROCEDURE — 80061 LIPID PANEL: CPT

## 2019-05-21 PROCEDURE — 83735 ASSAY OF MAGNESIUM: CPT

## 2019-05-21 RX ORDER — TIZANIDINE 4 MG/1
TABLET ORAL
Qty: 30 TABLET | Refills: 0 | Status: SHIPPED | OUTPATIENT
Start: 2019-05-21 | End: 2019-07-25

## 2019-05-22 ENCOUNTER — OFFICE VISIT (OUTPATIENT)
Dept: FAMILY MEDICINE CLINIC | Facility: CLINIC | Age: 49
End: 2019-05-22

## 2019-05-22 VITALS
HEIGHT: 61.5 IN | SYSTOLIC BLOOD PRESSURE: 122 MMHG | OXYGEN SATURATION: 98 % | HEART RATE: 68 BPM | DIASTOLIC BLOOD PRESSURE: 70 MMHG | WEIGHT: 177 LBS | BODY MASS INDEX: 32.99 KG/M2

## 2019-05-22 DIAGNOSIS — Z87.898 HISTORY OF VOMITING: ICD-10-CM

## 2019-05-22 DIAGNOSIS — Z51.81 ENCOUNTER FOR MEDICATION MONITORING: Primary | ICD-10-CM

## 2019-05-22 DIAGNOSIS — S83.241A ACUTE MENISCAL TEAR, MEDIAL, RIGHT, INITIAL ENCOUNTER: ICD-10-CM

## 2019-05-22 DIAGNOSIS — M25.561 ACUTE PAIN OF RIGHT KNEE: ICD-10-CM

## 2019-05-22 PROCEDURE — 99214 OFFICE O/P EST MOD 30 MIN: CPT | Performed by: FAMILY MEDICINE

## 2019-05-22 RX ORDER — ONDANSETRON 8 MG/1
8 TABLET, ORALLY DISINTEGRATING ORAL EVERY 8 HOURS PRN
Qty: 30 TABLET | Refills: 0 | Status: SHIPPED | OUTPATIENT
Start: 2019-05-22 | End: 2021-09-08

## 2019-05-22 NOTE — PROGRESS NOTES
Kal Hampton is a 53year old female. HPI:       Roomer's notes read and reviewed with patient.       History of nausea and vomiting:  History of severe vomiting causing facial petechiae and intraocular changes for which patient had to see ophthalmo HYDROcodone-acetaminophen 7.5-325 MG/15ML Oral Solution Take 10 mL by mouth every 4 (four) hours as needed. Disp: 300 mL Rfl: 0   Multiple Vitamins-Minerals (BARIATRIC MULTIVITAMINS/IRON OR) Take by mouth.  Disp:  Rfl:    Meclizine HCl 25 MG Oral Tab Take Tae Carrillo M.D.   • Polycystic ovaries     ovary embedded in pelvic wall--right side   • PONV (postoperative nausea and vomiting)    • Posterior vitreous detachment of both eyes 12/8/2017    Right > Left   • Primary open angle glaucoma of both eyes, moderat 2008/liver 1998/prostate 2010   • Cancer Maternal Aunt         basal cell   • Cancer Maternal Aunt         basal cell   • Cancer Maternal Aunt         bone   • Cancer Cousin         esoph,adenocarcinoma   • Cancer Cousin         Non Hodgkins Lymphoma   • C right knee pain and difficulty walking for about 2 months. Patient states there was no injury or fall          FINDINGS:    LIGAMENTS:          The ACL, PCL, patellar retinacula, and collateral ligament complexes are intact.   MENISCI:            The medi every 8 (eight) hours as needed for Nausea. Dispense: 30 tablet; Refill: 0    3. Acute pain of right knee  4.  Acute meniscal tear, medial, right, initial encounter  Patient to follow-up with Dr. Marino Carmona for further evaluation and treatment.    - ORTHOPEDIC

## 2019-05-24 RX ORDER — LEVOTHYROXINE SODIUM 0.12 MG/1
TABLET ORAL
Qty: 90 TABLET | Refills: 2 | Status: SHIPPED | OUTPATIENT
Start: 2019-05-24 | End: 2020-02-17

## 2019-05-28 PROBLEM — Z87.898 HISTORY OF VOMITING: Status: ACTIVE | Noted: 2019-05-28

## 2019-05-28 PROBLEM — Z51.81 ENCOUNTER FOR MEDICATION MONITORING: Status: ACTIVE | Noted: 2019-05-28

## 2019-05-28 PROBLEM — S83.241A ACUTE MENISCAL TEAR, MEDIAL, RIGHT, INITIAL ENCOUNTER: Status: ACTIVE | Noted: 2019-05-28

## 2019-06-11 ENCOUNTER — OFFICE VISIT (OUTPATIENT)
Dept: PODIATRY CLINIC | Facility: CLINIC | Age: 49
End: 2019-06-11

## 2019-06-11 DIAGNOSIS — S86.311D TEAR OF PERONEAL TENDON, RIGHT, SUBSEQUENT ENCOUNTER: ICD-10-CM

## 2019-06-11 DIAGNOSIS — M76.71 PERONEAL TENDINITIS OF RIGHT LOWER EXTREMITY: Primary | ICD-10-CM

## 2019-06-11 PROCEDURE — 99213 OFFICE O/P EST LOW 20 MIN: CPT | Performed by: PODIATRIST

## 2019-06-12 NOTE — PROGRESS NOTES
Kraig Cast is a 53year old female. Patient presents with: Foot Pain: right -- States pain is about the same. Rates pain 5/10. HPI:   This patient returns to clinic little to no improvement in symptoms.   She wants to proceed with correction by mouth. Disp:  Rfl:    Calcium 500-125 MG-UNIT Oral Tab Take 1,000 mg by mouth. Disp:  Rfl:    HYDROcodone-acetaminophen 7.5-325 MG/15ML Oral Solution Take 10 mL by mouth every 4 (four) hours as needed.  Disp: 300 mL Rfl: 0   Multiple Vitamins-Minerals (B ovaries     ovary embedded in pelvic wall--right side   • PONV (postoperative nausea and vomiting)    • Posterior vitreous detachment of both eyes 12/8/2017    Right > Left   • Primary open angle glaucoma of both eyes, moderate stage 6/1/2016   • Visual im Social History    Socioeconomic History      Marital status:       Spouse name: Not on file      Number of children: Not on file      Years of education: Not on file      Highest education level: Not on file    Tobacco Use      Smoking status: conversation the patient opted for surgery after questions were answered. The nature and extent of the surgery which would be, surgical repair  of peroneal tendon right foot was explained in great detail.   The pre-, cayetano-and postoperative management was d

## 2019-06-13 ENCOUNTER — OFFICE VISIT (OUTPATIENT)
Dept: PHYSICAL THERAPY | Age: 49
End: 2019-06-13
Attending: ORTHOPAEDIC SURGERY
Payer: COMMERCIAL

## 2019-06-13 ENCOUNTER — ORDER TRANSCRIPTION (OUTPATIENT)
Dept: PHYSICAL THERAPY | Age: 49
End: 2019-06-13

## 2019-06-13 DIAGNOSIS — M22.41 CHONDROMALACIA PATELLAE OF RIGHT KNEE: ICD-10-CM

## 2019-06-13 DIAGNOSIS — R26.2 DIFFICULTY WALKING: ICD-10-CM

## 2019-06-13 DIAGNOSIS — M25.561 KNEE PAIN, RIGHT: ICD-10-CM

## 2019-06-13 DIAGNOSIS — M76.31 ILIOTIBIAL BAND SYNDROME OF RIGHT SIDE: ICD-10-CM

## 2019-06-13 DIAGNOSIS — M76.31 ILIOTIBIAL BAND SYNDROME OF RIGHT SIDE: Primary | ICD-10-CM

## 2019-06-13 PROCEDURE — 97164 PT RE-EVAL EST PLAN CARE: CPT

## 2019-06-13 PROCEDURE — 97112 NEUROMUSCULAR REEDUCATION: CPT

## 2019-06-13 PROCEDURE — 97110 THERAPEUTIC EXERCISES: CPT

## 2019-06-14 PROBLEM — T50.905A DRUG REACTION: Status: RESOLVED | Noted: 2018-01-23 | Resolved: 2019-06-14

## 2019-06-14 PROBLEM — Z13.71 BRCA NEGATIVE: Status: ACTIVE | Noted: 2019-06-14

## 2019-06-14 PROBLEM — Z01.818 PREOP TESTING: Status: RESOLVED | Noted: 2018-10-15 | Resolved: 2019-06-14

## 2019-06-14 PROBLEM — Z98.890 H/O LAPAROSCOPY: Status: ACTIVE | Noted: 2019-06-14

## 2019-06-14 PROBLEM — Z98.890 HX OF LAPAROSCOPY: Status: ACTIVE | Noted: 2019-06-14

## 2019-06-14 PROBLEM — Z51.81 ENCOUNTER FOR MEDICATION MONITORING: Status: RESOLVED | Noted: 2019-05-28 | Resolved: 2019-06-14

## 2019-06-14 PROBLEM — R10.2 PELVIC PAIN: Status: RESOLVED | Noted: 2017-11-26 | Resolved: 2019-06-14

## 2019-06-14 PROBLEM — Z87.898 HISTORY OF VOMITING: Status: RESOLVED | Noted: 2019-05-28 | Resolved: 2019-06-14

## 2019-06-14 PROBLEM — Z80.3 FAMILY HISTORY OF BREAST CANCER: Status: ACTIVE | Noted: 2019-06-14

## 2019-06-14 PROBLEM — N95.1 VASOMOTOR SYMPTOMS DUE TO MENOPAUSE: Status: ACTIVE | Noted: 2019-06-14

## 2019-06-14 PROBLEM — Z90.710 H/O: HYSTERECTOMY: Status: ACTIVE | Noted: 2019-06-14

## 2019-06-14 NOTE — PROGRESS NOTES
REASSESSMENT    Dx: IT band syndrome of right knee, chondromalacia patellae right knee, walking difficulty, knee pain right          Authorized # of Visits:  8         Next MD visit: none scheduled  Fall Risk: standard         Precautions: n/a L 4+/5  Extension: R 3/5; L 4+/5         Balance:  NT due to high reported pain     Functional Mobility: NT due to high reported pain.       AROM:    Knee    Flexion: R 90; L -2   Extension: R 131; L +2          Assessment: Pt presents to clinic following min  Total Treatment Time: 45 min

## 2019-06-18 ENCOUNTER — OFFICE VISIT (OUTPATIENT)
Dept: PHYSICAL THERAPY | Age: 49
End: 2019-06-18
Attending: ORTHOPAEDIC SURGERY
Payer: COMMERCIAL

## 2019-06-18 ENCOUNTER — TELEPHONE (OUTPATIENT)
Dept: PODIATRY CLINIC | Facility: CLINIC | Age: 49
End: 2019-06-18

## 2019-06-18 DIAGNOSIS — S86.311D TEAR OF PERONEAL TENDON, RIGHT, SUBSEQUENT ENCOUNTER: ICD-10-CM

## 2019-06-18 DIAGNOSIS — M76.71 PERONEAL TENDINITIS, RIGHT: Primary | ICD-10-CM

## 2019-06-18 PROCEDURE — 97140 MANUAL THERAPY 1/> REGIONS: CPT

## 2019-06-18 PROCEDURE — 97110 THERAPEUTIC EXERCISES: CPT

## 2019-06-18 PROCEDURE — 97112 NEUROMUSCULAR REEDUCATION: CPT

## 2019-06-18 NOTE — PROGRESS NOTES
Dx: IT band syndrome of right knee, chondromalacia patellae right knee, walking difficulty, knee pain right          Authorized # of Visits:  8         Next MD visit: none scheduled  Fall Risk: standard         Precautions: n/a             Subjective: Pt s slides 20x heel slides 20x      Prone quad sets 5sx20 Prone quad sets 5sx20 Prone quad sets 5sx20 quad sets 5sx20      SLR 2x10 SLR 2x10 reassessment Prone quad stretch 3x30s      gastroc stretch and HS stretch 3x30s gastroc stretch and HS stretch 3x30s -

## 2019-06-18 NOTE — TELEPHONE ENCOUNTER
Spoke to pt surgery is scheduled [place] on 10/18/19. Pt's PO appointment with Harriett Caraballo is scheduled on 10/25. Pt informed sx center will call the day before surgery with a time to arrive and all other instructions. All questions answered. Thank You.

## 2019-06-19 ENCOUNTER — LAB ENCOUNTER (OUTPATIENT)
Dept: LAB | Age: 49
End: 2019-06-19
Attending: INTERNAL MEDICINE
Payer: COMMERCIAL

## 2019-06-19 DIAGNOSIS — R25.2 LEG CRAMPS: ICD-10-CM

## 2019-06-19 PROCEDURE — 80053 COMPREHEN METABOLIC PANEL: CPT

## 2019-06-19 PROCEDURE — 84100 ASSAY OF PHOSPHORUS: CPT

## 2019-06-19 PROCEDURE — 83735 ASSAY OF MAGNESIUM: CPT

## 2019-06-19 PROCEDURE — 85025 COMPLETE CBC W/AUTO DIFF WBC: CPT

## 2019-06-19 PROCEDURE — 36415 COLL VENOUS BLD VENIPUNCTURE: CPT

## 2019-06-21 ENCOUNTER — OFFICE VISIT (OUTPATIENT)
Dept: PHYSICAL THERAPY | Age: 49
End: 2019-06-21
Attending: ORTHOPAEDIC SURGERY
Payer: COMMERCIAL

## 2019-06-21 PROCEDURE — 97112 NEUROMUSCULAR REEDUCATION: CPT

## 2019-06-21 PROCEDURE — 97110 THERAPEUTIC EXERCISES: CPT

## 2019-06-21 PROCEDURE — 97140 MANUAL THERAPY 1/> REGIONS: CPT

## 2019-06-21 NOTE — PROGRESS NOTES
Dx: IT band syndrome of right knee, chondromalacia patellae right knee, walking difficulty, knee pain right          Authorized # of Visits:  8         Next MD visit: none scheduled  Fall Risk: standard         Precautions: n/a             Subjective: Pt s 3x30s Shuttle DLP 3x 20x     gastroc stretch and HS stretch 3x30s gastroc stretch and HS stretch 3x30s - SLR x10 ea SLR x20 ea     Quad sets 3sx20 Quad sets 3sx20 - Butterfly stretch 3x20s Clams 2x10 ea     Manual- STM roller to quad, PF mobs and TF distra

## 2019-06-24 NOTE — TELEPHONE ENCOUNTER
Please provide CPT for procedure. Unable to locate the code in the chart.      Thank you,  [facility]

## 2019-06-25 ENCOUNTER — OFFICE VISIT (OUTPATIENT)
Dept: PHYSICAL THERAPY | Age: 49
End: 2019-06-25
Attending: ORTHOPAEDIC SURGERY
Payer: COMMERCIAL

## 2019-06-25 PROCEDURE — 97110 THERAPEUTIC EXERCISES: CPT

## 2019-06-25 PROCEDURE — 97140 MANUAL THERAPY 1/> REGIONS: CPT

## 2019-06-25 PROCEDURE — 97112 NEUROMUSCULAR REEDUCATION: CPT

## 2019-06-25 NOTE — PROGRESS NOTES
Dx: IT band syndrome of right knee, chondromalacia patellae right knee, walking difficulty, knee pain right          Authorized # of Visits:  8         Next MD visit: none scheduled  Fall Risk: standard         Precautions: n/a             Subjective: Pt s and HS stretch 3x30s - SLR x10 ea SLR x20 ea SLR x20 ea    Quad sets 3sx20 Quad sets 3sx20 - Butterfly stretch 3x20s Clams 2x10 ea Clams 2x10 ea    Manual- STM roller to quad, PF mobs and TF distraction  hastroc STM Bridges 2x10 - Manual  HS STM  Distal me

## 2019-06-27 ENCOUNTER — OFFICE VISIT (OUTPATIENT)
Dept: PHYSICAL THERAPY | Age: 49
End: 2019-06-27
Attending: ORTHOPAEDIC SURGERY
Payer: COMMERCIAL

## 2019-06-27 PROCEDURE — 97110 THERAPEUTIC EXERCISES: CPT

## 2019-06-27 PROCEDURE — 97140 MANUAL THERAPY 1/> REGIONS: CPT

## 2019-06-27 PROCEDURE — 97112 NEUROMUSCULAR REEDUCATION: CPT

## 2019-06-27 NOTE — PROGRESS NOTES
Dx: IT band syndrome of right knee, chondromalacia patellae right knee, walking difficulty, knee pain right          Authorized # of Visits:  8         Next MD visit: none scheduled  Fall Risk: standard         Precautions: n/a             Subjective: Pt s Shuttle DLP 3x 20x Supine hip abd YTB 2x10 ea YTB ankle inveriosn eversion 30x ea   gastroc stretch and HS stretch 3x30s gastroc stretch and HS stretch 3x30s - SLR x10 ea SLR x20 ea SLR x20 ea SLR x30 ea   Quad sets 3sx20 Quad sets 3sx20 - Butterfly stretc

## 2019-07-02 ENCOUNTER — OFFICE VISIT (OUTPATIENT)
Dept: PHYSICAL THERAPY | Age: 49
End: 2019-07-02
Attending: ORTHOPAEDIC SURGERY
Payer: COMMERCIAL

## 2019-07-02 PROCEDURE — 97110 THERAPEUTIC EXERCISES: CPT

## 2019-07-02 PROCEDURE — 97140 MANUAL THERAPY 1/> REGIONS: CPT

## 2019-07-02 PROCEDURE — 97112 NEUROMUSCULAR REEDUCATION: CPT

## 2019-07-02 NOTE — PROGRESS NOTES
Dx: IT band syndrome of right knee, chondromalacia patellae right knee, walking difficulty, knee pain right          Authorized # of Visits:  12         Next MD visit: none scheduled  Fall Risk: standard         Precautions: n/a             Subjective: Pt 20x   reassessment Prone quad stretch 3x30s Shuttle DLP 3x 20x Supine hip abd YTB 2x10 ea YTB ankle inveriosn eversion 30x ea YTB ankle inveriosn eversion 30x ea   - SLR x10 ea SLR x20 ea SLR x20 ea SLR x30 ea SLR x30 ea   - Butterfly stretch 3x20s Clams 2

## 2019-07-09 ENCOUNTER — TELEPHONE (OUTPATIENT)
Dept: INTERNAL MEDICINE CLINIC | Facility: CLINIC | Age: 49
End: 2019-07-09

## 2019-07-09 DIAGNOSIS — Z98.84 S/P LAPAROSCOPIC SLEEVE GASTRECTOMY: Primary | ICD-10-CM

## 2019-07-10 ENCOUNTER — OFFICE VISIT (OUTPATIENT)
Dept: INTERNAL MEDICINE CLINIC | Facility: CLINIC | Age: 49
End: 2019-07-10

## 2019-07-10 VITALS — BODY MASS INDEX: 32.84 KG/M2 | HEIGHT: 61.5 IN | WEIGHT: 176.19 LBS

## 2019-07-10 DIAGNOSIS — E66.09 CLASS 1 OBESITY DUE TO EXCESS CALORIES WITH SERIOUS COMORBIDITY AND BODY MASS INDEX (BMI) OF 32.0 TO 32.9 IN ADULT: Primary | ICD-10-CM

## 2019-07-10 PROCEDURE — 97803 MED NUTRITION INDIV SUBSEQ: CPT | Performed by: DIETITIAN, REGISTERED

## 2019-07-11 ENCOUNTER — OFFICE VISIT (OUTPATIENT)
Dept: PHYSICAL THERAPY | Age: 49
End: 2019-07-11
Attending: ORTHOPAEDIC SURGERY
Payer: COMMERCIAL

## 2019-07-11 PROCEDURE — 97110 THERAPEUTIC EXERCISES: CPT

## 2019-07-11 PROCEDURE — 97112 NEUROMUSCULAR REEDUCATION: CPT

## 2019-07-11 PROCEDURE — 97140 MANUAL THERAPY 1/> REGIONS: CPT

## 2019-07-11 NOTE — PROGRESS NOTES
Dx: IT band syndrome of right knee, chondromalacia patellae right knee, walking difficulty, knee pain right          Authorized # of Visits:  12         Next MD visit: none scheduled  Fall Risk: standard         Precautions: n/a             Subjective: Pt quad stretch 30sx3 Prone quad stretch 30sx3 Prone quad stretch 30sx3   Prone quad sets 5sx20 quad sets 5sx20 SB HS curl 20x SB HS curl 20x - SB HS curl 20x Foam roll quads 25x   reassessment Prone quad stretch 3x30s Shuttle DLP 3x 20x Supine hip abd YTB 2x

## 2019-07-15 ENCOUNTER — OFFICE VISIT (OUTPATIENT)
Dept: PHYSICAL THERAPY | Age: 49
End: 2019-07-15
Attending: ORTHOPAEDIC SURGERY
Payer: COMMERCIAL

## 2019-07-15 PROCEDURE — 97110 THERAPEUTIC EXERCISES: CPT

## 2019-07-15 PROCEDURE — 97112 NEUROMUSCULAR REEDUCATION: CPT

## 2019-07-15 PROCEDURE — 97140 MANUAL THERAPY 1/> REGIONS: CPT

## 2019-07-15 NOTE — PROGRESS NOTES
Dx: IT band syndrome of right knee, chondromalacia patellae right knee, walking difficulty, knee pain right          Authorized # of Visits:  12         Next MD visit: none scheduled  Fall Risk: standard         Precautions: n/a             Subjective: Pt 25x Foam roll piriformis 25x   Shuttle DLP 3x 20x Supine hip abd YTB 2x10 ea YTB ankle inveriosn eversion 30x ea YTB ankle inveriosn eversion 30x ea 4 in lateral step ups 10x SB mini squats on wall 5x2   SLR x20 ea SLR x20 ea SLR x30 ea SLR x30 ea SLR x20

## 2019-07-17 ENCOUNTER — OFFICE VISIT (OUTPATIENT)
Dept: PHYSICAL THERAPY | Age: 49
End: 2019-07-17
Attending: ORTHOPAEDIC SURGERY
Payer: COMMERCIAL

## 2019-07-17 DIAGNOSIS — L81.3 CAFE-AU-LAIT SPOTS: ICD-10-CM

## 2019-07-17 DIAGNOSIS — D18.00 HEMANGIOMA, UNSPECIFIED SITE: ICD-10-CM

## 2019-07-17 DIAGNOSIS — L30.9 ACUTE DERMATITIS: Primary | ICD-10-CM

## 2019-07-17 DIAGNOSIS — D22.60 MELANOCYTIC NEVUS OF UPPER EXTREMITY, UNSPECIFIED LATERALITY: ICD-10-CM

## 2019-07-17 DIAGNOSIS — L73.8 SEBACEOUS HYPERPLASIA OF FACE: ICD-10-CM

## 2019-07-17 PROCEDURE — 97110 THERAPEUTIC EXERCISES: CPT

## 2019-07-17 PROCEDURE — 97140 MANUAL THERAPY 1/> REGIONS: CPT

## 2019-07-17 PROCEDURE — 97112 NEUROMUSCULAR REEDUCATION: CPT

## 2019-07-17 NOTE — PROGRESS NOTES
Dx: IT band syndrome of right knee, chondromalacia patellae right knee, walking difficulty, knee pain right          Authorized # of Visits:  12         Next MD visit: none scheduled  Fall Risk: standard         Precautions: n/a             Subjective: Pt quad stretch 30sx3 Prone quad stretch 30sx3 Prone quad stretch 30sx3 Prone quad stretch 30sx3 Prone quad stretch 30sx3   SB HS curl 20x SB HS curl 20x - SB HS curl 20x Foam roll quads 25x Foam roll piriformis 25x Stairs 10steps x2   Shuttle DLP 3x 20x Supi

## 2019-07-18 ENCOUNTER — OFFICE VISIT (OUTPATIENT)
Dept: INTERNAL MEDICINE CLINIC | Facility: CLINIC | Age: 49
End: 2019-07-18

## 2019-07-18 VITALS
RESPIRATION RATE: 14 BRPM | WEIGHT: 174 LBS | BODY MASS INDEX: 32.43 KG/M2 | HEART RATE: 60 BPM | HEIGHT: 61.5 IN | DIASTOLIC BLOOD PRESSURE: 70 MMHG | SYSTOLIC BLOOD PRESSURE: 110 MMHG

## 2019-07-18 DIAGNOSIS — L98.7 EXCESS SKIN OF ABDOMEN: ICD-10-CM

## 2019-07-18 DIAGNOSIS — R73.01 IFG (IMPAIRED FASTING GLUCOSE): ICD-10-CM

## 2019-07-18 DIAGNOSIS — Z51.81 THERAPEUTIC DRUG MONITORING: Primary | ICD-10-CM

## 2019-07-18 DIAGNOSIS — R53.83 OTHER FATIGUE: ICD-10-CM

## 2019-07-18 DIAGNOSIS — L30.4 INTERTRIGO: ICD-10-CM

## 2019-07-18 DIAGNOSIS — E66.09 CLASS 1 OBESITY DUE TO EXCESS CALORIES WITH SERIOUS COMORBIDITY AND BODY MASS INDEX (BMI) OF 32.0 TO 32.9 IN ADULT: ICD-10-CM

## 2019-07-18 DIAGNOSIS — L98.7 EXCESS SKIN OF BREAST: ICD-10-CM

## 2019-07-18 PROCEDURE — 99214 OFFICE O/P EST MOD 30 MIN: CPT | Performed by: INTERNAL MEDICINE

## 2019-07-18 NOTE — PROGRESS NOTES
CC: Patient presents with:  Weight Check: down 6        HPI:     Overall doing well today. Underwent bariatric surgery on 10/15/18. Weight on surgery date: 230 lbs.    Down  6 lb from previous   Does feel that the weight loss is slowing down but is Disp:  Rfl:    Ketoconazole 2 % External Shampoo Use as directed Disp: 120 mL Rfl: 1   Nystatin 629057 UNIT/GM External Powder Apply 1 Application topically 4 (four) times daily.  Disp: 60 g Rfl: 1   SIMVASTATIN 20 MG Oral Tab TAKE 1 TABLET BY MOUTH EVERY E 11/30/15    currently on macrobid for kidney infection-instructed to inform surgeon of infection.    • Lung nodule     pt unsure of which side   • Migraines    • Morbid obesity with BMI of 45.0-49.9, adult ([facility] [address] 7/13/2014   • Open angle with borderline find New daily persistent headache     History of palpitations     Family history of CHF (congestive heart failure)     Family history of cardiomyopathy     Acute left ankle pain     Major depressive disorder, recurrent episode, mild with anxious distress ([address] adenopathy  LUNGS: clear to auscultation bilaterally   CARDIO: RRR without murmur  GI: good BS, denies any constipation or diarrhea, + excess skin of the abdomen and under the breast  EXTREMITIES: no cyanosis, no clubbing, no edema    Orders Placed This En for her recurrent intertrigo                    The patient indicates understanding of these issues and agrees to the plan. Return in about 3 months (around 10/18/2019) for weight mangement.

## 2019-07-19 DIAGNOSIS — E11.9 TYPE 2 DIABETES MELLITUS WITHOUT COMPLICATION, WITHOUT LONG-TERM CURRENT USE OF INSULIN (HCC): ICD-10-CM

## 2019-07-19 DIAGNOSIS — E78.2 MIXED HYPERLIPIDEMIA: Primary | ICD-10-CM

## 2019-07-19 RX ORDER — SIMVASTATIN 20 MG
TABLET ORAL
Qty: 90 TABLET | Refills: 1 | Status: SHIPPED | OUTPATIENT
Start: 2019-07-19 | End: 2019-11-11

## 2019-07-19 NOTE — TELEPHONE ENCOUNTER
Pt requesting refill of simvastatin, passed protocol , refill approved, sent to pharmacy    Last Time Medication was Filled: 1/28/2019     Last Office Visit with PCP: 5/22/19   Return in about 3 months (around 8/22/2019)

## 2019-07-23 ENCOUNTER — OFFICE VISIT (OUTPATIENT)
Dept: PHYSICAL THERAPY | Age: 49
End: 2019-07-23
Attending: ORTHOPAEDIC SURGERY
Payer: COMMERCIAL

## 2019-07-23 PROCEDURE — 97112 NEUROMUSCULAR REEDUCATION: CPT

## 2019-07-23 PROCEDURE — 97140 MANUAL THERAPY 1/> REGIONS: CPT

## 2019-07-23 PROCEDURE — 97110 THERAPEUTIC EXERCISES: CPT

## 2019-07-23 NOTE — PROGRESS NOTES
Dx: IT band syndrome of right knee, chondromalacia patellae right knee, walking difficulty, knee pain right          Authorized # of Visits:  12         Next MD visit: none scheduled  Fall Risk: standard         Precautions: n/a             Subjective: Pt heel slides 20x Prone quad stretch 30sx3 Prone quad stretch 30sx3 Prone quad stretch 30sx3 Prone quad stretch 30sx3 Prone quad stretch 30sx3 Prone quad stretch 30sx3 Prone quad stretch 30sx3   SB HS curl 20x SB HS curl 20x - SB HS curl 20x Foam roll quad

## 2019-07-25 ENCOUNTER — OFFICE VISIT (OUTPATIENT)
Dept: PHYSICAL THERAPY | Age: 49
End: 2019-07-25
Attending: ORTHOPAEDIC SURGERY
Payer: COMMERCIAL

## 2019-07-25 DIAGNOSIS — G43.009 MIGRAINE WITHOUT AURA AND WITHOUT STATUS MIGRAINOSUS, NOT INTRACTABLE: ICD-10-CM

## 2019-07-25 PROCEDURE — 97110 THERAPEUTIC EXERCISES: CPT

## 2019-07-25 PROCEDURE — 97140 MANUAL THERAPY 1/> REGIONS: CPT

## 2019-07-25 RX ORDER — TIZANIDINE 4 MG/1
TABLET ORAL
Qty: 30 TABLET | Refills: 0 | Status: SHIPPED | OUTPATIENT
Start: 2019-07-25 | End: 2019-08-23

## 2019-07-25 NOTE — PROGRESS NOTES
Dx: IT band syndrome of right knee, chondromalacia patellae right knee, walking difficulty, knee pain right          Authorized # of Visits:  12         Next MD visit: none scheduled  Fall Risk: standard         Precautions: n/a             Subjective: Pt stretch 30sx3 Prone quad stretch 30sx3 Prone quad stretch 30sx3 Prone quad stretch 30sx3 Prone quad stretch 30sx3 --   SB HS curl 20x SB HS curl 20x - SB HS curl 20x Foam roll quads 25x Foam roll piriformis 25x Stairs 10steps x2 Foam roll quads 25x -   Brandy Gilmore

## 2019-07-25 NOTE — TELEPHONE ENCOUNTER
Pt requesting refill of tizinadine    Last Time Medication was Filled:  5/21/19 qty30    Last Office Visit with PCP: 5/22/19.  Return in about 3 months (around 8/22/2019) for  or sooner for Annual Wellness Visit and as needed or indicated.          No fut

## 2019-07-30 ENCOUNTER — LAB ENCOUNTER (OUTPATIENT)
Dept: LAB | Age: 49
End: 2019-07-30
Attending: INTERNAL MEDICINE
Payer: COMMERCIAL

## 2019-07-30 ENCOUNTER — TELEPHONE (OUTPATIENT)
Dept: PODIATRY CLINIC | Facility: CLINIC | Age: 49
End: 2019-07-30

## 2019-07-30 ENCOUNTER — OFFICE VISIT (OUTPATIENT)
Dept: PHYSICAL THERAPY | Age: 49
End: 2019-07-30
Attending: ORTHOPAEDIC SURGERY
Payer: COMMERCIAL

## 2019-07-30 DIAGNOSIS — M76.71 PERONEAL TENDINITIS OF RIGHT LOWER EXTREMITY: Primary | ICD-10-CM

## 2019-07-30 DIAGNOSIS — Z51.81 THERAPEUTIC DRUG MONITORING: ICD-10-CM

## 2019-07-30 DIAGNOSIS — L30.4 INTERTRIGO: ICD-10-CM

## 2019-07-30 DIAGNOSIS — R53.83 OTHER FATIGUE: ICD-10-CM

## 2019-07-30 DIAGNOSIS — S86.311D TEAR OF PERONEAL TENDON, RIGHT, SUBSEQUENT ENCOUNTER: ICD-10-CM

## 2019-07-30 DIAGNOSIS — R73.01 IFG (IMPAIRED FASTING GLUCOSE): ICD-10-CM

## 2019-07-30 DIAGNOSIS — E66.09 CLASS 1 OBESITY DUE TO EXCESS CALORIES WITH SERIOUS COMORBIDITY AND BODY MASS INDEX (BMI) OF 32.0 TO 32.9 IN ADULT: ICD-10-CM

## 2019-07-30 LAB
DEPRECATED HBV CORE AB SER IA-ACNC: 37 NG/ML (ref 12–240)
FOLATE SERPL-MCNC: 28.2 NG/ML (ref 8.7–?)
IRON SATURATION: 23 % (ref 15–50)
IRON SERPL-MCNC: 80 UG/DL (ref 50–170)
T4 FREE SERPL-MCNC: 1.2 NG/DL (ref 0.8–1.7)
TOTAL IRON BINDING CAPACITY: 344 UG/DL (ref 240–450)
TRANSFERRIN SERPL-MCNC: 231 MG/DL (ref 200–360)
TSI SER-ACNC: 1.11 MIU/ML (ref 0.36–3.74)
VIT B12 SERPL-MCNC: 696 PG/ML (ref 193–986)
VIT D+METAB SERPL-MCNC: 51.5 NG/ML (ref 30–100)

## 2019-07-30 PROCEDURE — 84425 ASSAY OF VITAMIN B-1: CPT

## 2019-07-30 PROCEDURE — 97530 THERAPEUTIC ACTIVITIES: CPT

## 2019-07-30 PROCEDURE — 84443 ASSAY THYROID STIM HORMONE: CPT

## 2019-07-30 PROCEDURE — 82607 VITAMIN B-12: CPT

## 2019-07-30 PROCEDURE — 97140 MANUAL THERAPY 1/> REGIONS: CPT

## 2019-07-30 PROCEDURE — 82728 ASSAY OF FERRITIN: CPT

## 2019-07-30 PROCEDURE — 83550 IRON BINDING TEST: CPT

## 2019-07-30 PROCEDURE — 84439 ASSAY OF FREE THYROXINE: CPT

## 2019-07-30 PROCEDURE — 83540 ASSAY OF IRON: CPT

## 2019-07-30 PROCEDURE — 36415 COLL VENOUS BLD VENIPUNCTURE: CPT

## 2019-07-30 PROCEDURE — 82306 VITAMIN D 25 HYDROXY: CPT

## 2019-07-30 PROCEDURE — 97110 THERAPEUTIC EXERCISES: CPT

## 2019-07-30 PROCEDURE — 82746 ASSAY OF FOLIC ACID SERUM: CPT

## 2019-07-30 NOTE — TELEPHONE ENCOUNTER
S/w pt and informed her [facility] ordered knee scooter, but she will need to wait for referral to see if approved and where to before making an appointment somewhere. Order mailed to home.

## 2019-07-30 NOTE — PROGRESS NOTES
DISCHARGE NOTE   Discharge Summary  Pt has attended 14 visits in Physical Therapy.      Dx: IT band syndrome of right knee, chondromalacia patellae right knee, walking difficulty, knee pain right          Authorized # of Visits:  8         Next MD visit: no Goals:   Pt will increase right knee AROM flexion to 120 deg to perform full squat. MET  Pt will increase right knee ext strength to 4+/5 to perform stairs with reciprocal pattern without use of handrail.  MET (with reported ankle and knee pain)  Pt chantelle

## 2019-08-01 ENCOUNTER — APPOINTMENT (OUTPATIENT)
Dept: PHYSICAL THERAPY | Age: 49
End: 2019-08-01
Attending: ORTHOPAEDIC SURGERY
Payer: COMMERCIAL

## 2019-08-02 LAB — VITAMIN B1 (THIAMINE), WHOLE B: 186 NMOL/L

## 2019-08-06 ENCOUNTER — APPOINTMENT (OUTPATIENT)
Dept: PHYSICAL THERAPY | Age: 49
End: 2019-08-06
Attending: ORTHOPAEDIC SURGERY
Payer: COMMERCIAL

## 2019-08-08 ENCOUNTER — APPOINTMENT (OUTPATIENT)
Dept: PHYSICAL THERAPY | Age: 49
End: 2019-08-08
Attending: ORTHOPAEDIC SURGERY
Payer: COMMERCIAL

## 2019-08-12 ENCOUNTER — TELEPHONE (OUTPATIENT)
Dept: PODIATRY CLINIC | Facility: CLINIC | Age: 49
End: 2019-08-12

## 2019-08-23 DIAGNOSIS — G43.009 MIGRAINE WITHOUT AURA AND WITHOUT STATUS MIGRAINOSUS, NOT INTRACTABLE: ICD-10-CM

## 2019-08-23 RX ORDER — TIZANIDINE 4 MG/1
TABLET ORAL
Qty: 30 TABLET | Refills: 0 | Status: SHIPPED | OUTPATIENT
Start: 2019-08-23 | End: 2020-01-15 | Stop reason: SDUPTHER

## 2019-08-23 NOTE — TELEPHONE ENCOUNTER
Refill request for tizanidine. 30 day sent to pharmacy. Phoned patient. She states she forgot about making neurology appointment. Dr. Alycia Cox information given to patient and she VU and is in agreement to calling that office.

## 2019-09-10 ENCOUNTER — PATIENT MESSAGE (OUTPATIENT)
Dept: PODIATRY CLINIC | Facility: CLINIC | Age: 49
End: 2019-09-10

## 2019-09-10 NOTE — TELEPHONE ENCOUNTER
From: Mehrdad Childress  To: Josette Cheema DPM  Sent: 9/10/2019 8:01 AM CDT  Subject: Non-Urgent Medical Question    Good morning,     I'm trying to get as many things in order in my house before my surgery on October 18th.  Will my right foot be in a

## 2019-09-11 ENCOUNTER — TELEPHONE (OUTPATIENT)
Dept: FAMILY MEDICINE CLINIC | Facility: CLINIC | Age: 49
End: 2019-09-11

## 2019-09-11 DIAGNOSIS — E11.9 TYPE 2 DIABETES MELLITUS WITHOUT COMPLICATION, WITHOUT LONG-TERM CURRENT USE OF INSULIN (HCC): Primary | ICD-10-CM

## 2019-09-11 NOTE — TELEPHONE ENCOUNTER
[address] [facility], [facility] Clinical Staff Cc: [facility] Referral Pool   Phone Number: [phone]             Diya Hines [[id number]]     . Reason for the order/referral: REFERRAL REQUEST   PCP: [email or web address] Soledad Holman MD   Refer to Provider (f

## 2019-09-12 ENCOUNTER — OFFICE VISIT (OUTPATIENT)
Dept: FAMILY MEDICINE CLINIC | Facility: CLINIC | Age: 49
End: 2019-09-12

## 2019-09-12 VITALS
BODY MASS INDEX: 32.43 KG/M2 | SYSTOLIC BLOOD PRESSURE: 134 MMHG | HEART RATE: 75 BPM | RESPIRATION RATE: 18 BRPM | TEMPERATURE: 98 F | HEIGHT: 61.5 IN | OXYGEN SATURATION: 98 % | WEIGHT: 174 LBS | DIASTOLIC BLOOD PRESSURE: 84 MMHG

## 2019-09-12 DIAGNOSIS — J30.2 SEASONAL ALLERGIES: Primary | ICD-10-CM

## 2019-09-12 DIAGNOSIS — J06.9 VIRAL UPPER RESPIRATORY TRACT INFECTION: ICD-10-CM

## 2019-09-12 PROCEDURE — 99213 OFFICE O/P EST LOW 20 MIN: CPT | Performed by: NURSE PRACTITIONER

## 2019-09-12 NOTE — PROGRESS NOTES
CHIEF COMPLAINT:   Patient presents with:  Nasal Congestion: s/s for 1 week, getting worst.  OTC meds taken  Pain: right side of face/ear dizzy    HPI:   Mehrdad Childress is a 53year old female who presents for sinus congestion for  7  days.  Symptoms have Nystatin 222844 UNIT/GM External Powder Apply 1 Application topically 4 (four) times daily. Disp: 60 g Rfl: 1   magnesium 250 MG Oral Tab Take 250 mg by mouth. Disp:  Rfl:    Pyridoxine HCl (VITAMIN B-6 OR) Take by mouth.  Disp:  Rfl:    Calcium 500-125 MG- • Open angle with borderline findings, high risk, bilateral 03/27/2019    Marguerite Brower M.D.   • Osteoarthritis    • Other vitreous opacities, left eye 03/27/2019    Marguerite Brower M.D.   • Other vitreous opacities, right eye 03/27/2019    Marguerite Brower • Cancer Maternal Aunt         basal cell   • Cancer Maternal Aunt         basal cell   • Cancer Maternal Aunt         bone   • Cancer Cousin         esoph,adenocarcinoma   • Cancer Cousin         Non Hodgkins Lymphoma   • Cancer Cousin         basal cell ASSESSMENT AND PLAN:   ASSESSMENT:  Maksim eLon is a 53year old female who presents with    ASSESSMENT: Seasonal allergies  (primary encounter diagnosis)  Viral upper respiratory tract infection     1.  Seasonal allergies  Recommended nasal spray flon Allergies can cause nasal tissue to swell. This makes the air passages smaller. The nose may feel stuffed up. The nose may also make extra mucus, which can plug the nasal passages or drip out of the nose.  Mucus can drip down the back of the throat (postnas

## 2019-09-13 ENCOUNTER — TELEPHONE (OUTPATIENT)
Dept: INTERNAL MEDICINE CLINIC | Facility: CLINIC | Age: 49
End: 2019-09-13

## 2019-09-13 NOTE — TELEPHONE ENCOUNTER
Patient called would like to make sure it is safe to take zyrtec D for her sinus since surgery -last October

## 2019-09-26 DIAGNOSIS — G43.009 MIGRAINE WITHOUT AURA AND WITHOUT STATUS MIGRAINOSUS, NOT INTRACTABLE: ICD-10-CM

## 2019-09-26 RX ORDER — TIZANIDINE 4 MG/1
TABLET ORAL
Qty: 30 TABLET | Refills: 0 | OUTPATIENT
Start: 2019-09-26

## 2019-09-26 NOTE — TELEPHONE ENCOUNTER
Patient requesting refill of tizanidine. Last fill was 8/23/19 for 30 tabs. Has upcoming appointment with Dr. Terri Mckeon on 10/1/19. Refill denied at this time. LMOM to call office.     1. Migraine without status migrainosus, not intractable, unspecified

## 2019-10-01 ENCOUNTER — OFFICE VISIT (OUTPATIENT)
Dept: NEUROLOGY | Facility: CLINIC | Age: 49
End: 2019-10-01

## 2019-10-01 VITALS
HEIGHT: 61.5 IN | HEART RATE: 74 BPM | SYSTOLIC BLOOD PRESSURE: 131 MMHG | RESPIRATION RATE: 16 BRPM | DIASTOLIC BLOOD PRESSURE: 68 MMHG | BODY MASS INDEX: 32 KG/M2

## 2019-10-01 DIAGNOSIS — G43.109 MIGRAINE WITH AURA AND WITHOUT STATUS MIGRAINOSUS, NOT INTRACTABLE: Primary | ICD-10-CM

## 2019-10-01 DIAGNOSIS — H53.9 VISION CHANGES: ICD-10-CM

## 2019-10-01 PROCEDURE — 99244 OFF/OP CNSLTJ NEW/EST MOD 40: CPT | Performed by: OTHER

## 2019-10-01 RX ORDER — BUTALBITAL, ACETAMINOPHEN AND CAFFEINE 50; 325; 40 MG/1; MG/1; MG/1
TABLET ORAL
Qty: 12 TABLET | Refills: 1 | Status: SHIPPED | OUTPATIENT
Start: 2019-10-01 | End: 2019-11-25

## 2019-10-01 NOTE — PROGRESS NOTES
[facility]   Neurology- INITIAL CLINIC VISIT  10/1/2019, 8:29 AM     Peter Roa Patient Status:  No patient class for patient encounter    1970 MRN [id number]   Location [address] [facility] Rubi Torres BMI of 45.0-49.9, adult ([facility] [address] 7/13/2014   • Open angle with borderline findings, high risk, bilateral 03/27/2019    Bharathi Turner M.D.   • Osteoarthritis    • Other vitreous opacities, left eye 03/27/2019    Bharathi Turner M.D.   • Other vitreous opacit 61) in her maternal aunt. Social History:   reports that she has never smoked. She has never used smokeless tobacco. She reports that she drinks alcohol. She reports that she does not use drugs.     Allergies:    Berries                 ANAPHYLAXIS  Case Tab, Take 250 mg by mouth., Disp: , Rfl:   •  Pyridoxine HCl (VITAMIN B-6 OR), Take by mouth., Disp: , Rfl:   •  Calcium 500-125 MG-UNIT Oral Tab, Take 1,000 mg by mouth., Disp: , Rfl:   •  Multiple Vitamins-Minerals (BARIATRIC MULTIVITAMINS/IRON OR), Take triceps, knee jerk, and ankle jerk. Plantar responses were flexor bilaterally. Sensory exam revealed normal light touch perception. Vibratory perception and proprioception were intact at the toes.  Pinprick and temperature were normal. Romberg sign was ab

## 2019-10-08 ENCOUNTER — TELEPHONE (OUTPATIENT)
Dept: INTERNAL MEDICINE CLINIC | Facility: CLINIC | Age: 49
End: 2019-10-08

## 2019-10-08 ENCOUNTER — OFFICE VISIT (OUTPATIENT)
Dept: INTERNAL MEDICINE CLINIC | Facility: CLINIC | Age: 49
End: 2019-10-08

## 2019-10-08 VITALS
SYSTOLIC BLOOD PRESSURE: 122 MMHG | HEIGHT: 61.5 IN | WEIGHT: 171 LBS | BODY MASS INDEX: 31.87 KG/M2 | DIASTOLIC BLOOD PRESSURE: 76 MMHG | RESPIRATION RATE: 16 BRPM | HEART RATE: 78 BPM

## 2019-10-08 DIAGNOSIS — L98.7 EXCESS SKIN OF ABDOMEN: ICD-10-CM

## 2019-10-08 DIAGNOSIS — Z51.81 THERAPEUTIC DRUG MONITORING: Primary | ICD-10-CM

## 2019-10-08 DIAGNOSIS — L98.7 EXCESS SKIN OF BREAST: ICD-10-CM

## 2019-10-08 DIAGNOSIS — E66.09 CLASS 1 OBESITY DUE TO EXCESS CALORIES WITH SERIOUS COMORBIDITY AND BODY MASS INDEX (BMI) OF 32.0 TO 32.9 IN ADULT: ICD-10-CM

## 2019-10-08 DIAGNOSIS — L30.4 INTERTRIGO: ICD-10-CM

## 2019-10-08 DIAGNOSIS — K21.9 CHRONIC GERD: ICD-10-CM

## 2019-10-08 DIAGNOSIS — E78.2 MIXED HYPERLIPIDEMIA: ICD-10-CM

## 2019-10-08 PROCEDURE — 99214 OFFICE O/P EST MOD 30 MIN: CPT | Performed by: INTERNAL MEDICINE

## 2019-10-08 RX ORDER — PHENTERMINE HYDROCHLORIDE 37.5 MG/1
37.5 TABLET ORAL
Qty: 30 TABLET | Refills: 1 | Status: SHIPPED | OUTPATIENT
Start: 2019-10-08 | End: 2020-02-10

## 2019-10-08 NOTE — PROGRESS NOTES
CC: Patient presents with:  Weight Check: down 3 lb       HPI:     Overall doing well today. Underwent bariatric surgery on 10/15/18. Weight on surgery date: 230 lbs.    Down 3 lb from previous   Does feel weight loss has slowed down this past month needed. Disp:  Rfl: 0   Polyethylene Glycol 3350 Oral Powder Take 17 g by mouth daily. Disp:  Rfl:    Nystatin [id number] UNIT/GM External Powder Apply 1 Application topically 4 (four) times daily.  Disp: 60 g Rfl: 1   magnesium 250 MG Oral Tab Take 250 mg by m • Osteoarthritis    • Other vitreous opacities, left eye 03/27/2019    Afia Meneses M.D.   • Other vitreous opacities, right eye 03/27/2019    Afia Meneses M.D.   • Polycystic ovaries     ovary embedded in pelvic wall--right side   • PONV (postoper vitreous detachment of both eyes     Vertigo     Non-intractable vomiting with nausea     Dermatitis     Injury of peroneal tendon of right foot     Heartburn     Family history of colon cancer     Personal history of colonic polyps     Hordeolum externum Status: Future          Standing Expiration Date: 10/8/2020      B12 AND FOLATE          Standing Status: Future          Standing Expiration Date: 10/8/2020      Comp Metabolic Panel (14)          Standing Status: Future          Standing Expiration Date: adult  Mixed hyperlipidemia  Chronic GERD  Excess skin of breast  Excess skin of abdomen  Intertrigo   -initial consult: 261 lb , sleeve gastrectomy on 10/15/ 18  -down 3 lbs since surgery.  -down 73  lbs total   -underwent VSG with Dr Hillary Shah on 10/15/18  Bl

## 2019-10-08 NOTE — TELEPHONE ENCOUNTER
Received fax to clarify Lansoprazole given at office visit today. MD picked ODT and did not mean to - pharmacy informed to change to regular tabs.   Removed from her current med list.

## 2019-10-08 NOTE — TELEPHONE ENCOUNTER
Received request from Epic to do Prior Authorization for Phentermine  Approved from 10/8/19 to 1/6/20  Notified patient on my chart

## 2019-10-09 ENCOUNTER — OFFICE VISIT (OUTPATIENT)
Dept: INTERNAL MEDICINE CLINIC | Facility: CLINIC | Age: 49
End: 2019-10-09

## 2019-10-09 VITALS — HEIGHT: 61.5 IN | WEIGHT: 171 LBS | BODY MASS INDEX: 31.87 KG/M2

## 2019-10-09 DIAGNOSIS — E66.09 CLASS 1 OBESITY DUE TO EXCESS CALORIES WITH SERIOUS COMORBIDITY AND BODY MASS INDEX (BMI) OF 31.0 TO 31.9 IN ADULT: Primary | ICD-10-CM

## 2019-10-09 PROCEDURE — 97803 MED NUTRITION INDIV SUBSEQ: CPT | Performed by: DIETITIAN, REGISTERED

## 2019-10-09 NOTE — PROGRESS NOTES
[address] [place] [address] AND WEIGHT LOSS CLINIC  [address] [place] [id number]  Dept: [phone]  Loc: [phone]    10/09/19      Bariatric Follow-up Nutrition Session    Michael Rodriguez is a 53year old female.      As Panel:  Lab Results   Component Value Date    CHOLEST 163 05/21/2019    TRIG 109 05/21/2019    HDL 73 (H) 05/21/2019    LDL 68 05/21/2019    VLDL 22 05/21/2019    TCHDLRATIO 2.46 05/07/2018    NONHDLC 90 05/21/2019    CHOLHDLRATIO 4.1 08/18/2015        Vit Take 250 mg by mouth., Disp: , Rfl:   •  Pyridoxine HCl (VITAMIN B-6 OR), Take by mouth., Disp: , Rfl:   •  Calcium 500-125 MG-UNIT Oral Tab, Take 1,000 mg by mouth., Disp: , Rfl:   •  Multiple Vitamins-Minerals (BARIATRIC MULTIVITAMINS/IRON OR), Take by m (allergies)  Vitamin/mineral supplements:  ProCare capsule + calcium citrate BID + Vit D + B6 + magnesium  Protein supplements: Orgain + [facility]      Activity Level: Upper body resistance bands three days per week.     Other: Pt doing very well 1 yr s/p VSG w

## 2019-10-14 ENCOUNTER — LAB ENCOUNTER (OUTPATIENT)
Dept: LAB | Age: 49
End: 2019-10-14
Attending: INTERNAL MEDICINE
Payer: COMMERCIAL

## 2019-10-14 DIAGNOSIS — L98.7 EXCESS SKIN OF BREAST: ICD-10-CM

## 2019-10-14 DIAGNOSIS — E78.2 MIXED HYPERLIPIDEMIA: ICD-10-CM

## 2019-10-14 DIAGNOSIS — K21.9 CHRONIC GERD: ICD-10-CM

## 2019-10-14 DIAGNOSIS — L30.4 INTERTRIGO: ICD-10-CM

## 2019-10-14 DIAGNOSIS — E66.09 CLASS 1 OBESITY DUE TO EXCESS CALORIES WITH SERIOUS COMORBIDITY AND BODY MASS INDEX (BMI) OF 32.0 TO 32.9 IN ADULT: ICD-10-CM

## 2019-10-14 DIAGNOSIS — L98.7 EXCESS SKIN OF ABDOMEN: ICD-10-CM

## 2019-10-14 DIAGNOSIS — Z51.81 THERAPEUTIC DRUG MONITORING: ICD-10-CM

## 2019-10-14 PROCEDURE — 82306 VITAMIN D 25 HYDROXY: CPT

## 2019-10-14 PROCEDURE — 85025 COMPLETE CBC W/AUTO DIFF WBC: CPT

## 2019-10-14 PROCEDURE — 82746 ASSAY OF FOLIC ACID SERUM: CPT

## 2019-10-14 PROCEDURE — 84443 ASSAY THYROID STIM HORMONE: CPT

## 2019-10-14 PROCEDURE — 83036 HEMOGLOBIN GLYCOSYLATED A1C: CPT

## 2019-10-14 PROCEDURE — 84100 ASSAY OF PHOSPHORUS: CPT

## 2019-10-14 PROCEDURE — 84425 ASSAY OF VITAMIN B-1: CPT

## 2019-10-14 PROCEDURE — 82607 VITAMIN B-12: CPT

## 2019-10-14 PROCEDURE — 80061 LIPID PANEL: CPT

## 2019-10-14 PROCEDURE — 83540 ASSAY OF IRON: CPT

## 2019-10-14 PROCEDURE — 82728 ASSAY OF FERRITIN: CPT

## 2019-10-14 PROCEDURE — 36415 COLL VENOUS BLD VENIPUNCTURE: CPT

## 2019-10-14 PROCEDURE — 83550 IRON BINDING TEST: CPT

## 2019-10-14 PROCEDURE — 80053 COMPREHEN METABOLIC PANEL: CPT

## 2019-10-14 PROCEDURE — 83735 ASSAY OF MAGNESIUM: CPT

## 2019-10-14 PROCEDURE — 84439 ASSAY OF FREE THYROXINE: CPT

## 2019-10-16 ENCOUNTER — OFFICE VISIT (OUTPATIENT)
Dept: SURGERY | Facility: CLINIC | Age: 49
End: 2019-10-16

## 2019-10-16 ENCOUNTER — TELEPHONE (OUTPATIENT)
Dept: SURGERY | Facility: CLINIC | Age: 49
End: 2019-10-16

## 2019-10-16 ENCOUNTER — TELEPHONE (OUTPATIENT)
Dept: INTERNAL MEDICINE CLINIC | Facility: CLINIC | Age: 49
End: 2019-10-16

## 2019-10-16 ENCOUNTER — TELEPHONE (OUTPATIENT)
Dept: PODIATRY CLINIC | Facility: CLINIC | Age: 49
End: 2019-10-16

## 2019-10-16 VITALS
DIASTOLIC BLOOD PRESSURE: 89 MMHG | RESPIRATION RATE: 16 BRPM | HEIGHT: 62 IN | SYSTOLIC BLOOD PRESSURE: 134 MMHG | HEART RATE: 69 BPM | OXYGEN SATURATION: 100 % | WEIGHT: 170 LBS | BODY MASS INDEX: 31.28 KG/M2

## 2019-10-16 DIAGNOSIS — K21.9 GASTROESOPHAGEAL REFLUX DISEASE, ESOPHAGITIS PRESENCE NOT SPECIFIED: ICD-10-CM

## 2019-10-16 DIAGNOSIS — L30.9 DERMATITIS: Primary | ICD-10-CM

## 2019-10-16 DIAGNOSIS — L98.7 EXCESS SKIN OF BREAST: ICD-10-CM

## 2019-10-16 DIAGNOSIS — E78.2 MIXED HYPERLIPIDEMIA: ICD-10-CM

## 2019-10-16 DIAGNOSIS — M79.3 PANNICULITIS: ICD-10-CM

## 2019-10-16 DIAGNOSIS — Z98.84 S/P LAPAROSCOPIC SLEEVE GASTRECTOMY: ICD-10-CM

## 2019-10-16 DIAGNOSIS — I10 ESSENTIAL HYPERTENSION: ICD-10-CM

## 2019-10-16 DIAGNOSIS — E11.9 TYPE 2 DIABETES MELLITUS WITHOUT COMPLICATION, WITHOUT LONG-TERM CURRENT USE OF INSULIN (HCC): ICD-10-CM

## 2019-10-16 RX ORDER — LANSOPRAZOLE 30 MG/1
30 CAPSULE, DELAYED RELEASE ORAL
Refills: 0 | COMMUNITY
Start: 2019-10-08 | End: 2020-01-07

## 2019-10-16 NOTE — TELEPHONE ENCOUNTER
Pt calling to confirm her surgery date of 10/18. Spoke to pt and confirmed sx date of 10/18. Pt requested to know length of sx? Explained that procedure will be one hr.  Pt also informed sx center will call to confirm sx time and a time to arrive.   Pt a

## 2019-10-16 NOTE — TELEPHONE ENCOUNTER
JAMES Soriano Amy J, RN             Eric Vail,     Pls make f/u appt w/ Dr Yaima Blackburn; according to her not in ~ 8 weeks, 12/3/19 silvia.      Thank you,     Randell Rosenberg      Scheduled  12/11/2019  9:00 AM Lupe Ruggiero MD [facility]

## 2019-10-16 NOTE — PROGRESS NOTES
[address] [address]  Dept: [phone]    4/10/2019     Bariatric Patient Post-op Evaluation    Chief Complaint:  Post-op, sleeve/HH 10/15/18, 242 lbs pre 03/27/2019    Jasper Tucker M.D.   • Osteoarthritis    • Other vitreous opacities, left eye 03/27/2019    Jasper Tucker M.D.   • Other vitreous opacities, right eye 03/27/2019    Jasper Tucker M.D.   • Polycystic ovaries     ovary embedded in pelvic 1998/prostate 2010   • Cancer Maternal Aunt         basal cell   • Cancer Maternal Aunt         basal cell   • Cancer Maternal Aunt         bone   • Cancer Cousin         esoph,adenocarcinoma   • Cancer Cousin         Non Hodgkins Lymphoma   • Cancer Cousi BREAKFAST., Disp: 90 tablet, Rfl: 2  •  ondansetron 8 MG Oral Tablet Dispersible, Take 1 tablet (8 mg total) by mouth every 8 (eight) hours as needed for Nausea., Disp: 30 tablet, Rfl: 0  •  ALPRAZolam 0.5 MG Oral Tab, 1/2 to one tab daily as needed, Disp: negative  Musculoskeletal: as per HPI  Neurological: negative  Psychological: negative  Endocrine: negative  Immunologic: negative  Integumentary: negative        Physical Exam:  Vital Signs:  /89   Pulse 69   Resp 16   Ht 62\"   Wt 170 lb (77.1 kg)

## 2019-10-18 ENCOUNTER — LAB REQUISITION (OUTPATIENT)
Dept: LAB | Facility: HOSPITAL | Age: 49
End: 2019-10-18
Payer: COMMERCIAL

## 2019-10-18 DIAGNOSIS — Z01.89 ENCOUNTER FOR OTHER SPECIFIED SPECIAL EXAMINATIONS: ICD-10-CM

## 2019-10-18 PROCEDURE — 88311 DECALCIFY TISSUE: CPT | Performed by: PODIATRIST

## 2019-10-18 PROCEDURE — 88305 TISSUE EXAM BY PATHOLOGIST: CPT | Performed by: PODIATRIST

## 2019-10-25 ENCOUNTER — TELEPHONE (OUTPATIENT)
Dept: PODIATRY CLINIC | Facility: CLINIC | Age: 49
End: 2019-10-25

## 2019-10-25 RX ORDER — HYDROCODONE BITATRATE AND ACETAMINOPHEN 5; 325 MG/1; MG/1
1 TABLET ORAL EVERY 4 HOURS PRN
Qty: 30 TABLET | Refills: 0 | Status: SHIPPED | OUTPATIENT
Start: 2019-10-25 | End: 2020-01-13

## 2019-10-25 NOTE — TELEPHONE ENCOUNTER
S/w pt and she states she has 1 tab left of Norco 5/325 rx'd on 10/18/19 PO. She takes 1 tab every 7 hrs prn pain.  She rates her pain constantly a 5/10 and intermittently a 7/10 which I when she takes the norco.   Please advise on refill request.

## 2019-10-25 NOTE — TELEPHONE ENCOUNTER
Pt has appointment scheduled 10-29-19. Pt only has one pain pill left. Will you refill.   Call pt to advise

## 2019-10-25 NOTE — TELEPHONE ENCOUNTER
Pt notified rx ready for p/u at [facility] office and someone will be in office til 430pm today. Her spouse may p/u the rx.

## 2019-10-28 NOTE — TELEPHONE ENCOUNTER
I was paged at 3:55 PM on 10/27/2019 by the patient she had fallen off of the knee scooter and hurt her foot. She was in a lot of pain as a result.   We have not really operated on any bone the Jose C us brevis tendon was still intact and unaffecte theref

## 2019-10-29 ENCOUNTER — OFFICE VISIT (OUTPATIENT)
Dept: PODIATRY CLINIC | Facility: CLINIC | Age: 49
End: 2019-10-29

## 2019-10-29 DIAGNOSIS — S86.311D TEAR OF PERONEAL TENDON, RIGHT, SUBSEQUENT ENCOUNTER: ICD-10-CM

## 2019-10-29 DIAGNOSIS — Z98.890 POST-OPERATIVE STATE: Primary | ICD-10-CM

## 2019-10-29 DIAGNOSIS — M76.71 PERONEAL TENDINITIS OF RIGHT LOWER EXTREMITY: ICD-10-CM

## 2019-10-29 DIAGNOSIS — M76.71 PERONEAL TENDINITIS, RIGHT: ICD-10-CM

## 2019-10-29 PROCEDURE — 99024 POSTOP FOLLOW-UP VISIT: CPT | Performed by: PODIATRIST

## 2019-10-29 NOTE — PROGRESS NOTES
James Corcoran is a 53year old female. Patient presents with:  Post-Op: sx 10/18/19, right peroneal tendon - pt arrives using knee scooter. pt accidentally set right foot down on sunday and pain was excruitating, she has been mostly ok since.  her right 2  ondansetron 8 MG Oral Tablet Dispersible, Take 1 tablet (8 mg total) by mouth every 8 (eight) hours as needed for Nausea., Disp: 30 tablet, Rfl: 0  ALPRAZolam 0.5 MG Oral Tab, 1/2 to one tab daily as needed, Disp: 10 tablet, Rfl: 0  Polyethylene Glycol of infection.    • Lung nodule     pt unsure of which side   • Migraines    • Morbid obesity with BMI of 45.0-49.9, adult ([facility] [address] 7/13/2014   • Open angle with borderline findings, high risk, bilateral 03/27/2019    Afia Meneses M.D.   • Osteoarthritis • Breast Cancer Maternal Aunt 68        Late 60's.    • Cancer Mother         basal cell X 3; LUNG   • Cancer Father         colon 1998 and 2008/liver 1998/prostate 2010   • Cancer Maternal Aunt         basal cell   • Cancer Maternal Aunt         basal ce visit:    Post-operative state    Peroneal tendinitis of right lower extremity    Tear of peroneal tendon, right, subsequent encounter    Peroneal tendinitis, right        Plan: Aseptic dressing was reapplied along with her splint I will see her in 1 week

## 2019-10-30 ENCOUNTER — TELEPHONE (OUTPATIENT)
Dept: PODIATRY CLINIC | Facility: CLINIC | Age: 49
End: 2019-10-30

## 2019-10-30 RX ORDER — CYCLOBENZAPRINE HCL 5 MG
5 TABLET ORAL 3 TIMES DAILY PRN
Qty: 30 TABLET | Refills: 0 | Status: SHIPPED | OUTPATIENT
Start: 2019-10-30 | End: 2019-11-25

## 2019-10-30 NOTE — TELEPHONE ENCOUNTER
S/w pt. She is having muscle spasms in operative foot/ankle. The muscle spasms are not new, she has had them in the past at night, but now they are increasing in frequency and intensity.  Pt states that pain is 9/10 when they happen and it takes her breath

## 2019-10-30 NOTE — TELEPHONE ENCOUNTER
Please call the patient and let her know that I prescribed Flexeril which she can take 3 times daily.   Also inform her because that we are manipulating tendons which are connected the muscles that muscle spasms in the lower leg can be very common after lauren

## 2019-10-30 NOTE — TELEPHONE ENCOUNTER
S/w pt. Relayed WMN msg. No further questions or concerns at this time. Pt verbalizes understanding re discontinuing other muscle relaxer.

## 2019-11-01 ENCOUNTER — TELEPHONE (OUTPATIENT)
Dept: PODIATRY CLINIC | Facility: CLINIC | Age: 49
End: 2019-11-01

## 2019-11-01 NOTE — TELEPHONE ENCOUNTER
S/w pt and she states Dr Norma Bloch offered an rx for Norco at 10/29 appt but she declined. She states her f/u appt is 11/5 and she only has a couple days worth of Norco left and would like refill. Do you approve refill?  If so pt can p/u at [facility] office on 11

## 2019-11-01 NOTE — TELEPHONE ENCOUNTER
Pt requesting to speak with RN in regards to narco medication. Pt states she was offered the medication and now she would like to have the prescription for the narco because she would not have enough medication to last her.  pls advise thank you

## 2019-11-02 RX ORDER — HYDROCODONE BITATRATE AND ACETAMINOPHEN 5; 325 MG/1; MG/1
1 TABLET ORAL EVERY 4 HOURS PRN
Qty: 20 TABLET | Refills: 0 | Status: SHIPPED | OUTPATIENT
Start: 2019-11-02 | End: 2019-12-18 | Stop reason: ALTCHOICE

## 2019-11-05 ENCOUNTER — OFFICE VISIT (OUTPATIENT)
Dept: PODIATRY CLINIC | Facility: CLINIC | Age: 49
End: 2019-11-05

## 2019-11-05 ENCOUNTER — PATIENT MESSAGE (OUTPATIENT)
Dept: PODIATRY CLINIC | Facility: CLINIC | Age: 49
End: 2019-11-05

## 2019-11-05 DIAGNOSIS — S86.311D TEAR OF PERONEAL TENDON, RIGHT, SUBSEQUENT ENCOUNTER: ICD-10-CM

## 2019-11-05 DIAGNOSIS — M76.71 PERONEAL TENDINITIS, RIGHT: ICD-10-CM

## 2019-11-05 DIAGNOSIS — Z98.890 POST-OPERATIVE STATE: Primary | ICD-10-CM

## 2019-11-05 PROCEDURE — 99024 POSTOP FOLLOW-UP VISIT: CPT | Performed by: PODIATRIST

## 2019-11-05 RX ORDER — HYDROCODONE BITATRATE AND ACETAMINOPHEN 5; 325 MG/1; MG/1
1 TABLET ORAL EVERY 6 HOURS PRN
Qty: 20 TABLET | Refills: 0 | Status: SHIPPED | OUTPATIENT
Start: 2019-11-05 | End: 2019-12-18 | Stop reason: ALTCHOICE

## 2019-11-05 NOTE — TELEPHONE ENCOUNTER
From: Michael Rodriguez  To: Sabra Byrne DPM  Sent: 11/5/2019 11:39 AM CST  Subject: Visit Follow-up Question    On my last message I forgot to mention it's also numbing. My toes are numb to the touch.      Thank you    Waletr Caren

## 2019-11-05 NOTE — TELEPHONE ENCOUNTER
Please call the patient back tell her that she can remove the posterior splint loosen the Ace wrap around her foot and then reapply everything more loosely she should not however touch the gauze dressing that is underneath the Ace wrap.

## 2019-11-05 NOTE — PROGRESS NOTES
Amita Perez is a 53year old female. Patient presents with:  Post-Op: LOV 10/29/19. sx 10/18/19, right peroneal tendon. pt has not tried the flexerill yet, the spasms have subsided/calmed down a lot.  pt arrives using knee scooter, nonweightbearing on a week 12 tablet 1   • TIZANIDINE HCL 4 MG Oral Tab TAKE 1 TABLET BY MOUTH EVERY DAY IN THE EVENING 30 tablet 0   • SIMVASTATIN 20 MG Oral Tab TAKE 1 TABLET BY MOUTH EVERY EVENING 90 tablet 1   • estradiol 1 MG Oral Tab Take 1 tablet (1 mg total) by mouth pressure    • High cholesterol    • Incontinence    • Keratoconjunctivitis sicca not specified as Sjogren's, bilateral 03/27/2019    Servando Allan M.D.   • Kidney infection 11/30/15    currently on macrobid for kidney infection-instructed to inform surge Family History   Problem Relation Age of Onset   • Cancer Maternal Aunt         breast, colon   • Breast Cancer Maternal Aunt 48        In her 52's. • Ovarian Cancer Maternal Aunt 60        In her 63's.    • Cancer Maternal Aunt         breast, basal ce apparent distress  EXTREMITIES:   Dressing change today shows incision line well coapted alternate sutures removed no dehiscence no erythema no edema no purulence noted. No sign of infection.   Septic dressing with posterior splint reapplied  ASSESSMENT AN

## 2019-11-05 NOTE — PROGRESS NOTES
Patient has begun to take tylenol between doses of Norco. Patient counseled on daily safe limits for acetaminophen dosing. Patient verbalizes understanding.

## 2019-11-11 DIAGNOSIS — E78.2 MIXED HYPERLIPIDEMIA: ICD-10-CM

## 2019-11-11 DIAGNOSIS — E11.9 TYPE 2 DIABETES MELLITUS WITHOUT COMPLICATION, WITHOUT LONG-TERM CURRENT USE OF INSULIN (HCC): ICD-10-CM

## 2019-11-11 RX ORDER — SIMVASTATIN 20 MG
TABLET ORAL
Qty: 90 TABLET | Refills: 1 | Status: SHIPPED | OUTPATIENT
Start: 2019-11-11 | End: 2020-07-10

## 2019-11-11 NOTE — TELEPHONE ENCOUNTER
Pt requesting refill of SIMVASTATIN 20 MG Oral Tab , passed protocol , refill approved, sent to pharmacy

## 2019-11-12 ENCOUNTER — TELEPHONE (OUTPATIENT)
Dept: PODIATRY CLINIC | Facility: CLINIC | Age: 49
End: 2019-11-12

## 2019-11-12 ENCOUNTER — OFFICE VISIT (OUTPATIENT)
Dept: PHYSICAL THERAPY | Age: 49
End: 2019-11-12
Attending: PODIATRIST
Payer: COMMERCIAL

## 2019-11-12 ENCOUNTER — OFFICE VISIT (OUTPATIENT)
Dept: PODIATRY CLINIC | Facility: CLINIC | Age: 49
End: 2019-11-12

## 2019-11-12 DIAGNOSIS — Z98.890 POST-OPERATIVE STATE: Primary | ICD-10-CM

## 2019-11-12 DIAGNOSIS — Z98.890 POST-OPERATIVE STATE: ICD-10-CM

## 2019-11-12 PROCEDURE — 97161 PT EVAL LOW COMPLEX 20 MIN: CPT

## 2019-11-12 PROCEDURE — 97112 NEUROMUSCULAR REEDUCATION: CPT

## 2019-11-12 PROCEDURE — 99024 POSTOP FOLLOW-UP VISIT: CPT | Performed by: PODIATRIST

## 2019-11-12 NOTE — TELEPHONE ENCOUNTER
Please let her know to just keep antibiotic ointment and a Band-Aid on that area and if it gets any worse please have her call or return to the clinic thank you.   Also let her know that she should not probe that area with any type of an instrument or Q-tip

## 2019-11-12 NOTE — PROGRESS NOTES
ANKLE EVALUATION:     Referring Physician: Dr. Greg Lai  Diagnosis: post operative state     Date of Service: 11/12/2019     PATIENT Carly Todd is a 53year old y/o female who presents to therapy today with complaints of right ankle pain an boot for 1 week  After that in 1 week evaluate and treat beginning with gentle range of motion exercises and edema reduction. 2 visits per week, for 4 weeks  OBJECTIVE:   Gait: touch down weight bearing in boot with bilateral crutches.    Observation/Skin: therapist: Diane Rosa, PT    [de-identified] certification required: Yes  I certify the need for these services furnished under this plan of treatment and while under my care.     X___________________________________________________ Date___________________

## 2019-11-12 NOTE — TELEPHONE ENCOUNTER
Asking for referral for PT to be changed from open to pending and send it to [facility]

## 2019-11-12 NOTE — PROGRESS NOTES
Hair Real is a 53year old female. Patient presents with:  Post-Op: LOV 11/5/19, sx 10/18/19, right peroneal tendon. pt states her muscle spasms have been ok, very few and far between. pt is using the knee scooter, nonweightbearing to right foot.  pt estradiol 1 MG Oral Tab Take 1 tablet (1 mg total) by mouth once daily. 90 tablet 3   • LEVOTHYROXINE SODIUM 125 MCG Oral Tab TAKE 1 TABLET (125 MCG TOTAL) BY MOUTH BEFORE BREAKFAST.  90 tablet 2   • ondansetron 8 MG Oral Tablet Dispersible Take 1 tablet (8 Keratoconjunctivitis sicca not specified as Sjogren's, bilateral 03/27/2019    Galindo Prescott M.D.   • Kidney infection 11/30/15    currently on macrobid for kidney infection-instructed to inform surgeon of infection.    • Lung nodule     pt unsure of whic Cancer Maternal Aunt         breast, colon   • Breast Cancer Maternal Aunt 48        In her 52's. • Ovarian Cancer Maternal Aunt 60        In her 63's.    • Cancer Maternal Aunt         breast, basal cell   • Breast Cancer Maternal Aunt 76        Late 61' well coapted without any sign of infection remaining sutures removed. ASSESSMENT AND PLAN:   Diagnoses and all orders for this visit:    Post-operative state  -     OP REFERRAL TO [facility]        Plan:  The wound is well coapted reap

## 2019-11-12 NOTE — TELEPHONE ENCOUNTER
S/w pt and she states Dr. Alisha Deng told her she could take a quick shower- so she did today and her incision started to open up some- maybe 1/2 cm. She states it is not a large opening but she states he told her to call if it opened.  She had a couple drops

## 2019-11-13 ENCOUNTER — APPOINTMENT (OUTPATIENT)
Dept: PHYSICAL THERAPY | Age: 49
End: 2019-11-13
Attending: PODIATRIST
Payer: COMMERCIAL

## 2019-11-13 ENCOUNTER — TELEPHONE (OUTPATIENT)
Dept: PHYSICAL THERAPY | Age: 49
End: 2019-11-13

## 2019-11-18 ENCOUNTER — APPOINTMENT (OUTPATIENT)
Dept: PHYSICAL THERAPY | Age: 49
End: 2019-11-18
Attending: PODIATRIST
Payer: COMMERCIAL

## 2019-11-19 ENCOUNTER — TELEPHONE (OUTPATIENT)
Dept: PODIATRY CLINIC | Facility: CLINIC | Age: 49
End: 2019-11-19

## 2019-11-19 ENCOUNTER — OFFICE VISIT (OUTPATIENT)
Dept: PHYSICAL THERAPY | Age: 49
End: 2019-11-19
Attending: PODIATRIST
Payer: COMMERCIAL

## 2019-11-19 PROCEDURE — 97110 THERAPEUTIC EXERCISES: CPT

## 2019-11-19 PROCEDURE — 97140 MANUAL THERAPY 1/> REGIONS: CPT

## 2019-11-19 NOTE — TELEPHONE ENCOUNTER
Received message from [facility] stating [place] 5/325 mg that was Rx by Adriana on  11/05/19 needed PA. Called [facility] and spoke to [place]. Per [place] Richard, pt picked up [facility] on 11/09/19.

## 2019-11-19 NOTE — PROGRESS NOTES
Dx: post operative state             Insurance (Authorized # of Visits):  6           Authorizing Physician: Dr. Ariella Gilbert  Next MD visit: none scheduled  Fall Risk: standard         Precautions: n/a             Subjective: Pt states her ankle is feeling ok.

## 2019-11-20 ENCOUNTER — OFFICE VISIT (OUTPATIENT)
Dept: PHYSICAL THERAPY | Age: 49
End: 2019-11-20
Attending: PODIATRIST
Payer: COMMERCIAL

## 2019-11-20 PROCEDURE — 97110 THERAPEUTIC EXERCISES: CPT

## 2019-11-20 PROCEDURE — 97140 MANUAL THERAPY 1/> REGIONS: CPT

## 2019-11-20 NOTE — PROGRESS NOTES
Dx: post operative state             Insurance (Authorized # of Visits):  6           Authorizing Physician: Dr. Matilde De Leon  Next MD visit: none scheduled  Fall Risk: standard         Precautions: n/a             Subjective: Pt states her ankle is sore.  She s

## 2019-11-25 ENCOUNTER — PATIENT MESSAGE (OUTPATIENT)
Dept: PODIATRY CLINIC | Facility: CLINIC | Age: 49
End: 2019-11-25

## 2019-11-25 ENCOUNTER — TELEPHONE (OUTPATIENT)
Dept: PODIATRY CLINIC | Facility: CLINIC | Age: 49
End: 2019-11-25

## 2019-11-25 ENCOUNTER — OFFICE VISIT (OUTPATIENT)
Dept: PODIATRY CLINIC | Facility: CLINIC | Age: 49
End: 2019-11-25

## 2019-11-25 DIAGNOSIS — Z98.890 POST-OPERATIVE STATE: Primary | ICD-10-CM

## 2019-11-25 PROCEDURE — 99024 POSTOP FOLLOW-UP VISIT: CPT | Performed by: PODIATRIST

## 2019-11-25 NOTE — TELEPHONE ENCOUNTER
Per tish kennedy PT they were informed by pt that there is new instructions for pts PT exercises, asking for cb or fax at [phone]. Thank you.

## 2019-11-25 NOTE — TELEPHONE ENCOUNTER
WMN - per your office note, you would like to speak with the physical therapist. Please return call.

## 2019-11-25 NOTE — PROGRESS NOTES
Daya Hunter is a 53year old female. Patient presents with:  Post-Op: s/p right peroneal tendon -- Sx on 10/18/19. Denies any fever or calf pain. Rates pain 2/10. Started PT last week. Wearing CAM boot and ambulating with crutches.          HPI:   Is 5 mouth.     • Meclizine HCl 25 MG Oral Tab Take 1 tablet (25 mg total) by mouth 3 (three) times daily as needed for Dizziness.  30 tablet 0   • PATIENT SUPPLIED MEDICATION Essential Oils for allergies     • Cholecalciferol (VITAMIN D3) 2000 UNITS Oral Tab Ta opacities, right eye 03/27/2019    Jaiden Baugh M.D.   • Polycystic ovaries     ovary embedded in pelvic wall--right side   • PONV (postoperative nausea and vomiting)    • Posterior vitreous detachment of both eyes 12/8/2017    Right > Left   • Primary Cancer Cousin         Non Hodgkins Lymphoma   • Cancer Cousin         basal cell   • Cancer Paternal Aunt         lung      Social History    Socioeconomic History      Marital status:       Spouse name: Not on file      Number of children: Not on f training. For right now my recommendation is for her to wear a boot like a hiking boot which she presented with on the other foot she can switch that out intermittently with the cam boot if she needs to to walk.   When she first starts walking with the [facility]

## 2019-11-26 ENCOUNTER — TELEPHONE (OUTPATIENT)
Dept: PHYSICAL THERAPY | Age: 49
End: 2019-11-26

## 2019-11-26 ENCOUNTER — OFFICE VISIT (OUTPATIENT)
Dept: PHYSICAL THERAPY | Age: 49
End: 2019-11-26
Attending: PODIATRIST
Payer: COMMERCIAL

## 2019-11-26 PROCEDURE — 97140 MANUAL THERAPY 1/> REGIONS: CPT

## 2019-11-26 PROCEDURE — 97110 THERAPEUTIC EXERCISES: CPT

## 2019-11-26 NOTE — TELEPHONE ENCOUNTER
I called and left a message for Lamont at [facility] physical therapy that he can begin band therapy lightly for inversion eversion of the right ankle. He can call back if he has any questions.

## 2019-11-26 NOTE — TELEPHONE ENCOUNTER
From: Luis Fernando Argueta  To: Eden Starks DPM  Sent: 11/25/2019 5:53 PM CST  Subject: Visit Gordon Santillan    I'm sorry I was so excited to hear I could wear a hiking boot that I forgot what you said about the crutch.  Is it st

## 2019-11-26 NOTE — PROGRESS NOTES
Dx: post operative state             Insurance (Authorized # of Visits):  6           Authorizing Physician: Dr. Zenaida Jarrett  Next MD visit: none scheduled  Fall Risk: standard         Precautions: n/a             Subjective: Pt states she went to the doctor w

## 2019-11-27 ENCOUNTER — OFFICE VISIT (OUTPATIENT)
Dept: PHYSICAL THERAPY | Age: 49
End: 2019-11-27
Attending: PODIATRIST
Payer: COMMERCIAL

## 2019-11-27 PROCEDURE — 97140 MANUAL THERAPY 1/> REGIONS: CPT

## 2019-11-27 PROCEDURE — 97110 THERAPEUTIC EXERCISES: CPT

## 2019-11-27 NOTE — PROGRESS NOTES
Dx: post operative state             Insurance (Authorized # of Visits):  6           Authorizing Physician: Dr. Schuyler Rivero  Next MD visit: none scheduled  Fall Risk: standard         Precautions: n/a             Subjective: Pt states her foot is sore but the STM  TC grade 1 gapping  Subtalar distraction      CPx10 min CPx10 min        Charges: Manual x1, therex x2       Total Timed Treatment: 40 min  Total Treatment Time: 50 min

## 2019-12-02 ENCOUNTER — OFFICE VISIT (OUTPATIENT)
Dept: PHYSICAL THERAPY | Age: 49
End: 2019-12-02
Attending: PODIATRIST
Payer: COMMERCIAL

## 2019-12-02 PROCEDURE — 97110 THERAPEUTIC EXERCISES: CPT

## 2019-12-02 PROCEDURE — 97140 MANUAL THERAPY 1/> REGIONS: CPT

## 2019-12-02 NOTE — PROGRESS NOTES
Dx: post operative state             Insurance (Authorized # of Visits):  6           Authorizing Physician: Dr. Negra Minaya MD visit: none scheduled  Fall Risk: standard         Precautions: n/a             Subjective: Pt states over the weekend when sh [id number]  gastroc STM  TC grade 1 gapping AROM inversion/eversion AROM inversion/eversion gastroc stretch 3x30s DKSA strap    CPx10 min PROM PROM PROM     [id number]  gastroc STM  TC grade 1 gapping  Subtalar distraction [id number]  gastroc STM  TC

## 2019-12-04 ENCOUNTER — OFFICE VISIT (OUTPATIENT)
Dept: PHYSICAL THERAPY | Age: 49
End: 2019-12-04
Attending: PODIATRIST
Payer: COMMERCIAL

## 2019-12-04 PROCEDURE — 97140 MANUAL THERAPY 1/> REGIONS: CPT

## 2019-12-04 PROCEDURE — 97110 THERAPEUTIC EXERCISES: CPT

## 2019-12-04 NOTE — PROGRESS NOTES
Dx: post operative state             Insurance (Authorized # of Visits):  6           Authorizing Physician: Dr. Nuria Russell  Next MD visit: none scheduled  Fall Risk: standard         Precautions: n/a             Subjective: Pt states her ankle has been feeli Seated wobble board M/L and A/P 2 min ea Seated wobble board M/L and A/P 2 min ea Seated wobble board M/L and A/P 2 min ea   [id number]  gastroc STM  TC grade 1 gapping [id number]  gastroc STM  TC grade 1 gapping AROM inversion/eversion AROM inversion/

## 2019-12-09 ENCOUNTER — OFFICE VISIT (OUTPATIENT)
Dept: PHYSICAL THERAPY | Age: 49
End: 2019-12-09
Attending: PODIATRIST
Payer: COMMERCIAL

## 2019-12-09 PROCEDURE — 97140 MANUAL THERAPY 1/> REGIONS: CPT

## 2019-12-09 PROCEDURE — 97110 THERAPEUTIC EXERCISES: CPT

## 2019-12-09 NOTE — PROGRESS NOTES
Dx: post operative state             Insurance (Authorized # of Visits):  15           Authorizing Physician: Dr. Tejas Mock  Next MD visit: none scheduled  Fall Risk: standard         Precautions: n/a             Subjective: Pt states her ankle has been real ea Seated wobble board M/L and A/P 2 min ea Seated wobble board M/L and A/P 2 min ea Seated wobble board M/L and A/P 2 min ea   [id number]  gastroc STM  TC grade 1 gapping [id number]  gastroc STM  TC grade 1 gapping AROM inversion/eversion AROM inversi

## 2019-12-11 ENCOUNTER — OFFICE VISIT (OUTPATIENT)
Dept: INTERNAL MEDICINE CLINIC | Facility: CLINIC | Age: 49
End: 2019-12-11

## 2019-12-11 VITALS
SYSTOLIC BLOOD PRESSURE: 118 MMHG | RESPIRATION RATE: 16 BRPM | BODY MASS INDEX: 30.94 KG/M2 | HEIGHT: 61.5 IN | WEIGHT: 166 LBS | HEART RATE: 78 BPM | DIASTOLIC BLOOD PRESSURE: 78 MMHG

## 2019-12-11 DIAGNOSIS — M79.10 MYALGIA: Primary | ICD-10-CM

## 2019-12-11 DIAGNOSIS — Z51.81 THERAPEUTIC DRUG MONITORING: ICD-10-CM

## 2019-12-11 DIAGNOSIS — L98.7 EXCESS SKIN OF BREAST: ICD-10-CM

## 2019-12-11 DIAGNOSIS — E66.09 CLASS 1 OBESITY DUE TO EXCESS CALORIES WITH SERIOUS COMORBIDITY AND BODY MASS INDEX (BMI) OF 32.0 TO 32.9 IN ADULT: ICD-10-CM

## 2019-12-11 DIAGNOSIS — L98.7 EXCESS SKIN OF ABDOMEN: ICD-10-CM

## 2019-12-11 DIAGNOSIS — L30.4 INTERTRIGO: ICD-10-CM

## 2019-12-11 PROCEDURE — 99214 OFFICE O/P EST MOD 30 MIN: CPT | Performed by: INTERNAL MEDICINE

## 2019-12-11 RX ORDER — PHENTERMINE HYDROCHLORIDE 37.5 MG/1
37.5 TABLET ORAL
Qty: 30 TABLET | Refills: 0 | Status: SHIPPED | OUTPATIENT
Start: 2019-12-11 | End: 2020-01-15

## 2019-12-11 NOTE — PROGRESS NOTES
CC: Patient presents with:  Weight Check: down 5 lb       HPI:     Overall doing well today. Underwent bariatric surgery on 10/15/18. Weight on surgery date: 230 lbs.      Reviewed calorie intake  Shooting for 3 times of week     60-90 grams of prot BEFORE BREAKFAST. 90 tablet 2   • ondansetron 8 MG Oral Tablet Dispersible Take 1 tablet (8 mg total) by mouth every 8 (eight) hours as needed for Nausea.  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   • Osteoarthritis    • Other vitreous opacities, left eye 03/27/2019    Nette Stokes M.D.   • Other vitreous opacities, ri depressive disorder, recurrent episode, mild with anxious distress (HCC)     B12 deficiency     Microscopic hematuria     Posterior vitreous detachment of both eyes     Vertigo     Non-intractable vomiting with nausea     Dermatitis     Injury of peroneal the breast  EXTREMITIES: no cyanosis, no clubbing, no edema    No orders of the defined types were placed in this encounter.   vge   Requested Prescriptions     Signed Prescriptions Disp Refills   • Phentermine HCl 37.5 MG Oral Tab 30 tablet 0     Sig: Take

## 2019-12-12 ENCOUNTER — OFFICE VISIT (OUTPATIENT)
Dept: PHYSICAL THERAPY | Age: 49
End: 2019-12-12
Attending: PODIATRIST
Payer: COMMERCIAL

## 2019-12-12 PROCEDURE — 97110 THERAPEUTIC EXERCISES: CPT

## 2019-12-12 PROCEDURE — 97140 MANUAL THERAPY 1/> REGIONS: CPT

## 2019-12-12 NOTE — PROGRESS NOTES
Progress Summary  Pt has attended 9 visits in Physical Therapy.      Dx: post operative state             Insurance (Authorized # of Visits):  15           Authorizing Physician: Dr. Mann Victoria  Next MD visit: none scheduled  Fall Risk: standard         Preca therapist: Minerva Alvarez PT     Physician's certification required:  Yes  Please co-sign or sign and return this letter via fax as soon as possible to [phone].    I certify the need for these services furnished under this plan of treatment and while

## 2019-12-18 ENCOUNTER — OFFICE VISIT (OUTPATIENT)
Dept: PHYSICAL THERAPY | Age: 49
End: 2019-12-18
Attending: PODIATRIST
Payer: COMMERCIAL

## 2019-12-18 ENCOUNTER — OFFICE VISIT (OUTPATIENT)
Dept: PODIATRY CLINIC | Facility: CLINIC | Age: 49
End: 2019-12-18

## 2019-12-18 DIAGNOSIS — Z98.890 POST-OPERATIVE STATE: Primary | ICD-10-CM

## 2019-12-18 PROCEDURE — 99024 POSTOP FOLLOW-UP VISIT: CPT | Performed by: PODIATRIST

## 2019-12-18 PROCEDURE — 97140 MANUAL THERAPY 1/> REGIONS: CPT

## 2019-12-18 PROCEDURE — 97110 THERAPEUTIC EXERCISES: CPT

## 2019-12-18 NOTE — PROGRESS NOTES
Dx: post operative state             Insurance (Authorized # of Visits):  15           Authorizing Physician: Dr. Kelly Minaya MD visit: none scheduled  Fall Risk: standard         Precautions: n/a             Subjective: Pt states her ankle is really bot and A/P 2 min ea Seated wobble board M/L and A/P 2 min ea Seated wobble board M/L and A/P 2 min ea Seated wobble board M/L and A/P 2 min ea   AROM inversion/eversion gastroc stretch 3x30s DKSA strap gastroc stretch 3x30s DKSA strap gastroc stretch 3x30s DK

## 2019-12-18 NOTE — PROGRESS NOTES
Josr Boles is a 51year old female. Patient presents with:  Post-Op: Patient had surgery on hte right foot on 10/18/19. Patient slipped yesterday so it has set her back, she is doing physical therapy and wearing her cast boot.  Patient states that her 3350 Oral Powder Take 17 g by mouth daily. • Nystatin 608843 UNIT/GM External Powder Apply 1 Application topically 4 (four) times daily. 60 g 1   • magnesium 250 MG Oral Tab Take 250 mg by mouth. • Pyridoxine HCl (VITAMIN B-6 OR) Take by mouth. 03/27/2019    Elieser Cook M.D.   • Polycystic ovaries     ovary embedded in pelvic wall--right side   • PONV (postoperative nausea and vomiting)    • Posterior vitreous detachment of both eyes 12/8/2017    Right > Left   • Primary open angle glaucoma o Non Hodgkins Lymphoma   • Cancer Cousin         basal cell   • Cancer Paternal Aunt         lung      Social History    Socioeconomic History      Marital status:       Spouse name: Not on file      Number of children: Not on file      Years of educ physical therapy since there are no signs of infection she can return to a hiking boot at this time and will follow-up with me again in 1 month    The patient indicates understanding of these issues and agrees to the plan.     Joceline Baker DPM

## 2019-12-19 ENCOUNTER — OFFICE VISIT (OUTPATIENT)
Dept: PHYSICAL THERAPY | Age: 49
End: 2019-12-19
Attending: PODIATRIST
Payer: COMMERCIAL

## 2019-12-19 PROCEDURE — 97116 GAIT TRAINING THERAPY: CPT

## 2019-12-19 PROCEDURE — 97140 MANUAL THERAPY 1/> REGIONS: CPT

## 2019-12-19 PROCEDURE — 97110 THERAPEUTIC EXERCISES: CPT

## 2019-12-19 NOTE — PROGRESS NOTES
Dx: post operative state             Insurance (Authorized # of Visits):  15           Authorizing Physician: Dr. Medina Sol  Next MD visit: none scheduled  Fall Risk: standard         Precautions: hiking boot without AD.      Subjective: Pt states her ankle i heel toe gait 40x   Seated wobble board M/L and A/P 2 min ea Seated wobble board M/L and A/P 2 min ea Seated wobble board M/L and A/P 2 min ea Seated wobble board M/L and A/P 2 min ea Seated wobble board M/L and A/P 2 min ea Seated wobble board M/L and A/P

## 2019-12-20 ENCOUNTER — TELEPHONE (OUTPATIENT)
Dept: PODIATRY CLINIC | Facility: CLINIC | Age: 49
End: 2019-12-20

## 2019-12-20 NOTE — TELEPHONE ENCOUNTER
----- Message from Purnima Rutledge PTA sent at 2019  5:08 PM CST -----  Regardin Referral needed  I have entered all the info     Can you please approve this referral as per your orders on 19    Patient next appt for  is

## 2019-12-23 ENCOUNTER — OFFICE VISIT (OUTPATIENT)
Dept: PHYSICAL THERAPY | Age: 49
End: 2019-12-23
Attending: PODIATRIST
Payer: COMMERCIAL

## 2019-12-23 PROCEDURE — 97140 MANUAL THERAPY 1/> REGIONS: CPT

## 2019-12-23 PROCEDURE — 97110 THERAPEUTIC EXERCISES: CPT

## 2019-12-23 PROCEDURE — 97112 NEUROMUSCULAR REEDUCATION: CPT

## 2019-12-23 NOTE — PROGRESS NOTES
Dx: post operative state             Insurance (Authorized # of Visits):  15           Authorizing Physician: Dr. Mann Victoria  Next MD visit: none scheduled  Fall Risk: standard         Precautions: hiking boot without AD.      Subjective: Pt states her ankle i support   Ankle pumps 2x10 Ankle pumps 2x10 Shuttle double heel raises 3c 2x10 Shuttle double heel raises 3c 2x10 Shuttle double heel raises 3c 2x10 ABC's 2x Lateral walks in parallel bars 3 laps Lateral walks in parallel bars 3 laps   Toe curls 3 min Toe

## 2019-12-31 RX ORDER — LANSOPRAZOLE 30 MG/1
CAPSULE, DELAYED RELEASE ORAL
Qty: 90 CAPSULE | Refills: 0 | OUTPATIENT
Start: 2019-12-31

## 2019-12-31 NOTE — TELEPHONE ENCOUNTER
Requesting Lansoprazole  LOV: 12/11/19  RTC: 8 weeks  Last Relevant Labs: na  We have never filled this - given by Dr. Ben Theodore    2/10/2020  9:20 AM Maribell Mc MD [facility]   4/7/2020  9:00 AM Adrián Banda RD [facility]     Denied

## 2020-01-03 ENCOUNTER — OFFICE VISIT (OUTPATIENT)
Dept: PHYSICAL THERAPY | Age: 50
End: 2020-01-03
Attending: FAMILY MEDICINE
Payer: COMMERCIAL

## 2020-01-03 DIAGNOSIS — Z98.890 POST-OPERATIVE STATE: ICD-10-CM

## 2020-01-03 PROCEDURE — 97140 MANUAL THERAPY 1/> REGIONS: CPT

## 2020-01-03 PROCEDURE — 97112 NEUROMUSCULAR REEDUCATION: CPT

## 2020-01-03 PROCEDURE — 97110 THERAPEUTIC EXERCISES: CPT

## 2020-01-07 ENCOUNTER — APPOINTMENT (OUTPATIENT)
Dept: PHYSICAL THERAPY | Age: 50
End: 2020-01-07
Attending: FAMILY MEDICINE
Payer: COMMERCIAL

## 2020-01-07 ENCOUNTER — TELEPHONE (OUTPATIENT)
Dept: INTERNAL MEDICINE CLINIC | Facility: CLINIC | Age: 50
End: 2020-01-07

## 2020-01-07 RX ORDER — LANSOPRAZOLE 30 MG/1
CAPSULE, DELAYED RELEASE ORAL
Qty: 90 CAPSULE | Refills: 0 | OUTPATIENT
Start: 2020-01-07

## 2020-01-07 RX ORDER — LANSOPRAZOLE 30 MG/1
CAPSULE, DELAYED RELEASE ORAL
Qty: 90 CAPSULE | Refills: 0 | Status: SHIPPED | OUTPATIENT
Start: 2020-01-07 | End: 2020-03-25

## 2020-01-07 NOTE — TELEPHONE ENCOUNTER
Pt calling about denial in regards to a refill for one of her medications and would like to know why

## 2020-01-07 NOTE — TELEPHONE ENCOUNTER
Refill request for lansoprazole 30mg. I do not see that you have prescribed this for patient.  Last OV  5/22/19: notes:  Return in about 3 months (around 8/22/2019) for  or sooner for Annual Wellness Visit and as needed or indicated.       Future appoitmen

## 2020-01-07 NOTE — TELEPHONE ENCOUNTER
I called patient and let her know that we did not prescribe Lansoprazole for her. It is her pcp Dr. Lorie Malin. I further explained that I sent back to pharmacy asking them to contact Dr. Lorie Malin. She will f/u with pharmacy.

## 2020-01-07 NOTE — TELEPHONE ENCOUNTER
Requesting lansoprazole  LOV: 12/11/19  RTC: 8 weeks  Last Relevant Labs: na  We do not prescribe this med - Dr. Genaro Gonzáles does.   Denied with note as such    2/10/2020  9:20 AM Edouard Mason MD [facility] [address]   4/7/2020  9:00 AM Pacheco Gordon RD [facility]

## 2020-01-09 ENCOUNTER — OFFICE VISIT (OUTPATIENT)
Dept: PHYSICAL THERAPY | Age: 50
End: 2020-01-09
Attending: FAMILY MEDICINE
Payer: COMMERCIAL

## 2020-01-09 PROCEDURE — 97110 THERAPEUTIC EXERCISES: CPT

## 2020-01-09 PROCEDURE — 97140 MANUAL THERAPY 1/> REGIONS: CPT

## 2020-01-09 PROCEDURE — 97112 NEUROMUSCULAR REEDUCATION: CPT

## 2020-01-09 NOTE — PROGRESS NOTES
Dx: post operative state             Insurance (Authorized # of Visits):  15           Authorizing Physician: Dr. José Martinez  Next MD visit: none scheduled  Fall Risk: standard         Precautions: hiking boot without AD.      Subjective: Pt states she had a d heel raise 5x2 with UE support   Shuttle double heel raises 3c 2x10 Shuttle double heel raises 3c 2x10 ABC's 2x Lateral walks in parallel bars 3 laps Lateral walks in parallel bars 3 laps Lateral walks in parallel bars 3 laps Obstacle course in parallel ba

## 2020-01-13 ENCOUNTER — OFFICE VISIT (OUTPATIENT)
Dept: PODIATRY CLINIC | Facility: CLINIC | Age: 50
End: 2020-01-13

## 2020-01-13 DIAGNOSIS — Z98.890 POST-OPERATIVE STATE: Primary | ICD-10-CM

## 2020-01-13 DIAGNOSIS — M76.71 PERONEAL TENDINITIS OF RIGHT LOWER EXTREMITY: ICD-10-CM

## 2020-01-13 DIAGNOSIS — S86.311D TEAR OF PERONEAL TENDON, RIGHT, SUBSEQUENT ENCOUNTER: ICD-10-CM

## 2020-01-13 DIAGNOSIS — M76.71 PERONEAL TENDINITIS, RIGHT: ICD-10-CM

## 2020-01-13 PROCEDURE — 99024 POSTOP FOLLOW-UP VISIT: CPT | Performed by: PODIATRIST

## 2020-01-13 NOTE — PROGRESS NOTES
Alexia Smallwood is a 51year old female. Patient presents with:  Post-Op: LOV 12/18/19, sx 10/18/19, right peroneal tendon. pt states she is still experiencing tightness, but not as much pain. pain is usually about 2/10 and tolerable.  last week she worked • ondansetron 8 MG Oral Tablet Dispersible Take 1 tablet (8 mg total) by mouth every 8 (eight) hours as needed for Nausea.  30 tablet 0   • ALPRAZolam 0.5 MG Oral Tab 1/2 to one tab daily as needed 10 tablet 0   • Polyethylene Glycol 3350 Oral Powder Take 7/13/2014   • Open angle with borderline findings, high risk, bilateral 03/27/2019    Connor Davis M.D.   • Osteoarthritis    • Other vitreous opacities, left eye 03/27/2019    Connor Davis M.D.   • Other vitreous opacities, right eye 03/27/2019    B colon 1998 and 2008/liver 1998/prostate 2010   • Cancer Maternal Aunt         basal cell   • Cancer Maternal Aunt         basal cell   • Cancer Maternal Aunt         bone   • Cancer Cousin         esoph,adenocarcinoma   • Cancer Cousin         Non Hodgkins state    Peroneal tendinitis, right    Tear of peroneal tendon, right, subsequent encounter    Peroneal tendinitis of right lower extremity        Plan: The patient can wear slippers at home I have no objections to that.   In addition to that I told her luciano

## 2020-01-14 ENCOUNTER — OFFICE VISIT (OUTPATIENT)
Dept: PHYSICAL THERAPY | Age: 50
End: 2020-01-14
Attending: FAMILY MEDICINE
Payer: COMMERCIAL

## 2020-01-14 PROCEDURE — 97112 NEUROMUSCULAR REEDUCATION: CPT

## 2020-01-14 PROCEDURE — 97110 THERAPEUTIC EXERCISES: CPT

## 2020-01-14 PROCEDURE — 97140 MANUAL THERAPY 1/> REGIONS: CPT

## 2020-01-14 NOTE — PROGRESS NOTES
Dx: post operative state             Insurance (Authorized # of Visits):  15           Authorizing Physician: Dr. Jonna Minaya MD visit: none scheduled  Fall Risk: standard         Precautions: hiking boot without AD.      Subjective: Pt states her ankle i for heel toe gait 40x diagnonal weight shifts 2x10 ea diagnonal weight shifts 2x10 ea diagnonal weight shifts 2x10 ea Walking on airex log 5 laps   Seated wobble board M/L and A/P 2 min ea - - - Mini squats in parallel bars 5x3 bridges 2x10   gastroc stret

## 2020-01-15 ENCOUNTER — APPOINTMENT (OUTPATIENT)
Dept: LAB | Age: 50
End: 2020-01-15
Attending: FAMILY MEDICINE
Payer: COMMERCIAL

## 2020-01-15 ENCOUNTER — OFFICE VISIT (OUTPATIENT)
Dept: FAMILY MEDICINE CLINIC | Facility: CLINIC | Age: 50
End: 2020-01-15

## 2020-01-15 VITALS
HEART RATE: 80 BPM | TEMPERATURE: 98 F | HEIGHT: 61.5 IN | BODY MASS INDEX: 30.57 KG/M2 | RESPIRATION RATE: 16 BRPM | WEIGHT: 164 LBS | SYSTOLIC BLOOD PRESSURE: 120 MMHG | DIASTOLIC BLOOD PRESSURE: 82 MMHG

## 2020-01-15 DIAGNOSIS — M54.50 ACUTE LEFT-SIDED LOW BACK PAIN WITHOUT SCIATICA: ICD-10-CM

## 2020-01-15 DIAGNOSIS — Z98.84 S/P LAPAROSCOPIC SLEEVE GASTRECTOMY: ICD-10-CM

## 2020-01-15 DIAGNOSIS — Z98.890 HISTORY OF FOOT SURGERY: ICD-10-CM

## 2020-01-15 DIAGNOSIS — M79.10 MYALGIA: ICD-10-CM

## 2020-01-15 DIAGNOSIS — Z82.69 FAMILY HISTORY OF ANKYLOSING SPONDYLITIS: ICD-10-CM

## 2020-01-15 DIAGNOSIS — E11.9 TYPE 2 DIABETES MELLITUS WITHOUT COMPLICATION, WITHOUT LONG-TERM CURRENT USE OF INSULIN (HCC): Primary | ICD-10-CM

## 2020-01-15 DIAGNOSIS — K91.2 POSTOPERATIVE MALABSORPTION: ICD-10-CM

## 2020-01-15 DIAGNOSIS — G89.29 CHRONIC MIDLINE LOW BACK PAIN WITHOUT SCIATICA: ICD-10-CM

## 2020-01-15 DIAGNOSIS — M54.50 CHRONIC MIDLINE LOW BACK PAIN WITHOUT SCIATICA: ICD-10-CM

## 2020-01-15 PROCEDURE — 36415 COLL VENOUS BLD VENIPUNCTURE: CPT

## 2020-01-15 PROCEDURE — 99214 OFFICE O/P EST MOD 30 MIN: CPT | Performed by: FAMILY MEDICINE

## 2020-01-15 PROCEDURE — 82180 ASSAY OF ASCORBIC ACID: CPT

## 2020-01-15 RX ORDER — TIZANIDINE 4 MG/1
4 TABLET ORAL NIGHTLY PRN
Qty: 30 TABLET | Refills: 0 | Status: SHIPPED | OUTPATIENT
Start: 2020-01-15 | End: 2020-02-13

## 2020-01-15 NOTE — PROGRESS NOTES
Willie Tirado is a 51year old female. HPI:       Sensation of feeling of a bruise, rarely visibly sees a bruise.   If  rests arm across her shoulders she feels bruised or if her dog stands on her she will feel bruised where the dogs paws were Tab 1/2 to one tab daily as needed 10 tablet 0   • Polyethylene Glycol 3350 Oral Powder Take 17 g by mouth daily. • Nystatin 512551 UNIT/GM External Powder Apply 1 Application topically 4 (four) times daily.  60 g 1   • magnesium 250 MG Oral Tab Take 25 03/27/2019    Nette Stokes M.D.   • Other vitreous opacities, right eye 03/27/2019    Nette Stokes M.D.   • Polycystic ovaries     ovary embedded in pelvic wall--right side   • PONV (postoperative nausea and vomiting)    • Posterior vitreous detachme 62        In her 63's. • Cancer Maternal Aunt         breast, basal cell   • Breast Cancer Maternal Aunt 68        Late 60's.    • Cancer Mother         basal cell X 3; LUNG   • Cancer Father         colon 1998 and 2008/liver 1998/prostate 2010   • Cancer affect normal, normal thought content.         DATA:    Component      Latest Ref Rng & Units 10/14/2019 7/30/2019   WBC      4.0 - 11.0 x10(3) uL 5.3    RBC      3.80 - 5.30 x10(6)uL 4.64    Hemoglobin      12.0 - 16.0 g/dL 14.0    Hematocrit      35.0 - 4 59 mg/dL 69 (H)    Triglycerides      30 - 149 mg/dL 83    LDL Cholesterol Calc      <100 mg/dL 107 (H)    VLDL      0 - 30 mg/dL 17    NON HDL CHOL      <130 mg/dL 124    Iron, Serum      50 - 170 ug/dL 69 80   Transferrin      200 - 360 mg/dL 228 231   I % 48.6 46.0 42.9   Monocytes %      % 6.6 5.2 6.8   Eosinophils %      % 4.7 2.7 3.6   Basophils %      % 1.7 0.9 0.9   Immature Granulocyte %      % 0.2 0.2 0.2     Component      Latest Ref Rng & Units 10/14/2019 5/21/2019 1/23/2019   HEMOGLOBIN A1c Encounter      Vitamin C, Serum      Meds & Refills for this Visit:  Requested Prescriptions     Signed Prescriptions Disp Refills   • tiZANidine HCl 4 MG Oral Tab 30 tablet 0     Sig: Take 1 tablet (4 mg total) by mouth nightly as needed.        Imaging &

## 2020-01-16 ENCOUNTER — OFFICE VISIT (OUTPATIENT)
Dept: PHYSICAL THERAPY | Age: 50
End: 2020-01-16
Attending: FAMILY MEDICINE
Payer: COMMERCIAL

## 2020-01-16 PROCEDURE — 97140 MANUAL THERAPY 1/> REGIONS: CPT

## 2020-01-16 PROCEDURE — 97112 NEUROMUSCULAR REEDUCATION: CPT

## 2020-01-16 PROCEDURE — 97110 THERAPEUTIC EXERCISES: CPT

## 2020-01-16 NOTE — PROGRESS NOTES
Dx: post operative state             Insurance (Authorized # of Visits):  15           Authorizing Physician: Dr. José Martinez  Next MD visit: none scheduled  Fall Risk: standard         Precautions: hiking boot without AD.      Subjective: Pt states her ankle i shifts 2x10 ea diagnonal weight shifts 2x10 ea Walking on airex log 5 laps Walking on airex log 5 laps and lateral 5 laps   - - Mini squats in parallel bars 5x3 bridges 2x10 bridges 2x10   Slant bord stretch 3x30s Slant bord stretch 3x30s Slant bord stretc

## 2020-01-18 LAB — VITAMIN C LEVEL: 90 UMOL/L

## 2020-01-20 ENCOUNTER — OFFICE VISIT (OUTPATIENT)
Dept: PHYSICAL THERAPY | Age: 50
End: 2020-01-20
Attending: FAMILY MEDICINE
Payer: COMMERCIAL

## 2020-01-20 PROCEDURE — 97110 THERAPEUTIC EXERCISES: CPT

## 2020-01-20 PROCEDURE — 97112 NEUROMUSCULAR REEDUCATION: CPT

## 2020-01-20 PROCEDURE — 97140 MANUAL THERAPY 1/> REGIONS: CPT

## 2020-01-20 NOTE — PROGRESS NOTES
Dx: post operative state             Insurance (Authorized # of Visits):  15           Authorizing Physician: Dr. Emerita Minaya MD visit: none scheduled  Fall Risk: standard         Precautions: hiking boot without AD.      Subjective: Pt states her ankle i napoleon 4 in step and airex step over 2 laps Clams RTB 3x10 Clams RTB 3x10 Clams RTB 3x10, SL hip abduction 2x10 ea   diagnonal weight shifts 2x10 ea diagnonal weight shifts 2x10 ea diagnonal weight shifts 2x10 ea Walking on airex log 5 laps Walking on [employer]

## 2020-01-21 ENCOUNTER — HOSPITAL ENCOUNTER (OUTPATIENT)
Dept: GENERAL RADIOLOGY | Age: 50
Discharge: HOME | End: 2020-01-21
Attending: FAMILY MEDICINE
Payer: COMMERCIAL

## 2020-01-21 DIAGNOSIS — M54.50 CHRONIC MIDLINE LOW BACK PAIN WITHOUT SCIATICA: ICD-10-CM

## 2020-01-21 DIAGNOSIS — G89.29 CHRONIC MIDLINE LOW BACK PAIN WITHOUT SCIATICA: ICD-10-CM

## 2020-01-21 DIAGNOSIS — M54.50 ACUTE LEFT-SIDED LOW BACK PAIN WITHOUT SCIATICA: ICD-10-CM

## 2020-01-21 PROCEDURE — 72110 X-RAY EXAM L-2 SPINE 4/>VWS: CPT | Performed by: FAMILY MEDICINE

## 2020-01-22 ENCOUNTER — OFFICE VISIT (OUTPATIENT)
Dept: PHYSICAL THERAPY | Age: 50
End: 2020-01-22
Attending: FAMILY MEDICINE
Payer: COMMERCIAL

## 2020-01-22 PROCEDURE — 97140 MANUAL THERAPY 1/> REGIONS: CPT

## 2020-01-22 PROCEDURE — 97110 THERAPEUTIC EXERCISES: CPT

## 2020-01-22 PROCEDURE — 97112 NEUROMUSCULAR REEDUCATION: CPT

## 2020-01-22 NOTE — PROGRESS NOTES
Dx: post operative state             Insurance (Authorized # of Visits):  15           Authorizing Physician: Dr. Shanon Minaya MD visit: none scheduled  Fall Risk: standard         Precautions: hiking boot without AD.      Subjective: Pt states her ankle i Lateral walks in parallel bars 3 laps Lateral walks in parallel bars 3 laps Obstacle course in parallel bars 1 low napoleon 4 in step and airex step over 2 laps Clams RTB 3x10 Clams RTB 3x10 Clams RTB 3x10, SL hip abduction 2x10 ea Clams RTB 3x10, SL hip a min  Total Treatment Time: 60 min

## 2020-01-28 ENCOUNTER — OFFICE VISIT (OUTPATIENT)
Dept: PHYSICAL THERAPY | Age: 50
End: 2020-01-28
Attending: FAMILY MEDICINE
Payer: COMMERCIAL

## 2020-01-28 PROCEDURE — 97140 MANUAL THERAPY 1/> REGIONS: CPT

## 2020-01-28 PROCEDURE — 97110 THERAPEUTIC EXERCISES: CPT

## 2020-01-28 PROCEDURE — 97112 NEUROMUSCULAR REEDUCATION: CPT

## 2020-01-28 NOTE — PROGRESS NOTES
Dx: post operative state             Insurance (Authorized # of Visits):  15           Authorizing Physician: Dr. Greg Minaya MD visit: none scheduled  Fall Risk: standard         Precautions: hiking boot without AD.      Subjective: Pt states her ankle i heel raise 5x2 with UE support slow eccentric loading   Obstacle course in parallel bars 1 low napoleon 4 in step and airex step over 2 laps Clams RTB 3x10 Clams RTB 3x10 Clams RTB 3x10, SL hip abduction 2x10 ea Clams RTB 3x10, SL hip abduction 2x10 ea Clams min  Total Treatment Time: 55 min

## 2020-01-30 ENCOUNTER — OFFICE VISIT (OUTPATIENT)
Dept: PHYSICAL THERAPY | Age: 50
End: 2020-01-30
Attending: FAMILY MEDICINE
Payer: COMMERCIAL

## 2020-01-30 PROCEDURE — 97110 THERAPEUTIC EXERCISES: CPT

## 2020-01-30 PROCEDURE — 97140 MANUAL THERAPY 1/> REGIONS: CPT

## 2020-01-30 PROCEDURE — 97112 NEUROMUSCULAR REEDUCATION: CPT

## 2020-01-30 NOTE — PROGRESS NOTES
Dx: post operative state             Insurance (Authorized # of Visits):  15           Authorizing Physician: Dr. Carmenza Minaya MD visit: none scheduled  Fall Risk: standard         Precautions: hiking boot without AD.      Subjective: Pt states her ankle c RTB 3x10 Clams RTB 3x10 Clams RTB 3x10, SL hip abduction 2x10 ea Clams RTB 3x10, SL hip abduction 2x10 ea Clams RTB 3x10, SL hip abduction 2x10 ea Clams RTB 3x10, SL hip abduction 2x10 ea   diagnonal weight shifts 2x10 ea Walking on airex log 5 laps [id number]  gastroc STM  TC grade 1 gapping  Subtalar distraction  Fibular muscles STM  Scar STM  Tibialalis posterior STM       Charges: Manual x1, therex x1, neuro x2       Total Timed Treatment: 55 min  Total Treatment Time: 65 min

## 2020-01-31 ENCOUNTER — TELEPHONE (OUTPATIENT)
Dept: PODIATRY CLINIC | Facility: CLINIC | Age: 50
End: 2020-01-31

## 2020-01-31 DIAGNOSIS — Z98.890 S/P PERONEAL TENDON REPAIR: Primary | ICD-10-CM

## 2020-02-04 ENCOUNTER — APPOINTMENT (OUTPATIENT)
Dept: PHYSICAL THERAPY | Age: 50
End: 2020-02-04
Attending: FAMILY MEDICINE
Payer: COMMERCIAL

## 2020-02-04 NOTE — TELEPHONE ENCOUNTER
S/w PT dept and they informed me that the dx on the PT order is not sufficient for post operative state.  Dr. Magnus Olszewski please advise on a dx for pt?

## 2020-02-04 NOTE — TELEPHONE ENCOUNTER
Bennie from [address] [place] [address] out pt physical therapy. Referral is closed. Send ihp the diagnosis. Need body part, Foot. Pt has an appointment 2-5-20 at 8:45am.    Please send.

## 2020-02-05 ENCOUNTER — APPOINTMENT (OUTPATIENT)
Dept: PHYSICAL THERAPY | Age: 50
End: 2020-02-05
Attending: FAMILY MEDICINE
Payer: COMMERCIAL

## 2020-02-06 ENCOUNTER — OFFICE VISIT (OUTPATIENT)
Dept: FAMILY MEDICINE CLINIC | Facility: CLINIC | Age: 50
End: 2020-02-06

## 2020-02-06 VITALS
BODY MASS INDEX: 30.38 KG/M2 | HEIGHT: 61.5 IN | RESPIRATION RATE: 16 BRPM | TEMPERATURE: 98 F | SYSTOLIC BLOOD PRESSURE: 126 MMHG | WEIGHT: 163 LBS | DIASTOLIC BLOOD PRESSURE: 78 MMHG | HEART RATE: 72 BPM

## 2020-02-06 DIAGNOSIS — M54.50 CHRONIC BILATERAL LOW BACK PAIN WITHOUT SCIATICA: Primary | ICD-10-CM

## 2020-02-06 DIAGNOSIS — G89.29 CHRONIC BILATERAL LOW BACK PAIN WITHOUT SCIATICA: Primary | ICD-10-CM

## 2020-02-06 PROCEDURE — 99213 OFFICE O/P EST LOW 20 MIN: CPT | Performed by: FAMILY MEDICINE

## 2020-02-10 ENCOUNTER — OFFICE VISIT (OUTPATIENT)
Dept: INTERNAL MEDICINE CLINIC | Facility: CLINIC | Age: 50
End: 2020-02-10

## 2020-02-10 VITALS
DIASTOLIC BLOOD PRESSURE: 82 MMHG | SYSTOLIC BLOOD PRESSURE: 128 MMHG | HEART RATE: 76 BPM | HEIGHT: 61.5 IN | WEIGHT: 163 LBS | RESPIRATION RATE: 16 BRPM | BODY MASS INDEX: 30.38 KG/M2

## 2020-02-10 DIAGNOSIS — E66.9 OBESITY (BMI 30.0-34.9): ICD-10-CM

## 2020-02-10 DIAGNOSIS — L98.7 EXCESS SKIN OF BREAST: ICD-10-CM

## 2020-02-10 DIAGNOSIS — E16.2 HYPOGLYCEMIA: ICD-10-CM

## 2020-02-10 DIAGNOSIS — L98.7 EXCESS SKIN OF ABDOMEN: ICD-10-CM

## 2020-02-10 DIAGNOSIS — E11.9 TYPE 2 DIABETES MELLITUS WITHOUT COMPLICATION, WITHOUT LONG-TERM CURRENT USE OF INSULIN (HCC): ICD-10-CM

## 2020-02-10 DIAGNOSIS — Z51.81 ENCOUNTER FOR THERAPEUTIC DRUG MONITORING: Primary | ICD-10-CM

## 2020-02-10 PROCEDURE — 99214 OFFICE O/P EST MOD 30 MIN: CPT | Performed by: INTERNAL MEDICINE

## 2020-02-10 RX ORDER — PHENTERMINE HYDROCHLORIDE 37.5 MG/1
37.5 TABLET ORAL
Qty: 30 TABLET | Refills: 1 | Status: CANCELLED | OUTPATIENT
Start: 2020-02-10

## 2020-02-10 RX ORDER — PHENTERMINE HYDROCHLORIDE 15 MG/1
15 CAPSULE ORAL 2 TIMES DAILY
Qty: 60 CAPSULE | Refills: 1 | Status: SHIPPED | OUTPATIENT
Start: 2020-02-10 | End: 2020-03-11

## 2020-02-10 NOTE — PROGRESS NOTES
CC: Patient presents with:  Weight Check: down 3lbs       HPI:     Overall doing well today. Underwent bariatric surgery on 10/15/18. Weight on surgery date: 230 lbs.      Reviewed calorie intake  Shooting for 3 times of week     60-90 grams of prot 1,000 mg by mouth. • Multiple Vitamins-Minerals (BARIATRIC MULTIVITAMINS/IRON OR) Take by mouth. • Meclizine HCl 25 MG Oral Tab Take 1 tablet (25 mg total) by mouth 3 (three) times daily as needed for Dizziness.  30 tablet 0   • PATIENT SUPPLIED MED Visual impairment    • Vitreous degeneration, right eye 03/27/2019    Marguerite Brower M.D.       Patient Active Problem List:     Hypothyroidism     Allergic rhinitis     History of kidney stones     H/O colonoscopy with polypectomy     Eczema     GERD (gas laparoscopic sleeve gastrectomy     Acute foot pain, right     Calcaneal spur of right foot     Metatarsalgia of right foot     Migraine without status migrainosus, not intractable     Acute pain of right knee     Patellofemoral arthritis of right knee drug monitoring  (primary encounter diagnosis)  Obesity (BMI 30.0-34. 9)  Excess skin of breast  Excess skin of abdomen  Type 2 diabetes mellitus without complication, without long-term current use of insulin (HCC)  Hypoglycemia     PLAN:  Encounter for the

## 2020-02-11 ENCOUNTER — OFFICE VISIT (OUTPATIENT)
Dept: PHYSICAL THERAPY | Age: 50
End: 2020-02-11
Attending: FAMILY MEDICINE
Payer: COMMERCIAL

## 2020-02-11 ENCOUNTER — OFFICE VISIT (OUTPATIENT)
Dept: PODIATRY CLINIC | Facility: CLINIC | Age: 50
End: 2020-02-11

## 2020-02-11 ENCOUNTER — TELEPHONE (OUTPATIENT)
Dept: INTERNAL MEDICINE CLINIC | Facility: CLINIC | Age: 50
End: 2020-02-11

## 2020-02-11 ENCOUNTER — TELEPHONE (OUTPATIENT)
Dept: PODIATRY CLINIC | Facility: CLINIC | Age: 50
End: 2020-02-11

## 2020-02-11 DIAGNOSIS — M72.2 PLANTAR FASCIITIS OF RIGHT FOOT: ICD-10-CM

## 2020-02-11 DIAGNOSIS — Z98.890 POST-OPERATIVE STATE: Primary | ICD-10-CM

## 2020-02-11 DIAGNOSIS — Z98.890 S/P PERONEAL TENDON REPAIR: ICD-10-CM

## 2020-02-11 DIAGNOSIS — M72.2 PLANTAR FASCIITIS OF LEFT FOOT: ICD-10-CM

## 2020-02-11 PROCEDURE — 99213 OFFICE O/P EST LOW 20 MIN: CPT | Performed by: PODIATRIST

## 2020-02-11 PROCEDURE — 97112 NEUROMUSCULAR REEDUCATION: CPT

## 2020-02-11 PROCEDURE — 97140 MANUAL THERAPY 1/> REGIONS: CPT

## 2020-02-11 PROCEDURE — 97110 THERAPEUTIC EXERCISES: CPT

## 2020-02-11 NOTE — PROGRESS NOTES
Dx: post operative state             Insurance (Authorized # of Visits):  15           Authorizing Physician: Dr. Schuyler Rivero  Next MD visit: none scheduled  Fall Risk: standard         Precautions: hiking boot without AD.      Subjective: Pt states she had two lateral 5 laps Walking on airex log 5 laps and lateral 5 laps airex staggered stance 15sx3 Staggered stance RMB throws at trampoline 4 way 10x ea Single leg stance with UE assist hip IR and ER rotation 2x5  Heel raises 10x slow eccentric loading   bridges

## 2020-02-11 NOTE — PROGRESS NOTES
Felicia Watts is a 51year old female. Patient presents with:  Post-Op: LOV 1/13/20. sx 10/18/19, right peroneal tendon. pt to office ambulating, wearing hiking shoes. pt states that she has good days and days.  pt felt a popping in the back of her heel 622332 UNIT/GM External Powder Apply 1 Application topically 4 (four) times daily. 60 g 1   • magnesium 250 MG Oral Tab Take 250 mg by mouth. • Pyridoxine HCl (VITAMIN B-6 OR) Take by mouth.      • Calcium 500-125 MG-UNIT Oral Tab Take 1,000 mg by mouth ovary embedded in pelvic wall--right side   • PONV (postoperative nausea and vomiting)    • Posterior vitreous detachment of both eyes 12/8/2017    Right > Left   • Primary open angle glaucoma of both eyes, moderate stage 6/1/2016   • Visual impairment Non Hodgkins Lymphoma   • Cancer Cousin         basal cell   • Cancer Paternal Aunt         lung      Social History    Socioeconomic History      Marital status:       Spouse name: Not on file      Number of children: Not on file      Years of ed the peroneal tendons and offload the tendons with control of the foot.     ASSESSMENT AND PLAN:   Diagnoses and all orders for this visit:    Post-operative state    S/P peroneal tendon repair    Plantar fasciitis of right foot    Plantar fasciitis of left

## 2020-02-11 NOTE — TELEPHONE ENCOUNTER
Approved     Prior authorization approved   Case ID: [id number]      Payer:  [facility]    [phone]     [phone]    Your PA request has been approved. Tavia Sifuentes information will be provided in the approval communication.  (Message [phone])   Appr

## 2020-02-13 ENCOUNTER — OFFICE VISIT (OUTPATIENT)
Dept: PHYSICAL THERAPY | Age: 50
End: 2020-02-13
Attending: FAMILY MEDICINE
Payer: COMMERCIAL

## 2020-02-13 DIAGNOSIS — M54.50 CHRONIC MIDLINE LOW BACK PAIN WITHOUT SCIATICA: ICD-10-CM

## 2020-02-13 DIAGNOSIS — G89.29 CHRONIC MIDLINE LOW BACK PAIN WITHOUT SCIATICA: ICD-10-CM

## 2020-02-13 DIAGNOSIS — M54.50 ACUTE LEFT-SIDED LOW BACK PAIN WITHOUT SCIATICA: ICD-10-CM

## 2020-02-13 PROCEDURE — 97112 NEUROMUSCULAR REEDUCATION: CPT

## 2020-02-13 PROCEDURE — 97140 MANUAL THERAPY 1/> REGIONS: CPT

## 2020-02-13 PROCEDURE — 97110 THERAPEUTIC EXERCISES: CPT

## 2020-02-13 RX ORDER — TIZANIDINE 4 MG/1
4 TABLET ORAL NIGHTLY PRN
Qty: 30 TABLET | Refills: 0 | Status: SHIPPED | OUTPATIENT
Start: 2020-02-13 | End: 2020-03-11

## 2020-02-13 NOTE — PROGRESS NOTES
Dx: post operative state             Insurance (Authorized # of Visits):  15           Authorizing Physician: Dr. Mariangel Villanueva  Next MD visit: none scheduled  Fall Risk: standard         Precautions: hiking boot without AD.      Subjective: Pt states her ankle c 15sx3 Staggered stance RMB throws at youwhoine 4 way 10x ea Single leg stance with UE assist hip IR and ER rotation 2x5  Heel raises 10x slow eccentric loading Heel raises 15x slow eccentric loading   - Standing wobble board in parallel bars forward/backw

## 2020-02-13 NOTE — TELEPHONE ENCOUNTER
Received a fax from pharmacy refill request for TIZANIDINE 4MG. Last fill 1/15/20 30 tabs. Last OV 2/6/20. No future OV.

## 2020-02-17 ENCOUNTER — APPOINTMENT (OUTPATIENT)
Dept: PHYSICAL THERAPY | Age: 50
End: 2020-02-17
Attending: FAMILY MEDICINE
Payer: COMMERCIAL

## 2020-02-17 ENCOUNTER — OFFICE VISIT (OUTPATIENT)
Dept: PHYSICAL THERAPY | Age: 50
End: 2020-02-17
Attending: FAMILY MEDICINE
Payer: COMMERCIAL

## 2020-02-17 DIAGNOSIS — E03.9 HYPOTHYROIDISM, UNSPECIFIED TYPE: Primary | ICD-10-CM

## 2020-02-17 PROCEDURE — 97110 THERAPEUTIC EXERCISES: CPT

## 2020-02-17 PROCEDURE — 97112 NEUROMUSCULAR REEDUCATION: CPT

## 2020-02-17 PROCEDURE — 97140 MANUAL THERAPY 1/> REGIONS: CPT

## 2020-02-17 RX ORDER — LEVOTHYROXINE SODIUM 0.12 MG/1
125 TABLET ORAL
Qty: 90 TABLET | Refills: 2 | Status: SHIPPED | OUTPATIENT
Start: 2020-02-17 | End: 2020-07-09 | Stop reason: DRUGHIGH

## 2020-02-17 NOTE — PROGRESS NOTES
Dx: post operative state             Insurance (Authorized # of Visits):  15           Authorizing Physician: Dr. Steve Minaya MD visit: none scheduled  Fall Risk: standard         Precautions: hiking boot without AD.      Subjective: Pt states her ankle c abduction 2x10 ea Clams RTB 3x10, SL hip abduction 2x10 ea Clams RTB 3x10, SL hip abduction 2x10 ea   Walking on airex log 5 laps and lateral 5 laps airex staggered stance 15sx3 Staggered stance RMB throws at trampoline 4 way 10x ea Single leg stance with gapping  Subtalar distraction  Fibular muscles STM  Scar STM  Tibialalis posterior STM       Charges: Manual x1, therex x1, neuro x1       Total Timed Treatment: 45 min  Total Treatment Time: 55 min

## 2020-02-17 NOTE — TELEPHONE ENCOUNTER
Pt requesting refill of LEVOTHYROXINE 125MCG     Last Time Medication was Filled:  5/24/19    Last Office Visit with PCP: 2/6/20  No future appointments.

## 2020-02-18 ENCOUNTER — APPOINTMENT (OUTPATIENT)
Dept: PHYSICAL THERAPY | Age: 50
End: 2020-02-18
Attending: FAMILY MEDICINE
Payer: COMMERCIAL

## 2020-02-18 ENCOUNTER — OFFICE VISIT (OUTPATIENT)
Dept: INTERNAL MEDICINE CLINIC | Facility: CLINIC | Age: 50
End: 2020-02-18

## 2020-02-18 VITALS — BODY MASS INDEX: 30.53 KG/M2 | WEIGHT: 163.81 LBS | HEIGHT: 61.5 IN

## 2020-02-18 DIAGNOSIS — E66.09 CLASS 1 OBESITY DUE TO EXCESS CALORIES WITH SERIOUS COMORBIDITY AND BODY MASS INDEX (BMI) OF 30.0 TO 30.9 IN ADULT: Primary | ICD-10-CM

## 2020-02-18 PROCEDURE — 97803 MED NUTRITION INDIV SUBSEQ: CPT | Performed by: DIETITIAN, REGISTERED

## 2020-02-18 NOTE — PROGRESS NOTES
[address] [address]  Dept: [phone]  Loc: [phone]    02/18/20      Bariatric Follow-up Nutrition Session    Suman Rubio is a 51year old female.      As Panel:  Lab Results   Component Value Date    CHOLEST 193 10/14/2019    TRIG 83 10/14/2019    HDL 69 (H) 10/14/2019     (H) 10/14/2019    VLDL 17 10/14/2019    TCHDLRATIO 2.46 05/07/2018    NONHDLC 124 10/14/2019    CHOLHDLRATIO 4.1 08/18/2015 Tab, Take 1,000 mg by mouth., Disp: , Rfl:   •  Multiple Vitamins-Minerals (BARIATRIC MULTIVITAMINS/IRON OR), Take by mouth., Disp: , Rfl:   •  Meclizine HCl 25 MG Oral Tab, Take 1 tablet (25 mg total) by mouth 3 (three) times daily as needed for Dizziness + Premier    Activity Level: PT for foot several times per week, row machine, resistance bands, weights    Other: Pt experiencing hypoglycemic episodes lately- roughly 1.5-2 hrs after a meal and is unable to pinpoint a specific time of day or type of food

## 2020-02-19 ENCOUNTER — OFFICE VISIT (OUTPATIENT)
Dept: PHYSICAL THERAPY | Age: 50
End: 2020-02-19
Attending: FAMILY MEDICINE
Payer: COMMERCIAL

## 2020-02-19 PROCEDURE — 97140 MANUAL THERAPY 1/> REGIONS: CPT

## 2020-02-19 PROCEDURE — 97112 NEUROMUSCULAR REEDUCATION: CPT

## 2020-02-19 NOTE — PROGRESS NOTES
Progress Summary  Pt has attended 24 visits in Physical Therapy.      Dx: post operative state             Insurance (Authorized # of Visits):  15           Authorizing Physician: Dr. Abran Bermudez  Next MD visit: none scheduled  Fall Risk: standard         Prec findings, precautions, and treatment options and has agreed to actively participate in planning and for this course of care. Thank you for your referral. If you have any questions, please contact me at Dept: [phone].     Sincerely,  Electronically s and soleus Slant bord stretch 3x30s gastroc and soleus Slant bord stretch 3x30s gastroc and soleus Slant bord stretch 3x30s gastroc and soleus Slant bord stretch 3x30s gastroc and soleus Slant bord stretch 3x30s gastroc and soleus -   PROM PROM PROM PROM P

## 2020-02-25 ENCOUNTER — OFFICE VISIT (OUTPATIENT)
Dept: PHYSICAL THERAPY | Age: 50
End: 2020-02-25
Attending: FAMILY MEDICINE
Payer: COMMERCIAL

## 2020-02-25 DIAGNOSIS — G89.29 CHRONIC MIDLINE LOW BACK PAIN WITHOUT SCIATICA: ICD-10-CM

## 2020-02-25 DIAGNOSIS — M54.50 CHRONIC MIDLINE LOW BACK PAIN WITHOUT SCIATICA: ICD-10-CM

## 2020-02-25 PROCEDURE — 97161 PT EVAL LOW COMPLEX 20 MIN: CPT

## 2020-02-25 PROCEDURE — 97112 NEUROMUSCULAR REEDUCATION: CPT

## 2020-02-25 NOTE — PROGRESS NOTES
LUMBAR EVALUATION:   Referring Physician: Dr. Lorie Malin  Diagnosis: Chronic midline low back pain without sciatica     Date of Service: 2/25/2020     PATIENT Kassandra Welch is a 51year old y/o female who presents to therapy today with complaints with core activation during active SLR testing indicating reduced core activation and strength. Pt also has left on right rotated sacrum which correct with MET and following lumbar extension ROM improves although pain remains.  It is medically necessary for Neuromuscular Re-education; Therapeutic Activity;  Electrical Stim; Mechanical Traction; Pt education; Home exercise program instruction;     Education or treatment limitation: None  Rehab Potential:good    FOTO: 44/100    Patient/Family/Caregiver was advis

## 2020-02-27 ENCOUNTER — OFFICE VISIT (OUTPATIENT)
Dept: PHYSICAL THERAPY | Age: 50
End: 2020-02-27
Attending: FAMILY MEDICINE
Payer: COMMERCIAL

## 2020-02-27 PROCEDURE — 97014 ELECTRIC STIMULATION THERAPY: CPT

## 2020-02-27 PROCEDURE — 97112 NEUROMUSCULAR REEDUCATION: CPT

## 2020-02-27 PROCEDURE — 97140 MANUAL THERAPY 1/> REGIONS: CPT

## 2020-02-27 PROCEDURE — 97110 THERAPEUTIC EXERCISES: CPT

## 2020-02-27 NOTE — PROGRESS NOTES
Dx: Chronic midline low back pain without sciatica           Insurance (Authorized # of Visits):  6           Authorizing Physician: Dr. Anjana Sanchez  Next MD visit: none scheduled  Fall Risk: standard         Precautions: n/a             Subjective: Pt states

## 2020-03-03 ENCOUNTER — OFFICE VISIT (OUTPATIENT)
Dept: PHYSICAL THERAPY | Age: 50
End: 2020-03-03
Attending: FAMILY MEDICINE
Payer: COMMERCIAL

## 2020-03-03 PROCEDURE — 97140 MANUAL THERAPY 1/> REGIONS: CPT

## 2020-03-03 PROCEDURE — 97112 NEUROMUSCULAR REEDUCATION: CPT

## 2020-03-03 PROCEDURE — 97014 ELECTRIC STIMULATION THERAPY: CPT

## 2020-03-03 PROCEDURE — 97110 THERAPEUTIC EXERCISES: CPT

## 2020-03-03 NOTE — PROGRESS NOTES
Dx: Chronic midline low back pain without sciatica           Insurance (Authorized # of Visits):  6           Authorizing Physician: Dr. Shannen Valdovinos  Next MD visit: none scheduled  Fall Risk: standard         Precautions: n/a             Subjective: Pt states extension 2x10, SB TA 3sx20, isometric hip adduction and abduction 5sx5 ea    Charges: manual x1, therex x1, neuro x1, IFC x1       Total Timed Treatment: 45 min  Total Treatment Time: 60 min

## 2020-03-05 ENCOUNTER — OFFICE VISIT (OUTPATIENT)
Dept: PHYSICAL THERAPY | Age: 50
End: 2020-03-05
Attending: FAMILY MEDICINE
Payer: COMMERCIAL

## 2020-03-05 PROCEDURE — 97014 ELECTRIC STIMULATION THERAPY: CPT

## 2020-03-05 PROCEDURE — 97110 THERAPEUTIC EXERCISES: CPT

## 2020-03-05 PROCEDURE — 97112 NEUROMUSCULAR REEDUCATION: CPT

## 2020-03-05 PROCEDURE — 97140 MANUAL THERAPY 1/> REGIONS: CPT

## 2020-03-05 NOTE — PROGRESS NOTES
Dx: Chronic midline low back pain without sciatica           Insurance (Authorized # of Visits):  6           Authorizing Physician: Dr. Andreea Chiang  Next MD visit: none scheduled  Fall Risk: standard         Precautions: n/a             Subjective: Pt states min with MHP IFC x15 min with MHP 7# chops on cable machine 10x ea     Manual   Lumbar STM  Thoracic PA mobs grade 1-2 Manual   Lumbar STM  Thoracic PA mobs grade 1-2 Manual   Lumbar STM  Thoracic PA mobs grade 1-2     HEP: bridges 2x10, YTB shoulder exten

## 2020-03-10 ENCOUNTER — APPOINTMENT (OUTPATIENT)
Dept: PHYSICAL THERAPY | Age: 50
End: 2020-03-10
Attending: FAMILY MEDICINE
Payer: COMMERCIAL

## 2020-03-10 DIAGNOSIS — M54.50 CHRONIC MIDLINE LOW BACK PAIN WITHOUT SCIATICA: ICD-10-CM

## 2020-03-10 DIAGNOSIS — M54.50 ACUTE LEFT-SIDED LOW BACK PAIN WITHOUT SCIATICA: ICD-10-CM

## 2020-03-10 DIAGNOSIS — G89.29 CHRONIC MIDLINE LOW BACK PAIN WITHOUT SCIATICA: ICD-10-CM

## 2020-03-11 ENCOUNTER — OFFICE VISIT (OUTPATIENT)
Dept: INTERNAL MEDICINE CLINIC | Facility: CLINIC | Age: 50
End: 2020-03-11

## 2020-03-11 VITALS
RESPIRATION RATE: 16 BRPM | SYSTOLIC BLOOD PRESSURE: 122 MMHG | DIASTOLIC BLOOD PRESSURE: 78 MMHG | HEART RATE: 80 BPM | WEIGHT: 161 LBS | HEIGHT: 61.5 IN | BODY MASS INDEX: 30.01 KG/M2

## 2020-03-11 DIAGNOSIS — E16.2 HYPOGLYCEMIA: ICD-10-CM

## 2020-03-11 DIAGNOSIS — E66.01 MORBID OBESITY WITH BMI OF 45.0-49.9, ADULT (HCC): ICD-10-CM

## 2020-03-11 DIAGNOSIS — L98.7 EXCESS SKIN OF BREAST: ICD-10-CM

## 2020-03-11 DIAGNOSIS — L98.7 EXCESS SKIN OF ABDOMEN: ICD-10-CM

## 2020-03-11 DIAGNOSIS — E11.9 TYPE 2 DIABETES MELLITUS WITHOUT COMPLICATION, WITHOUT LONG-TERM CURRENT USE OF INSULIN (HCC): ICD-10-CM

## 2020-03-11 DIAGNOSIS — Z51.81 ENCOUNTER FOR THERAPEUTIC DRUG MONITORING: Primary | ICD-10-CM

## 2020-03-11 PROCEDURE — 99214 OFFICE O/P EST MOD 30 MIN: CPT | Performed by: INTERNAL MEDICINE

## 2020-03-11 RX ORDER — TIZANIDINE 4 MG/1
4 TABLET ORAL NIGHTLY PRN
Qty: 30 TABLET | Refills: 0 | Status: SHIPPED | OUTPATIENT
Start: 2020-03-11 | End: 2020-11-12

## 2020-03-11 RX ORDER — PHENTERMINE HYDROCHLORIDE 37.5 MG/1
37.5 TABLET ORAL
Qty: 30 TABLET | Refills: 1 | Status: SHIPPED | OUTPATIENT
Start: 2020-03-11 | End: 2020-03-24

## 2020-03-11 NOTE — PROGRESS NOTES
CC: Patient presents with:  Weight Check: down 2 lb       HPI:     Overall doing well today. Underwent bariatric surgery on 10/15/18. Weight on surgery date: 230 lbs. Taking medication twice per day.    Down 2 lb form previous   Reviewed 24 di 250 MG Oral Tab Take 250 mg by mouth. • Pyridoxine HCl (VITAMIN B-6 OR) Take by mouth. • Calcium 500-125 MG-UNIT Oral Tab Take 1,000 mg by mouth. • Multiple Vitamins-Minerals (BARIATRIC MULTIVITAMINS/IRON OR) Take by mouth.      • Meclizine HCl vitreous detachment of both eyes 12/8/2017    Right > Left   • Primary open angle glaucoma of both eyes, moderate stage 6/1/2016   • Visual impairment    • Vitreous degeneration, right eye 03/27/2019    Pedro Osler, M.D.       Patient Active Problem List hyperplasia of face     Cafe au lait spots     Melanocytic nevus of upper extremity     Angioma     Acute nonintractable headache     S/P laparoscopic sleeve gastrectomy     Acute foot pain, right     Calcaneal spur of right foot     Metatarsalgia of right (37.5 mg total) by mouth every morning before breakfast.      None     ASSESSMENT:   Encounter for therapeutic drug monitoring  (primary encounter diagnosis)  Morbid obesity with BMI of 45.0-49.9, adult ([address]  Type 2 diabetes mellitus without complication,

## 2020-03-12 ENCOUNTER — OFFICE VISIT (OUTPATIENT)
Dept: PHYSICAL THERAPY | Age: 50
End: 2020-03-12
Attending: FAMILY MEDICINE
Payer: COMMERCIAL

## 2020-03-12 ENCOUNTER — TELEPHONE (OUTPATIENT)
Dept: INTERNAL MEDICINE CLINIC | Facility: CLINIC | Age: 50
End: 2020-03-12

## 2020-03-12 PROCEDURE — 97112 NEUROMUSCULAR REEDUCATION: CPT

## 2020-03-12 PROCEDURE — 97110 THERAPEUTIC EXERCISES: CPT

## 2020-03-12 PROCEDURE — 97140 MANUAL THERAPY 1/> REGIONS: CPT

## 2020-03-12 NOTE — PROGRESS NOTES
Dx: Chronic midline low back pain without sciatica           Insurance (Authorized # of Visits):  6           Authorizing Physician: Dr. Hyacinth Sanchez  Next MD visit: none scheduled  Fall Risk: standard         Precautions: n/a             Subjective: Pt states multifidi punches on cable machine 20x ea 3# multifidi punches on cable machine 20x ea 3# multifidi punches on cable machine 20x ea Bridges with hip abductio using RTB 20x, and 20x with hip adduction with ball    Wall pushup 2x10 Wall pushup 2x10 Prone sca

## 2020-03-12 NOTE — TELEPHONE ENCOUNTER
Received request for PA for Phentermine in Epic  Questions answered and med denied with note that only covered if patient has not received 3 months of therapy within the past year. Since patient has - it was denied coverage. My chart to patient.

## 2020-03-16 ENCOUNTER — APPOINTMENT (OUTPATIENT)
Dept: PHYSICAL THERAPY | Age: 50
End: 2020-03-16
Attending: FAMILY MEDICINE
Payer: COMMERCIAL

## 2020-03-16 ENCOUNTER — TELEPHONE (OUTPATIENT)
Dept: PHYSICAL THERAPY | Age: 50
End: 2020-03-16

## 2020-03-16 NOTE — TELEPHONE ENCOUNTER
Contacted pt to discuss department's initiative to protect all non-essential and high risk patients from COVID-19 exposure. Discussed recommendations relative to pt's home program and POC.  Explained that the clinic is cancelling visits for the next two wee

## 2020-03-18 ENCOUNTER — APPOINTMENT (OUTPATIENT)
Dept: PHYSICAL THERAPY | Age: 50
End: 2020-03-18
Attending: FAMILY MEDICINE
Payer: COMMERCIAL

## 2020-03-23 ENCOUNTER — APPOINTMENT (OUTPATIENT)
Dept: PHYSICAL THERAPY | Age: 50
End: 2020-03-23
Attending: FAMILY MEDICINE
Payer: COMMERCIAL

## 2020-03-24 ENCOUNTER — PATIENT MESSAGE (OUTPATIENT)
Dept: INTERNAL MEDICINE CLINIC | Facility: CLINIC | Age: 50
End: 2020-03-24

## 2020-03-24 RX ORDER — PHENTERMINE HYDROCHLORIDE 37.5 MG/1
37.5 TABLET ORAL
Qty: 30 TABLET | Refills: 1 | Status: SHIPPED | OUTPATIENT
Start: 2020-03-24 | End: 2020-07-09 | Stop reason: ALTCHOICE

## 2020-03-24 NOTE — TELEPHONE ENCOUNTER
Phentermine HCl 37.5 MG Oral Tab 30 tablet 1 3/11/2020     Sig - Route:  Take 1 tablet (37.5 mg total) by mouth every morning before breakfast. - Oral    Class: Print Script    Prior authorization: Closed - Other      Last filled phentermine 15 mg #60 on 2/

## 2020-03-24 NOTE — TELEPHONE ENCOUNTER
From: Nanci Zuniga  To:  Jasbir Curry MD  Sent: 3/24/2020 9:21 AM CDT  Subject: Prescription Question    Good morning Dr. Chuy Dorantes had given me a new prescription for the higher dose of phentermine and I managed to lose it before turning in to my pha

## 2020-03-25 RX ORDER — LANSOPRAZOLE 30 MG/1
30 CAPSULE, DELAYED RELEASE ORAL
Qty: 90 CAPSULE | Refills: 0 | Status: SHIPPED | OUTPATIENT
Start: 2020-03-25 | End: 2020-06-22

## 2020-03-25 NOTE — TELEPHONE ENCOUNTER
Pt requesting refill of LANSOPRAZOLE 30 MG Oral Capsule Delayed Release    Last Time Medication was Filled:  1/7/20 qty90    Last Office Visit with Provider: 2/6/20  No future appointments.

## 2020-03-26 ENCOUNTER — APPOINTMENT (OUTPATIENT)
Dept: PHYSICAL THERAPY | Age: 50
End: 2020-03-26
Attending: FAMILY MEDICINE
Payer: COMMERCIAL

## 2020-03-30 ENCOUNTER — PATIENT MESSAGE (OUTPATIENT)
Dept: FAMILY MEDICINE CLINIC | Facility: CLINIC | Age: 50
End: 2020-03-30

## 2020-03-31 ENCOUNTER — APPOINTMENT (OUTPATIENT)
Dept: PHYSICAL THERAPY | Age: 50
End: 2020-03-31
Attending: FAMILY MEDICINE
Payer: COMMERCIAL

## 2020-03-31 NOTE — TELEPHONE ENCOUNTER
From: Kraig Cast  To: Chelsey Naranjo DO  Sent: 3/30/2020 10:57 PM CDT  Subject: Other    Good morning Dr Sidra Calix been dealing with swollen glands in my throat for just over a week now. Not taking anything for pain until today.  This evening m

## 2020-04-02 ENCOUNTER — APPOINTMENT (OUTPATIENT)
Dept: PHYSICAL THERAPY | Age: 50
End: 2020-04-02
Attending: FAMILY MEDICINE
Payer: COMMERCIAL

## 2020-04-05 ENCOUNTER — PATIENT MESSAGE (OUTPATIENT)
Dept: FAMILY MEDICINE CLINIC | Facility: CLINIC | Age: 50
End: 2020-04-05

## 2020-04-06 ENCOUNTER — VIRTUAL PHONE E/M (OUTPATIENT)
Dept: FAMILY MEDICINE CLINIC | Facility: CLINIC | Age: 50
End: 2020-04-06

## 2020-04-06 ENCOUNTER — TELEPHONE (OUTPATIENT)
Dept: FAMILY MEDICINE CLINIC | Facility: CLINIC | Age: 50
End: 2020-04-06

## 2020-04-06 ENCOUNTER — APPOINTMENT (OUTPATIENT)
Dept: PHYSICAL THERAPY | Age: 50
End: 2020-04-06
Attending: FAMILY MEDICINE
Payer: COMMERCIAL

## 2020-04-06 DIAGNOSIS — H92.01 OTALGIA, RIGHT: ICD-10-CM

## 2020-04-06 DIAGNOSIS — J02.9 SORE THROAT: ICD-10-CM

## 2020-04-06 DIAGNOSIS — J34.89 SINUS PRESSURE: ICD-10-CM

## 2020-04-06 DIAGNOSIS — B34.9 VIRAL SYNDROME: Primary | ICD-10-CM

## 2020-04-06 DIAGNOSIS — R06.89 DIFFICULTY BREATHING: ICD-10-CM

## 2020-04-06 PROCEDURE — 99213 OFFICE O/P EST LOW 20 MIN: CPT | Performed by: FAMILY MEDICINE

## 2020-04-06 NOTE — TELEPHONE ENCOUNTER
----- Message from Juana Shaver sent at 4/6/2020 10:32 AM CDT -----  Regarding: RE: Non-Urgent Medical Question  Contact: [phone]  Good morning Caren,   Yes, a virtual call visit will be fine.   I can be reached at [phone]    Thank you  Myke

## 2020-04-06 NOTE — TELEPHONE ENCOUNTER
From: Carly Watts  To: Sara Maxwell DO  Sent: 4/5/2020 9:33 AM CDT  Subject: Non-Urgent Medical Question    Good morning Dr. Chasidy Ware,  I messaged last week about having swollen glands and a sore throat.  I've been treating at home with tylenol and

## 2020-04-06 NOTE — PROGRESS NOTES
Virtual/Telephone Check-In    Mehrdad Childress verbally consents to a Virtual/Telephone Check-In service on 04/06/20. Patient understands and accepts financial responsibility for any deductible, co-insurance and/or co-pays associated with this service. department. Okay to call with other questions or concerns. Patient verbalizes understanding and agrees with plan.

## 2020-04-08 ENCOUNTER — APPOINTMENT (OUTPATIENT)
Dept: PHYSICAL THERAPY | Age: 50
End: 2020-04-08
Attending: FAMILY MEDICINE
Payer: COMMERCIAL

## 2020-04-20 ENCOUNTER — VIRTUAL PHONE E/M (OUTPATIENT)
Dept: FAMILY MEDICINE CLINIC | Facility: CLINIC | Age: 50
End: 2020-04-20

## 2020-04-20 ENCOUNTER — PATIENT MESSAGE (OUTPATIENT)
Dept: FAMILY MEDICINE CLINIC | Facility: CLINIC | Age: 50
End: 2020-04-20

## 2020-04-20 DIAGNOSIS — R06.02 SHORTNESS OF BREATH: Primary | ICD-10-CM

## 2020-04-20 DIAGNOSIS — Z76.89 ENCOUNTER FOR NEW MEDICATION PRESCRIPTION: ICD-10-CM

## 2020-04-20 DIAGNOSIS — J30.1 SEASONAL ALLERGIC RHINITIS DUE TO POLLEN: ICD-10-CM

## 2020-04-20 DIAGNOSIS — B34.9 VIRAL SYNDROME: ICD-10-CM

## 2020-04-20 PROCEDURE — 99214 OFFICE O/P EST MOD 30 MIN: CPT | Performed by: FAMILY MEDICINE

## 2020-04-20 RX ORDER — ALBUTEROL SULFATE 90 UG/1
2 AEROSOL, METERED RESPIRATORY (INHALATION) EVERY 6 HOURS PRN
Qty: 1 INHALER | Refills: 0 | Status: SHIPPED | OUTPATIENT
Start: 2020-04-20 | End: 2021-11-24

## 2020-04-20 NOTE — PATIENT INSTRUCTIONS
Recommend try doing breathing exercises in the below link.     [email or web address]    Recommend taking over-the-counter antihistamine such as Claritin, Clarinex, Zyrtec, or Xyzal nightly for a week or 2 t

## 2020-04-20 NOTE — PROGRESS NOTES
Virtual/Telephone Check-In    Michael Rodriguez verbally consents to a Virtual/Telephone Check-In service on 04/20/20. Patient understands and accepts financial responsibility for any deductible, co-insurance and/or co-pays associated with this service. did ok but felt like an inhaler might help. Current Outpatient Medications   Medication Sig Dispense Refill   • Phenylephrine-DM-GG (MUCINEX FAST-MAX CONGEST COUGH) 2.5-5-100 MG/5ML Oral Liquid Take by mouth 2 (two) times daily.      • Albuterol Storm • CycloSPORINE (RESTASIS) 0.05 % Ophthalmic Emulsion Place 1 drop into both eyes 2 (two) times daily.           Patient Active Problem List:     Hypothyroidism     Allergic rhinitis     History of kidney stones     H/O colonoscopy with polypectomy headache     S/P laparoscopic sleeve gastrectomy     Acute foot pain, right     Calcaneal spur of right foot     Metatarsalgia of right foot     Migraine without status migrainosus, not intractable     Acute pain of right knee     Patellofemoral arthritis Nando Calle M.D.   • Osteoarthritis    • Other vitreous opacities, left eye 03/27/2019    Nando Calle M.D.   • Other vitreous opacities, right eye 03/27/2019    Nando Calle M.D.   • Polycystic ovaries     ovary embedded in pelvic wall--right si symptoms and ear pain and sore throat have resolved or greatly improved. 3. Seasonal allergic rhinitis due to pollen  Recommend take over-the-counter antihistamine such as Claritin, Xyzal, Clarinex, or Zyrtec.     4. Encounter for new medication prescrip

## 2020-04-20 NOTE — TELEPHONE ENCOUNTER
From: James Corcoran  To: Raford Cogan, DO  Sent: 4/20/2020 8:55 AM CDT  Subject: Non-Urgent Medical Question    Good morning Dr. Humphrey Mins,  My head congestion is much better and my ear doesn't hurt nearly as bad, but my breathing really hasn't improve

## 2020-04-22 ENCOUNTER — VIRTUAL PHONE E/M (OUTPATIENT)
Dept: INTERNAL MEDICINE CLINIC | Facility: CLINIC | Age: 50
End: 2020-04-22

## 2020-04-22 DIAGNOSIS — E66.01 MORBID OBESITY WITH BMI OF 45.0-49.9, ADULT (HCC): ICD-10-CM

## 2020-04-22 DIAGNOSIS — E78.2 MIXED HYPERLIPIDEMIA: ICD-10-CM

## 2020-04-22 DIAGNOSIS — E03.9 HYPOTHYROIDISM (ACQUIRED): ICD-10-CM

## 2020-04-22 DIAGNOSIS — E11.9 TYPE 2 DIABETES MELLITUS WITHOUT COMPLICATION, WITHOUT LONG-TERM CURRENT USE OF INSULIN (HCC): ICD-10-CM

## 2020-04-22 DIAGNOSIS — E53.8 B12 DEFICIENCY: ICD-10-CM

## 2020-04-22 DIAGNOSIS — I10 ESSENTIAL HYPERTENSION: ICD-10-CM

## 2020-04-22 DIAGNOSIS — Z51.81 ENCOUNTER FOR THERAPEUTIC DRUG MONITORING: Primary | ICD-10-CM

## 2020-04-22 PROCEDURE — 99213 OFFICE O/P EST LOW 20 MIN: CPT | Performed by: INTERNAL MEDICINE

## 2020-04-22 RX ORDER — PHENTERMINE HYDROCHLORIDE 37.5 MG/1
37.5 TABLET ORAL
Qty: 30 TABLET | Refills: 1 | Status: SHIPPED | OUTPATIENT
Start: 2020-04-22 | End: 2020-04-28

## 2020-04-22 NOTE — PROGRESS NOTES
[facility] Weight Management check in/follow up encounter  Virtual/Telephone Check-In    Calvin Mclaughlin verbally consents to a Virtual/Telephone Check-In service on 04/22/20    Has viral respiratory infection x 3 weeks   Started on inhaler this past

## 2020-04-28 ENCOUNTER — TELEMEDICINE (OUTPATIENT)
Dept: RHEUMATOLOGY | Facility: CLINIC | Age: 50
End: 2020-04-28

## 2020-04-28 VITALS — BODY MASS INDEX: 28.34 KG/M2 | HEIGHT: 62 IN | WEIGHT: 154 LBS

## 2020-04-28 DIAGNOSIS — G89.29 CHRONIC BILATERAL LOW BACK PAIN WITHOUT SCIATICA: ICD-10-CM

## 2020-04-28 DIAGNOSIS — Z82.69 FAMILY HISTORY OF ANKYLOSING SPONDYLITIS: ICD-10-CM

## 2020-04-28 DIAGNOSIS — G89.29 CHRONIC SI JOINT PAIN: ICD-10-CM

## 2020-04-28 DIAGNOSIS — M19.90 INFLAMMATORY ARTHRITIS: Primary | ICD-10-CM

## 2020-04-28 DIAGNOSIS — M53.3 CHRONIC SI JOINT PAIN: ICD-10-CM

## 2020-04-28 DIAGNOSIS — M54.50 CHRONIC BILATERAL LOW BACK PAIN WITHOUT SCIATICA: ICD-10-CM

## 2020-04-28 DIAGNOSIS — M25.50 POLYARTHRALGIA: ICD-10-CM

## 2020-04-28 DIAGNOSIS — M79.10 MYALGIA: ICD-10-CM

## 2020-04-28 PROCEDURE — 99245 OFF/OP CONSLTJ NEW/EST HI 55: CPT | Performed by: INTERNAL MEDICINE

## 2020-04-28 NOTE — PROGRESS NOTES
[facility] TeleHealth Audio and Visual Visit     This was an audio/video conversation using Epic/[employer] in lieu of an in-person visit due to need to limit person to person contact during the coronavirus pandemic.  The patient was within the state of spondylitis  Polyarthralgia  Myalgia    Discussion:  Ms. James Corcoran is a 52 yo woman with a hx of hypertension, hyperlipidemia, depression, reflux as well as obesity s/p bariatric surgery who presents for evaluation of worsened joint and muscle pain. Future    Chronic SI joint pain  -     HLA B 27 DISEASE ASS[employer]ATION; Future  -     XR SACROILIAC JOINTS (MIN 3 VIEWS) (CPT=72202); Future    Family history of ankylosing spondylitis  -     HLA B 27 DISEASE ASSOCIATION;  Future    Polyarthralgia  -     HLA B for her muscle aches   + hair thinning, thought to be related to bariatric surgery (Oct 2018); also admits to hair loss related to stress   + issues with blood sugars, feels very sensitive to low blood sugars or any elevations 20 over her fasting sugars - of uveitis, crampy abdominal pain, constipation, diarrhea, nodular painful shin bruises, Achilles heel pain, psoriatic lesions, history of dactylitis. There are no symptoms of severe dry mouth. No fevers, chills, night sweats, or unexplained weakness.   D glaucoma of both eyes, moderate stage 6/1/2016   • Visual impairment    • Vitreous degeneration, right eye 03/27/2019    Anabell Byrne M.D.      Past Surgical History:  Past Surgical History:   Procedure Laterality Date   • ANGIOGRAM      2-24-12 [place] Tobacco Use      Smoking status: Never Smoker      Smokeless tobacco: Never Used    Alcohol use:  Yes      Alcohol/week: 0.0 standard drinks      Frequency: 2-4 times a month      Drinks per session: 1 or 2      Comment: 6 oz of hard liqour weekends    Abad 3 (three) times daily as needed for Dizziness. , Disp: 30 tablet, Rfl: 0  PATIENT SUPPLIED MEDICATION, Essential Oils for allergies, Disp: , Rfl:   Cholecalciferol (VITAMIN D3) 2000 UNITS Oral Tab, Take 1 tablet by mouth daily.   , Disp: , Rfl:   CycloSPORIN and headaches. Negative for tingling, sensory change and weakness. Endo/Heme/Allergies: Negative for polydipsia. Bruises/bleeds easily. Psychiatric/Behavioral: Negative for depression. The patient is not nervous/anxious and does not have insomnia. were obtained. COMPARISON:  [facility] XR, XR LUMBAR SPINE (MIN 4 VIEWS) (CPT=72110), 3/26/2014, 9:23.      INDICATIONS:  M54.5 Low back pain G89.29 Other chronic pain M54.5 Low back pain     PATIENT STATED HISTORY: (As transcribed by Technologist)  P Component Value Date    GLU 88 10/14/2019    BUN 18 10/14/2019    BUNCREA 19.8 10/14/2019    CREATSERUM 0.91 10/14/2019    ANIONGAP 5 10/14/2019    GFR 84 11/09/2017    GFRNAA 75 10/14/2019    GFRAA 87 10/14/2019    CA 9.1 10/14/2019    OSMOCALC 297 (H)

## 2020-04-30 ENCOUNTER — LAB ENCOUNTER (OUTPATIENT)
Dept: LAB | Age: 50
End: 2020-04-30
Attending: INTERNAL MEDICINE
Payer: COMMERCIAL

## 2020-04-30 ENCOUNTER — HOSPITAL ENCOUNTER (OUTPATIENT)
Dept: GENERAL RADIOLOGY | Age: 50
Discharge: HOME | End: 2020-04-30
Attending: INTERNAL MEDICINE
Payer: COMMERCIAL

## 2020-04-30 DIAGNOSIS — G89.29 CHRONIC SI JOINT PAIN: ICD-10-CM

## 2020-04-30 DIAGNOSIS — M53.3 CHRONIC SI JOINT PAIN: ICD-10-CM

## 2020-04-30 DIAGNOSIS — M19.90 INFLAMMATORY ARTHRITIS: ICD-10-CM

## 2020-04-30 DIAGNOSIS — M79.10 MYALGIA: ICD-10-CM

## 2020-04-30 DIAGNOSIS — G89.29 CHRONIC BILATERAL LOW BACK PAIN WITHOUT SCIATICA: ICD-10-CM

## 2020-04-30 DIAGNOSIS — M25.50 POLYARTHRALGIA: ICD-10-CM

## 2020-04-30 DIAGNOSIS — M54.50 CHRONIC BILATERAL LOW BACK PAIN WITHOUT SCIATICA: ICD-10-CM

## 2020-04-30 DIAGNOSIS — Z82.69 FAMILY HISTORY OF ANKYLOSING SPONDYLITIS: ICD-10-CM

## 2020-04-30 PROCEDURE — 86235 NUCLEAR ANTIGEN ANTIBODY: CPT

## 2020-04-30 PROCEDURE — 86140 C-REACTIVE PROTEIN: CPT

## 2020-04-30 PROCEDURE — 86038 ANTINUCLEAR ANTIBODIES: CPT

## 2020-04-30 PROCEDURE — 86431 RHEUMATOID FACTOR QUANT: CPT

## 2020-04-30 PROCEDURE — 86200 CCP ANTIBODY: CPT

## 2020-04-30 PROCEDURE — 85652 RBC SED RATE AUTOMATED: CPT

## 2020-04-30 PROCEDURE — 86812 HLA TYPING A B OR C: CPT

## 2020-04-30 PROCEDURE — 82550 ASSAY OF CK (CPK): CPT

## 2020-04-30 PROCEDURE — 86225 DNA ANTIBODY NATIVE: CPT

## 2020-04-30 PROCEDURE — 36415 COLL VENOUS BLD VENIPUNCTURE: CPT

## 2020-04-30 PROCEDURE — 72202 X-RAY EXAM SI JOINTS 3/> VWS: CPT | Performed by: INTERNAL MEDICINE

## 2020-05-08 ENCOUNTER — TELEPHONE (OUTPATIENT)
Dept: RHEUMATOLOGY | Facility: CLINIC | Age: 50
End: 2020-05-08

## 2020-05-08 ENCOUNTER — OFFICE VISIT (OUTPATIENT)
Dept: PHYSICAL THERAPY | Age: 50
End: 2020-05-08
Attending: PODIATRIST
Payer: COMMERCIAL

## 2020-05-08 DIAGNOSIS — G89.29 CHRONIC SI JOINT PAIN: ICD-10-CM

## 2020-05-08 DIAGNOSIS — Z82.69 FAMILY HISTORY OF ANKYLOSING SPONDYLITIS: ICD-10-CM

## 2020-05-08 DIAGNOSIS — M53.3 CHRONIC SI JOINT PAIN: ICD-10-CM

## 2020-05-08 DIAGNOSIS — M19.90 INFLAMMATORY ARTHRITIS: Primary | ICD-10-CM

## 2020-05-08 DIAGNOSIS — M54.50 CHRONIC BILATERAL LOW BACK PAIN WITHOUT SCIATICA: ICD-10-CM

## 2020-05-08 DIAGNOSIS — G89.29 CHRONIC BILATERAL LOW BACK PAIN WITHOUT SCIATICA: ICD-10-CM

## 2020-05-08 PROCEDURE — 97112 NEUROMUSCULAR REEDUCATION: CPT

## 2020-05-08 PROCEDURE — 97140 MANUAL THERAPY 1/> REGIONS: CPT

## 2020-05-08 PROCEDURE — 97014 ELECTRIC STIMULATION THERAPY: CPT

## 2020-05-08 NOTE — TELEPHONE ENCOUNTER
Called pt and notified of test results. HLAB27 negative, inflammatory markers normal, RF/CCP negative. LUIS ARMANDO borderline positive with equivocal SSA. Pt does admit to dry eyes on daily basis.    Discussed that due to equivocal testing, unclear if LUIS ARMANDO/SSA is tr

## 2020-05-08 NOTE — PROGRESS NOTES
Progress Summary  Pt has attended 6 visits in Physical Therapy.      Dx: Chronic midline low back pain without sciatica           Insurance (Authorized # of Visits):  6           Authorizing Physician: Dr. Jeromy Tong Next MD visit: none scheduled  Fall Risk: given new HEP which is all exercises performed in flowsheet. She voiced understanding and agreement. It is medically necessary for pt to continue PT to reach functional goals.      Goals:   Pt will increase core stability to lift a case of water without napoles Ant/post pelvic tilt with ASIS monitoring 20x   3# multifidi punches on cable machine 20x ea 3# multifidi punches on cable machine 20x ea 3# multifidi punches on cable machine 20x ea Bridges with hip abductio using RTB 20x, and 20x with hip adduction with

## 2020-05-15 ENCOUNTER — OFFICE VISIT (OUTPATIENT)
Dept: PHYSICAL THERAPY | Age: 50
End: 2020-05-15
Attending: PODIATRIST
Payer: COMMERCIAL

## 2020-05-15 PROCEDURE — 97140 MANUAL THERAPY 1/> REGIONS: CPT

## 2020-05-15 PROCEDURE — 97112 NEUROMUSCULAR REEDUCATION: CPT

## 2020-05-15 PROCEDURE — 97014 ELECTRIC STIMULATION THERAPY: CPT

## 2020-05-15 NOTE — PROGRESS NOTES
Dx: Chronic midline low back pain without sciatica           Insurance (Authorized # of Visits):  6           Authorizing Physician: Dr. Jimi Minaya MD visit: none scheduled  Fall Risk: standard         Precautions: n/a             Subjective: Pt states h control pain to reach functional goals.      Date: 2/27/2020  TX#: 2/8 Date:3/3/20                 TX#: 3/8 Date: 3/5/20                TX#: 4/8 Date: 3/12/20                TX#: 5/8 Date: 5/8/20  Tx#: 6/8 5/15/20  7/8   Elliptical 5 min Elliptical 5 min El

## 2020-05-21 ENCOUNTER — HOSPITAL ENCOUNTER (OUTPATIENT)
Dept: MRI IMAGING | Age: 50
Discharge: HOME | End: 2020-05-21
Attending: INTERNAL MEDICINE
Payer: COMMERCIAL

## 2020-05-21 DIAGNOSIS — G89.29 CHRONIC SI JOINT PAIN: ICD-10-CM

## 2020-05-21 DIAGNOSIS — G89.29 CHRONIC BILATERAL LOW BACK PAIN WITHOUT SCIATICA: ICD-10-CM

## 2020-05-21 DIAGNOSIS — M54.50 CHRONIC BILATERAL LOW BACK PAIN WITHOUT SCIATICA: ICD-10-CM

## 2020-05-21 DIAGNOSIS — Z82.69 FAMILY HISTORY OF ANKYLOSING SPONDYLITIS: ICD-10-CM

## 2020-05-21 DIAGNOSIS — M53.3 CHRONIC SI JOINT PAIN: ICD-10-CM

## 2020-05-21 DIAGNOSIS — M19.90 INFLAMMATORY ARTHRITIS: ICD-10-CM

## 2020-05-21 PROCEDURE — 72195 MRI PELVIS W/O DYE: CPT | Performed by: INTERNAL MEDICINE

## 2020-05-21 PROCEDURE — 72148 MRI LUMBAR SPINE W/O DYE: CPT | Performed by: INTERNAL MEDICINE

## 2020-05-22 ENCOUNTER — OFFICE VISIT (OUTPATIENT)
Dept: PHYSICAL THERAPY | Age: 50
End: 2020-05-22
Attending: PODIATRIST
Payer: COMMERCIAL

## 2020-05-22 PROCEDURE — 97140 MANUAL THERAPY 1/> REGIONS: CPT

## 2020-05-22 PROCEDURE — 97112 NEUROMUSCULAR REEDUCATION: CPT

## 2020-05-22 PROCEDURE — 97014 ELECTRIC STIMULATION THERAPY: CPT

## 2020-05-22 NOTE — PROGRESS NOTES
Dx: Chronic midline low back pain without sciatica           Insurance (Authorized # of Visits):  6           Authorizing Physician: Dr. Chasidy Ware Next MD visit: none scheduled  Fall Risk: standard         Precautions: n/a             Subjective: Pt states h reach functional goals.      Date: 2/27/2020  TX#: 2/8 Date:3/3/20                 TX#: 3/8 Date: 3/5/20                TX#: 4/8 Date: 3/12/20                TX#: 5/8 Date: 5/8/20  Tx#: 6/8 5/15/20  7/8 5/22/20  8/16   Elliptical 5 min Elliptical 5 min Aicha HEP: bridges 2x10, YTB shoulder extension 2x10, SB TA 3sx20, isometric hip adduction and abduction 5sx5 ea    Charges: manual x2, neuro x2, IFC x1 (unattended)       Total Timed Treatment: 70 min  Total Treatment Time: 70 min

## 2020-05-26 ENCOUNTER — APPOINTMENT (OUTPATIENT)
Dept: PHYSICAL THERAPY | Age: 50
End: 2020-05-26
Attending: PODIATRIST
Payer: COMMERCIAL

## 2020-05-29 ENCOUNTER — OFFICE VISIT (OUTPATIENT)
Dept: PHYSICAL THERAPY | Age: 50
End: 2020-05-29
Attending: PODIATRIST
Payer: COMMERCIAL

## 2020-05-29 ENCOUNTER — TELEPHONE (OUTPATIENT)
Dept: FAMILY MEDICINE CLINIC | Facility: CLINIC | Age: 50
End: 2020-05-29

## 2020-05-29 PROCEDURE — 97014 ELECTRIC STIMULATION THERAPY: CPT

## 2020-05-29 PROCEDURE — 97112 NEUROMUSCULAR REEDUCATION: CPT

## 2020-05-29 PROCEDURE — 97140 MANUAL THERAPY 1/> REGIONS: CPT

## 2020-05-29 NOTE — PROGRESS NOTES
Dx: Chronic midline low back pain without sciatica           Insurance (Authorized # of Visits):  6           Authorizing Physician: Dr. Sunita Minaya MD visit: none scheduled  Fall Risk: standard         Precautions: n/a             Subjective: Pt states h Recumbent bike 8 min Recumbent bike 8 min Recumbent bike 8 min   bridges 2x10, YTB shoulder extension 2x10, SB TA 3sx20 Supine sciatic nerve glide 2x10 ea Supine sciatic nerve glide 2x10 ea Supine sciatic nerve glide 2x10 ea Supine sciatic nerve glide 2x10

## 2020-05-29 NOTE — TELEPHONE ENCOUNTER
Caren,    I called pt to ask her about her answers to the screening questions. She is coming in for dizziness and motion sickness feelings on Monday morning.   She did say the symptoms are part of the reason Dr. Jimi Swan sent her to rheumatology, can you tri

## 2020-06-01 ENCOUNTER — TELEMEDICINE (OUTPATIENT)
Dept: FAMILY MEDICINE CLINIC | Facility: CLINIC | Age: 50
End: 2020-06-01

## 2020-06-01 DIAGNOSIS — E03.9 HYPOTHYROIDISM (ACQUIRED): ICD-10-CM

## 2020-06-01 DIAGNOSIS — H81.10 BENIGN PAROXYSMAL POSITIONAL VERTIGO, UNSPECIFIED LATERALITY: Primary | ICD-10-CM

## 2020-06-01 DIAGNOSIS — E16.2 HYPOGLYCEMIA: ICD-10-CM

## 2020-06-01 DIAGNOSIS — R42 VERTIGO: ICD-10-CM

## 2020-06-01 PROCEDURE — 99214 OFFICE O/P EST MOD 30 MIN: CPT | Performed by: FAMILY MEDICINE

## 2020-06-01 NOTE — PATIENT INSTRUCTIONS
[email or web address]          Benign Paroxysmal Positional Vertigo     Your health care provider may move your head in certain ways to treat your BPPV.      Benign paro abnormal movements of your eyes. You may have other tests to check your vestibular or nervous systems. Treatment for BPPV  Your healthcare provider may try to move the calcium crystals. This is done by having you move your head and neck in certain ways.  Ngozi Al signals. This problem may be caused by a viral infection. Depending on the cause, your hearing can be affected (labyrinthitis). Or your hearing can remain normal (neuronitis). Infection or inflammation:  · Causes vertigo that lasts for hours or days.  The

## 2020-06-01 NOTE — PROGRESS NOTES
Video visit with live 2 way video and audio via [employer] verbally consents to a Virtual/Telephone Check-In service on 06/01/20.   Patient has been referred to the [facility] website at [email or web address] to review the yearly Consent to ESTRADIOL 1 MG Oral Tab TAKE 1 TABLET BY MOUTH EVERY DAY 90 tablet 0   • Albuterol Sulfate  (90 Base) MCG/ACT Inhalation Aero Soln Inhale 2 puffs into the lungs every 6 (six) hours as needed for Wheezing or Shortness of Breath (Cough).  1 Inhaler 0 Problem List:     Hypothyroidism     Allergic rhinitis     History of kidney stones     H/O colonoscopy with polypectomy     Eczema     GERD (gastroesophageal reflux disease)     Chronic low back pain     Mixed hyperlipidemia     Low HDL (under 40)     Low right foot     Migraine without status migrainosus, not intractable     Acute pain of right knee     Patellofemoral arthritis of right knee     Glaucoma suspect of both eyes     Intraocular pressure increase, bilateral     Acute meniscal tear, medial, righ vitreous opacities, left eye 03/27/2019    Afia Meneses M.D.   • Other vitreous opacities, right eye 03/27/2019    Afia Meneses M.D.   • Polycystic ovaries     ovary embedded in pelvic wall--right side   • PONV (postoperative nausea and vomiting) hydration. Follow-up in 2 weeks. Follow-up sooner if new symptoms or if worsening.    - [facility]    3. Hypoglycemia  Patient is symptomatic in the low 90s. Patient to continue to monitor plasma glucose.     4. Hypoth

## 2020-06-02 ENCOUNTER — OFFICE VISIT (OUTPATIENT)
Dept: PHYSICAL THERAPY | Age: 50
End: 2020-06-02
Attending: PODIATRIST
Payer: COMMERCIAL

## 2020-06-02 ENCOUNTER — APPOINTMENT (OUTPATIENT)
Dept: PHYSICAL THERAPY | Age: 50
End: 2020-06-02
Attending: PODIATRIST
Payer: COMMERCIAL

## 2020-06-02 PROCEDURE — 97112 NEUROMUSCULAR REEDUCATION: CPT

## 2020-06-02 PROCEDURE — 97140 MANUAL THERAPY 1/> REGIONS: CPT

## 2020-06-02 PROCEDURE — 97014 ELECTRIC STIMULATION THERAPY: CPT

## 2020-06-02 NOTE — PROGRESS NOTES
Dx: Chronic midline low back pain without sciatica           Insurance (Authorized # of Visits):  6           Authorizing Physician: Dr. Adrian Tovar Next MD visit: none scheduled  Fall Risk: standard         Precautions: n/a             Subjective: Pt states s TX#: 5/8 Date: 5/8/20  Tx#: 6/8 5/15/20  7/8 5/22/20  8/16 5/29/20  9/16 6/2/20  10/16   Elliptical 5 min reassessment Recumbent bike 8 min Recumbent bike 8 min Recumbent bike 8 min Recumbent bike 8 min   bridges 2x10, YTB shoulder extension

## 2020-06-05 ENCOUNTER — OFFICE VISIT (OUTPATIENT)
Dept: PHYSICAL THERAPY | Age: 50
End: 2020-06-05
Attending: PODIATRIST
Payer: COMMERCIAL

## 2020-06-05 PROCEDURE — 97140 MANUAL THERAPY 1/> REGIONS: CPT

## 2020-06-05 PROCEDURE — 97112 NEUROMUSCULAR REEDUCATION: CPT

## 2020-06-05 PROCEDURE — 97110 THERAPEUTIC EXERCISES: CPT

## 2020-06-05 NOTE — PROGRESS NOTES
Dx: Chronic midline low back pain without sciatica           Insurance (Authorized # of Visits):  6           Authorizing Physician: Dr. Ben Theodore Next MD visit: none scheduled  Fall Risk: standard         Precautions: n/a             Subjective: Pt states s low trap 2x10 HS and quad stretch 3x30s ea   Bridges with hip abductio using RTB 20x, and 20x with hip adduction with ball Pelvic clock 1 min x2 Pelvic clock 1 min x2 Isometric hip adduction and abduction 5sx5 ea Isometric hip adduction and abduction 5sx5

## 2020-06-09 ENCOUNTER — OFFICE VISIT (OUTPATIENT)
Dept: PHYSICAL THERAPY | Age: 50
End: 2020-06-09
Attending: PODIATRIST
Payer: COMMERCIAL

## 2020-06-09 PROCEDURE — 97112 NEUROMUSCULAR REEDUCATION: CPT

## 2020-06-09 PROCEDURE — 97140 MANUAL THERAPY 1/> REGIONS: CPT

## 2020-06-09 PROCEDURE — 97014 ELECTRIC STIMULATION THERAPY: CPT

## 2020-06-09 PROCEDURE — 97110 THERAPEUTIC EXERCISES: CPT

## 2020-06-09 NOTE — PROGRESS NOTES
Dx: Chronic midline low back pain without sciatica           Insurance (Authorized # of Visits):  6           Authorizing Physician: Dr. Ben Theodore Next MD visit: none scheduled  Fall Risk: standard         Precautions: n/a             Subjective: Pt states s - SB seated reach with feedback for symmetry 20x SB seated reach with feedback for symmetry 20x Ant/post pelvic tilt with ASIS monitoring 30x Ant/post pelvic tilt with ASIS monitoring 30x   - - - - - YTB low trap 2x10 HS and quad stretch 3x30s ea -   [place]

## 2020-06-10 NOTE — PROGRESS NOTES
Dx: Chronic midline low back pain without sciatica           Insurance (Authorized # of Visits):  6           Authorizing Physician: Dr. Francis Ramirez Next MD visit: none scheduled  Fall Risk: standard         Precautions: n/a             Subjective: Pt states s reach with feedback for symmetry 20x Ant/post pelvic tilt with ASIS monitoring 30x   - - - - - YTB low trap 2x10 HS and quad stretch 3x30s ea   Bridges with hip abductio using RTB 20x, and 20x with hip adduction with ball Pelvic clock 1 min x2 Pelvic clock

## 2020-06-11 ENCOUNTER — OFFICE VISIT (OUTPATIENT)
Dept: PHYSICAL THERAPY | Age: 50
End: 2020-06-11
Attending: PODIATRIST
Payer: COMMERCIAL

## 2020-06-11 PROCEDURE — 97112 NEUROMUSCULAR REEDUCATION: CPT

## 2020-06-11 PROCEDURE — 97110 THERAPEUTIC EXERCISES: CPT

## 2020-06-11 PROCEDURE — 97140 MANUAL THERAPY 1/> REGIONS: CPT

## 2020-06-11 PROCEDURE — 97014 ELECTRIC STIMULATION THERAPY: CPT

## 2020-06-11 NOTE — PROGRESS NOTES
Dx: Chronic midline low back pain without sciatica           Insurance (Authorized # of Visits):  6           Authorizing Physician: Dr. Nandini Kenyon      Next MD visit: none scheduled  Fall Risk: standard         Precautions: n/a              Subjective: Pt st Ant/post pelvic tilt with ASIS monitoring 30x Ant/post pelvic tilt with ASIS monitoring 30x Ant/post pelvic tilt with ASIS monitoring 30x   - - - - - YTB low trap 2x10 HS and quad stretch 3x30s ea - SB rollout core 15x   Bridges with hip abductio using RTB

## 2020-06-15 ENCOUNTER — OFFICE VISIT (OUTPATIENT)
Dept: PHYSICAL THERAPY | Age: 50
End: 2020-06-15
Attending: PODIATRIST
Payer: COMMERCIAL

## 2020-06-15 PROCEDURE — 97110 THERAPEUTIC EXERCISES: CPT

## 2020-06-15 PROCEDURE — 97140 MANUAL THERAPY 1/> REGIONS: CPT

## 2020-06-15 PROCEDURE — 97014 ELECTRIC STIMULATION THERAPY: CPT

## 2020-06-15 NOTE — PROGRESS NOTES
Dx: Chronic midline low back pain without sciatica           Insurance (Authorized # of Visits):  6           Authorizing Physician: Dr. Samual Pallas      Next MD visit: none scheduled  Fall Risk: standard         Precautions: n/a              Subjective: Pt st with ASIS monitoring 30x Ant/post pelvic tilt with ASIS monitoring 30x -   - - - YTB low trap 2x10 HS and quad stretch 3x30s ea - SB rollout core 15x SB TA hooklying 3sx20   Pelvic clock 1 min x2 Isometric hip adduction and abduction 5sx5 ea Isometric hip

## 2020-06-16 ENCOUNTER — OFFICE VISIT (OUTPATIENT)
Dept: INTERNAL MEDICINE CLINIC | Facility: CLINIC | Age: 50
End: 2020-06-16

## 2020-06-16 ENCOUNTER — TELEPHONE (OUTPATIENT)
Dept: PHYSICAL THERAPY | Age: 50
End: 2020-06-16

## 2020-06-16 VITALS — WEIGHT: 164 LBS | BODY MASS INDEX: 30.57 KG/M2 | HEIGHT: 61.5 IN

## 2020-06-16 DIAGNOSIS — E66.09 CLASS 1 OBESITY DUE TO EXCESS CALORIES WITH SERIOUS COMORBIDITY AND BODY MASS INDEX (BMI) OF 30.0 TO 30.9 IN ADULT: Primary | ICD-10-CM

## 2020-06-16 PROCEDURE — 97803 MED NUTRITION INDIV SUBSEQ: CPT | Performed by: DIETITIAN, REGISTERED

## 2020-06-16 NOTE — PROGRESS NOTES
[address] [address]  Dept: [phone]  Loc: [phone]    06/16/20      Bariatric Follow-up Nutrition Session    Jose Mcnulty is a 51year old female.      As Panel:  Lab Results   Component Value Date    CHOLEST 193 10/14/2019    TRIG 83 10/14/2019    HDL 69 (H) 10/14/2019     (H) 10/14/2019    VLDL 17 10/14/2019    TCHDLRATIO 2.46 05/07/2018    NONHDLC 124 10/14/2019    CHOLHDLRATIO 4.1 08/18/2015 Glycol 3350 Oral Powder, Take 17 g by mouth daily. , Disp: , Rfl:   •  Nystatin 846126 UNIT/GM External Powder, Apply 1 Application topically 4 (four) times daily. , Disp: 60 g, Rfl: 1  •  magnesium 250 MG Oral Tab, Take 250 mg by mouth., Disp: , Rfl:   •  P bed:  8 chocolate covered almonds or piece of chocolate     Total Calories: ~1100 kcal per day  Excessive in: nothing  Inadequate in:  fruits     Patient has made some modifications and adjustments to diet: yes, is sticking w/ moderate portions, keeping co minutes

## 2020-06-17 ENCOUNTER — OFFICE VISIT (OUTPATIENT)
Dept: PHYSICAL THERAPY | Age: 50
End: 2020-06-17
Attending: PODIATRIST
Payer: COMMERCIAL

## 2020-06-17 PROCEDURE — 97140 MANUAL THERAPY 1/> REGIONS: CPT

## 2020-06-17 PROCEDURE — 97112 NEUROMUSCULAR REEDUCATION: CPT

## 2020-06-17 PROCEDURE — 97110 THERAPEUTIC EXERCISES: CPT

## 2020-06-17 NOTE — PROGRESS NOTES
Dx: Chronic midline low back pain without sciatica           Insurance (Authorized # of Visits):  6           Authorizing Physician: Dr. Jeromy Tong      Next MD visit: none scheduled  Fall Risk: standard         Precautions: n/a              Subjective: Pt st feedback for symmetry 20x SB seated reach with feedback for symmetry 20x Ant/post pelvic tilt with ASIS monitoring 30x Ant/post pelvic tilt with ASIS monitoring 30x Ant/post pelvic tilt with ASIS monitoring 30x - Sciatic nerve floss 2x10 ea   - - - YTB low

## 2020-06-19 DIAGNOSIS — R42 VERTIGO: ICD-10-CM

## 2020-06-19 RX ORDER — MECLIZINE HYDROCHLORIDE 25 MG/1
25 TABLET ORAL 3 TIMES DAILY PRN
Qty: 30 TABLET | Refills: 0 | Status: SHIPPED | OUTPATIENT
Start: 2020-06-19 | End: 2020-07-09

## 2020-06-19 RX ORDER — MECLIZINE HYDROCHLORIDE 25 MG/1
25 TABLET ORAL 3 TIMES DAILY PRN
Qty: 30 TABLET | Refills: 0 | Status: CANCELLED | OUTPATIENT
Start: 2020-06-19

## 2020-06-19 NOTE — TELEPHONE ENCOUNTER
Patient reports on and off dizziness x1 mo. States provider is aware and has follow up appointment. Denies headaches, nausea, vomiting, vision changes.

## 2020-06-22 ENCOUNTER — APPOINTMENT (OUTPATIENT)
Dept: PHYSICAL THERAPY | Age: 50
End: 2020-06-22
Attending: PODIATRIST
Payer: COMMERCIAL

## 2020-06-22 RX ORDER — LANSOPRAZOLE 30 MG/1
CAPSULE, DELAYED RELEASE ORAL
Qty: 90 CAPSULE | Refills: 0 | Status: SHIPPED | OUTPATIENT
Start: 2020-06-22 | End: 2020-09-17

## 2020-06-22 NOTE — TELEPHONE ENCOUNTER
Pt requesting refill of LANSOPRAZOLE 30 MG Oral Capsule Delayed Release    Last Time Medication was Filled:  3/25/20    Last Office Visit with Provider: 2/6/20    Appt scheduled on 7/13/20

## 2020-06-24 ENCOUNTER — OFFICE VISIT (OUTPATIENT)
Dept: PHYSICAL THERAPY | Age: 50
End: 2020-06-24
Attending: PODIATRIST
Payer: COMMERCIAL

## 2020-06-24 PROCEDURE — 97140 MANUAL THERAPY 1/> REGIONS: CPT

## 2020-06-24 PROCEDURE — 97112 NEUROMUSCULAR REEDUCATION: CPT

## 2020-06-24 PROCEDURE — 97110 THERAPEUTIC EXERCISES: CPT

## 2020-06-24 NOTE — PROGRESS NOTES
Progress Summary  Pt has attended 16 visits in Physical Therapy.      Dx: Chronic midline low back pain without sciatica           Insurance (Authorized # of Visits):  6           Authorizing Physician: Dr. Kam Lopez      Next MD visit: none scheduled  Fall Pt will increase core stability to lift a case of water without limitation. IN PROGRESS  Pt will increase AROM lumbar flexion to bend. MET  Pt will reduce highest reported pain to 3/10 to sleep through night.  IN PRORGESS  Pt will be independent in [facility] Ant/post pelvic tilt with ASIS monitoring 20x - Isometric hip adduction and abduction 3sx20 ea Isometric hip adduction and abduction 3sx20 ea - Isometric hip adduction and abduction 3sx20 ea -   IFC with MHPx15 min - IFC with MHPx15 min IFC with MHPx15 m

## 2020-06-29 ENCOUNTER — LAB ENCOUNTER (OUTPATIENT)
Dept: LAB | Age: 50
End: 2020-06-29
Attending: FAMILY MEDICINE
Payer: COMMERCIAL

## 2020-06-29 DIAGNOSIS — E53.8 B12 DEFICIENCY: ICD-10-CM

## 2020-06-29 DIAGNOSIS — E11.9 TYPE 2 DIABETES MELLITUS WITHOUT COMPLICATION, WITHOUT LONG-TERM CURRENT USE OF INSULIN (HCC): ICD-10-CM

## 2020-06-29 DIAGNOSIS — E66.01 MORBID OBESITY WITH BMI OF 45.0-49.9, ADULT (HCC): ICD-10-CM

## 2020-06-29 DIAGNOSIS — Z51.81 ENCOUNTER FOR THERAPEUTIC DRUG MONITORING: ICD-10-CM

## 2020-06-29 DIAGNOSIS — I10 ESSENTIAL HYPERTENSION: ICD-10-CM

## 2020-06-29 DIAGNOSIS — E78.2 MIXED HYPERLIPIDEMIA: ICD-10-CM

## 2020-06-29 DIAGNOSIS — E03.9 HYPOTHYROIDISM (ACQUIRED): ICD-10-CM

## 2020-06-29 LAB
ALBUMIN SERPL-MCNC: 3.5 G/DL (ref 3.4–5)
ALBUMIN/GLOB SERPL: 1 {RATIO} (ref 1–2)
ALP LIVER SERPL-CCNC: 64 U/L (ref 39–100)
ALT SERPL-CCNC: 36 U/L (ref 13–56)
ANION GAP SERPL CALC-SCNC: 6 MMOL/L (ref 0–18)
AST SERPL-CCNC: 24 U/L (ref 15–37)
BASOPHILS # BLD AUTO: 0.06 X10(3) UL (ref 0–0.2)
BASOPHILS NFR BLD AUTO: 1.1 %
BILIRUB SERPL-MCNC: 0.4 MG/DL (ref 0.1–2)
BUN BLD-MCNC: 15 MG/DL (ref 7–18)
BUN/CREAT SERPL: 21.7 (ref 10–20)
CALCIUM BLD-MCNC: 8.7 MG/DL (ref 8.5–10.1)
CHLORIDE SERPL-SCNC: 109 MMOL/L (ref 98–112)
CHOLEST SMN-MCNC: 156 MG/DL (ref ?–200)
CO2 SERPL-SCNC: 26 MMOL/L (ref 21–32)
CREAT BLD-MCNC: 0.69 MG/DL (ref 0.55–1.02)
DEPRECATED HBV CORE AB SER IA-ACNC: 30.7 NG/ML (ref 12–240)
DEPRECATED RDW RBC AUTO: 47.3 FL (ref 35.1–46.3)
EOSINOPHIL # BLD AUTO: 0.16 X10(3) UL (ref 0–0.7)
EOSINOPHIL NFR BLD AUTO: 2.9 %
ERYTHROCYTE [DISTWIDTH] IN BLOOD BY AUTOMATED COUNT: 13.7 % (ref 11–15)
EST. AVERAGE GLUCOSE BLD GHB EST-MCNC: 108 MG/DL (ref 68–126)
FOLATE SERPL-MCNC: 87 NG/ML (ref 8.7–?)
GLOBULIN PLAS-MCNC: 3.6 G/DL (ref 2.8–4.4)
GLUCOSE BLD-MCNC: 89 MG/DL (ref 70–99)
HBA1C MFR BLD HPLC: 5.4 % (ref ?–5.7)
HCT VFR BLD AUTO: 41.5 % (ref 35–48)
HDLC SERPL-MCNC: 84 MG/DL (ref 40–59)
HGB BLD-MCNC: 13.4 G/DL (ref 12–16)
IMM GRANULOCYTES # BLD AUTO: 0.02 X10(3) UL (ref 0–1)
IMM GRANULOCYTES NFR BLD: 0.4 %
IRON SATURATION: 29 % (ref 15–50)
IRON SERPL-MCNC: 102 UG/DL (ref 50–170)
LDLC SERPL CALC-MCNC: 58 MG/DL (ref ?–100)
LYMPHOCYTES # BLD AUTO: 2.58 X10(3) UL (ref 1–4)
LYMPHOCYTES NFR BLD AUTO: 46.6 %
M PROTEIN MFR SERPL ELPH: 7.1 G/DL (ref 6.4–8.2)
MCH RBC QN AUTO: 30.1 PG (ref 26–34)
MCHC RBC AUTO-ENTMCNC: 32.3 G/DL (ref 31–37)
MCV RBC AUTO: 93.3 FL (ref 80–100)
MONOCYTES # BLD AUTO: 0.39 X10(3) UL (ref 0.1–1)
MONOCYTES NFR BLD AUTO: 7 %
NEUTROPHILS # BLD AUTO: 2.33 X10 (3) UL (ref 1.5–7.7)
NEUTROPHILS # BLD AUTO: 2.33 X10(3) UL (ref 1.5–7.7)
NEUTROPHILS NFR BLD AUTO: 42 %
NONHDLC SERPL-MCNC: 72 MG/DL (ref ?–130)
OSMOLALITY SERPL CALC.SUM OF ELEC: 292 MOSM/KG (ref 275–295)
PATIENT FASTING Y/N/NP: YES
PATIENT FASTING Y/N/NP: YES
PLATELET # BLD AUTO: 300 10(3)UL (ref 150–450)
POTASSIUM SERPL-SCNC: 3.9 MMOL/L (ref 3.5–5.1)
RBC # BLD AUTO: 4.45 X10(6)UL (ref 3.8–5.3)
SODIUM SERPL-SCNC: 141 MMOL/L (ref 136–145)
T4 FREE SERPL-MCNC: 1 NG/DL (ref 0.8–1.7)
TOTAL IRON BINDING CAPACITY: 352 UG/DL (ref 240–450)
TRANSFERRIN SERPL-MCNC: 236 MG/DL (ref 200–360)
TRIGL SERPL-MCNC: 71 MG/DL (ref 30–149)
TSI SER-ACNC: 0.33 MIU/ML (ref 0.36–3.74)
VIT B12 SERPL-MCNC: 1701 PG/ML (ref 193–986)
VIT D+METAB SERPL-MCNC: 47.9 NG/ML (ref 30–100)
VLDLC SERPL CALC-MCNC: 14 MG/DL (ref 0–30)
WBC # BLD AUTO: 5.5 X10(3) UL (ref 4–11)

## 2020-06-29 PROCEDURE — 80061 LIPID PANEL: CPT

## 2020-06-29 PROCEDURE — 82607 VITAMIN B-12: CPT

## 2020-06-29 PROCEDURE — 82746 ASSAY OF FOLIC ACID SERUM: CPT

## 2020-06-29 PROCEDURE — 83550 IRON BINDING TEST: CPT

## 2020-06-29 PROCEDURE — 83036 HEMOGLOBIN GLYCOSYLATED A1C: CPT

## 2020-06-29 PROCEDURE — 80053 COMPREHEN METABOLIC PANEL: CPT

## 2020-06-29 PROCEDURE — 85025 COMPLETE CBC W/AUTO DIFF WBC: CPT

## 2020-06-29 PROCEDURE — 84425 ASSAY OF VITAMIN B-1: CPT

## 2020-06-29 PROCEDURE — 83540 ASSAY OF IRON: CPT

## 2020-06-29 PROCEDURE — 84439 ASSAY OF FREE THYROXINE: CPT

## 2020-06-29 PROCEDURE — 84443 ASSAY THYROID STIM HORMONE: CPT

## 2020-06-29 PROCEDURE — 36415 COLL VENOUS BLD VENIPUNCTURE: CPT

## 2020-06-29 PROCEDURE — 82728 ASSAY OF FERRITIN: CPT

## 2020-06-29 PROCEDURE — 82306 VITAMIN D 25 HYDROXY: CPT

## 2020-06-30 ENCOUNTER — APPOINTMENT (OUTPATIENT)
Dept: PHYSICAL THERAPY | Age: 50
End: 2020-06-30
Attending: PODIATRIST
Payer: COMMERCIAL

## 2020-07-01 ENCOUNTER — TELEPHONE (OUTPATIENT)
Dept: FAMILY MEDICINE CLINIC | Facility: CLINIC | Age: 50
End: 2020-07-01

## 2020-07-01 LAB — VITAMIN B1 (THIAMINE), WHOLE B: 168 NMOL/L

## 2020-07-01 NOTE — TELEPHONE ENCOUNTER
Spoke with Fern Reddy in PT and relayed message.  She verbalized understanding and will relay information to therapist.

## 2020-07-02 ENCOUNTER — APPOINTMENT (OUTPATIENT)
Dept: PHYSICAL THERAPY | Age: 50
End: 2020-07-02
Attending: PODIATRIST
Payer: COMMERCIAL

## 2020-07-06 ENCOUNTER — TELEPHONE (OUTPATIENT)
Dept: FAMILY MEDICINE CLINIC | Facility: CLINIC | Age: 50
End: 2020-07-06

## 2020-07-06 DIAGNOSIS — M76.31 ILIOTIBIAL BAND TENDINITIS OF BOTH SIDES: ICD-10-CM

## 2020-07-06 DIAGNOSIS — M76.32 ILIOTIBIAL BAND TENDINITIS OF BOTH SIDES: ICD-10-CM

## 2020-07-06 DIAGNOSIS — R42 VERTIGO: ICD-10-CM

## 2020-07-06 DIAGNOSIS — H81.10 BENIGN PAROXYSMAL POSITIONAL VERTIGO, UNSPECIFIED LATERALITY: Primary | ICD-10-CM

## 2020-07-06 DIAGNOSIS — Z98.890 HISTORY OF FOOT SURGERY: ICD-10-CM

## 2020-07-06 NOTE — TELEPHONE ENCOUNTER
Shefali Akbar, DO   to [facility] Staff      7/1/20 1:08 PM   Note      Okay for additional physical therapy visits.         Referral placed

## 2020-07-08 ENCOUNTER — OFFICE VISIT (OUTPATIENT)
Dept: PHYSICAL THERAPY | Age: 50
End: 2020-07-08
Attending: PODIATRIST
Payer: COMMERCIAL

## 2020-07-08 PROCEDURE — 97112 NEUROMUSCULAR REEDUCATION: CPT

## 2020-07-08 PROCEDURE — 97110 THERAPEUTIC EXERCISES: CPT

## 2020-07-08 PROCEDURE — 97140 MANUAL THERAPY 1/> REGIONS: CPT

## 2020-07-08 NOTE — PROGRESS NOTES
TSH is slightly hyperactive, continue to follow-up with PCP  B12 is elevated, is she taking extra supplement?

## 2020-07-08 NOTE — PROGRESS NOTES
Dx: Chronic midline low back pain without sciatica           Insurance (Authorized # of Visits):  6           Authorizing Physician: Dr. Sherley Livingston      Next MD visit: none scheduled  Fall Risk: standard         Precautions: n/a              Subjective: Pt st 6/9/20 12/16 6/11/20  13/16 6/15/20  14/16 6/17/20  15/16 6/24/20  16/20 7/8/20  17/21   Recumbent bike 8 min Recumbent bike 5 min Recumbent bike 5 min Recumbent bike 5 min Recumbent bike 5 min Recumbent bike 8 min Recumbent bike 8 min Recumbent bike 8 mi isometric hip adduction and abduction 5sx5 ea     Charges: manual x1, therex x1, neuro x1              Total Timed Treatment: 45 min              Total Treatment Time: 45 min

## 2020-07-09 ENCOUNTER — OFFICE VISIT (OUTPATIENT)
Dept: FAMILY MEDICINE CLINIC | Facility: CLINIC | Age: 50
End: 2020-07-09

## 2020-07-09 VITALS
HEART RATE: 80 BPM | OXYGEN SATURATION: 98 % | SYSTOLIC BLOOD PRESSURE: 122 MMHG | DIASTOLIC BLOOD PRESSURE: 60 MMHG | WEIGHT: 167.19 LBS | TEMPERATURE: 99 F | BODY MASS INDEX: 31.16 KG/M2 | HEIGHT: 61.5 IN

## 2020-07-09 DIAGNOSIS — E03.9 HYPOTHYROIDISM (ACQUIRED): ICD-10-CM

## 2020-07-09 DIAGNOSIS — M77.11 LATERAL EPICONDYLITIS OF RIGHT ELBOW: ICD-10-CM

## 2020-07-09 DIAGNOSIS — H60.331 ACUTE SWIMMER'S EAR OF RIGHT SIDE: ICD-10-CM

## 2020-07-09 DIAGNOSIS — R42 VERTIGO: Primary | ICD-10-CM

## 2020-07-09 DIAGNOSIS — Z86.39 HISTORY OF DIABETES MELLITUS, TYPE II: ICD-10-CM

## 2020-07-09 DIAGNOSIS — Z86.79 HISTORY OF HYPERTENSION: ICD-10-CM

## 2020-07-09 DIAGNOSIS — Z98.84 S/P LAPAROSCOPIC SLEEVE GASTRECTOMY: ICD-10-CM

## 2020-07-09 LAB
CREAT UR-SCNC: 104 MG/DL
MICROALBUMIN UR-MCNC: 1.93 MG/DL
MICROALBUMIN/CREAT 24H UR-RTO: 18.6 UG/MG (ref ?–30)

## 2020-07-09 PROCEDURE — 82043 UR ALBUMIN QUANTITATIVE: CPT | Performed by: FAMILY MEDICINE

## 2020-07-09 PROCEDURE — 3078F DIAST BP <80 MM HG: CPT | Performed by: FAMILY MEDICINE

## 2020-07-09 PROCEDURE — 3008F BODY MASS INDEX DOCD: CPT | Performed by: FAMILY MEDICINE

## 2020-07-09 PROCEDURE — 3074F SYST BP LT 130 MM HG: CPT | Performed by: FAMILY MEDICINE

## 2020-07-09 PROCEDURE — 82570 ASSAY OF URINE CREATININE: CPT | Performed by: FAMILY MEDICINE

## 2020-07-09 PROCEDURE — 99215 OFFICE O/P EST HI 40 MIN: CPT | Performed by: FAMILY MEDICINE

## 2020-07-09 RX ORDER — LEVOTHYROXINE SODIUM 112 UG/1
112 TABLET ORAL
Qty: 30 TABLET | Refills: 1 | Status: SHIPPED | OUTPATIENT
Start: 2020-07-09 | End: 2020-09-02

## 2020-07-09 RX ORDER — MECLIZINE HYDROCHLORIDE 25 MG/1
25 TABLET ORAL 3 TIMES DAILY PRN
Qty: 30 TABLET | Refills: 0 | Status: SHIPPED | OUTPATIENT
Start: 2020-07-09 | End: 2020-07-09

## 2020-07-09 RX ORDER — MECLIZINE HYDROCHLORIDE 25 MG/1
25 TABLET ORAL 3 TIMES DAILY PRN
Qty: 30 TABLET | Refills: 2 | Status: SHIPPED | OUTPATIENT
Start: 2020-07-09 | End: 2021-04-08

## 2020-07-10 DIAGNOSIS — E78.2 MIXED HYPERLIPIDEMIA: ICD-10-CM

## 2020-07-10 DIAGNOSIS — E11.9 TYPE 2 DIABETES MELLITUS WITHOUT COMPLICATION, WITHOUT LONG-TERM CURRENT USE OF INSULIN (HCC): ICD-10-CM

## 2020-07-10 RX ORDER — SIMVASTATIN 20 MG
20 TABLET ORAL EVERY EVENING
Qty: 90 TABLET | Refills: 1 | Status: SHIPPED | OUTPATIENT
Start: 2020-07-10 | End: 2021-01-05

## 2020-07-10 NOTE — TELEPHONE ENCOUNTER
Pt requesting refill of Simvastatin 20 mg     Last Time Medication was Prescribed :  11/19/2019 qty #90 with 1 refill    Last Office Visit with Provider: 07/10/2020    Appt scheduled on 08/05/2020    Routed to [facility]

## 2020-07-14 ENCOUNTER — APPOINTMENT (OUTPATIENT)
Dept: PHYSICAL THERAPY | Age: 50
End: 2020-07-14
Attending: PODIATRIST
Payer: COMMERCIAL

## 2020-07-15 ENCOUNTER — OFFICE VISIT (OUTPATIENT)
Dept: PHYSICAL THERAPY | Age: 50
End: 2020-07-15
Attending: PODIATRIST
Payer: COMMERCIAL

## 2020-07-15 PROCEDURE — 97140 MANUAL THERAPY 1/> REGIONS: CPT

## 2020-07-15 PROCEDURE — 97112 NEUROMUSCULAR REEDUCATION: CPT

## 2020-07-15 PROCEDURE — 97110 THERAPEUTIC EXERCISES: CPT

## 2020-07-15 NOTE — PROGRESS NOTES
Dx: Chronic midline low back pain without sciatica           Insurance (Authorized # of Visits):  6           Authorizing Physician: Dr. Priscilla Steiner      Next MD visit: none scheduled  Fall Risk: standard         Precautions: n/a              Subjective: Pt st 7/15/20  18/21   Recumbent bike 5 min Recumbent bike 5 min Recumbent bike 5 min Recumbent bike 8 min Recumbent bike 8 min Recumbent bike 8 min Recumbent bike 8 min   Multifidi punches and chops 7# 20x ea Multifidi punches and chops 7# 20x ea SB 3 way stret

## 2020-07-21 ENCOUNTER — OFFICE VISIT (OUTPATIENT)
Dept: PHYSICAL THERAPY | Age: 50
End: 2020-07-21
Attending: PODIATRIST
Payer: COMMERCIAL

## 2020-07-21 PROCEDURE — 97112 NEUROMUSCULAR REEDUCATION: CPT

## 2020-07-21 PROCEDURE — 97110 THERAPEUTIC EXERCISES: CPT

## 2020-07-21 NOTE — PROGRESS NOTES
Discharge Summary  Pt has attended 19 visits in Physical Therapy.    Dx: Chronic midline low back pain without sciatica           Insurance (Authorized # of Visits):  8           Authorizing Physician: Dr. Angel Prior      Next MD visit: none scheduled  Fall R increase core stability to lift a case of water without limitation. PARTIALLY MET  Pt will increase AROM lumbar flexion to bend. MET  Pt will reduce highest reported pain to 3/10 to sleep through night.  MET  Pt will be independent in comprehensive HEP for rollout core on high table 15x -   IFC with MHPx15 min IFC with MHPx15 min - - - - -   Segmental LTR 3 levels 10x ea Segmental LTR and LTR stretch 10x ea Segmental LTR and LTR stretch 10x ea - Segmental LTR and LTR stretch 10x ea Segmental LTR and LTR stre

## 2020-07-29 ENCOUNTER — OFFICE VISIT (OUTPATIENT)
Dept: PHYSICAL THERAPY | Age: 50
End: 2020-07-29
Attending: PODIATRIST
Payer: COMMERCIAL

## 2020-07-29 ENCOUNTER — TELEPHONE (OUTPATIENT)
Dept: PHYSICAL THERAPY | Age: 50
End: 2020-07-29

## 2020-07-29 DIAGNOSIS — M77.11 LATERAL EPICONDYLITIS OF RIGHT ELBOW: ICD-10-CM

## 2020-07-29 PROCEDURE — 97161 PT EVAL LOW COMPLEX 20 MIN: CPT

## 2020-07-29 PROCEDURE — 97112 NEUROMUSCULAR REEDUCATION: CPT

## 2020-07-29 NOTE — PROGRESS NOTES
UPPER EXTREMITY EVALUATION:   Referring Physician: Dr. Yasmine Aguilera  Diagnosis: lateral epicondylitis of right elbow.       Date of Service: 7/29/2020     PATIENT Marco Antonio Overton is a 51year old y/o female who presents to therapy today with complaint extensor wad.    joint mobility: radiocarpal hypomobile    AROM:   wrist   Flexion: R 48; L 45  extension: R 45*; L 60  supination: R 70; L 70  pronation: R 72; L 90   AROM:   elbow   Flexion: R 140; L 140  extension: R 0 with high reported pain; L 0 treatment and while under my care.     X___________________________________________________ Date____________________    Certification From: 0/75/3465  To:10/27/2020

## 2020-08-03 ENCOUNTER — TELEPHONE (OUTPATIENT)
Dept: FAMILY MEDICINE CLINIC | Facility: CLINIC | Age: 50
End: 2020-08-03

## 2020-08-03 ENCOUNTER — HOSPITAL ENCOUNTER (OUTPATIENT)
Age: 50
Discharge: HOME | End: 2020-08-03
Payer: COMMERCIAL

## 2020-08-03 ENCOUNTER — APPOINTMENT (OUTPATIENT)
Dept: PHYSICAL THERAPY | Age: 50
End: 2020-08-03
Attending: PODIATRIST
Payer: COMMERCIAL

## 2020-08-03 ENCOUNTER — TELEMEDICINE (OUTPATIENT)
Dept: TELEHEALTH | Age: 50
End: 2020-08-03

## 2020-08-03 VITALS
SYSTOLIC BLOOD PRESSURE: 143 MMHG | RESPIRATION RATE: 18 BRPM | BODY MASS INDEX: 30.73 KG/M2 | DIASTOLIC BLOOD PRESSURE: 87 MMHG | WEIGHT: 167 LBS | HEIGHT: 62 IN | OXYGEN SATURATION: 98 % | TEMPERATURE: 97 F | HEART RATE: 65 BPM

## 2020-08-03 DIAGNOSIS — Z20.822 ENCOUNTER FOR LABORATORY TESTING FOR COVID-19 VIRUS: ICD-10-CM

## 2020-08-03 DIAGNOSIS — Z02.9 ENCOUNTERS FOR ADMINISTRATIVE PURPOSE: Primary | ICD-10-CM

## 2020-08-03 DIAGNOSIS — J02.9 PHARYNGITIS, UNSPECIFIED ETIOLOGY: Primary | ICD-10-CM

## 2020-08-03 DIAGNOSIS — G44.209 TENSION-TYPE HEADACHE, NOT INTRACTABLE, UNSPECIFIED CHRONICITY PATTERN: ICD-10-CM

## 2020-08-03 DIAGNOSIS — M77.11 LATERAL EPICONDYLITIS OF RIGHT ELBOW: Primary | ICD-10-CM

## 2020-08-03 LAB — POCT RAPID STREP: NEGATIVE

## 2020-08-03 PROCEDURE — 99214 OFFICE O/P EST MOD 30 MIN: CPT

## 2020-08-03 PROCEDURE — 87430 STREP A AG IA: CPT | Performed by: NURSE PRACTITIONER

## 2020-08-03 PROCEDURE — 87081 CULTURE SCREEN ONLY: CPT | Performed by: NURSE PRACTITIONER

## 2020-08-03 RX ORDER — LIDOCAINE HYDROCHLORIDE 20 MG/ML
5 SOLUTION OROPHARYNGEAL
Qty: 100 ML | Refills: 0 | Status: SHIPPED | OUTPATIENT
Start: 2020-08-03 | End: 2020-10-13

## 2020-08-03 NOTE — TELEPHONE ENCOUNTER
Spoke with patient. No fever, no shortness of breath. Advised to stay home, separate from family as much as possible.   She stated she is going to try to get COVID testing through other means but will switch her appointment on Wed to virtual.  Appointment

## 2020-08-03 NOTE — PROGRESS NOTES
PT referred to wade KEE. Has sore throat. Worsening, tender lymph notes. Would also to be checked for covid. Advised wearing mask. No charge for video visit.

## 2020-08-03 NOTE — TELEPHONE ENCOUNTER
Bennie from PT called and said pt has an appt this evening for PT but they do not have a referral.  She said she will need a total of 8 visits and this will be for her elbow.

## 2020-08-03 NOTE — ED PROVIDER NOTES
Patient Seen in: [facility] [address]      History   Patient presents with:  Sore Throat    Stated Complaint: TL - ST, wants covid test    HPI  Patient is a 61-year-old female with past medical history of PCO S, diverticulitis, osteoarthr Keratoconjunctivitis sicca not specified as Sjogren's, bilateral 03/27/2019    Sinai Upton M.D.   • Kidney infection 11/30/15    currently on macrobid for kidney infection-instructed to inform surgeon of infection.    • Lung nodule     pt unsure of whic UPPER GI ENDOSCOPY,EXAM                  Family history reviewed with patient/caregiver and is not pertinent to presenting problem. Social History    Tobacco Use      Smoking status: Never Smoker      Smokeless tobacco: Never Used    Alcohol use:  Yes Musculoskeletal: Normal range of motion and neck supple. Cardiovascular:      Rate and Rhythm: Normal rate and regular rhythm. Pulses: Normal pulses. Heart sounds: Normal heart sounds. No murmur. No friction rub. No gallop.     Pulmonary:      E

## 2020-08-03 NOTE — TELEPHONE ENCOUNTER
Patient called states she has a sore throat and headache.  Was suppose to have a PT ppt but with the symptoms was given the COVID hotline and they advised her to call her PCP and they said it could be allergies but since the symptoms overlap it was a good i

## 2020-08-05 ENCOUNTER — TELEMEDICINE (OUTPATIENT)
Dept: FAMILY MEDICINE CLINIC | Facility: CLINIC | Age: 50
End: 2020-08-05

## 2020-08-05 ENCOUNTER — APPOINTMENT (OUTPATIENT)
Dept: PHYSICAL THERAPY | Age: 50
End: 2020-08-05
Attending: PODIATRIST
Payer: COMMERCIAL

## 2020-08-05 DIAGNOSIS — G43.909 MIGRAINE WITHOUT STATUS MIGRAINOSUS, NOT INTRACTABLE, UNSPECIFIED MIGRAINE TYPE: Primary | ICD-10-CM

## 2020-08-05 DIAGNOSIS — J02.9 PHARYNGITIS, UNSPECIFIED ETIOLOGY: ICD-10-CM

## 2020-08-05 LAB — SARS-COV-2 RNA RESP QL NAA+PROBE: NOT DETECTED

## 2020-08-05 PROCEDURE — 99213 OFFICE O/P EST LOW 20 MIN: CPT | Performed by: FAMILY MEDICINE

## 2020-08-05 NOTE — PROGRESS NOTES
Video visit with synchronous video and audio via [employer] verbally consents to a Video visit with synchronous video and audio via [employer] service on 08/05/20.   Patient has been referred to the [facility] website at [email or web address] 1 tablet (20 mg total) by mouth every evening.  90 tablet 1   • Levothyroxine Sodium 112 MCG Oral Tab Take 1 tablet (112 mcg total) by mouth before breakfast. 30 tablet 1   • Neomycin-Polymyxin-HC 3.5-47644-2 Otic Solution Place 4 drops into the right ear 3 Hypothyroidism     Allergic rhinitis     History of kidney stones     H/O colonoscopy with polypectomy     Eczema     GERD (gastroesophageal reflux disease)     Chronic low back pain     Mixed hyperlipidemia     Low HDL (under 40)     Lower abdominal adhes Migraine without status migrainosus, not intractable     Acute pain of right knee     Patellofemoral arthritis of right knee     Glaucoma suspect of both eyes     Intraocular pressure increase, bilateral     Acute meniscal tear, medial, right, initial enco findings, high risk, bilateral 03/27/2019    Margreta Gitelman, M.D.   • Osteoarthritis    • Other vitreous opacities, left eye 03/27/2019    Margreta Gitelman, M.D.   • Other vitreous opacities, right eye 03/27/2019    Margreta Gitelman, M.D.   • Polycystic ovarie that if she is not heard back regarding the results of the testing that it will be only herself and her  that goes camping and not the rest of the family including her mother. 2. Pharyngitis, unspecified etiology  Improving.         Return if symp

## 2020-08-10 ENCOUNTER — OFFICE VISIT (OUTPATIENT)
Dept: PHYSICAL THERAPY | Age: 50
End: 2020-08-10
Attending: PODIATRIST
Payer: COMMERCIAL

## 2020-08-10 PROCEDURE — 97112 NEUROMUSCULAR REEDUCATION: CPT

## 2020-08-10 PROCEDURE — 97140 MANUAL THERAPY 1/> REGIONS: CPT

## 2020-08-10 NOTE — PROGRESS NOTES
Dx: lateral epicondylitis of right elbow.              Insurance (Authorized # of Visits):  6           Authorizing Physician: Dr. Emerita Lau  Next MD visit: none scheduled  Fall Risk: standard         Precautions: n/a             Subjective: Pt states her el

## 2020-08-13 ENCOUNTER — APPOINTMENT (OUTPATIENT)
Dept: PHYSICAL THERAPY | Age: 50
End: 2020-08-13
Attending: PODIATRIST
Payer: COMMERCIAL

## 2020-08-19 ENCOUNTER — OFFICE VISIT (OUTPATIENT)
Dept: PHYSICAL THERAPY | Age: 50
End: 2020-08-19
Attending: PODIATRIST
Payer: COMMERCIAL

## 2020-08-19 PROCEDURE — 97112 NEUROMUSCULAR REEDUCATION: CPT

## 2020-08-19 PROCEDURE — 97140 MANUAL THERAPY 1/> REGIONS: CPT

## 2020-08-19 PROCEDURE — 97110 THERAPEUTIC EXERCISES: CPT

## 2020-08-19 NOTE — PROGRESS NOTES
Dx: lateral epicondylitis of right elbow.              Insurance (Authorized # of Visits):  6           Authorizing Physician: Dr. Dahlia Minaya MD visit: none scheduled  Fall Risk: standard         Precautions: n/a             Subjective: Pt states her el scaption 2x10      Manual x10min  Wrist extensor STM  UT STM Manual x10min  Wrist extensor STM  UT STM      HEP: eccentric wrist extension 2x10, wrist extensor stretch 3x30s, prone mid trap 2x10, prone 90/90 ER lift off 5sx10    Charges: manual  X1, neuro

## 2020-08-25 ENCOUNTER — OFFICE VISIT (OUTPATIENT)
Dept: RHEUMATOLOGY | Facility: CLINIC | Age: 50
End: 2020-08-25

## 2020-08-25 VITALS
WEIGHT: 167 LBS | RESPIRATION RATE: 18 BRPM | DIASTOLIC BLOOD PRESSURE: 68 MMHG | TEMPERATURE: 98 F | HEART RATE: 68 BPM | HEIGHT: 61.5 IN | SYSTOLIC BLOOD PRESSURE: 138 MMHG | BODY MASS INDEX: 31.13 KG/M2

## 2020-08-25 DIAGNOSIS — M35.00 SICCA SYNDROME (HCC): ICD-10-CM

## 2020-08-25 DIAGNOSIS — R76.8 SS-A ANTIBODY POSITIVE: ICD-10-CM

## 2020-08-25 DIAGNOSIS — M15.9 PRIMARY OSTEOARTHRITIS INVOLVING MULTIPLE JOINTS: ICD-10-CM

## 2020-08-25 DIAGNOSIS — M25.50 POLYARTHRALGIA: Primary | ICD-10-CM

## 2020-08-25 DIAGNOSIS — M79.7 FIBROMYALGIA: ICD-10-CM

## 2020-08-25 DIAGNOSIS — R76.8 POSITIVE ANA (ANTINUCLEAR ANTIBODY): ICD-10-CM

## 2020-08-25 PROCEDURE — 99214 OFFICE O/P EST MOD 30 MIN: CPT | Performed by: INTERNAL MEDICINE

## 2020-08-25 PROCEDURE — 3075F SYST BP GE 130 - 139MM HG: CPT | Performed by: INTERNAL MEDICINE

## 2020-08-25 PROCEDURE — 3078F DIAST BP <80 MM HG: CPT | Performed by: INTERNAL MEDICINE

## 2020-08-25 PROCEDURE — 3008F BODY MASS INDEX DOCD: CPT | Performed by: INTERNAL MEDICINE

## 2020-08-25 NOTE — PROGRESS NOTES
?  RHEUMATOLOGY Follow up   Date of visit: 8/25/2020  ? Patient presents with: Follow - Up: f/u, symptoms do not match test results, c/o back pain, hand pain, muscle    ?   ASSESSMENT, DISCUSSION & PLAN   Assessment:  Polyarthralgia  (primary encounter importance of having any underlying depression/anxiety/PTSD treated in addition to proper and healthy sleep habits as well as regular exercise. I will call pt with test results and next steps in management.      She will follow up in 4 months or sooner as antibody)  -     LUIS ARMANDO BY IFA, IGG; Future  -     SJOGREN'S ANTI-SS-A; Future  -     COMPLEMENT C3, SERUM; Future  -     COMPLEMENT C4, SERUM; Future  -     SED RATE, WESTERGREN (AUTOMATED);  Future  -     C-REACTIVE PROTEIN; Future    SS-A antibody positive of bariatric surgery. Has tried tumeric but only as needed due to issues with GI discomfort. Applies essential oils. Tylenol rarely but only provides mild relief. Got a TENS unit which does help.        HPI from initial consultation  referred for rheumatolo trimester, able to conceive twice afterwards - relatively normal per pt   + rare difficulty swallowing  + blisters over fingertips when under stress - none currently   + follows with ophthalmology- has been diagnosed with early glaucoma as well as hx of vi ?  Past Medical History:  Past Medical History:   Diagnosis Date   • Acute atopic conjunctivitis, bilateral 03/27/2019    Di Morse M.D.   • Anxiety state    • Back problem    • Calculus of kidney    • Colon polyp 09/27/2013    Brent Ingram, • FOOT SURGERY Right 10/18/2019    Tendon repair    • HERNIA SURGERY  10/18/2018    Hiatal hernia Dr. Angie De La O    • HYSTERECTOMY  2006    partial hystero.    • LAP SLEEVE GASTRECTOMY  10/18/2018    Dr. Mitra Claudio     • OOPHORECTOMY B 1  Neomycin-Polymyxin-HC 3.5-45647-0 Otic Solution, Place 4 drops into the right ear 3 (three) times daily. , Disp: 10 mL, Rfl: 0  Meclizine HCl 25 MG Oral Tab, Take 1 tablet (25 mg total) by mouth 3 (three) times daily as needed for Dizziness. , Disp: 30 ta Comment:rash  Augmentin [Amoxicil*    NAUSEA AND VOMITING  Avocado                 ITCHING  Darvocet [Propoxyph*    NAUSEA AND VOMITING  Latex                   RASH, SWELLING  Nuts                    ANAPHYLAXIS    Comment:MARGOT/ABBI Henry Eyes: Pupils are equal, round, and reactive to light. Conjunctivae and EOM are normal. No scleral icterus. Neck: Normal range of motion. Neck supple. No JVD present. No tracheal deviation present.    Cardiovascular: Normal rate, regular rhythm, normal h G89.29 Other ch*     TECHNIQUE:  Multiplanar T1 and T2 weighted images including fat suppression sequences. Images acquired in sagittal and axial planes.        PATIENT STATED HISTORY: (As transcribed by Technologist)  The patient has had low back pain for spinal canal stenosis is seen at L4-5. Moderate right foraminal narrowing is seen at L4-5. Moderate left foraminal narrowing is seen at L5-S1. Moderate   degenerative disc disease noted at L4-5. Please see above for further details.   2. There is sclero 93.3 06/29/2020    MCH 30.1 06/29/2020    MCHC 32.3 06/29/2020    RDW 13.7 06/29/2020    NEPRELIM 2.33 06/29/2020    NEUTABS 5,438 09/15/2015    LYMPHABS 2,619 09/15/2015    EOSABS 209 09/15/2015    BASABS 52 09/15/2015    NEUT 62.5 09/15/2015    LYMPH 30.

## 2020-08-25 NOTE — PATIENT INSTRUCTIONS
-- for the dry eye syndrome, we will recheck the LUIS ARMANDO/SSA and inflammatory markers, if still positive, you likely have Sjogren's syndrome and will consider adding plaquenil to your regimen.  If your ophthalmologist does not want you starting on plaquenil, wi

## 2020-08-26 ENCOUNTER — OFFICE VISIT (OUTPATIENT)
Dept: PHYSICAL THERAPY | Age: 50
End: 2020-08-26
Attending: PODIATRIST
Payer: COMMERCIAL

## 2020-08-26 ENCOUNTER — TELEPHONE (OUTPATIENT)
Dept: RHEUMATOLOGY | Facility: CLINIC | Age: 50
End: 2020-08-26

## 2020-08-26 PROCEDURE — 97112 NEUROMUSCULAR REEDUCATION: CPT

## 2020-08-26 PROCEDURE — 97110 THERAPEUTIC EXERCISES: CPT

## 2020-08-26 PROCEDURE — 97140 MANUAL THERAPY 1/> REGIONS: CPT

## 2020-08-26 NOTE — PROGRESS NOTES
Dx: lateral epicondylitis of right elbow.              Insurance (Authorized # of Visits):  6           Authorizing Physician: Dr. Mimi Minaya MD visit: none scheduled  Fall Risk: standard         Precautions: n/a             Subjective: Pt states her el Manual x10min  Wrist extensor STM  UT STM Manual x10min  Wrist extensor STM     HEP: eccentric wrist extension 2x10, wrist extensor stretch 3x30s, prone mid trap 2x10, prone 90/90 ER lift off 5sx10    Charges: manual  X1, neuro x1, therex x1       Total Ti

## 2020-08-28 ENCOUNTER — LAB ENCOUNTER (OUTPATIENT)
Dept: LAB | Age: 50
End: 2020-08-28
Attending: INTERNAL MEDICINE
Payer: COMMERCIAL

## 2020-08-28 DIAGNOSIS — M25.50 POLYARTHRALGIA: ICD-10-CM

## 2020-08-28 DIAGNOSIS — E03.9 HYPOTHYROIDISM (ACQUIRED): ICD-10-CM

## 2020-08-28 DIAGNOSIS — R76.8 POSITIVE ANA (ANTINUCLEAR ANTIBODY): ICD-10-CM

## 2020-08-28 DIAGNOSIS — M35.00 SICCA SYNDROME (HCC): ICD-10-CM

## 2020-08-28 DIAGNOSIS — R76.8 SS-A ANTIBODY POSITIVE: ICD-10-CM

## 2020-08-28 LAB
C3 SERPL-MCNC: 112 MG/DL (ref 90–180)
C4 SERPL-MCNC: 21.2 MG/DL (ref 10–40)
CRP SERPL-MCNC: <0.29 MG/DL (ref ?–0.3)
SED RATE-ML: 9 MM/HR (ref 0–25)
T4 FREE SERPL-MCNC: 1.2 NG/DL (ref 0.8–1.7)
TSI SER-ACNC: 0.96 MIU/ML (ref 0.36–3.74)

## 2020-08-28 PROCEDURE — 86038 ANTINUCLEAR ANTIBODIES: CPT

## 2020-08-28 PROCEDURE — 86160 COMPLEMENT ANTIGEN: CPT

## 2020-08-28 PROCEDURE — 86235 NUCLEAR ANTIGEN ANTIBODY: CPT

## 2020-08-28 PROCEDURE — 86140 C-REACTIVE PROTEIN: CPT

## 2020-08-28 PROCEDURE — 84439 ASSAY OF FREE THYROXINE: CPT

## 2020-08-28 PROCEDURE — 84443 ASSAY THYROID STIM HORMONE: CPT

## 2020-08-28 PROCEDURE — 85652 RBC SED RATE AUTOMATED: CPT

## 2020-08-28 PROCEDURE — 36415 COLL VENOUS BLD VENIPUNCTURE: CPT

## 2020-08-31 ENCOUNTER — OFFICE VISIT (OUTPATIENT)
Dept: PHYSICAL THERAPY | Age: 50
End: 2020-08-31
Attending: PODIATRIST
Payer: COMMERCIAL

## 2020-08-31 LAB
ANTI-NUCLEAR ANTIBODY (ANA), HEP-2 IGG: DETECTED
SSA AUTOAB: <100 AU/ML (ref ?–100)

## 2020-08-31 PROCEDURE — 97035 APP MDLTY 1+ULTRASOUND EA 15: CPT

## 2020-08-31 PROCEDURE — 97112 NEUROMUSCULAR REEDUCATION: CPT

## 2020-08-31 PROCEDURE — 97140 MANUAL THERAPY 1/> REGIONS: CPT

## 2020-08-31 NOTE — PROGRESS NOTES
Dx: lateral epicondylitis of right elbow.              Insurance (Authorized # of Visits):  6           Authorizing Physician: Dr. Mariangel Villanueva  Next MD visit: none scheduled  Fall Risk: standard         Precautions: n/a             Subjective: Pt states her el Wall pushup 2x10 US 3.3 mghz, 1.1 wcm2 x8 min lateral elbow US 3.3 mghz, 1.1 wcm2 x8 min lateral elbow    - Median nerve glide 2x10 Median nerve glide 2x10 Radial nerve glide 2x10 ea    - 2# scaption 2x10 2# scaption 2x10 Wall pushup 2x10    Manual x10min

## 2020-09-01 ENCOUNTER — HOSPITAL ENCOUNTER (OUTPATIENT)
Dept: MAMMOGRAPHY | Age: 50
Discharge: HOME | End: 2020-09-01
Attending: OBSTETRICS & GYNECOLOGY
Payer: COMMERCIAL

## 2020-09-01 ENCOUNTER — PATIENT MESSAGE (OUTPATIENT)
Dept: RHEUMATOLOGY | Facility: CLINIC | Age: 50
End: 2020-09-01

## 2020-09-01 DIAGNOSIS — M79.7 FIBROMYALGIA: Primary | ICD-10-CM

## 2020-09-01 DIAGNOSIS — Z12.31 ENCOUNTER FOR SCREENING MAMMOGRAM FOR MALIGNANT NEOPLASM OF BREAST: ICD-10-CM

## 2020-09-01 PROCEDURE — 77067 SCR MAMMO BI INCL CAD: CPT | Performed by: OBSTETRICS & GYNECOLOGY

## 2020-09-01 PROCEDURE — 77063 BREAST TOMOSYNTHESIS BI: CPT | Performed by: OBSTETRICS & GYNECOLOGY

## 2020-09-02 ENCOUNTER — TELEPHONE (OUTPATIENT)
Dept: FAMILY MEDICINE CLINIC | Facility: CLINIC | Age: 50
End: 2020-09-02

## 2020-09-02 ENCOUNTER — OFFICE VISIT (OUTPATIENT)
Dept: PHYSICAL THERAPY | Age: 50
End: 2020-09-02
Attending: PODIATRIST
Payer: COMMERCIAL

## 2020-09-02 DIAGNOSIS — E03.9 HYPOTHYROIDISM (ACQUIRED): Primary | ICD-10-CM

## 2020-09-02 DIAGNOSIS — E03.9 HYPOTHYROIDISM (ACQUIRED): ICD-10-CM

## 2020-09-02 PROCEDURE — 97140 MANUAL THERAPY 1/> REGIONS: CPT

## 2020-09-02 PROCEDURE — 97112 NEUROMUSCULAR REEDUCATION: CPT

## 2020-09-02 PROCEDURE — 97035 APP MDLTY 1+ULTRASOUND EA 15: CPT

## 2020-09-02 RX ORDER — LEVOTHYROXINE SODIUM 112 UG/1
112 TABLET ORAL
Qty: 90 TABLET | Refills: 0 | Status: SHIPPED | OUTPATIENT
Start: 2020-09-02 | End: 2020-12-07

## 2020-09-02 NOTE — TELEPHONE ENCOUNTER
Called pt and discussed grossly normal labs. States she has a bruise sensation throughout her body- particularly in the arms, shoulders and back as well as legs. Denies overt swelling of the joints. Swelling only present if she has a bruise.  But admits

## 2020-09-02 NOTE — TELEPHONE ENCOUNTER
Requested Prescriptions     Signed Prescriptions Disp Refills   • Levothyroxine Sodium 112 MCG Oral Tab 90 tablet 0     Sig: Take 1 tablet (112 mcg total) by mouth before breakfast.     Authorizing Provider: Darlyn Savage     Ordering User: Radha Carbajal

## 2020-09-02 NOTE — PROGRESS NOTES
Dx: lateral epicondylitis of right elbow.              Insurance (Authorized # of Visits):  6           Authorizing Physician: Dr. Zenaida Jarrett  Next MD visit: none scheduled  Fall Risk: standard         Precautions: n/a             Subjective: Pt states her el 1.1 wcm2 x8 min lateral elbow US 3.3 mghz, 1.1 wcm2 x8 min lateral elbow US 3.3 mghz, 1.1 wcm2 x8 min lateral elbow   - Median nerve glide 2x10 Median nerve glide 2x10 Radial nerve glide 2x10 ea -   - 2# scaption 2x10 2# scaption 2x10 Wall pushup 2x10 YTB

## 2020-09-08 ENCOUNTER — OFFICE VISIT (OUTPATIENT)
Dept: PHYSICAL THERAPY | Age: 50
End: 2020-09-08
Attending: PODIATRIST
Payer: COMMERCIAL

## 2020-09-08 PROCEDURE — 97140 MANUAL THERAPY 1/> REGIONS: CPT

## 2020-09-08 PROCEDURE — 97012 MECHANICAL TRACTION THERAPY: CPT

## 2020-09-08 PROCEDURE — 97035 APP MDLTY 1+ULTRASOUND EA 15: CPT

## 2020-09-08 NOTE — PROGRESS NOTES
Dx: lateral epicondylitis of right elbow.              Insurance (Authorized # of Visits):  6           Authorizing Physician: Dr. Magnus Olszewski  Next MD visit: none scheduled  Fall Risk: standard         Precautions: n/a             Subjective: Pt states she we glide 2x10 ea - TRX rows 20x    2# scaption 2x10 Wall pushup 2x10 YTB ER 2x10 -   Manual x10min  Wrist extensor STM Manual x10min  Wrist extensor STM Manual x10min  Wrist extensor STM Manual x10min  Wrist extensor STM   HEP: eccentric wrist extension 2x10,

## 2020-09-15 ENCOUNTER — OFFICE VISIT (OUTPATIENT)
Dept: PHYSICAL THERAPY | Age: 50
End: 2020-09-15
Attending: PODIATRIST
Payer: COMMERCIAL

## 2020-09-15 PROCEDURE — 97140 MANUAL THERAPY 1/> REGIONS: CPT

## 2020-09-15 PROCEDURE — 97035 APP MDLTY 1+ULTRASOUND EA 15: CPT

## 2020-09-15 PROCEDURE — 97112 NEUROMUSCULAR REEDUCATION: CPT

## 2020-09-15 NOTE — PROGRESS NOTES
Dx: lateral epicondylitis of right elbow.              Insurance (Authorized # of Visits):  6           Authorizing Physician: Dr. Dagoberto Galeas  Next MD visit: none scheduled  Fall Risk: standard         Precautions: n/a             Subjective: Pt states she we lateral elbow US 3.3 mghz, 1.1 wcm2 x8 min lateral elbow   Median nerve glide 2x10 Radial nerve glide 2x10 ea - TRX rows 20x  TRX rows 20x    2# scaption 2x10 Wall pushup 2x10 YTB ER 2x10 - 90/90 ER 3# 2x10   Manual x10min  Wrist extensor STM Manual x10min

## 2020-09-17 ENCOUNTER — OFFICE VISIT (OUTPATIENT)
Dept: PHYSICAL THERAPY | Age: 50
End: 2020-09-17
Attending: PODIATRIST
Payer: COMMERCIAL

## 2020-09-17 PROCEDURE — 97035 APP MDLTY 1+ULTRASOUND EA 15: CPT

## 2020-09-17 PROCEDURE — 97112 NEUROMUSCULAR REEDUCATION: CPT

## 2020-09-17 PROCEDURE — 97140 MANUAL THERAPY 1/> REGIONS: CPT

## 2020-09-17 RX ORDER — LANSOPRAZOLE 30 MG/1
30 CAPSULE, DELAYED RELEASE ORAL DAILY
Qty: 90 CAPSULE | Refills: 0 | Status: SHIPPED | OUTPATIENT
Start: 2020-09-17 | End: 2020-12-18

## 2020-09-17 NOTE — PROGRESS NOTES
Discharge Summary  Pt has attended 9 visits in Physical Therapy.      Dx: lateral epicondylitis of right elbow.              Insurance (Authorized # of Visits):  6           Authorizing Physician: Dr. Rashad Santillan  Next MD visit: none scheduled  Fall Risk: luis antonio [phone]. Sincerely,  Electronically signed by therapist: Baltazar Johnson PT     Physician's certification required:  No  Please co-sign or sign and return this letter via fax as soon as possible to [phone].    I certify the need for these serv prone 90/90 ER lift off 5sx10    Charges: manual  X1, neuro x1, US x1       Total Timed Treatment: 45 min  Total Treatment Time: 45 min

## 2020-10-06 ENCOUNTER — OFFICE VISIT (OUTPATIENT)
Dept: INTERNAL MEDICINE CLINIC | Facility: CLINIC | Age: 50
End: 2020-10-06

## 2020-10-06 VITALS — HEIGHT: 61.5 IN | BODY MASS INDEX: 32.24 KG/M2 | WEIGHT: 173 LBS

## 2020-10-06 DIAGNOSIS — E66.09 CLASS 1 OBESITY DUE TO EXCESS CALORIES WITH SERIOUS COMORBIDITY AND BODY MASS INDEX (BMI) OF 32.0 TO 32.9 IN ADULT: Primary | ICD-10-CM

## 2020-10-06 PROCEDURE — 97803 MED NUTRITION INDIV SUBSEQ: CPT | Performed by: DIETITIAN, REGISTERED

## 2020-10-06 PROCEDURE — 3008F BODY MASS INDEX DOCD: CPT | Performed by: DIETITIAN, REGISTERED

## 2020-10-06 NOTE — PROGRESS NOTES
[address] [address]  Dept: [phone]  Loc: [phone]    10/06/20      Bariatric Follow-up Nutrition Session    Michael Rodriguez is a 51year old female.      As Panel:  Lab Results   Component Value Date    CHOLEST 156 06/29/2020    TRIG 71 06/29/2020    HDL 84 (H) 06/29/2020    LDL 58 06/29/2020    VLDL 14 06/29/2020    TCHDLRATIO 2.46 05/07/2018    NONHDLC 72 06/29/2020    CHOLHDLRATIO 4.1 08/18/2015        Jana Dispersible, Take 1 tablet (8 mg total) by mouth every 8 (eight) hours as needed for Nausea., Disp: 30 tablet, Rfl: 0  •  ALPRAZolam 0.5 MG Oral Tab, 1/2 to one tab daily as needed, Disp: 10 tablet, Rfl: 0  •  Polyethylene Glycol 3350 Oral Powder, Take 17 noodles) w/ meat sauce, pizza 1x per week w/ family    Before bed:  8 chocolate covered almonds or piece of chocolate     +Eating faster lately    +More carbs this past week due to camping    +One ETOH drink three days per week- vodka    Total Calories: ~1 Recommendations/goals:    1. Stay under 1200 kcals per day  2. Slow rate of eating- 30 minutes per meal, min  3. Cont taking MVs as instructed, increase calcium to 1200 mg/day  4.  Cont excellent exercise routine, do cardio when stressed vs snacking

## 2020-10-13 ENCOUNTER — OFFICE VISIT (OUTPATIENT)
Dept: PHYSICAL THERAPY | Facility: HOSPITAL | Age: 50
End: 2020-10-13
Attending: INTERNAL MEDICINE
Payer: COMMERCIAL

## 2020-10-13 ENCOUNTER — OFFICE VISIT (OUTPATIENT)
Dept: INTERNAL MEDICINE CLINIC | Facility: CLINIC | Age: 50
End: 2020-10-13

## 2020-10-13 VITALS
HEIGHT: 61.5 IN | RESPIRATION RATE: 16 BRPM | SYSTOLIC BLOOD PRESSURE: 138 MMHG | HEART RATE: 78 BPM | BODY MASS INDEX: 31.69 KG/M2 | WEIGHT: 170 LBS | DIASTOLIC BLOOD PRESSURE: 78 MMHG | TEMPERATURE: 97 F

## 2020-10-13 DIAGNOSIS — M79.7 FIBROMYALGIA: ICD-10-CM

## 2020-10-13 DIAGNOSIS — Z51.81 ENCOUNTER FOR THERAPEUTIC DRUG MONITORING: Primary | ICD-10-CM

## 2020-10-13 DIAGNOSIS — F43.21 GRIEF: ICD-10-CM

## 2020-10-13 DIAGNOSIS — E11.9 TYPE 2 DIABETES MELLITUS WITHOUT COMPLICATION, WITHOUT LONG-TERM CURRENT USE OF INSULIN (HCC): ICD-10-CM

## 2020-10-13 DIAGNOSIS — R07.89 CHEST PAIN RADIATING TO ARM: ICD-10-CM

## 2020-10-13 DIAGNOSIS — R07.9 CHEST PAIN RADIATING TO JAW: ICD-10-CM

## 2020-10-13 DIAGNOSIS — F41.9 ANXIETY: ICD-10-CM

## 2020-10-13 DIAGNOSIS — E66.01 MORBID OBESITY WITH BMI OF 45.0-49.9, ADULT (HCC): ICD-10-CM

## 2020-10-13 DIAGNOSIS — E53.8 B12 DEFICIENCY: ICD-10-CM

## 2020-10-13 DIAGNOSIS — R07.9 CHEST PAIN, UNSPECIFIED TYPE: ICD-10-CM

## 2020-10-13 PROCEDURE — 97110 THERAPEUTIC EXERCISES: CPT

## 2020-10-13 PROCEDURE — 97162 PT EVAL MOD COMPLEX 30 MIN: CPT

## 2020-10-13 PROCEDURE — 93000 ELECTROCARDIOGRAM COMPLETE: CPT | Performed by: INTERNAL MEDICINE

## 2020-10-13 PROCEDURE — 99214 OFFICE O/P EST MOD 30 MIN: CPT | Performed by: INTERNAL MEDICINE

## 2020-10-13 PROCEDURE — 3075F SYST BP GE 130 - 139MM HG: CPT | Performed by: INTERNAL MEDICINE

## 2020-10-13 PROCEDURE — 3078F DIAST BP <80 MM HG: CPT | Performed by: INTERNAL MEDICINE

## 2020-10-13 PROCEDURE — 3008F BODY MASS INDEX DOCD: CPT | Performed by: INTERNAL MEDICINE

## 2020-10-13 RX ORDER — FLUOXETINE 10 MG/1
CAPSULE ORAL
Qty: 60 CAPSULE | Refills: 1 | Status: SHIPPED | OUTPATIENT
Start: 2020-10-13 | End: 2020-11-12

## 2020-10-13 NOTE — PROGRESS NOTES
CC: Patient presents with:  Weight Check: up 9 lb       HPI:     Underwent bariatric surgery on 10/15/18. Weight on surgery date: 230 lbs. Father passed away yesterday, had been sick on and off.    Under significant stress   Caretaker with mom and tin to one tab daily as needed 10 tablet 0   • Polyethylene Glycol 3350 Oral Powder Take 17 g by mouth daily. • Nystatin 172873 UNIT/GM External Powder Apply 1 Application topically 4 (four) times daily.  60 g 1   • magnesium 250 MG Oral Tab Take 250 mg by obesity with BMI of 45.0-49.9, adult ([facility] [address] 7/13/2014   • Open angle with borderline findings, high risk, bilateral 03/27/2019    George iLao M.D.   • Osteoarthritis    • Other vitreous opacities, left eye 03/27/2019    George iLao M.D.   • Other vi with anxious distress ([facility] [address]     B12 deficiency     Microscopic hematuria     Posterior vitreous detachment of both eyes     Vertigo     Non-intractable vomiting with nausea     Dermatitis     Injury of peroneal tendon of right foot     Heartburn     Family (Approximate)   BMI 31.60 kg/m²   GENERAL: well developed, well nourished,in no apparent distress, A/O x3  SKIN: + bilateral flank rash, pinpoint miniscule papules.    HEENT: atraumatic, normocephalic, OP-clear, PERRLA  NECK: supple,no adenopathy  LUNGS: cl recurrent intertrigo   -advised of side effects and adverse effects of this medication and advised cautions with long term use of medication.    Continue bariatric MV  EKG in office completed- Normal axis, NSR without any ST or T wave changes QTc : 432   Re

## 2020-10-13 NOTE — PROGRESS NOTES
SPINE EVALUATION:   Referring Physician: Dr. Lawanda Salinas  Diagnosis: fibromyalgia     Date of Service: 10/13/2020     PATIENT David Zayas is a 51year old female who presents to therapy today with complaints of widespread pain due to fibromyalgi Hemorrhoids    • High blood pressure    • High cholesterol    • Hypothyroidism    • Incontinence    • Keratoconjunctivitis sicca not specified as Sjogren's, bilateral 03/27/2019    Jorge Galo M.D.   • Kidney infection 11/30/15    currently on macrobid of how pain works promotes recovery and rehabilitation. Current knowledge and understanding of patient on pain related topics was explored to create baseline.     Precautions:  None  OBJECTIVE:   Observation/Posture: unremarkable  Neuro Screen: sensation c time in the store. Patient will demonstrate at least 75% reduction in sleep disturbances. Patient will demonstrate increased BL hip flexion strength by at least 1/2 a grade in order to improve her ability to ascend stairs without difficulty or pain.     Suleiman Hardin

## 2020-10-16 ENCOUNTER — APPOINTMENT (OUTPATIENT)
Dept: PHYSICAL THERAPY | Facility: HOSPITAL | Age: 50
End: 2020-10-16
Attending: INTERNAL MEDICINE
Payer: COMMERCIAL

## 2020-10-20 ENCOUNTER — OFFICE VISIT (OUTPATIENT)
Dept: PHYSICAL THERAPY | Facility: HOSPITAL | Age: 50
End: 2020-10-20
Attending: INTERNAL MEDICINE
Payer: COMMERCIAL

## 2020-10-20 PROCEDURE — 97140 MANUAL THERAPY 1/> REGIONS: CPT

## 2020-10-20 PROCEDURE — 97110 THERAPEUTIC EXERCISES: CPT

## 2020-10-20 NOTE — PROGRESS NOTES
Dx: fibromyalgia         Insurance (Authorized # of Visits):  [employer] 8 visits until 11/23           Authorizing Physician: Dr. Aaron Sandhu  Next MD visit: none scheduled  Fall Risk: standard         Precautions: n/a             Subjective: Patient has been ta

## 2020-10-23 ENCOUNTER — APPOINTMENT (OUTPATIENT)
Dept: GENERAL RADIOLOGY | Age: 50
End: 2020-10-23
Attending: NURSE PRACTITIONER
Payer: COMMERCIAL

## 2020-10-23 ENCOUNTER — HOSPITAL ENCOUNTER (OUTPATIENT)
Age: 50
Discharge: HOME | End: 2020-10-23
Payer: COMMERCIAL

## 2020-10-23 VITALS
RESPIRATION RATE: 12 BRPM | SYSTOLIC BLOOD PRESSURE: 160 MMHG | HEART RATE: 66 BPM | OXYGEN SATURATION: 97 % | BODY MASS INDEX: 31 KG/M2 | DIASTOLIC BLOOD PRESSURE: 98 MMHG | TEMPERATURE: 99 F | WEIGHT: 168 LBS

## 2020-10-23 DIAGNOSIS — M79.672 FOOT PAIN, LEFT: Primary | ICD-10-CM

## 2020-10-23 PROCEDURE — 99214 OFFICE O/P EST MOD 30 MIN: CPT

## 2020-10-23 PROCEDURE — 73630 X-RAY EXAM OF FOOT: CPT | Performed by: NURSE PRACTITIONER

## 2020-10-23 PROCEDURE — 73110 X-RAY EXAM OF WRIST: CPT | Performed by: NURSE PRACTITIONER

## 2020-10-23 RX ORDER — ACETAMINOPHEN 500 MG
1000 TABLET ORAL ONCE
Status: COMPLETED | OUTPATIENT
Start: 2020-10-23 | End: 2020-10-23

## 2020-10-23 RX ORDER — IBUPROFEN 600 MG/1
600 TABLET ORAL ONCE
Status: COMPLETED | OUTPATIENT
Start: 2020-10-23 | End: 2020-10-23

## 2020-10-23 NOTE — ED INITIAL ASSESSMENT (HPI)
Pt c/o L foot pain, denies injury, felt a pop in foot while walking, pt also c/o l wrist pain feeling like she bumped it but unsure of injury, pt is having pain while using crutches

## 2020-10-23 NOTE — ED PROVIDER NOTES
Patient Seen in: [facility] [place]      History   Patient presents with:  Leg or Foot Injury    Stated Complaint: Left foot/wrist pain 7 days      HPI    Patient presents to the urgent care with report of bumping her left wrist and having pain f Primary open angle glaucoma of both eyes, moderate stage 6/1/2016   • Visual impairment    • Vitreous degeneration, right eye 03/27/2019    Oc Leach M.D.               Past Surgical History:   Procedure Laterality Date   • ANGIOGRAM      2-24-12 Good movement  : Denies dysuria frequency or urgency, denies discharge  MSK: Left wrist pain, left foot pain   neuro: Denies syncope, loss of balance, weakness  Integumentary: Denies rashes, bruising petechiae                    Positive for stated complaint: Reviewed - No data to display               MDM     Left wrist and foot x-rays ordered, ibuprofen for discomfort   Xr Foot, Complete (min 3 Views), Left (cpt=73630)    Result Date: 10/23/2020  PROCEDURE:  XR FOOT, COMPLETE (MIN 3 VIEWS), LEFT (CPT=73630) for a visit in 3 days      Ellie Ayala MD  [facility] [phone]    Schedule an appointment as soon as possible for a visit in 3 days            Medications Prescribed:  Current Discharge Medication List

## 2020-10-24 ENCOUNTER — TELEPHONE (OUTPATIENT)
Dept: FAMILY MEDICINE CLINIC | Facility: CLINIC | Age: 50
End: 2020-10-24

## 2020-10-24 DIAGNOSIS — M79.672 ACUTE FOOT PAIN, LEFT: Primary | ICD-10-CM

## 2020-10-24 NOTE — TELEPHONE ENCOUNTER
Please call patient:  10/23/2020 visit note from the [facility] immediate care received and reviewed.   Please inform patient that a referral has been placed for patient to follow-up with Dr. Cristina Washington for the acute left foot pain, she is already under the c

## 2020-10-26 ENCOUNTER — APPOINTMENT (OUTPATIENT)
Dept: PHYSICAL THERAPY | Facility: HOSPITAL | Age: 50
End: 2020-10-26
Attending: INTERNAL MEDICINE
Payer: COMMERCIAL

## 2020-10-26 ENCOUNTER — TELEPHONE (OUTPATIENT)
Dept: PHYSICAL THERAPY | Facility: HOSPITAL | Age: 50
End: 2020-10-26

## 2020-10-30 ENCOUNTER — APPOINTMENT (OUTPATIENT)
Dept: PHYSICAL THERAPY | Facility: HOSPITAL | Age: 50
End: 2020-10-30
Attending: INTERNAL MEDICINE
Payer: COMMERCIAL

## 2020-11-02 ENCOUNTER — APPOINTMENT (OUTPATIENT)
Dept: PHYSICAL THERAPY | Facility: HOSPITAL | Age: 50
End: 2020-11-02
Attending: INTERNAL MEDICINE
Payer: COMMERCIAL

## 2020-11-05 RX ORDER — FLUOXETINE 10 MG/1
CAPSULE ORAL
Qty: 60 CAPSULE | Refills: 1 | OUTPATIENT
Start: 2020-11-05

## 2020-11-05 NOTE — TELEPHONE ENCOUNTER
Requesting fluoxetine  LOV: 10/13/20  RTC: 6 weeks  Last Relevant Labs: na  Filled: 10/13/20 #60 with 1 refills    11/19/2020  3:15 PM Kita Branham, PT [facility]   12/23/2020 10:00 AM Fiordaliza Llamas, DO [facility] refill

## 2020-11-06 ENCOUNTER — OFFICE VISIT (OUTPATIENT)
Dept: PHYSICAL THERAPY | Facility: HOSPITAL | Age: 50
End: 2020-11-06
Attending: INTERNAL MEDICINE
Payer: COMMERCIAL

## 2020-11-06 PROCEDURE — 97110 THERAPEUTIC EXERCISES: CPT

## 2020-11-06 PROCEDURE — 97140 MANUAL THERAPY 1/> REGIONS: CPT

## 2020-11-06 NOTE — PROGRESS NOTES
Dx: fibromyalgia         Insurance (Authorized # of Visits):  [employer] 8 visits until 11/23           Authorizing Physician: Dr. Nicolette Minaya MD visit: none scheduled  Fall Risk: standard         Precautions: n/a             Subjective: Patient reports her BL  hooklying traction BL  x15' total                    HEP: lower trunk rotations, bridges, alt toe taps on box    Charges: manual x1, therex x2       Total Timed Treatment: 40 min  Total Treatment Time: 40 min

## 2020-11-09 ENCOUNTER — OFFICE VISIT (OUTPATIENT)
Dept: PHYSICAL THERAPY | Facility: HOSPITAL | Age: 50
End: 2020-11-09
Attending: INTERNAL MEDICINE
Payer: COMMERCIAL

## 2020-11-09 PROCEDURE — 97110 THERAPEUTIC EXERCISES: CPT

## 2020-11-09 PROCEDURE — 97140 MANUAL THERAPY 1/> REGIONS: CPT

## 2020-11-09 NOTE — PROGRESS NOTES
Dx: fibromyalgia         Insurance (Authorized # of Visits):  [facility] 8 visits until 11/23           Authorizing Physician: Dr. Hannah Horton  Next MD visit: none scheduled  Fall Risk: standard         Precautions: n/a             Subjective: Patient reports she forward lunges 2x1'  SL heel raises x10 BL     Manual:  Sciatic nerve glides BL  hooklying traction BL  x15' total Manual:  Sciatic nerve glides BL  hooklying traction BL  x15' total Manual:  Sciatic nerve glides BL  hooklying traction BL  x10' total

## 2020-11-12 ENCOUNTER — OFFICE VISIT (OUTPATIENT)
Dept: FAMILY MEDICINE CLINIC | Facility: CLINIC | Age: 50
End: 2020-11-12

## 2020-11-12 VITALS
TEMPERATURE: 97 F | HEIGHT: 61.5 IN | BODY MASS INDEX: 32.36 KG/M2 | SYSTOLIC BLOOD PRESSURE: 124 MMHG | WEIGHT: 173.63 LBS | DIASTOLIC BLOOD PRESSURE: 72 MMHG | HEART RATE: 68 BPM | OXYGEN SATURATION: 97 %

## 2020-11-12 DIAGNOSIS — M54.50 CHRONIC MIDLINE LOW BACK PAIN WITHOUT SCIATICA: ICD-10-CM

## 2020-11-12 DIAGNOSIS — G43.009 MIGRAINE WITHOUT AURA AND WITHOUT STATUS MIGRAINOSUS, NOT INTRACTABLE: ICD-10-CM

## 2020-11-12 DIAGNOSIS — G89.29 CHRONIC MIDLINE LOW BACK PAIN WITHOUT SCIATICA: ICD-10-CM

## 2020-11-12 DIAGNOSIS — M25.532 ACUTE PAIN OF LEFT WRIST: Primary | ICD-10-CM

## 2020-11-12 DIAGNOSIS — M79.672 LEFT FOOT PAIN: ICD-10-CM

## 2020-11-12 PROBLEM — M79.671 ACUTE FOOT PAIN, RIGHT: Status: RESOLVED | Noted: 2018-12-20 | Resolved: 2020-11-12

## 2020-11-12 PROBLEM — H81.10 BENIGN PAROXYSMAL POSITIONAL VERTIGO: Status: RESOLVED | Noted: 2020-06-01 | Resolved: 2020-11-12

## 2020-11-12 PROBLEM — F33.0 MAJOR DEPRESSIVE DISORDER, RECURRENT EPISODE, MILD WITH ANXIOUS DISTRESS (HCC): Status: RESOLVED | Noted: 2017-07-27 | Resolved: 2020-11-12

## 2020-11-12 PROBLEM — H00.014 HORDEOLUM EXTERNUM OF LEFT UPPER EYELID: Status: RESOLVED | Noted: 2018-06-27 | Resolved: 2020-11-12

## 2020-11-12 PROBLEM — S83.241A ACUTE MENISCAL TEAR, MEDIAL, RIGHT, INITIAL ENCOUNTER: Status: RESOLVED | Noted: 2019-05-28 | Resolved: 2020-11-12

## 2020-11-12 PROBLEM — B34.9 VIRAL SYNDROME: Status: RESOLVED | Noted: 2020-04-06 | Resolved: 2020-11-12

## 2020-11-12 PROBLEM — R06.89 DIFFICULTY BREATHING: Status: RESOLVED | Noted: 2020-04-06 | Resolved: 2020-11-12

## 2020-11-12 PROBLEM — E66.01 MORBID OBESITY WITH BMI OF 45.0-49.9, ADULT (HCC): Status: RESOLVED | Noted: 2017-02-11 | Resolved: 2020-11-12

## 2020-11-12 PROBLEM — Z98.890 HISTORY OF FOOT SURGERY: Status: RESOLVED | Noted: 2020-01-15 | Resolved: 2020-11-12

## 2020-11-12 PROBLEM — R11.2 NON-INTRACTABLE VOMITING WITH NAUSEA: Status: RESOLVED | Noted: 2018-01-23 | Resolved: 2020-11-12

## 2020-11-12 PROBLEM — H92.01 OTALGIA, RIGHT: Status: RESOLVED | Noted: 2020-04-06 | Resolved: 2020-11-12

## 2020-11-12 PROBLEM — R42 VERTIGO: Status: RESOLVED | Noted: 2018-01-23 | Resolved: 2020-11-12

## 2020-11-12 PROBLEM — J02.9 SORE THROAT: Status: RESOLVED | Noted: 2020-04-06 | Resolved: 2020-11-12

## 2020-11-12 PROBLEM — J34.89 SINUS PRESSURE: Status: RESOLVED | Noted: 2020-04-06 | Resolved: 2020-11-12

## 2020-11-12 PROBLEM — K13.0 ANGULAR CHEILITIS: Status: RESOLVED | Noted: 2017-05-05 | Resolved: 2020-11-12

## 2020-11-12 PROBLEM — R51.9 ACUTE NONINTRACTABLE HEADACHE: Status: RESOLVED | Noted: 2018-10-03 | Resolved: 2020-11-12

## 2020-11-12 PROBLEM — L73.8 SEBACEOUS HYPERPLASIA OF FACE: Status: RESOLVED | Noted: 2018-08-25 | Resolved: 2020-11-12

## 2020-11-12 PROBLEM — F33.0 MAJOR DEPRESSIVE DISORDER, RECURRENT EPISODE, MILD WITH ANXIOUS DISTRESS: Status: RESOLVED | Noted: 2017-07-27 | Resolved: 2020-11-12

## 2020-11-12 PROBLEM — M25.50 POLYARTHRALGIA: Status: RESOLVED | Noted: 2017-05-12 | Resolved: 2020-11-12

## 2020-11-12 PROBLEM — M25.572 ACUTE LEFT ANKLE PAIN: Status: RESOLVED | Noted: 2017-07-27 | Resolved: 2020-11-12

## 2020-11-12 PROBLEM — R00.2 PALPITATIONS: Status: RESOLVED | Noted: 2017-03-27 | Resolved: 2020-11-12

## 2020-11-12 PROBLEM — L30.9 DERMATITIS: Status: RESOLVED | Noted: 2018-04-21 | Resolved: 2020-11-12

## 2020-11-12 PROBLEM — D18.00 ANGIOMA: Status: RESOLVED | Noted: 2018-08-25 | Resolved: 2020-11-12

## 2020-11-12 PROBLEM — G44.52 NEW DAILY PERSISTENT HEADACHE: Status: RESOLVED | Noted: 2017-05-24 | Resolved: 2020-11-12

## 2020-11-12 PROBLEM — G43.909 MIGRAINE WITHOUT STATUS MIGRAINOSUS, NOT INTRACTABLE: Status: RESOLVED | Noted: 2019-03-17 | Resolved: 2020-11-12

## 2020-11-12 PROBLEM — H60.331 ACUTE SWIMMER'S EAR OF RIGHT SIDE: Status: RESOLVED | Noted: 2020-07-09 | Resolved: 2020-11-12

## 2020-11-12 PROBLEM — M25.561 ACUTE PAIN OF RIGHT KNEE: Status: RESOLVED | Noted: 2019-03-27 | Resolved: 2020-11-12

## 2020-11-12 PROBLEM — S86.301A INJURY OF PERONEAL TENDON OF RIGHT FOOT: Status: RESOLVED | Noted: 2018-05-23 | Resolved: 2020-11-12

## 2020-11-12 PROBLEM — D22.60 MELANOCYTIC NEVUS OF UPPER EXTREMITY: Status: RESOLVED | Noted: 2018-08-25 | Resolved: 2020-11-12

## 2020-11-12 PROBLEM — Z87.898 HISTORY OF PALPITATIONS: Status: RESOLVED | Noted: 2017-06-04 | Resolved: 2020-11-12

## 2020-11-12 PROBLEM — M77.11 LATERAL EPICONDYLITIS OF RIGHT ELBOW: Status: RESOLVED | Noted: 2020-07-09 | Resolved: 2020-11-12

## 2020-11-12 PROBLEM — M79.10 MYALGIA: Status: RESOLVED | Noted: 2017-05-12 | Resolved: 2020-11-12

## 2020-11-12 PROCEDURE — 3008F BODY MASS INDEX DOCD: CPT | Performed by: FAMILY MEDICINE

## 2020-11-12 PROCEDURE — 3074F SYST BP LT 130 MM HG: CPT | Performed by: FAMILY MEDICINE

## 2020-11-12 PROCEDURE — 99214 OFFICE O/P EST MOD 30 MIN: CPT | Performed by: FAMILY MEDICINE

## 2020-11-12 PROCEDURE — 3078F DIAST BP <80 MM HG: CPT | Performed by: FAMILY MEDICINE

## 2020-11-12 RX ORDER — TIZANIDINE 4 MG/1
4 TABLET ORAL NIGHTLY PRN
Qty: 30 TABLET | Refills: 2 | Status: SHIPPED | OUTPATIENT
Start: 2020-11-12

## 2020-11-13 ENCOUNTER — OFFICE VISIT (OUTPATIENT)
Dept: PHYSICAL THERAPY | Facility: HOSPITAL | Age: 50
End: 2020-11-13
Attending: INTERNAL MEDICINE
Payer: COMMERCIAL

## 2020-11-13 PROCEDURE — 97112 NEUROMUSCULAR REEDUCATION: CPT

## 2020-11-13 PROCEDURE — 97110 THERAPEUTIC EXERCISES: CPT

## 2020-11-13 PROCEDURE — 97140 MANUAL THERAPY 1/> REGIONS: CPT

## 2020-11-13 NOTE — PROGRESS NOTES
Dx: fibromyalgia         Insurance (Authorized # of Visits):  [employer] 8 visits until 11/23           Authorizing Physician: Dr. Spencer Minaya MD visit: none scheduled  Fall Risk: standard         Precautions: n/a             Subjective: Patient reports she rotation x2'  SB DKTC x20  Bridges 2 sec holds x20  Rhythmic stabilization lower trunk rotations 4x30 sec      Manual:  Sciatic nerve glides BL  hooklying traction BL  x15' total Manual:  Sciatic nerve glides BL  hooklying traction BL  x15' total Manual:

## 2020-11-16 ENCOUNTER — HOSPITAL ENCOUNTER (OUTPATIENT)
Dept: GENERAL RADIOLOGY | Age: 50
Discharge: HOME | End: 2020-11-16
Attending: PODIATRIST
Payer: COMMERCIAL

## 2020-11-16 ENCOUNTER — OFFICE VISIT (OUTPATIENT)
Dept: PODIATRY CLINIC | Facility: CLINIC | Age: 50
End: 2020-11-16

## 2020-11-16 DIAGNOSIS — S93.602A FOOT SPRAIN, LEFT, INITIAL ENCOUNTER: ICD-10-CM

## 2020-11-16 DIAGNOSIS — M79.672 FOOT PAIN, LEFT: ICD-10-CM

## 2020-11-16 DIAGNOSIS — M79.672 FOOT PAIN, LEFT: Primary | ICD-10-CM

## 2020-11-16 PROCEDURE — 73630 X-RAY EXAM OF FOOT: CPT | Performed by: PODIATRIST

## 2020-11-16 PROCEDURE — 99213 OFFICE O/P EST LOW 20 MIN: CPT | Performed by: PODIATRIST

## 2020-11-16 NOTE — PROGRESS NOTES
Khloe Granados is a 51year old female.  Patient presents with:  Consult: Left foot Pain - Sore, tired after a full day - Much better Extends to ankle - 2/10 Pain        HPI:   This pleasant patient returns to the clinic she is doing pretty well with her tablet 0   • ALPRAZolam 0.5 MG Oral Tab 1/2 to one tab daily as needed 10 tablet 0   • Polyethylene Glycol 3350 Oral Powder Take 17 g by mouth daily. • Nystatin 385333 UNIT/GM External Powder Apply 1 Application topically 4 (four) times daily.  60 g 1 kidney infection-instructed to inform surgeon of infection.    • Lateral epicondylitis of right elbow 7/9/2020   • Lung nodule     pt unsure of which side   • Major depressive disorder, recurrent episode, mild with anxious distress [facility] 7/27/2017   • [place] veinogram 7-6-12 Horace Began. General   • UPPER GI ENDOSCOPY,EXAM        Family History   Problem Relation Age of Onset   • Cancer Maternal Aunt         breast, colon   • Breast Cancer Maternal Aunt 48        In her 52's.    • Ovarian Cancer Maternal Aunt 60 on her left foot was evaluated its warm dry and supple there is no erythema no edema no sign of infection noted. 2. Vascular: Patient has palpable dorsalis pedis posterior tibial pulses on the left   3. Neurologic: Patient has intact sensorium   4.  [place]

## 2020-11-17 ENCOUNTER — OFFICE VISIT (OUTPATIENT)
Dept: PHYSICAL THERAPY | Facility: HOSPITAL | Age: 50
End: 2020-11-17
Attending: INTERNAL MEDICINE
Payer: COMMERCIAL

## 2020-11-17 PROCEDURE — 97110 THERAPEUTIC EXERCISES: CPT

## 2020-11-17 PROCEDURE — 97112 NEUROMUSCULAR REEDUCATION: CPT

## 2020-11-17 NOTE — PROGRESS NOTES
Dx: fibromyalgia         Insurance (Authorized # of Visits):  [employer] 8 visits until 11/23           Authorizing Physician: Dr. Ayesha Minaya MD visit: none scheduled  Fall Risk: standard         Precautions: n/a             Subjective: Patient reports she x2'  SB DKTC x20  Bridges 2 sec holds x20  Rhythmic stabilization lower trunk rotations 4x30 sec   Therex:  TRX squats 3x10  Wall push ups 2x10  Lower trap lift offs x20  Horizontal abduction at wall 2x10   Manual:  Sciatic nerve glides BL  hooklying tract

## 2020-11-18 NOTE — TELEPHONE ENCOUNTER
rx approved qty 20 NR Ear Wedge Repair Text: A wedge excision was completed by carrying down an excision through the full thickness of the ear and cartilage with an inward facing Burow's triangle. The wound was then closed in a layered fashion.

## 2020-11-19 ENCOUNTER — APPOINTMENT (OUTPATIENT)
Dept: PHYSICAL THERAPY | Facility: HOSPITAL | Age: 50
End: 2020-11-19
Attending: INTERNAL MEDICINE
Payer: COMMERCIAL

## 2020-11-19 ENCOUNTER — PATIENT MESSAGE (OUTPATIENT)
Dept: FAMILY MEDICINE CLINIC | Facility: CLINIC | Age: 50
End: 2020-11-19

## 2020-11-19 DIAGNOSIS — H57.9 EYE PROBLEM: Primary | ICD-10-CM

## 2020-11-19 NOTE — TELEPHONE ENCOUNTER
From: Mehrdad Childress  To: Kristine Dee DO  Sent: 11/19/2020 1:53 PM CST  Subject: Referral Request    Good afternoon Dr. Adrian Tovar,  I had an appointment with my eye dr - Dr. Nina Zamorano - for tomorrow morning and they called having to cancel it bec

## 2020-11-23 ENCOUNTER — PATIENT MESSAGE (OUTPATIENT)
Dept: INTERNAL MEDICINE CLINIC | Facility: CLINIC | Age: 50
End: 2020-11-23

## 2020-11-24 NOTE — TELEPHONE ENCOUNTER
From: Jurgen Adams  To: Trell Fischer MD  Sent: 11/23/2020 8:58 AM CST  Subject: Non-Urgent Medical Question    Good morning Dr. Judie Celis noticed over the past couple weeks that I feel very bloated every time I eat.  Doesn't matter the amount-I still w

## 2020-11-27 ENCOUNTER — APPOINTMENT (OUTPATIENT)
Dept: LAB | Facility: HOSPITAL | Age: 50
End: 2020-11-27
Attending: INTERNAL MEDICINE
Payer: COMMERCIAL

## 2020-11-27 DIAGNOSIS — R07.9 CHEST PAIN, UNSPECIFIED TYPE: ICD-10-CM

## 2020-11-27 DIAGNOSIS — R07.9 CHEST PAIN RADIATING TO JAW: ICD-10-CM

## 2020-11-27 DIAGNOSIS — E66.01 MORBID OBESITY WITH BMI OF 45.0-49.9, ADULT (HCC): ICD-10-CM

## 2020-11-27 DIAGNOSIS — R07.89 CHEST PAIN RADIATING TO ARM: ICD-10-CM

## 2020-11-30 ENCOUNTER — HOSPITAL ENCOUNTER (OUTPATIENT)
Dept: CV DIAGNOSTICS | Facility: HOSPITAL | Age: 50
Discharge: HOME | End: 2020-11-30
Attending: INTERNAL MEDICINE
Payer: COMMERCIAL

## 2020-11-30 DIAGNOSIS — R07.9 CHEST PAIN, UNSPECIFIED TYPE: ICD-10-CM

## 2020-11-30 DIAGNOSIS — R07.89 CHEST PAIN RADIATING TO ARM: ICD-10-CM

## 2020-11-30 DIAGNOSIS — R07.9 CHEST PAIN RADIATING TO JAW: ICD-10-CM

## 2020-11-30 PROCEDURE — 93350 STRESS TTE ONLY: CPT | Performed by: INTERNAL MEDICINE

## 2020-11-30 PROCEDURE — 93018 CV STRESS TEST I&R ONLY: CPT | Performed by: INTERNAL MEDICINE

## 2020-11-30 PROCEDURE — 93017 CV STRESS TEST TRACING ONLY: CPT | Performed by: INTERNAL MEDICINE

## 2020-12-01 ENCOUNTER — TELEPHONE (OUTPATIENT)
Dept: PHYSICAL THERAPY | Age: 50
End: 2020-12-01

## 2020-12-01 ENCOUNTER — OFFICE VISIT (OUTPATIENT)
Dept: PHYSICAL THERAPY | Facility: HOSPITAL | Age: 50
End: 2020-12-01
Attending: INTERNAL MEDICINE
Payer: COMMERCIAL

## 2020-12-01 PROCEDURE — 97140 MANUAL THERAPY 1/> REGIONS: CPT

## 2020-12-01 PROCEDURE — 97110 THERAPEUTIC EXERCISES: CPT

## 2020-12-01 NOTE — PROGRESS NOTES
Dx: fibromyalgia         Insurance (Authorized # of Visits):  [employer] 8 visits until 11/23           Authorizing Physician: Dr. Aviles Covert  Next MD visit: none scheduled  Fall Risk: standard         Precautions: n/a            Progress Summary  Pt has attende Megan Swan, PT     [de-identified] certification required:  No    Certification From: 10/2/7452  To:3/1/2021   Date: 10/20/2020  TX#: 2/8 Date: 11/6/2020           TX#: 3/8 Date: 11/9/2020           TX#: 4/8 Date: 11/13/2020           TX#: 5/8 Date: 11/17 rotations, bridges, alt toe taps on box    Charges: therex x1, neuro x2       Total Timed Treatment: 44 min  Total Treatment Time: 44 min

## 2020-12-02 ENCOUNTER — PATIENT MESSAGE (OUTPATIENT)
Dept: FAMILY MEDICINE CLINIC | Facility: CLINIC | Age: 50
End: 2020-12-02

## 2020-12-02 NOTE — TELEPHONE ENCOUNTER
From: Kian Tovar  To: Didi Joy DO  Sent: 12/2/2020 9:23 AM CST  Subject: Referral Request    Good morning -     Can I get a referral to see a counselor please?  With my dad passing in October and taking care of my mom during these crazy Covid

## 2020-12-07 ENCOUNTER — PATIENT MESSAGE (OUTPATIENT)
Dept: RHEUMATOLOGY | Facility: CLINIC | Age: 50
End: 2020-12-07

## 2020-12-07 ENCOUNTER — PATIENT MESSAGE (OUTPATIENT)
Dept: FAMILY MEDICINE CLINIC | Facility: CLINIC | Age: 50
End: 2020-12-07

## 2020-12-07 DIAGNOSIS — M25.50 POLYARTHRALGIA: Primary | ICD-10-CM

## 2020-12-07 DIAGNOSIS — R76.8 POSITIVE ANA (ANTINUCLEAR ANTIBODY): ICD-10-CM

## 2020-12-07 DIAGNOSIS — M35.00 SICCA SYNDROME (HCC): ICD-10-CM

## 2020-12-07 DIAGNOSIS — E03.9 HYPOTHYROIDISM (ACQUIRED): Primary | ICD-10-CM

## 2020-12-07 RX ORDER — LEVOTHYROXINE SODIUM 112 UG/1
112 TABLET ORAL
Qty: 30 TABLET | Refills: 0 | Status: SHIPPED | OUTPATIENT
Start: 2020-12-07 | End: 2020-12-08

## 2020-12-07 NOTE — TELEPHONE ENCOUNTER
From: Peter Roa  To: Paddy Luque DO  Sent: 12/7/2020 11:16 AM CST  Subject: Other    Good morning Dr. Janeen Burnett,    I have an appointment scheduled with you for Weds Dec 23, I'm just checking if I need to get a blood draw for anything before then?  I

## 2020-12-07 NOTE — TELEPHONE ENCOUNTER
Pt requesting refill of LEVOTHYROXINE SODIUM 112 MCG Oral Tab    Passed protocol, refill approved, sent to pharmacy. 1 month approved.    Patient due for follow up

## 2020-12-07 NOTE — TELEPHONE ENCOUNTER
From: Jameson Close  To: Sun Vides DO  Sent: 12/7/2020 11:05 AM CST  Subject: Referral Request    Lancelo Dr. Andreea Chiang,  I'm sorry can you please re-submit my referral for Dr. Gopal Hernandes? The eye doc.  I just called their office to schedule my ap

## 2020-12-08 ENCOUNTER — TELEPHONE (OUTPATIENT)
Dept: FAMILY MEDICINE CLINIC | Facility: CLINIC | Age: 50
End: 2020-12-08

## 2020-12-08 DIAGNOSIS — E03.9 HYPOTHYROIDISM (ACQUIRED): ICD-10-CM

## 2020-12-08 RX ORDER — LEVOTHYROXINE SODIUM 112 UG/1
112 TABLET ORAL
Qty: 90 TABLET | Refills: 0 | Status: SHIPPED | OUTPATIENT
Start: 2020-12-08 | End: 2021-03-10

## 2020-12-08 NOTE — TELEPHONE ENCOUNTER
Received a fax requesting we send 90 for thyroid medication. Insurance will not pay for 30. Patient is aware she needs to repeat labs. Has lab appointment for 12/17/10.  90 ordered because she is out of medication.

## 2020-12-10 ENCOUNTER — HOSPITAL ENCOUNTER (OUTPATIENT)
Age: 50
Discharge: HOME | End: 2020-12-10
Payer: COMMERCIAL

## 2020-12-10 ENCOUNTER — TELEPHONE (OUTPATIENT)
Dept: FAMILY MEDICINE CLINIC | Facility: CLINIC | Age: 50
End: 2020-12-10

## 2020-12-10 VITALS
HEART RATE: 72 BPM | DIASTOLIC BLOOD PRESSURE: 86 MMHG | TEMPERATURE: 98 F | RESPIRATION RATE: 18 BRPM | OXYGEN SATURATION: 97 % | SYSTOLIC BLOOD PRESSURE: 144 MMHG

## 2020-12-10 DIAGNOSIS — Z20.822 PERSON UNDER INVESTIGATION FOR COVID-19: ICD-10-CM

## 2020-12-10 DIAGNOSIS — J32.9 VIRAL SINUSITIS: ICD-10-CM

## 2020-12-10 DIAGNOSIS — B34.9 VIRAL SYNDROME: Primary | ICD-10-CM

## 2020-12-10 DIAGNOSIS — B97.89 VIRAL SINUSITIS: ICD-10-CM

## 2020-12-10 PROCEDURE — 99213 OFFICE O/P EST LOW 20 MIN: CPT

## 2020-12-10 NOTE — ED PROVIDER NOTES
Patient Seen in: [facility]      History   Patient presents with:  Sinus Problem    Stated Complaint: SINUS PAIN     HPI    52-year-old female who comes in today complaining of left-sided sinus pressure and pain.   She states is been going 45.0-49.9, adult ([facility] [address] 7/13/2014   • Open angle with borderline findings, high risk, bilateral 03/27/2019    Valerie Hernandez M.D.   • Osteoarthritis    • Other vitreous opacities, left eye 03/27/2019    Valerie Hernandez M.D.   • Other vitreous opacities, ri use: Yes      Frequency: 2-4 times a month      Comment: 2-3 x weekly    Drug use: No             Review of Systems    Positive for stated complaint: SINUS PAIN   Other systems are as noted in HPI. Constitutional and vital signs reviewed.       All other s emergency room, close follow up with PCP. Patient verbalizes understanding of above and was discharges with stable vital signs.                  Disposition and Plan     Clinical Impression:  Viral syndrome  (primary encounter diagnosis)  Viral sinusitis

## 2020-12-10 NOTE — TELEPHONE ENCOUNTER
Patient reports sinus pressure, nasal and chest congestion, post nasal drip and feels like infection may be moving down to chest. Unsure of fever.    Denies N/V/D    Advised she proceed to one of our walk in clinics for further evaluation and management of

## 2020-12-10 NOTE — TELEPHONE ENCOUNTER
Dontae Escalante is calling because she thinks she has a sinus infection that is going down into her chest, she started last Sunday with facial pain and pressure on her left side, it is swollen today and she feels congestion in her chest she would like a call from

## 2020-12-16 ENCOUNTER — OFFICE VISIT (OUTPATIENT)
Dept: PHYSICAL THERAPY | Facility: HOSPITAL | Age: 50
End: 2020-12-16
Attending: INTERNAL MEDICINE
Payer: COMMERCIAL

## 2020-12-16 PROCEDURE — 97112 NEUROMUSCULAR REEDUCATION: CPT

## 2020-12-16 PROCEDURE — 97140 MANUAL THERAPY 1/> REGIONS: CPT

## 2020-12-16 PROCEDURE — 97110 THERAPEUTIC EXERCISES: CPT

## 2020-12-16 NOTE — PROGRESS NOTES
Dx: fibromyalgia         Insurance (Authorized # of Visits):  [employer] 8 visits until 11/23           Authorizing Physician: Dr. Aaron Sandhu  Next MD visit: none scheduled  Fall Risk: standard         Precautions: n/a             Subjective: Patient reports she holds x20  BOSU forward lunges x20  SL heel raises x10 BL Therex:  SB lower trunk rotations x2'  SB DKTC x20  Bridges 2 sec holds x20  Rhythmic stabilization lower trunk rotations 4x30 sec  Shuttle DLP 50# 2x20  BOSU forward lunges 2x1'  SL heel raises x10

## 2020-12-17 ENCOUNTER — LAB ENCOUNTER (OUTPATIENT)
Dept: LAB | Age: 50
End: 2020-12-17
Attending: INTERNAL MEDICINE
Payer: COMMERCIAL

## 2020-12-17 DIAGNOSIS — M25.50 POLYARTHRALGIA: ICD-10-CM

## 2020-12-17 DIAGNOSIS — E03.9 HYPOTHYROIDISM (ACQUIRED): ICD-10-CM

## 2020-12-17 DIAGNOSIS — R76.8 POSITIVE ANA (ANTINUCLEAR ANTIBODY): ICD-10-CM

## 2020-12-17 DIAGNOSIS — M35.00 SICCA SYNDROME (HCC): ICD-10-CM

## 2020-12-17 PROCEDURE — 86038 ANTINUCLEAR ANTIBODIES: CPT

## 2020-12-17 PROCEDURE — 86160 COMPLEMENT ANTIGEN: CPT

## 2020-12-17 PROCEDURE — 84439 ASSAY OF FREE THYROXINE: CPT

## 2020-12-17 PROCEDURE — 85652 RBC SED RATE AUTOMATED: CPT

## 2020-12-17 PROCEDURE — 84443 ASSAY THYROID STIM HORMONE: CPT

## 2020-12-17 PROCEDURE — 36415 COLL VENOUS BLD VENIPUNCTURE: CPT

## 2020-12-17 PROCEDURE — 86140 C-REACTIVE PROTEIN: CPT

## 2020-12-18 ENCOUNTER — TELEPHONE (OUTPATIENT)
Dept: FAMILY MEDICINE CLINIC | Facility: CLINIC | Age: 50
End: 2020-12-18

## 2020-12-18 RX ORDER — LANSOPRAZOLE 30 MG/1
CAPSULE, DELAYED RELEASE ORAL
Qty: 90 CAPSULE | Refills: 0 | Status: SHIPPED | OUTPATIENT
Start: 2020-12-18 | End: 2021-03-10

## 2020-12-18 RX ORDER — SULFAMETHOXAZOLE AND TRIMETHOPRIM 800; 160 MG/1; MG/1
1 TABLET ORAL 2 TIMES DAILY
Qty: 20 TABLET | Refills: 0 | Status: SHIPPED | OUTPATIENT
Start: 2020-12-18 | End: 2020-12-28

## 2020-12-18 NOTE — TELEPHONE ENCOUNTER
Requested Prescriptions     Pending Prescriptions Disp Refills   • LANSOPRAZOLE 30 MG Oral Capsule Delayed Release [Pharmacy Med Name: LANSOPRAZOLE DR 30 MG CAPSULE] 90 capsule 0     Sig: TAKE 1 CAPSULE BY MOUTH EVERY DAY     Last refill 9/17/20 90 caps  L

## 2020-12-18 NOTE — TELEPHONE ENCOUNTER
Alina Jones is calling because she went into the [facility] on12/10, for sinus problem, they told her if she was not better within 5 days to call Dr Sathya Mcgrath office for a antibiotic, she is wondering  If Dr Neri Xavier can call something in for her, the mucus coming fr

## 2020-12-18 NOTE — TELEPHONE ENCOUNTER
Spoke to patient. She went to IC 12/10/20 per our direction d/t sinus pressure and chest congestion that started around 12/5/20. Patient continues to have sinus pressure, post nasal drip,  and chest congestion. Stated IC did not prescribe medication.   P

## 2020-12-23 ENCOUNTER — OFFICE VISIT (OUTPATIENT)
Dept: RHEUMATOLOGY | Facility: CLINIC | Age: 50
End: 2020-12-23

## 2020-12-23 VITALS
TEMPERATURE: 98 F | BODY MASS INDEX: 33.23 KG/M2 | DIASTOLIC BLOOD PRESSURE: 64 MMHG | WEIGHT: 176 LBS | SYSTOLIC BLOOD PRESSURE: 124 MMHG | HEART RATE: 72 BPM | RESPIRATION RATE: 16 BRPM | HEIGHT: 61 IN

## 2020-12-23 DIAGNOSIS — M25.50 POLYARTHRALGIA: ICD-10-CM

## 2020-12-23 DIAGNOSIS — R76.8 POSITIVE ANA (ANTINUCLEAR ANTIBODY): ICD-10-CM

## 2020-12-23 DIAGNOSIS — M35.00 SICCA SYNDROME (HCC): Primary | ICD-10-CM

## 2020-12-23 DIAGNOSIS — M79.642 BILATERAL HAND PAIN: ICD-10-CM

## 2020-12-23 DIAGNOSIS — R79.82 ELEVATED C-REACTIVE PROTEIN (CRP): ICD-10-CM

## 2020-12-23 DIAGNOSIS — M79.641 BILATERAL HAND PAIN: ICD-10-CM

## 2020-12-23 DIAGNOSIS — M79.7 FIBROMYALGIA: ICD-10-CM

## 2020-12-23 PROCEDURE — 3008F BODY MASS INDEX DOCD: CPT | Performed by: INTERNAL MEDICINE

## 2020-12-23 PROCEDURE — 99214 OFFICE O/P EST MOD 30 MIN: CPT | Performed by: INTERNAL MEDICINE

## 2020-12-23 PROCEDURE — 3078F DIAST BP <80 MM HG: CPT | Performed by: INTERNAL MEDICINE

## 2020-12-23 PROCEDURE — 3074F SYST BP LT 130 MM HG: CPT | Performed by: INTERNAL MEDICINE

## 2020-12-23 NOTE — PROGRESS NOTES
?  RHEUMATOLOGY Follow up   Date of visit: 12/23/2020  ? Patient presents with:  Joint Pain: 4 month f/u. Feeling ok. Two fingers on left hand lock up usually in the morning. Pain in fingers with temperature change. Back always hurts. Headaches.  Sore si work-up complete to see if Plaquenil is still an option. Previously iscussed that pt does have significant osteoarthritis particularly of her low back which is likely due to previous morbid obesity and damage done from before.  She does feel better whe C-REACTIVE PROTEIN; Future    Fibromyalgia    Bilateral hand pain  -     RHEUMATOID ARTHRITIS FACTOR; Future  -     CYCLIC CITRULLINATE PEP. IGG; Future  -     XR HAND (MIN 3 VIEWS), RIGHT (CPT=73130);  Future  -     XR HAND (MIN 3 VIEWS), LEFT (CPT=73130); with GI discomfort. Applies essential oils. Tylenol rarely but only provides mild relief. Got a TENS unit which does help.      HPI from initial consultation  referred for rheumatologic evaluation due to joint, muscle and back pain.      States she has been rare difficulty swallowing  + blisters over fingertips when under stress - none currently   + follows with ophthalmology- has been diagnosed with early glaucoma as well as hx of vitreal detachment due to forceful vomiting.  Also has dry eyes- uses Restasis, conjunctivitis, bilateral 03/27/2019    Jeniffer Collins M.D. • Acute meniscal tear, medial, right, initial encounter 5/28/2019    MRI 5/21/2019: Mild undersurface tearing of the posterior root ligament of the medial meniscus.    • Anxiety state    • Back 6/1/2016   • Trochanteric bursitis of both hips 8/3/2016    Left >>  Right    • Visual impairment    • Vitreous degeneration, right eye 03/27/2019    Trudi Bradshaw M.D.      Past Surgical History:  Past Surgical History:   Procedure Laterality Date   • AN Social History:  Social History    Tobacco Use      Smoking status: Never Smoker      Smokeless tobacco: Never Used    Alcohol use: Yes      Frequency: 2-4 times a month      Comment: 2-3 x weekly    Drug use: No    Medications:    •  LANSOPRAZOLE 30 M by mouth., Disp: , Rfl:     •  Multiple Vitamins-Minerals (BARIATRIC MULTIVITAMINS/IRON OR), Take by mouth., Disp: , Rfl:     •  PATIENT SUPPLIED MEDICATION, Essential Oils for allergies, Disp: , Rfl:     •  Cholecalciferol (VITAMIN D3) 2000 UNITS Oral Tab not have insomnia. PHYSICAL EXAM   Today's Vitals:  Temperature Blood Pressure Heart Rate Resp Rate SpO2   Temp: 98.2 °F (36.8 °C) BP: 124/64 Pulse: 72 Resp: 16     ?   Current Weight Height BMI BSA Pain   Wt Readings from Last 1 Encounters:  12/23/20 erythema. Multiple tattoos noted  No periungal erythema  No obvious nail pitting or onycholysis   Psychiatric: She has a normal mood and affect. Her behavior is normal.   Nursing note and vitals reviewed.     ?  Radiology review:     PROCEDURE:  MRI SPINE Moderate degenerative disc disease. Mild to moderate posterior disc osteophyte complex extending into the far right lateral region. Mild to moderate facet hypertrophy. Mild ligamentum flavum thickening. Mild to moderate spinal canal stenosis.     Bilate and edema. BONY STRUCTURES:      No fracture, pars defect, significant scoliosis, or osseous lesion. INTRAPELVIC STRUCTURES:  The visualized intrapelvic structures are unremarkable. No visualized muscular abnormalities are noted.      CONCLUSION 04/2020    SSB, Lomax, RNP, SCL 70, Kelley 1, dsDNA, centromere, histone negative  CK normal  RF negative  CCP negative  HLA-B27 negative  ESR normal  CRP normal   LUIS ARMANDO 1: 160 speckled      05/2017  LUIS ARMANDO negative  CRP normal   ESR normal     Hilda Irineoi

## 2020-12-28 ENCOUNTER — OFFICE VISIT (OUTPATIENT)
Dept: FAMILY MEDICINE CLINIC | Facility: CLINIC | Age: 50
End: 2020-12-28

## 2020-12-28 VITALS
TEMPERATURE: 98 F | OXYGEN SATURATION: 96 % | BODY MASS INDEX: 33.23 KG/M2 | SYSTOLIC BLOOD PRESSURE: 130 MMHG | WEIGHT: 176 LBS | DIASTOLIC BLOOD PRESSURE: 70 MMHG | HEART RATE: 88 BPM | HEIGHT: 61 IN

## 2020-12-28 DIAGNOSIS — R76.8 POSITIVE ANA (ANTINUCLEAR ANTIBODY): ICD-10-CM

## 2020-12-28 DIAGNOSIS — M35.00 SICCA SYNDROME (HCC): ICD-10-CM

## 2020-12-28 DIAGNOSIS — M25.50 POLYARTHRALGIA: ICD-10-CM

## 2020-12-28 DIAGNOSIS — M79.7 FIBROMYALGIA: ICD-10-CM

## 2020-12-28 DIAGNOSIS — M25.532 LEFT WRIST PAIN: Primary | ICD-10-CM

## 2020-12-28 DIAGNOSIS — R07.9 EXERTIONAL CHEST PAIN: ICD-10-CM

## 2020-12-28 PROCEDURE — 3078F DIAST BP <80 MM HG: CPT | Performed by: FAMILY MEDICINE

## 2020-12-28 PROCEDURE — 99214 OFFICE O/P EST MOD 30 MIN: CPT | Performed by: FAMILY MEDICINE

## 2020-12-28 PROCEDURE — 3075F SYST BP GE 130 - 139MM HG: CPT | Performed by: FAMILY MEDICINE

## 2020-12-28 PROCEDURE — 3008F BODY MASS INDEX DOCD: CPT | Performed by: FAMILY MEDICINE

## 2020-12-28 NOTE — PROGRESS NOTES
Amita Perez is a 53year old female.      HPI:       Chest pain:  Patient completed nuc med stress test.  Pt states she did not stop test b/c of fatigue or tiredness, it is because they were increasing the speed to the point that she would have to run Powder Apply 1 Application topically 4 (four) times daily. 60 g 1   • magnesium 250 MG Oral Tab Take 250 mg by mouth. • Pyridoxine HCl (VITAMIN B-6 OR) Take by mouth every other day. • Calcium 500-125 MG-UNIT Oral Tab Take 1,000 mg by mouth. elbow 7/9/2020   • Lung nodule     pt unsure of which side   • Major depressive disorder, recurrent episode, mild with anxious distress ([facility] 7/27/2017   • Migraines    • Morbid obesity with BMI of 45.0-49.9, adult ([facility].) 7/13/2014   • Open angle with borde History    Tobacco Use      Smoking status: Never Smoker      Smokeless tobacco: Never Used    Alcohol use: Yes      Frequency: 2-4 times a month      Comment: 2-3 x weekly    Drug use: No        Family History   Problem Relation Age of Onset   • Cancer Ma gait.  PSYCH: affect normal, normal thought content.         DATA:     STRESS ECHOCARDIOGRAM     Resting EKG:   Normal sinus rhythm.     STAGE      BP HR   REST 116/82 80   1 124/82 106   2 130/82 115   3     4     5     Peak 140/82 144   2 minute post 136/ rate and blood pressure response to exercise. 7.   Below average exercise capacity, 8.2 METs.         d Julissa Figueroa MD 11/30/2020 9:44  t  [id number] 11/30/2020 3:02 PM #[id number]         ASSESSMENT AND PLAN:       Left wrist pain  (primary encounter diagnos

## 2021-01-05 DIAGNOSIS — E78.2 MIXED HYPERLIPIDEMIA: ICD-10-CM

## 2021-01-05 DIAGNOSIS — E11.9 TYPE 2 DIABETES MELLITUS WITHOUT COMPLICATION, WITHOUT LONG-TERM CURRENT USE OF INSULIN (HCC): ICD-10-CM

## 2021-01-05 RX ORDER — SIMVASTATIN 20 MG
20 TABLET ORAL EVERY EVENING
Qty: 90 TABLET | Refills: 1 | Status: SHIPPED | OUTPATIENT
Start: 2021-01-05 | End: 2021-07-27

## 2021-01-07 ENCOUNTER — HOSPITAL ENCOUNTER (OUTPATIENT)
Dept: GENERAL RADIOLOGY | Age: 51
Discharge: HOME | End: 2021-01-07
Attending: INTERNAL MEDICINE
Payer: COMMERCIAL

## 2021-01-07 DIAGNOSIS — R76.8 POSITIVE ANA (ANTINUCLEAR ANTIBODY): ICD-10-CM

## 2021-01-07 DIAGNOSIS — M79.642 BILATERAL HAND PAIN: ICD-10-CM

## 2021-01-07 DIAGNOSIS — M79.641 BILATERAL HAND PAIN: ICD-10-CM

## 2021-01-07 PROCEDURE — 73130 X-RAY EXAM OF HAND: CPT | Performed by: INTERNAL MEDICINE

## 2021-01-13 NOTE — TELEPHONE ENCOUNTER
Addended byBoris Payan on: 1/13/2021 03:50 PM     Modules accepted: Orders Per Fatou Ramos she screened positive for joint pain and bruising. Spoke with patient and she states above symptoms have been ongoing for sometime and is being managed by Dr. Nicolette Seymour.  She has had several virtual appointments with her and will do an in-offic

## 2021-01-14 ENCOUNTER — PATIENT MESSAGE (OUTPATIENT)
Dept: FAMILY MEDICINE CLINIC | Facility: CLINIC | Age: 51
End: 2021-01-14

## 2021-01-14 ENCOUNTER — LAB ENCOUNTER (OUTPATIENT)
Dept: LAB | Age: 51
End: 2021-01-14
Attending: FAMILY MEDICINE
Payer: COMMERCIAL

## 2021-01-14 ENCOUNTER — HOSPITAL ENCOUNTER (OUTPATIENT)
Age: 51
Discharge: HOME | End: 2021-01-14
Attending: EMERGENCY MEDICINE
Payer: COMMERCIAL

## 2021-01-14 ENCOUNTER — APPOINTMENT (OUTPATIENT)
Dept: CT IMAGING | Age: 51
End: 2021-01-14
Attending: PHYSICIAN ASSISTANT
Payer: COMMERCIAL

## 2021-01-14 ENCOUNTER — TELEPHONE (OUTPATIENT)
Dept: FAMILY MEDICINE CLINIC | Facility: CLINIC | Age: 51
End: 2021-01-14

## 2021-01-14 VITALS
BODY MASS INDEX: 31.47 KG/M2 | TEMPERATURE: 98 F | HEART RATE: 67 BPM | HEIGHT: 62 IN | RESPIRATION RATE: 18 BRPM | WEIGHT: 171 LBS | SYSTOLIC BLOOD PRESSURE: 158 MMHG | DIASTOLIC BLOOD PRESSURE: 77 MMHG | OXYGEN SATURATION: 99 %

## 2021-01-14 DIAGNOSIS — R79.82 ELEVATED C-REACTIVE PROTEIN (CRP): ICD-10-CM

## 2021-01-14 DIAGNOSIS — R76.8 POSITIVE ANA (ANTINUCLEAR ANTIBODY): ICD-10-CM

## 2021-01-14 DIAGNOSIS — M79.641 BILATERAL HAND PAIN: ICD-10-CM

## 2021-01-14 DIAGNOSIS — M79.642 BILATERAL HAND PAIN: ICD-10-CM

## 2021-01-14 DIAGNOSIS — R10.32 ABDOMINAL PAIN, LEFT LOWER QUADRANT: Primary | ICD-10-CM

## 2021-01-14 DIAGNOSIS — M25.50 POLYARTHRALGIA: ICD-10-CM

## 2021-01-14 LAB
#MXD IC: 0.5 X10ˆ3/UL (ref 0.1–1)
CREAT BLD-MCNC: 0.8 MG/DL (ref 0.55–1.02)
CRP SERPL-MCNC: 0.76 MG/DL (ref ?–0.3)
GLUCOSE BLD-MCNC: 89 MG/DL (ref 70–99)
HCT VFR BLD AUTO: 39.9 %
HGB BLD-MCNC: 13 G/DL
ISTAT BUN: 24 MG/DL (ref 7–18)
ISTAT CHLORIDE: 106 MMOL/L (ref 98–112)
ISTAT HEMATOCRIT: 39 %
ISTAT IONIZED CALCIUM FOR CHEM 8: 1.2 MMOL/L (ref 1.12–1.32)
ISTAT POTASSIUM: 3.8 MMOL/L (ref 3.6–5.1)
ISTAT SODIUM: 140 MMOL/L (ref 136–145)
ISTAT TCO2: 24 MMOL/L (ref 21–32)
LYMPHOCYTES # BLD AUTO: 2.3 X10ˆ3/UL (ref 1–4)
LYMPHOCYTES NFR BLD AUTO: 33.9 %
MCH RBC QN AUTO: 30.1 PG (ref 26–34)
MCHC RBC AUTO-ENTMCNC: 32.6 G/DL (ref 31–37)
MCV RBC AUTO: 92.4 FL (ref 80–100)
MIXED CELL %: 7.1 %
NEUTROPHILS # BLD AUTO: 4 X10ˆ3/UL (ref 1.5–7.7)
NEUTROPHILS NFR BLD AUTO: 59 %
PLATELET # BLD AUTO: 366 X10ˆ3/UL (ref 150–450)
POCT BILIRUBIN URINE: NEGATIVE
POCT GLUCOSE URINE: NEGATIVE MG/DL
POCT KETONE URINE: NEGATIVE MG/DL
POCT LEUKOCYTE ESTERASE URINE: NEGATIVE
POCT NITRITE URINE: NEGATIVE
POCT PH URINE: 6.5 (ref 5–8)
POCT PROTEIN URINE: NEGATIVE MG/DL
POCT SPECIFIC GRAVITY URINE: 1.03
POCT URINE CLARITY: CLEAR
POCT URINE COLOR: YELLOW
POCT UROBILINOGEN URINE: 0.2 MG/DL
RBC # BLD AUTO: 4.32 X10ˆ6/UL
RHEUMATOID FACT SERPL-ACNC: <10 IU/ML (ref ?–15)
WBC # BLD AUTO: 6.8 X10ˆ3/UL (ref 4–11)

## 2021-01-14 PROCEDURE — 85025 COMPLETE CBC W/AUTO DIFF WBC: CPT | Performed by: PHYSICIAN ASSISTANT

## 2021-01-14 PROCEDURE — 81002 URINALYSIS NONAUTO W/O SCOPE: CPT | Performed by: PHYSICIAN ASSISTANT

## 2021-01-14 PROCEDURE — 80047 BASIC METABLC PNL IONIZED CA: CPT

## 2021-01-14 PROCEDURE — 36415 COLL VENOUS BLD VENIPUNCTURE: CPT

## 2021-01-14 PROCEDURE — 96361 HYDRATE IV INFUSION ADD-ON: CPT

## 2021-01-14 PROCEDURE — 86200 CCP ANTIBODY: CPT

## 2021-01-14 PROCEDURE — 99214 OFFICE O/P EST MOD 30 MIN: CPT

## 2021-01-14 PROCEDURE — 86140 C-REACTIVE PROTEIN: CPT

## 2021-01-14 PROCEDURE — 86431 RHEUMATOID FACTOR QUANT: CPT

## 2021-01-14 PROCEDURE — 74177 CT ABD & PELVIS W/CONTRAST: CPT | Performed by: PHYSICIAN ASSISTANT

## 2021-01-14 PROCEDURE — 96360 HYDRATION IV INFUSION INIT: CPT

## 2021-01-14 PROCEDURE — 99215 OFFICE O/P EST HI 40 MIN: CPT

## 2021-01-14 RX ORDER — SODIUM CHLORIDE 9 MG/ML
1000 INJECTION, SOLUTION INTRAVENOUS ONCE
Status: COMPLETED | OUTPATIENT
Start: 2021-01-14 | End: 2021-01-14

## 2021-01-14 NOTE — TELEPHONE ENCOUNTER
From: Jennifer Ramirez  To: Alberto Taylor DO  Sent: 1/14/2021 10:42 AM CST  Subject: Other    Good morning Dr. Elsa Ramires I'm hoping I can get in to see you ASAP because a couple days ago I thought I somehow pulled a muscle on the left side of my abdomen n

## 2021-01-14 NOTE — ED INITIAL ASSESSMENT (HPI)
Pt aox4. Pt c/o LLQ abd pain that started on Tuesday when woke up in am. Pt states pain is worse with movement and sneezing. Pt had nausea with sneezing. Pt denies fever, v/d.Pt has hx: diverticulitis.

## 2021-01-14 NOTE — TELEPHONE ENCOUNTER
Salomón Dennis is calling because a couple of days ago she though she pulled a muscle on her left side abdomen, it is not getting any better it is actually getting worse, she did put in a my chart message also so she would like someone to call her at [phone]

## 2021-01-14 NOTE — ED PROVIDER NOTES
Patient Seen in: [facility]      History   Patient presents with:  Abdominal Pain: Left side 3-8 pain level near belly button - Entered by patient    Stated Complaint: Abdominal Pain - Left side 3-8 pain level near belly button    HPI/Sub Shea Leal M.D.   • Diverticulosis of large intestine without hemorrhage 2/3/2016   • Dry eyes, bilateral    • Endometriosis    • Esophageal reflux    • Fibromyalgia    • Glaucoma 1/7/2013   • Hemorrhoids    • High blood pressure    • High choles Tendon repair    • HERNIA SURGERY  10/18/2018    Hiatal hernia Dr. Luis Shipman    • HYSTERECTOMY  2006    partial hystero.    • LAP SLEEVE GASTRECTOMY  10/18/2018    Dr. Nestor mooney    •      • OOPHORECTOMY Bilateral     ovaries removed afte the left upper, and left lower quadrants. Worse in the lower quadrant than the upper quadrant. No rebound, no guarding. Musculoskeletal: Normal range of motion. No edema or tenderness.    Neurological: CN III - XII grossly intact, normal strength and sen bilateral hand pain for the last few months. No injury. FINDINGS:  There is no fracture, dislocation, or subluxation. There is no lytic or blastic lesions. The soft tissues are unremarkable. The alignment of the bones is within normal limits. mass or adenopathy. BOWEL/MESENTERY:  There is evidence of a gastric sleeve procedure which is stable in appearance from prior imaging. The duodenum sweep, small bowel and colon reveal no evidence of acute process.   There are stable diverticular changes red flag symptoms that should warrant prompt reevaluation. I explained that if she has any worsening of her current symptoms or develops new symptoms that are concerning to her, she should be reevaluated. She expresses understanding and is agreeable.   Leia Lewis

## 2021-01-15 LAB — CCP IGG SERPL-ACNC: 1 U/ML (ref 0–6.9)

## 2021-01-18 ENCOUNTER — TELEPHONE (OUTPATIENT)
Dept: RHEUMATOLOGY | Facility: CLINIC | Age: 51
End: 2021-01-18

## 2021-01-18 NOTE — TELEPHONE ENCOUNTER
Pt returned our call for lab results. Explained RA negative and CRP improved per Dr. Chrissy Chu. Questions encouraged, reassurance given.

## 2021-01-25 ENCOUNTER — OFFICE VISIT (OUTPATIENT)
Dept: CARDIOLOGY | Age: 51
End: 2021-01-25

## 2021-01-25 VITALS
HEIGHT: 61 IN | DIASTOLIC BLOOD PRESSURE: 80 MMHG | BODY MASS INDEX: 33.61 KG/M2 | HEART RATE: 78 BPM | SYSTOLIC BLOOD PRESSURE: 130 MMHG | WEIGHT: 178 LBS

## 2021-01-25 DIAGNOSIS — E55.9 VITAMIN D DEFICIENCY: ICD-10-CM

## 2021-01-25 DIAGNOSIS — I10 ESSENTIAL HYPERTENSION: Primary | ICD-10-CM

## 2021-01-25 DIAGNOSIS — E78.2 HYPERLIPIDEMIA, MIXED: ICD-10-CM

## 2021-01-25 DIAGNOSIS — I20.89 OTHER FORMS OF ANGINA PECTORIS: ICD-10-CM

## 2021-01-25 PROCEDURE — 3079F DIAST BP 80-89 MM HG: CPT | Performed by: INTERNAL MEDICINE

## 2021-01-25 PROCEDURE — 99215 OFFICE O/P EST HI 40 MIN: CPT | Performed by: INTERNAL MEDICINE

## 2021-01-25 PROCEDURE — 3075F SYST BP GE 130 - 139MM HG: CPT | Performed by: INTERNAL MEDICINE

## 2021-01-25 RX ORDER — MECLIZINE HCL 25MG 25 MG/1
25 TABLET, CHEWABLE ORAL DAILY
COMMUNITY

## 2021-01-25 RX ORDER — ESTRADIOL 0.5 MG/1
0.5 TABLET ORAL DAILY
COMMUNITY

## 2021-01-25 RX ORDER — LACTOBACILLUS ACIDOPHILUS/PECT 30 MG-20MG
TABLET ORAL EVERY OTHER DAY
COMMUNITY

## 2021-01-25 RX ORDER — LEVOTHYROXINE SODIUM 112 UG/1
112 TABLET ORAL DAILY
COMMUNITY

## 2021-01-25 RX ORDER — TIZANIDINE 2 MG/1
2 TABLET ORAL PRN
COMMUNITY

## 2021-01-25 RX ORDER — LANSOPRAZOLE 30 MG/1
30 CAPSULE, DELAYED RELEASE ORAL DAILY
COMMUNITY

## 2021-01-25 RX ORDER — SIMVASTATIN 20 MG
20 TABLET ORAL NIGHTLY
COMMUNITY

## 2021-01-25 RX ORDER — ONDANSETRON HYDROCHLORIDE 8 MG/1
8 TABLET, FILM COATED ORAL PRN
COMMUNITY

## 2021-01-25 RX ORDER — NICOTINE 14MG/24HR
PATCH, TRANSDERMAL 24 HOURS TRANSDERMAL DAILY
COMMUNITY

## 2021-01-25 RX ORDER — MAGNESIUM OXIDE/MAG AA CHELATE 300 MG
CAPSULE ORAL DAILY
COMMUNITY

## 2021-01-25 RX ORDER — MULTIVIT-MIN/IRON/FOLIC ACID/K 18-600-40
CAPSULE ORAL DAILY
COMMUNITY

## 2021-01-25 SDOH — HEALTH STABILITY: PHYSICAL HEALTH: ON AVERAGE, HOW MANY DAYS PER WEEK DO YOU ENGAGE IN MODERATE TO STRENUOUS EXERCISE (LIKE A BRISK WALK)?: 4 DAYS

## 2021-01-25 ASSESSMENT — ENCOUNTER SYMPTOMS
DIZZINESS: 1
SUSPICIOUS LESIONS: 0
FEVER: 0
SYNCOPE: 0
WEIGHT LOSS: 0
CHILLS: 0
COUGH: 0
ABDOMINAL PAIN: 0
ENDOCRINE NEGATIVE: 1
HEMOPTYSIS: 0
HEMATOCHEZIA: 0
BRUISES/BLEEDS EASILY: 0
LIGHT-HEADEDNESS: 0
PSYCHIATRIC NEGATIVE: 1
WEIGHT GAIN: 0
SHORTNESS OF BREATH: 0

## 2021-02-01 ENCOUNTER — TELEPHONE (OUTPATIENT)
Dept: CT IMAGING | Age: 51
End: 2021-02-01

## 2021-02-01 RX ORDER — METOPROLOL TARTRATE 50 MG/1
50 TABLET, FILM COATED ORAL PRN
Qty: 2 TABLET | Refills: 0 | Status: SHIPPED | OUTPATIENT
Start: 2021-02-01

## 2021-02-03 ENCOUNTER — TELEPHONE (OUTPATIENT)
Dept: CARDIOLOGY | Age: 51
End: 2021-02-03

## 2021-02-10 ENCOUNTER — TELEPHONE (OUTPATIENT)
Dept: CARDIOLOGY | Age: 51
End: 2021-02-10

## 2021-02-10 NOTE — IMAGING NOTE
Call placed to Dr. Woodson Pruma office at this time. Spoke with  regarding HR control for CTA gated coronary study scheduled for 02/16/2012. Pt with documented HR of 88, per CTA gated coronary guidelines pt HR needs to be less than 60.  Request

## 2021-02-12 ENCOUNTER — APPOINTMENT (OUTPATIENT)
Dept: CT IMAGING | Age: 51
End: 2021-02-12
Attending: INTERNAL MEDICINE

## 2021-02-12 NOTE — IMAGING NOTE
Call placed to pt regarding CTA Gated coronary study on 02/16/2021. Instructed to arrive 45 mins before procedure. Instructed to drink plenty of fluids a day prior to procedure and day of procedure. May eat a light breakfast/lunch.  Advised to hold caffein

## 2021-02-16 ENCOUNTER — HOSPITAL ENCOUNTER (OUTPATIENT)
Dept: CT IMAGING | Facility: HOSPITAL | Age: 51
Discharge: HOME | End: 2021-02-16
Attending: INTERNAL MEDICINE
Payer: COMMERCIAL

## 2021-02-16 VITALS — DIASTOLIC BLOOD PRESSURE: 81 MMHG | RESPIRATION RATE: 16 BRPM | HEART RATE: 59 BPM | SYSTOLIC BLOOD PRESSURE: 118 MMHG

## 2021-02-16 DIAGNOSIS — I20.8 OTHER FORMS OF ANGINA PECTORIS (HCC): ICD-10-CM

## 2021-02-16 LAB — CREAT BLD-MCNC: 0.8 MG/DL

## 2021-02-16 PROCEDURE — 75574 CT ANGIO HRT W/3D IMAGE: CPT | Performed by: INTERNAL MEDICINE

## 2021-02-16 PROCEDURE — 82565 ASSAY OF CREATININE: CPT

## 2021-02-16 RX ORDER — NITROGLYCERIN 0.4 MG/1
TABLET SUBLINGUAL
Status: COMPLETED
Start: 2021-02-16 | End: 2021-02-16

## 2021-02-16 RX ADMIN — NITROGLYCERIN 0.4 MG: 0.4 TABLET SUBLINGUAL at 08:01:00

## 2021-02-16 NOTE — IMAGING NOTE
Patient arrived in Radiology Holding area, alert, coherent and denies CP distress. Explained CTA gated procedure to patient. Patient states she took Metoprolol 50 mg  PO last night and this morning. Patient ambulatory to Ct scan room 1.   Amador brown

## 2021-02-22 ENCOUNTER — TELEPHONE (OUTPATIENT)
Dept: CARDIOLOGY | Age: 51
End: 2021-02-22

## 2021-03-10 DIAGNOSIS — E03.9 HYPOTHYROIDISM (ACQUIRED): ICD-10-CM

## 2021-03-10 RX ORDER — LEVOTHYROXINE SODIUM 112 UG/1
112 TABLET ORAL
Qty: 90 TABLET | Refills: 0 | Status: SHIPPED | OUTPATIENT
Start: 2021-03-10 | End: 2021-06-01

## 2021-03-10 RX ORDER — LANSOPRAZOLE 30 MG/1
30 CAPSULE, DELAYED RELEASE ORAL DAILY
Qty: 90 CAPSULE | Refills: 0 | Status: SHIPPED | OUTPATIENT
Start: 2021-03-10 | End: 2021-06-01

## 2021-03-10 NOTE — TELEPHONE ENCOUNTER
Requested Prescriptions     Pending Prescriptions Disp Refills   • lansoprazole 30 MG Oral Capsule Delayed Release 90 capsule 0     Sig: Take 1 capsule (30 mg total) by mouth daily.      Signed Prescriptions Disp Refills   • Levothyroxine Sodium 112 MCG Ora

## 2021-03-24 ENCOUNTER — PATIENT MESSAGE (OUTPATIENT)
Dept: FAMILY MEDICINE CLINIC | Facility: CLINIC | Age: 51
End: 2021-03-24

## 2021-03-24 NOTE — TELEPHONE ENCOUNTER
From: Kraig Cast  To: Chelsey Naranjo DO  Sent: 3/24/2021 9:30 AM CDT  Subject: Non-Urgent Medical Question    Good morning,  My sense of hearing seems to be in overdrive and it's making me crazy. I can hear people chew.  I'm still getting dizzy and

## 2021-03-29 ENCOUNTER — TELEPHONE (OUTPATIENT)
Dept: CARDIOLOGY | Age: 51
End: 2021-03-29

## 2021-03-29 ENCOUNTER — PATIENT MESSAGE (OUTPATIENT)
Dept: RHEUMATOLOGY | Facility: CLINIC | Age: 51
End: 2021-03-29

## 2021-03-29 DIAGNOSIS — R76.8 POSITIVE ANA (ANTINUCLEAR ANTIBODY): ICD-10-CM

## 2021-03-29 DIAGNOSIS — E78.2 HYPERLIPIDEMIA, MIXED: ICD-10-CM

## 2021-03-29 DIAGNOSIS — M25.50 POLYARTHRALGIA: Primary | ICD-10-CM

## 2021-03-29 DIAGNOSIS — M35.00 SICCA SYNDROME (HCC): ICD-10-CM

## 2021-03-29 DIAGNOSIS — I10 ESSENTIAL HYPERTENSION: Primary | ICD-10-CM

## 2021-03-29 NOTE — TELEPHONE ENCOUNTER
From: Mehrdad Childress  To: Jones Lackey DO  Sent: 3/29/2021 11:21 AM CDT  Subject: Non-Urgent Medical Question    Good morning - I have an appointment coming up next week and don't remember if I need a blood draw before it?     Thank you  Mayer Curling

## 2021-03-29 NOTE — TELEPHONE ENCOUNTER
Pt did have repeat labs and imaging after LOV as indicated. Does she need labs now? If so, please advise.

## 2021-03-30 ENCOUNTER — OFFICE VISIT (OUTPATIENT)
Dept: INTERNAL MEDICINE CLINIC | Facility: CLINIC | Age: 51
End: 2021-03-30

## 2021-03-30 ENCOUNTER — LAB ENCOUNTER (OUTPATIENT)
Dept: LAB | Age: 51
End: 2021-03-30
Attending: INTERNAL MEDICINE
Payer: COMMERCIAL

## 2021-03-30 VITALS
SYSTOLIC BLOOD PRESSURE: 130 MMHG | WEIGHT: 186 LBS | HEIGHT: 62 IN | BODY MASS INDEX: 34.23 KG/M2 | RESPIRATION RATE: 16 BRPM | HEART RATE: 85 BPM | OXYGEN SATURATION: 97 % | DIASTOLIC BLOOD PRESSURE: 80 MMHG

## 2021-03-30 DIAGNOSIS — F43.9 STRESS AT HOME: ICD-10-CM

## 2021-03-30 DIAGNOSIS — E66.01 MORBID OBESITY WITH BMI OF 45.0-49.9, ADULT (HCC): ICD-10-CM

## 2021-03-30 DIAGNOSIS — M35.00 SICCA SYNDROME (HCC): ICD-10-CM

## 2021-03-30 DIAGNOSIS — R76.8 POSITIVE ANA (ANTINUCLEAR ANTIBODY): ICD-10-CM

## 2021-03-30 DIAGNOSIS — F43.21 GRIEF: ICD-10-CM

## 2021-03-30 DIAGNOSIS — R63.5 WEIGHT GAIN: ICD-10-CM

## 2021-03-30 DIAGNOSIS — Z51.81 ENCOUNTER FOR THERAPEUTIC DRUG MONITORING: Primary | ICD-10-CM

## 2021-03-30 DIAGNOSIS — E11.9 TYPE 2 DIABETES MELLITUS WITHOUT COMPLICATION, WITHOUT LONG-TERM CURRENT USE OF INSULIN (HCC): ICD-10-CM

## 2021-03-30 DIAGNOSIS — M25.50 POLYARTHRALGIA: ICD-10-CM

## 2021-03-30 PROCEDURE — 85652 RBC SED RATE AUTOMATED: CPT

## 2021-03-30 PROCEDURE — 3075F SYST BP GE 130 - 139MM HG: CPT | Performed by: INTERNAL MEDICINE

## 2021-03-30 PROCEDURE — 3008F BODY MASS INDEX DOCD: CPT | Performed by: INTERNAL MEDICINE

## 2021-03-30 PROCEDURE — 86140 C-REACTIVE PROTEIN: CPT

## 2021-03-30 PROCEDURE — 86038 ANTINUCLEAR ANTIBODIES: CPT

## 2021-03-30 PROCEDURE — 99214 OFFICE O/P EST MOD 30 MIN: CPT | Performed by: INTERNAL MEDICINE

## 2021-03-30 PROCEDURE — 36415 COLL VENOUS BLD VENIPUNCTURE: CPT

## 2021-03-30 PROCEDURE — 3079F DIAST BP 80-89 MM HG: CPT | Performed by: INTERNAL MEDICINE

## 2021-03-30 RX ORDER — PHENTERMINE HYDROCHLORIDE 37.5 MG/1
37.5 TABLET ORAL
Qty: 30 TABLET | Refills: 1 | Status: SHIPPED | OUTPATIENT
Start: 2021-03-30 | End: 2021-10-20

## 2021-03-30 RX ORDER — SACCHAROMYCES BOULARDII 250 MG
CAPSULE ORAL
COMMUNITY

## 2021-03-30 NOTE — PATIENT INSTRUCTIONS
RESOURCES   BOOKS   Dressing on the side- [facility]   Atomic Habits -by Pelon Mcclain  The hungry brain: the science behind why we overeat    PODCASTS   Food Psych with Marissa Jeronimo   Losing 100 lbs with Corinne   Brain over Binge by Renetta Pittman

## 2021-03-30 NOTE — PROGRESS NOTES
CC: Patient presents with:  Weight Check: up 16 lbs        HPI:     Underwent bariatric surgery on 10/15/18. Weight on surgery date: 230 lbs. Mom diagnosed with cancer and still selling her house.    Still getting back to tracking and has slowed d • Pyridoxine HCl (VITAMIN B-6 OR) Take by mouth every other day. • Calcium 500-125 MG-UNIT Oral Tab Take 1,000 mg by mouth. • Multiple Vitamins-Minerals (BARIATRIC MULTIVITAMINS/IRON OR) Take by mouth.      • PATIENT SUPPLIED MEDICATION [facility] ([facility] 7/13/2014   • Open angle with borderline findings, high risk, bilateral 03/27/2019    Marguerite Brower M.D.   • Osteoarthritis    • Other vitreous opacities, left eye 03/27/2019    Marguerite Brower M.D.   • Other vitreous opacities, right eye 03/27/201 endometriosis.       Family history of breast cancer     BRCA negative     Excess skin of abdomen     Excess skin of breast     Panniculitis     Family history of ankylosing spondylitis     Postoperative malabsorption     Hypoglycemia     Acute pain of left gastrectomy on 10/15/ 18  -up 9 lbs since surgery  Total time spent on chart review, pre-charting, obtaining history, counseling, and educating, reviewing labs was 30 minutes.  Retrial of phentermine 37.5 mg q day   Referral to psychology to establish, very

## 2021-04-01 ENCOUNTER — TELEPHONE (OUTPATIENT)
Dept: INTERNAL MEDICINE CLINIC | Facility: CLINIC | Age: 51
End: 2021-04-01

## 2021-04-01 NOTE — TELEPHONE ENCOUNTER
Prior Authorization completed in Epic for Phentermine  Approved    Prior authorization approved   Case ID: [id number]      Payer:  [facility]    [phone]    Your PA request has been approved. Guerda Berrios information will be provide

## 2021-04-06 ENCOUNTER — LABORATORY ENCOUNTER (OUTPATIENT)
Dept: LAB | Age: 51
End: 2021-04-06
Attending: INTERNAL MEDICINE
Payer: COMMERCIAL

## 2021-04-06 ENCOUNTER — OFFICE VISIT (OUTPATIENT)
Dept: RHEUMATOLOGY | Facility: CLINIC | Age: 51
End: 2021-04-06

## 2021-04-06 VITALS
BODY MASS INDEX: 34.6 KG/M2 | HEART RATE: 80 BPM | WEIGHT: 188 LBS | RESPIRATION RATE: 16 BRPM | TEMPERATURE: 98 F | DIASTOLIC BLOOD PRESSURE: 82 MMHG | SYSTOLIC BLOOD PRESSURE: 138 MMHG | HEIGHT: 62 IN

## 2021-04-06 DIAGNOSIS — R07.89 OTHER CHEST PAIN: ICD-10-CM

## 2021-04-06 DIAGNOSIS — R76.8 POSITIVE ANA (ANTINUCLEAR ANTIBODY): ICD-10-CM

## 2021-04-06 DIAGNOSIS — M25.50 POLYARTHRALGIA: ICD-10-CM

## 2021-04-06 DIAGNOSIS — R79.82 ELEVATED C-REACTIVE PROTEIN (CRP): ICD-10-CM

## 2021-04-06 DIAGNOSIS — M15.9 PRIMARY OSTEOARTHRITIS INVOLVING MULTIPLE JOINTS: ICD-10-CM

## 2021-04-06 DIAGNOSIS — M79.7 FIBROMYALGIA: ICD-10-CM

## 2021-04-06 DIAGNOSIS — M25.50 POLYARTHRALGIA: Primary | ICD-10-CM

## 2021-04-06 DIAGNOSIS — M35.00 SICCA SYNDROME (HCC): ICD-10-CM

## 2021-04-06 PROCEDURE — 86140 C-REACTIVE PROTEIN: CPT

## 2021-04-06 PROCEDURE — 3079F DIAST BP 80-89 MM HG: CPT | Performed by: INTERNAL MEDICINE

## 2021-04-06 PROCEDURE — 3075F SYST BP GE 130 - 139MM HG: CPT | Performed by: INTERNAL MEDICINE

## 2021-04-06 PROCEDURE — 80053 COMPREHEN METABOLIC PANEL: CPT

## 2021-04-06 PROCEDURE — 3008F BODY MASS INDEX DOCD: CPT | Performed by: INTERNAL MEDICINE

## 2021-04-06 PROCEDURE — 99215 OFFICE O/P EST HI 40 MIN: CPT | Performed by: INTERNAL MEDICINE

## 2021-04-06 PROCEDURE — 36415 COLL VENOUS BLD VENIPUNCTURE: CPT

## 2021-04-06 PROCEDURE — 85379 FIBRIN DEGRADATION QUANT: CPT

## 2021-04-06 PROCEDURE — 85025 COMPLETE CBC W/AUTO DIFF WBC: CPT

## 2021-04-06 NOTE — PROGRESS NOTES
?  RHEUMATOLOGY Follow up   Date of visit: 4/6/2021  ? Patient presents with: Follow - Up: 3 month f/u. In constant pain. Back, knee, left ankle, and hands. No swelling. Dry eyes and dry mouth.  Did eye plugs that helped for a bit, but now eyes painfull however with negative antibody assay, unclear if medication would be helpful in alleviating her pain/symptoms. I will contact with results and next steps in management.  Will likely need to focus on stress management to help with her pain during this st follow up today. Since her last visit, she has had worsened pain overall. Has been under increased stress overall. Her father  in October and his birthday would have been this past weekend.  Is trying to move her mother out of [place] and seems like have been persistent despite the warmer weather recently. Applies topicals when she remembers.  States areas she applies on a regular basis due respond and subside.      + morning stiffness, depends on activity level, can last at least hour   Takes tylenol    Denies new skin nodule formation. The patient denies oral or nasal ulcers, photosensitive rash, Raynaud's phenomenon, prior hematologic abnormality, prior renal or liver disease (other than kidney stones), or history of seizures.   No history of prior sicca not specified as Sjogren's, bilateral 03/27/2019    Jaiden Baugh M.D.   • Kidney infection 11/30/15    currently on macrobid for kidney infection-instructed to inform surgeon of infection.    • Lateral epicondylitis of right elbow 7/9/2020   • Lung abdominal or flank scar   • ROBOT-ASSISTED LAPAROSCOPIC CYSTECTOMY Left 12/10/2015    Performed by Bre Mullins MD at [address]   • TILT TABLE EVALUATION      veinogram 7-6-12 Par[facility] Solis.  General   • UPPER GI ENDOSCOPY,EXAM       Family History:  Fam (25 mg total) by mouth 3 (three) times daily as needed for Dizziness. , Disp: 30 tablet, Rfl: 2  Albuterol Sulfate  (90 Base) MCG/ACT Inhalation Aero Soln, Inhale 2 puffs into the lungs every 6 (six) hours as needed for Wheezing or Shortness of Breat pt, Ok with brand form. ? REVIEW OF SYSTEMS   ? Review of Systems   Constitutional: Positive for malaise/fatigue. Negative for chills, fever and weight loss. HENT: Positive for sinus pain and tinnitus.  Negative for congestion, hearing loss and sore th Heart sounds: Normal heart sounds. No murmur heard. Pulmonary:      Effort: Pulmonary effort is normal. No respiratory distress. Breath sounds: Normal breath sounds. No wheezing. Abdominal:      General: There is no distension.       Palpations: suppression sequences. Images acquired in sagittal and axial planes. PATIENT STATED HISTORY: (As transcribed by Technologist)  The patient has had low back pain for months with no known injury. FINDINGS:    There is lumbar lordosis.   The mahin L4-5.  Moderate left foraminal narrowing is seen at L5-S1. Moderate   degenerative disc disease noted at L4-5. Please see above for further details.   2. There is sclerosis along the SI joints, left greater than right, better characterized on the concurre 04/06/2021    NEPRELIM 8.57 (H) 04/06/2021    NEUTABS 5,438 09/15/2015    LYMPHABS 2,619 09/15/2015    EOSABS 209 09/15/2015    BASABS 52 09/15/2015    NEUT 62.5 09/15/2015    LYMPH 30.1 09/15/2015    MON 4.4 09/15/2015    EOS 2.4 09/15/2015    BASO 0.6 09

## 2021-04-08 ENCOUNTER — OFFICE VISIT (OUTPATIENT)
Dept: FAMILY MEDICINE CLINIC | Facility: CLINIC | Age: 51
End: 2021-04-08

## 2021-04-08 VITALS
SYSTOLIC BLOOD PRESSURE: 128 MMHG | DIASTOLIC BLOOD PRESSURE: 80 MMHG | WEIGHT: 185 LBS | BODY MASS INDEX: 34.04 KG/M2 | HEART RATE: 89 BPM | OXYGEN SATURATION: 98 % | HEIGHT: 62 IN | TEMPERATURE: 98 F

## 2021-04-08 DIAGNOSIS — R79.82 ELEVATED C-REACTIVE PROTEIN (CRP): ICD-10-CM

## 2021-04-08 DIAGNOSIS — R05.9 COUGH: Primary | ICD-10-CM

## 2021-04-08 DIAGNOSIS — R42 VERTIGO: ICD-10-CM

## 2021-04-08 DIAGNOSIS — D72.828 OTHER ELEVATED WHITE BLOOD CELL (WBC) COUNT: ICD-10-CM

## 2021-04-08 DIAGNOSIS — J30.2 SEASONAL ALLERGIC RHINITIS, UNSPECIFIED TRIGGER: ICD-10-CM

## 2021-04-08 PROCEDURE — 3008F BODY MASS INDEX DOCD: CPT | Performed by: FAMILY MEDICINE

## 2021-04-08 PROCEDURE — 3074F SYST BP LT 130 MM HG: CPT | Performed by: FAMILY MEDICINE

## 2021-04-08 PROCEDURE — 3079F DIAST BP 80-89 MM HG: CPT | Performed by: FAMILY MEDICINE

## 2021-04-08 PROCEDURE — 99214 OFFICE O/P EST MOD 30 MIN: CPT | Performed by: FAMILY MEDICINE

## 2021-04-08 RX ORDER — MECLIZINE HYDROCHLORIDE 25 MG/1
25 TABLET ORAL 3 TIMES DAILY PRN
Qty: 30 TABLET | Refills: 2 | Status: SHIPPED | OUTPATIENT
Start: 2021-04-08 | End: 2021-09-08

## 2021-04-08 RX ORDER — SULFAMETHOXAZOLE AND TRIMETHOPRIM 800; 160 MG/1; MG/1
1 TABLET ORAL 2 TIMES DAILY
Qty: 20 TABLET | Refills: 0 | Status: SHIPPED | OUTPATIENT
Start: 2021-04-08 | End: 2021-04-16 | Stop reason: ALTCHOICE

## 2021-04-08 RX ORDER — MONTELUKAST SODIUM 10 MG/1
10 TABLET ORAL NIGHTLY
Qty: 30 TABLET | Refills: 2 | Status: SHIPPED | OUTPATIENT
Start: 2021-04-08 | End: 2021-09-08

## 2021-04-08 NOTE — PROGRESS NOTES
Jurgen Adams is a 53year old female. HPI:       Medical assistant's/Nurse's notes read, reviewed, and confirmed with patient/patient's caregiver. Cough:   Intermittent, feels more like she has to bring up some mucous and coughs it up, green ti MCG/ACT Inhalation Aero Soln Inhale 2 puffs into the lungs every 6 (six) hours as needed for Wheezing or Shortness of Breath (Cough).  1 Inhaler 0   • ondansetron 8 MG Oral Tablet Dispersible Take 1 tablet (8 mg total) by mouth every 8 (eight) hours as need Hypothyroidism    • Incontinence    • Injury of peroneal tendon of right foot 5/23/2018   • Keratoconjunctivitis sicca not specified as Sjogren's, bilateral 03/27/2019    Elizabeth Hernandez M.D.   • Kidney infection 11/30/15    currently on macrobid for kidne • TILT TABLE EVALUATION      veinogram 7-6-12 [facility].  General   • UPPER GI ENDOSCOPY,EXAM        Social History:    Social History    Tobacco Use      Smoking status: Never Smoker      Smokeless tobacco: Never Used    Vaping Use      Vaping Use: Never us mmol/L 138    Potassium      3.5 - 5.1 mmol/L 4.0    Chloride      98 - 112 mmol/L 106    Carbon Dioxide, Total      21.0 - 32.0 mmol/L 27.0    ANION GAP      0 - 18 mmol/L 5    BUN      7 - 18 mg/dL 19 (H)    CREATININE      0.55 - 1.02 mg/dL 0.85    BUN/ 2. 78 2.58 2.59 2.56   Monocytes Absolute      0.10 - 1.00 x10(3) uL 0.61 0.39 0.35 0.29   Eosinophils Absolute      0.00 - 0.70 x10(3) uL 0.11 0.16 0.25 0.15   Basophils Absolute      0.00 - 0.20 x10(3) uL 0.08 0.06 0.09 0.05   Immature Granulocyte Absolut • Meclizine HCl 25 MG Oral Tab 30 tablet 2     Sig: Take 1 tablet (25 mg total) by mouth 3 (three) times daily as needed for Dizziness. • Montelukast Sodium 10 MG Oral Tab 30 tablet 2     Sig: Take 1 tablet (10 mg total) by mouth nightly.      Patient n

## 2021-04-10 ENCOUNTER — PATIENT MESSAGE (OUTPATIENT)
Dept: FAMILY MEDICINE CLINIC | Facility: CLINIC | Age: 51
End: 2021-04-10

## 2021-04-12 NOTE — TELEPHONE ENCOUNTER
From: Daya Hunter  To: Evelyne Jarrell DO  Sent: 4/10/2021 12:24 AM CDT  Subject: Prescription Question    Good evening Dr Samual Pallas.  I took my third dose of the antibiotic this evening and within a couple hours I became very nauseous and got dry mouth

## 2021-04-16 ENCOUNTER — OFFICE VISIT (OUTPATIENT)
Dept: NEUROLOGY | Facility: CLINIC | Age: 51
End: 2021-04-16

## 2021-04-16 ENCOUNTER — PATIENT MESSAGE (OUTPATIENT)
Dept: FAMILY MEDICINE CLINIC | Facility: CLINIC | Age: 51
End: 2021-04-16

## 2021-04-16 VITALS — DIASTOLIC BLOOD PRESSURE: 80 MMHG | SYSTOLIC BLOOD PRESSURE: 128 MMHG | HEART RATE: 70 BPM | RESPIRATION RATE: 16 BRPM

## 2021-04-16 DIAGNOSIS — H93.239 HYPERACUSIS, UNSPECIFIED LATERALITY: ICD-10-CM

## 2021-04-16 DIAGNOSIS — R42 DIZZINESS: ICD-10-CM

## 2021-04-16 DIAGNOSIS — R51.9 HEADACHE DISORDER: Primary | ICD-10-CM

## 2021-04-16 PROCEDURE — 3079F DIAST BP 80-89 MM HG: CPT | Performed by: OTHER

## 2021-04-16 PROCEDURE — 99215 OFFICE O/P EST HI 40 MIN: CPT | Performed by: OTHER

## 2021-04-16 PROCEDURE — 3074F SYST BP LT 130 MM HG: CPT | Performed by: OTHER

## 2021-04-16 RX ORDER — SULFAMETHOXAZOLE AND TRIMETHOPRIM 200; 40 MG/5ML; MG/5ML
160 SUSPENSION ORAL 2 TIMES DAILY
Qty: 400 ML | Refills: 0 | Status: SHIPPED | OUTPATIENT
Start: 2021-04-16 | End: 2021-04-26

## 2021-04-16 NOTE — PROGRESS NOTES
Patient here to establish care regarding headaches,dizziness, oversensitive hearing and strange sensation in 2 toes on left foot.

## 2021-04-16 NOTE — PROGRESS NOTES
[facility] Neurology Outpatient Progress Note  Date of service: 4/16/2021    Patient here to establish care regarding headaches,dizziness, oversensitive hearing (\"hearing ringing in right ear rarely, hearing people chewing\") and strange sensation in 2 toes on l Rfl: 3  •  Albuterol Sulfate  (90 Base) MCG/ACT Inhalation Aero Soln, Inhale 2 puffs into the lungs every 6 (six) hours as needed for Wheezing or Shortness of Breath (Cough). , Disp: 1 Inhaler, Rfl: 0  •  ondansetron 8 MG Oral Tablet Dispersible, Reji Pickens Hc*    RASH  Zithromax               RASH  Oxycodone               RASH    Comment:ONLY in GENERIC form per pt, Ok with brand form. Past Medical History:   Diagnosis Date   • Acute atopic conjunctivitis, bilateral 03/27/2019    Trudi Bradshaw M.D.    • Ac side   • PONV (postoperative nausea and vomiting)    • Posterior vitreous detachment of both eyes 12/8/2017    Right > Left   • Primary open angle glaucoma of both eyes, moderate stage 6/1/2016   • Trochanteric bursitis of both hips 8/3/2016    Left >>  Ri Lymphoma   • Cancer Cousin         basal cell   • Cancer Paternal Aunt         lung     Neurological Examination:  /80   Pulse 70   Resp 16   LMP 01/01/2006 (Approximate)   Mental status: A & O X 3  Language: no aphasia  Speech: no dysarthria  CN II-

## 2021-04-16 NOTE — TELEPHONE ENCOUNTER
From: Kian Tovar  To: Didi Joy DO  Sent: 4/16/2021 6:23 AM CDT  Subject: Prescription Question    Sorry I didn't reply about the prescription sooner from my previous message.  I got up a small amount of phlegm this morning that was tinged with

## 2021-04-27 ENCOUNTER — HOSPITAL ENCOUNTER (OUTPATIENT)
Dept: MRI IMAGING | Facility: HOSPITAL | Age: 51
Discharge: HOME | End: 2021-04-27
Attending: Other
Payer: COMMERCIAL

## 2021-04-27 DIAGNOSIS — H93.239 HYPERACUSIS, UNSPECIFIED LATERALITY: ICD-10-CM

## 2021-04-27 DIAGNOSIS — R51.9 HEADACHE DISORDER: ICD-10-CM

## 2021-04-27 DIAGNOSIS — R42 DIZZINESS: ICD-10-CM

## 2021-04-27 PROCEDURE — 70553 MRI BRAIN STEM W/O & W/DYE: CPT | Performed by: OTHER

## 2021-04-27 PROCEDURE — A9575 INJ GADOTERATE MEGLUMI 0.1ML: HCPCS | Performed by: OTHER

## 2021-04-29 ENCOUNTER — PATIENT MESSAGE (OUTPATIENT)
Dept: FAMILY MEDICINE CLINIC | Facility: CLINIC | Age: 51
End: 2021-04-29

## 2021-04-29 DIAGNOSIS — R04.2 COUGH WITH HEMOPTYSIS: Primary | ICD-10-CM

## 2021-04-29 DIAGNOSIS — R05.9 COUGH: ICD-10-CM

## 2021-04-29 NOTE — TELEPHONE ENCOUNTER
From: Peter Roa  To: Anitra Gan DO  Sent: 4/29/2021 11:32 AM CDT  Subject: Visit Follow-up Question    Good morning, As of last night I officially finished my liquid antibiotic.  Do I need to do a follow-up blood draw to check my WBC numbers or

## 2021-04-29 NOTE — TELEPHONE ENCOUNTER
LOV 4/8/21    Mychart message   4/16/21 1:11 PM  Eric Joyce,  I put in a prescription for liquid Bactrim DS. While you are on the antibiotic, try taking an antihistamine like Zyrtec once a day to help minimize possible allergic reaction.   And of course, l

## 2021-05-02 PROBLEM — R79.82 ELEVATED C-REACTIVE PROTEIN (CRP): Status: ACTIVE | Noted: 2021-05-02

## 2021-05-04 ENCOUNTER — LABORATORY ENCOUNTER (OUTPATIENT)
Dept: LAB | Age: 51
End: 2021-05-04
Attending: FAMILY MEDICINE
Payer: COMMERCIAL

## 2021-05-04 DIAGNOSIS — R05.9 COUGH: ICD-10-CM

## 2021-05-04 DIAGNOSIS — D72.828 OTHER ELEVATED WHITE BLOOD CELL (WBC) COUNT: ICD-10-CM

## 2021-05-04 PROCEDURE — 85025 COMPLETE CBC W/AUTO DIFF WBC: CPT

## 2021-05-04 PROCEDURE — 36415 COLL VENOUS BLD VENIPUNCTURE: CPT

## 2021-05-05 ENCOUNTER — PATIENT MESSAGE (OUTPATIENT)
Dept: FAMILY MEDICINE CLINIC | Facility: CLINIC | Age: 51
End: 2021-05-05

## 2021-05-05 DIAGNOSIS — R04.2 COUGH WITH HEMOPTYSIS: Primary | ICD-10-CM

## 2021-05-05 NOTE — TELEPHONE ENCOUNTER
LOV 4/8/21  1. Cough  Prescription for Bactrim DS to target possible lower respiratory tract infection.     Sulfamethoxazole-TMP -160 MG Oral Tab per tablet  20 tablet 0   Sig:   Take 1 tablet by mouth 2 (two) times daily for 10 days.        May 2, 20

## 2021-05-05 NOTE — TELEPHONE ENCOUNTER
From: Carly Watts  To: Sara Maxwell DO  Sent: 5/5/2021 9:53 AM CDT  Subject: Visit Follow-up Question    Good morning Dr. Chasidy Ware,    Dr. Adron Najjar office has asked me to ask you to please put in orders for a chest x-ray and/or a cat scan of my

## 2021-05-18 ENCOUNTER — HOSPITAL ENCOUNTER (OUTPATIENT)
Dept: GENERAL RADIOLOGY | Age: 51
Discharge: HOME | End: 2021-05-18
Attending: FAMILY MEDICINE
Payer: COMMERCIAL

## 2021-05-18 DIAGNOSIS — R04.2 COUGH WITH HEMOPTYSIS: ICD-10-CM

## 2021-05-18 PROCEDURE — 71046 X-RAY EXAM CHEST 2 VIEWS: CPT | Performed by: FAMILY MEDICINE

## 2021-05-19 DIAGNOSIS — R42 VERTIGO: ICD-10-CM

## 2021-05-19 RX ORDER — MECLIZINE HYDROCHLORIDE 25 MG/1
25 TABLET ORAL 3 TIMES DAILY PRN
Qty: 30 TABLET | Refills: 2 | OUTPATIENT
Start: 2021-05-19

## 2021-05-19 RX ORDER — PHENTERMINE HYDROCHLORIDE 37.5 MG/1
37.5 TABLET ORAL
Qty: 30 TABLET | Refills: 1 | Status: CANCELLED | OUTPATIENT
Start: 2021-05-19

## 2021-05-19 RX ORDER — NYSTATIN 100000 [USP'U]/G
1 POWDER TOPICAL 4 TIMES DAILY
Qty: 60 G | Refills: 1 | Status: SHIPPED | OUTPATIENT
Start: 2021-05-19

## 2021-05-19 NOTE — TELEPHONE ENCOUNTER
Requested Prescriptions     Pending Prescriptions Disp Refills   • Meclizine HCl 25 MG Oral Tab 30 tablet 2     Sig: Take 1 tablet (25 mg total) by mouth 3 (three) times daily as needed for Dizziness.      Last fill was 4/8/21 30 tabs with 2 refill  Called

## 2021-05-19 NOTE — TELEPHONE ENCOUNTER
Requesting Phentermine and Nystatin  LOV: 3/30/21  RTC: 2 months  Last Relevant Labs: na  Filled: 3/30/21 #30 with 1 refills  [place]  Filled: 2/1/19 #60 g with 1 refills  Nystatin    Future Appointments   Date Time Pro

## 2021-06-01 DIAGNOSIS — E03.9 HYPOTHYROIDISM (ACQUIRED): ICD-10-CM

## 2021-06-01 RX ORDER — LANSOPRAZOLE 30 MG/1
CAPSULE, DELAYED RELEASE ORAL
Qty: 90 CAPSULE | Refills: 0 | Status: SHIPPED | OUTPATIENT
Start: 2021-06-01 | End: 2021-06-15

## 2021-06-01 RX ORDER — LEVOTHYROXINE SODIUM 112 UG/1
112 TABLET ORAL
Qty: 90 TABLET | Refills: 1 | Status: SHIPPED | OUTPATIENT
Start: 2021-06-01 | End: 2021-09-08

## 2021-06-01 NOTE — TELEPHONE ENCOUNTER
Pt requesting refill of Levothyroxine    Passed protocol, refill approved, sent to pharmacy.   LOV 4/8/21

## 2021-06-01 NOTE — TELEPHONE ENCOUNTER
lansoprazole 30 MG Oral Capsule Delayed Release 90 capsule 0 3/10/2021     LOV 4/8/21.     Return in about 4 weeks (around 5/6/2021) for Wellness visit  No future OV

## 2021-06-08 ENCOUNTER — PATIENT MESSAGE (OUTPATIENT)
Dept: RHEUMATOLOGY | Facility: CLINIC | Age: 51
End: 2021-06-08

## 2021-06-08 ENCOUNTER — TELEPHONE (OUTPATIENT)
Dept: RHEUMATOLOGY | Facility: CLINIC | Age: 51
End: 2021-06-08

## 2021-06-08 ENCOUNTER — OFFICE VISIT (OUTPATIENT)
Dept: INTERNAL MEDICINE CLINIC | Facility: CLINIC | Age: 51
End: 2021-06-08

## 2021-06-08 VITALS
SYSTOLIC BLOOD PRESSURE: 120 MMHG | DIASTOLIC BLOOD PRESSURE: 76 MMHG | HEIGHT: 61.5 IN | HEART RATE: 80 BPM | WEIGHT: 186 LBS | RESPIRATION RATE: 16 BRPM | BODY MASS INDEX: 34.67 KG/M2

## 2021-06-08 DIAGNOSIS — Z51.81 THERAPEUTIC DRUG MONITORING: ICD-10-CM

## 2021-06-08 DIAGNOSIS — L67.8 DRY HAIR: ICD-10-CM

## 2021-06-08 DIAGNOSIS — R76.8 POSITIVE ANA (ANTINUCLEAR ANTIBODY): Primary | ICD-10-CM

## 2021-06-08 DIAGNOSIS — L30.4 INTERTRIGO: ICD-10-CM

## 2021-06-08 DIAGNOSIS — E66.01 MORBID OBESITY WITH BMI OF 45.0-49.9, ADULT (HCC): ICD-10-CM

## 2021-06-08 DIAGNOSIS — L98.7 EXCESS SKIN OF ABDOMINAL WALL: ICD-10-CM

## 2021-06-08 DIAGNOSIS — Z51.81 ENCOUNTER FOR THERAPEUTIC DRUG MONITORING: Primary | ICD-10-CM

## 2021-06-08 DIAGNOSIS — E11.9 TYPE 2 DIABETES MELLITUS WITHOUT COMPLICATION, WITHOUT LONG-TERM CURRENT USE OF INSULIN (HCC): ICD-10-CM

## 2021-06-08 PROCEDURE — 3008F BODY MASS INDEX DOCD: CPT | Performed by: INTERNAL MEDICINE

## 2021-06-08 PROCEDURE — 3078F DIAST BP <80 MM HG: CPT | Performed by: INTERNAL MEDICINE

## 2021-06-08 PROCEDURE — 99214 OFFICE O/P EST MOD 30 MIN: CPT | Performed by: INTERNAL MEDICINE

## 2021-06-08 PROCEDURE — 3074F SYST BP LT 130 MM HG: CPT | Performed by: INTERNAL MEDICINE

## 2021-06-08 RX ORDER — PHENTERMINE HYDROCHLORIDE 37.5 MG/1
37.5 TABLET ORAL
Qty: 30 TABLET | Refills: 1 | Status: SHIPPED | OUTPATIENT
Start: 2021-06-08 | End: 2021-08-12

## 2021-06-08 RX ORDER — CLOTRIMAZOLE AND BETAMETHASONE DIPROPIONATE 10; .64 MG/G; MG/G
1 CREAM TOPICAL 2 TIMES DAILY PRN
Qty: 60 G | Refills: 3 | Status: SHIPPED | OUTPATIENT
Start: 2021-06-08

## 2021-06-08 NOTE — TELEPHONE ENCOUNTER
----- Message from Kymberly Pierce RN sent at 6/8/2021  1:18 PM CDT -----  Regarding: FW: Other  Contact: [phone]    ----- Message -----  From: Malathi Hughes  Sent: 6/8/2021  12:58 PM CDT  To: [facility] Clinical Staff  Subject: Other

## 2021-06-08 NOTE — TELEPHONE ENCOUNTER
Called patient about her concerns. Concern for progression of undifferentiated connective tissue disease. Discussed with patient she needs to see ophthalmology for objective eye test dry eye testing.   Also discussed that pilocarpine line or evoxac may be

## 2021-06-11 NOTE — PROGRESS NOTES
CC: Patient presents with:  Weight Check: same       HPI:     Underwent bariatric surgery on 10/15/18. Weight on surgery date: 230 lbs. Mom diagnosed with cancer and still selling her house.    Still getting back to tracking and has slowed down we by mouth daily. 90 tablet 3   • Albuterol Sulfate  (90 Base) MCG/ACT Inhalation Aero Soln Inhale 2 puffs into the lungs every 6 (six) hours as needed for Wheezing or Shortness of Breath (Cough).  1 Inhaler 0   • ondansetron 8 MG Oral Tablet Dispersib 5/23/2018   • Keratoconjunctivitis sicca not specified as Sjogren's, bilateral 03/27/2019    Fran Parker M.D.   • Kidney infection 11/30/15    currently on macrobid for kidney infection-instructed to inform surgeon of infection.    • Lateral epicondylit history of colon cancer     Personal history of colonic polyps     Cafe au lait spots     S/P laparoscopic sleeve gastrectomy     Calcaneal spur of right foot     Metatarsalgia of right foot     Patellofemoral arthritis of right knee     Glaucoma suspect o Comp Metabolic Panel (14)          Standing Status: Future          Standing Expiration Date: 6/8/2022      CBC With Differential With Platelet          Standing Status: Future          Standing Expiration Date: 6/8/2022      B12 AND FOLATE          Standi pre-charting, obtaining history, counseling, and educating, reviewing labs was 30 minutes.    -down 73 lbs total   -underwent VSG with Dr Karin Mejia on 10/15/18  -advised of side effects and adverse effects of this medication  -does have history of this/ advised

## 2021-06-14 ENCOUNTER — LAB ENCOUNTER (OUTPATIENT)
Dept: LAB | Age: 51
End: 2021-06-14
Attending: INTERNAL MEDICINE
Payer: COMMERCIAL

## 2021-06-14 DIAGNOSIS — E66.01 MORBID OBESITY WITH BMI OF 45.0-49.9, ADULT (HCC): ICD-10-CM

## 2021-06-14 DIAGNOSIS — L67.8 DRY HAIR: ICD-10-CM

## 2021-06-14 DIAGNOSIS — R76.8 POSITIVE ANA (ANTINUCLEAR ANTIBODY): ICD-10-CM

## 2021-06-14 DIAGNOSIS — E11.9 TYPE 2 DIABETES MELLITUS WITHOUT COMPLICATION, WITHOUT LONG-TERM CURRENT USE OF INSULIN (HCC): ICD-10-CM

## 2021-06-14 DIAGNOSIS — L98.7 EXCESS SKIN OF ABDOMINAL WALL: ICD-10-CM

## 2021-06-14 DIAGNOSIS — Z51.81 THERAPEUTIC DRUG MONITORING: ICD-10-CM

## 2021-06-14 DIAGNOSIS — Z51.81 ENCOUNTER FOR THERAPEUTIC DRUG MONITORING: ICD-10-CM

## 2021-06-14 DIAGNOSIS — L30.4 INTERTRIGO: ICD-10-CM

## 2021-06-14 PROCEDURE — 82607 VITAMIN B-12: CPT

## 2021-06-14 PROCEDURE — 84439 ASSAY OF FREE THYROXINE: CPT

## 2021-06-14 PROCEDURE — 84443 ASSAY THYROID STIM HORMONE: CPT

## 2021-06-14 PROCEDURE — 83550 IRON BINDING TEST: CPT

## 2021-06-14 PROCEDURE — 82728 ASSAY OF FERRITIN: CPT

## 2021-06-14 PROCEDURE — 86334 IMMUNOFIX E-PHORESIS SERUM: CPT

## 2021-06-14 PROCEDURE — 83883 ASSAY NEPHELOMETRY NOT SPEC: CPT

## 2021-06-14 PROCEDURE — 82306 VITAMIN D 25 HYDROXY: CPT

## 2021-06-14 PROCEDURE — 36415 COLL VENOUS BLD VENIPUNCTURE: CPT

## 2021-06-14 PROCEDURE — 83036 HEMOGLOBIN GLYCOSYLATED A1C: CPT

## 2021-06-14 PROCEDURE — 80053 COMPREHEN METABOLIC PANEL: CPT

## 2021-06-14 PROCEDURE — 80061 LIPID PANEL: CPT

## 2021-06-14 PROCEDURE — 84425 ASSAY OF VITAMIN B-1: CPT

## 2021-06-14 PROCEDURE — 83540 ASSAY OF IRON: CPT

## 2021-06-14 PROCEDURE — 84165 PROTEIN E-PHORESIS SERUM: CPT

## 2021-06-14 PROCEDURE — 82746 ASSAY OF FOLIC ACID SERUM: CPT

## 2021-06-14 PROCEDURE — 85025 COMPLETE CBC W/AUTO DIFF WBC: CPT

## 2021-06-15 ENCOUNTER — TELEPHONE (OUTPATIENT)
Dept: FAMILY MEDICINE CLINIC | Facility: CLINIC | Age: 51
End: 2021-06-15

## 2021-06-15 RX ORDER — LANSOPRAZOLE 30 MG/1
30 CAPSULE, DELAYED RELEASE ORAL DAILY
Qty: 90 CAPSULE | Refills: 0 | Status: SHIPPED | OUTPATIENT
Start: 2021-06-15 | End: 2021-09-08

## 2021-06-15 NOTE — TELEPHONE ENCOUNTER
Pt spouse called requesting a refill on Losartan be sent to [facility] in [address].

## 2021-06-15 NOTE — PROGRESS NOTES
Normal lab. Can you call her and ask if she wanted to start hydroxychloroquine (plaquenil) or secretogogue (e.g. pilocarpine, cevemeline)?

## 2021-06-15 NOTE — TELEPHONE ENCOUNTER
Clarified with patient she is requesting lansoprazole. Pharmacy did not receive prior refill request. Sent for refill.

## 2021-06-16 ENCOUNTER — HOSPITAL ENCOUNTER (OUTPATIENT)
Dept: CT IMAGING | Age: 51
Discharge: HOME | End: 2021-06-16
Attending: INTERNAL MEDICINE
Payer: COMMERCIAL

## 2021-06-16 ENCOUNTER — TELEPHONE (OUTPATIENT)
Dept: RHEUMATOLOGY | Facility: CLINIC | Age: 51
End: 2021-06-16

## 2021-06-16 DIAGNOSIS — R04.2 HEMOPTYSIS: ICD-10-CM

## 2021-06-16 DIAGNOSIS — R05.9 COUGH: ICD-10-CM

## 2021-06-16 PROCEDURE — 71260 CT THORAX DX C+: CPT | Performed by: INTERNAL MEDICINE

## 2021-06-16 RX ORDER — HYDROXYCHLOROQUINE SULFATE 200 MG/1
200 TABLET, FILM COATED ORAL 2 TIMES DAILY
Qty: 180 TABLET | Refills: 1 | Status: SHIPPED | OUTPATIENT
Start: 2021-06-16 | End: 2021-11-30

## 2021-06-16 NOTE — TELEPHONE ENCOUNTER
Please call the patient and let the patient to start hydroxychloroquine (plaquenil) in the following way instead of the Rx instructions:    Hydroxychloroquine (plaquenil) start: Take 1 tab daily for 2 weeks, and if tolerating, then increase to 1 tab twice daily.

## 2021-06-17 NOTE — TELEPHONE ENCOUNTER
Pt notified ok to start --Hydroxychloroquine (plaquenil) start: Take 1 tab daily for 2 weeks, and if tolerating, then increase to 1 tab twice daily    Pt has eye exam on Monday, will send a my chart on Monday

## 2021-06-22 ENCOUNTER — PATIENT MESSAGE (OUTPATIENT)
Dept: FAMILY MEDICINE CLINIC | Facility: CLINIC | Age: 51
End: 2021-06-22

## 2021-06-22 DIAGNOSIS — H40.003 GLAUCOMA SUSPECT OF BOTH EYES: Primary | ICD-10-CM

## 2021-06-22 DIAGNOSIS — H40.053 INTRAOCULAR PRESSURE INCREASE, BILATERAL: ICD-10-CM

## 2021-06-22 NOTE — TELEPHONE ENCOUNTER
From: Felicia Watts  To: Susan Valles DO  Sent: 6/22/2021 7:47 AM CDT  Subject: Referral Request    Good morning,   At my eye doc appointment yesterday they let me know that was the last of my referrals.  Can I please get a new one for Dr. Ning Montiel

## 2021-06-24 ENCOUNTER — TELEPHONE (OUTPATIENT)
Dept: RHEUMATOLOGY | Facility: CLINIC | Age: 51
End: 2021-06-24

## 2021-06-25 NOTE — TELEPHONE ENCOUNTER
Received note from [facility] from Osvaldo Ramsey MD    No maculopathy during pre-hydroxychloroquine (plaquenil) evaluation

## 2021-07-06 ENCOUNTER — TELEPHONE (OUTPATIENT)
Dept: CARDIOLOGY | Age: 51
End: 2021-07-06

## 2021-07-08 NOTE — TELEPHONE ENCOUNTER
Requesting metformin 500 mg  LOV: 6/8/21  RTC: not noted  Last Relevant Labs: 6/14/21  Filled: 6/15/21 #60 with 1 refills    Future Appointments   Date Time Provider Zahra Carolina   8/12/2021 10:40 AM Sylvain Blair MD [facility]   9/17/2021  3:0

## 2021-07-19 ENCOUNTER — TELEPHONE (OUTPATIENT)
Dept: FAMILY MEDICINE CLINIC | Facility: CLINIC | Age: 51
End: 2021-07-19

## 2021-07-19 NOTE — TELEPHONE ENCOUNTER
Gina Kwok () is calling to see if Harshad Sterling can get in to see Dr Jimi Swan, she is having pain in her left ankle and shin, it feels like she hit it on something but she didn't she would like to see what she can do Please call Gina Kwok at [phone]

## 2021-07-19 NOTE — TELEPHONE ENCOUNTER
Spoke to patient. States was hiking a couple of weeks ago and started having left ankle pain. Worse beginning yesterday. Left shin started hurting yesterday. Stated no new or increased walking except going up and down basement stairs a couple days ago.

## 2021-07-20 NOTE — TELEPHONE ENCOUNTER
Recommend patient go to the [facility] immediate care, patient may need imaging.

## 2021-07-23 ENCOUNTER — TELEPHONE (OUTPATIENT)
Dept: INTERNAL MEDICINE CLINIC | Facility: CLINIC | Age: 51
End: 2021-07-23

## 2021-07-23 NOTE — TELEPHONE ENCOUNTER
Requesting Metformin 500 mg  LOV: 6/8/21  RTC: not noted  Last Relevant Labs: 6/14/21  Filled: 7/8/21 #60 with 1 refills    Future Appointments   Date Time Provider Zahra Carolina   8/12/2021 10:40 AM Meghna Haines MD [facility]     [facility]

## 2021-07-27 DIAGNOSIS — E78.2 MIXED HYPERLIPIDEMIA: ICD-10-CM

## 2021-07-27 DIAGNOSIS — E11.9 TYPE 2 DIABETES MELLITUS WITHOUT COMPLICATION, WITHOUT LONG-TERM CURRENT USE OF INSULIN (HCC): ICD-10-CM

## 2021-07-27 RX ORDER — SIMVASTATIN 20 MG
20 TABLET ORAL EVERY EVENING
Qty: 90 TABLET | Refills: 0 | Status: SHIPPED | OUTPATIENT
Start: 2021-07-27 | End: 2021-09-08

## 2021-07-27 NOTE — TELEPHONE ENCOUNTER
Pt requesting refill of simvastatin    Passed protocol, refill approved, sent to pharmacy. Last Office Visit with Provider: 4/8/21  No future appointments. Return in about 4 weeks (around 5/6/2021) for Wellness visit. Nata Alcantara message sent

## 2021-07-27 NOTE — TELEPHONE ENCOUNTER
Pt called requesting refill for her Simvastatin states she requested it through her [facility] a week ago but had not heard back.

## 2021-08-12 ENCOUNTER — OFFICE VISIT (OUTPATIENT)
Dept: INTERNAL MEDICINE CLINIC | Facility: CLINIC | Age: 51
End: 2021-08-12

## 2021-08-12 VITALS
BODY MASS INDEX: 34.29 KG/M2 | SYSTOLIC BLOOD PRESSURE: 130 MMHG | RESPIRATION RATE: 16 BRPM | WEIGHT: 184 LBS | HEART RATE: 78 BPM | HEIGHT: 61.5 IN | DIASTOLIC BLOOD PRESSURE: 80 MMHG

## 2021-08-12 DIAGNOSIS — E11.9 TYPE 2 DIABETES MELLITUS WITHOUT COMPLICATION, WITHOUT LONG-TERM CURRENT USE OF INSULIN (HCC): ICD-10-CM

## 2021-08-12 DIAGNOSIS — L60.3 SPLITTING OF NAIL: ICD-10-CM

## 2021-08-12 DIAGNOSIS — E66.01 MORBID OBESITY WITH BMI OF 45.0-49.9, ADULT (HCC): ICD-10-CM

## 2021-08-12 DIAGNOSIS — M25.551 CHRONIC HIP PAIN, BILATERAL: ICD-10-CM

## 2021-08-12 DIAGNOSIS — M25.552 CHRONIC HIP PAIN, BILATERAL: ICD-10-CM

## 2021-08-12 DIAGNOSIS — G89.29 CHRONIC HIP PAIN, BILATERAL: ICD-10-CM

## 2021-08-12 DIAGNOSIS — Z51.81 ENCOUNTER FOR THERAPEUTIC DRUG MONITORING: Primary | ICD-10-CM

## 2021-08-12 PROCEDURE — 3008F BODY MASS INDEX DOCD: CPT | Performed by: INTERNAL MEDICINE

## 2021-08-12 PROCEDURE — 3075F SYST BP GE 130 - 139MM HG: CPT | Performed by: INTERNAL MEDICINE

## 2021-08-12 PROCEDURE — 99214 OFFICE O/P EST MOD 30 MIN: CPT | Performed by: INTERNAL MEDICINE

## 2021-08-12 PROCEDURE — 3079F DIAST BP 80-89 MM HG: CPT | Performed by: INTERNAL MEDICINE

## 2021-08-12 RX ORDER — PHENTERMINE HYDROCHLORIDE 37.5 MG/1
37.5 TABLET ORAL
Qty: 30 TABLET | Refills: 1 | Status: SHIPPED | OUTPATIENT
Start: 2021-08-12 | End: 2021-09-08

## 2021-08-12 NOTE — PROGRESS NOTES
CC: Patient presents with:  Weight Check: down 2 lb       HPI:     Underwent bariatric surgery on 10/15/18. Weight on surgery date: 230 lbs.        Still struggling with possible diagnosis of lupus vs Sjogrens  Started on plaquenil and does feel that mg by mouth every morning before breakfast. Half tab in the AM ) 30 tablet 1   • tiZANidine HCl 4 MG Oral Tab Take 1 tablet (4 mg total) by mouth nightly as needed. 30 tablet 2   • estradiol 1 MG Oral Tab Take 1 tablet (1 mg total) by mouth daily.  90 table reflux    • Fibromyalgia    • Glaucoma 1/7/2013   • Hemorrhoids    • High blood pressure    • High cholesterol    • History of kidney stones 10/17/2013   • Hypothyroidism    • Incontinence    • Injury of peroneal tendon of right foot 5/23/2018   • Keratoco migrainosus, not intractable     Family history of CHF (congestive heart failure)     Family history of cardiomyopathy     B12 deficiency     Microscopic hematuria     Posterior vitreous detachment of both eyes     Heartburn     Family history of colon can Prescriptions      No prescriptions requested or ordered in this encounter      None     ASSESSMENT:   Encounter for therapeutic drug monitoring  (primary encounter diagnosis)  Morbid obesity with BMI of 45.0-49.9, adult ([facility]  Type 2 diabetes mellitus wit

## 2021-08-27 ENCOUNTER — PATIENT OUTREACH (OUTPATIENT)
Dept: FAMILY MEDICINE CLINIC | Facility: CLINIC | Age: 51
End: 2021-08-27

## 2021-08-27 NOTE — PROGRESS NOTES
Spoke with patient via phone. Advised patient that she is overdue for her wellness exam. Per patient she is going to give us a call back in about a week to schedule her appointment.

## 2021-09-01 ENCOUNTER — PATIENT MESSAGE (OUTPATIENT)
Dept: FAMILY MEDICINE CLINIC | Facility: CLINIC | Age: 51
End: 2021-09-01

## 2021-09-01 ENCOUNTER — PATIENT MESSAGE (OUTPATIENT)
Dept: RHEUMATOLOGY | Facility: CLINIC | Age: 51
End: 2021-09-01

## 2021-09-01 DIAGNOSIS — M25.50 POLYARTHRALGIA: ICD-10-CM

## 2021-09-01 DIAGNOSIS — R79.82 ELEVATED C-REACTIVE PROTEIN (CRP): ICD-10-CM

## 2021-09-01 DIAGNOSIS — M35.9 UNDIFFERENTIATED CONNECTIVE TISSUE DISEASE (HCC): Primary | ICD-10-CM

## 2021-09-01 NOTE — TELEPHONE ENCOUNTER
From: Carly Watts  To: Sara Maxwell DO  Sent: 9/1/2021 8:24 AM CDT  Subject: Non-Urgent Medical Question    Good morning,   Do I need a blood draw before my appointment next week?  I have one coming up for one of my other doctors and would like to

## 2021-09-08 ENCOUNTER — OFFICE VISIT (OUTPATIENT)
Dept: FAMILY MEDICINE CLINIC | Facility: CLINIC | Age: 51
End: 2021-09-08

## 2021-09-08 ENCOUNTER — LAB ENCOUNTER (OUTPATIENT)
Dept: LAB | Age: 51
End: 2021-09-08
Attending: INTERNAL MEDICINE
Payer: COMMERCIAL

## 2021-09-08 VITALS
HEART RATE: 76 BPM | WEIGHT: 186 LBS | BODY MASS INDEX: 35.12 KG/M2 | OXYGEN SATURATION: 98 % | SYSTOLIC BLOOD PRESSURE: 124 MMHG | TEMPERATURE: 97 F | HEIGHT: 61 IN | DIASTOLIC BLOOD PRESSURE: 70 MMHG

## 2021-09-08 DIAGNOSIS — H93.239 HYPERACUSIS, UNSPECIFIED LATERALITY: ICD-10-CM

## 2021-09-08 DIAGNOSIS — Z28.21 PNEUMOCOCCAL VACCINE REFUSED: ICD-10-CM

## 2021-09-08 DIAGNOSIS — J30.2 SEASONAL ALLERGIC RHINITIS, UNSPECIFIED TRIGGER: ICD-10-CM

## 2021-09-08 DIAGNOSIS — M25.50 POLYARTHRALGIA: ICD-10-CM

## 2021-09-08 DIAGNOSIS — E66.01 MORBID OBESITY WITH BMI OF 45.0-49.9, ADULT (HCC): ICD-10-CM

## 2021-09-08 DIAGNOSIS — M35.9 UNDIFFERENTIATED CONNECTIVE TISSUE DISEASE (HCC): ICD-10-CM

## 2021-09-08 DIAGNOSIS — E11.9 TYPE 2 DIABETES MELLITUS WITHOUT COMPLICATION, WITHOUT LONG-TERM CURRENT USE OF INSULIN (HCC): ICD-10-CM

## 2021-09-08 DIAGNOSIS — E11.9 TYPE 2 DIABETES MELLITUS WITHOUT COMPLICATION, WITHOUT LONG-TERM CURRENT USE OF INSULIN (HCC): Primary | ICD-10-CM

## 2021-09-08 DIAGNOSIS — R42 VERTIGO: ICD-10-CM

## 2021-09-08 DIAGNOSIS — Z51.81 ENCOUNTER FOR MEDICATION MONITORING: ICD-10-CM

## 2021-09-08 DIAGNOSIS — M25.551 CHRONIC HIP PAIN, BILATERAL: ICD-10-CM

## 2021-09-08 DIAGNOSIS — Z87.898 HISTORY OF VOMITING: ICD-10-CM

## 2021-09-08 DIAGNOSIS — Z51.81 ENCOUNTER FOR THERAPEUTIC DRUG MONITORING: ICD-10-CM

## 2021-09-08 DIAGNOSIS — E78.2 MIXED HYPERLIPIDEMIA: ICD-10-CM

## 2021-09-08 DIAGNOSIS — M25.552 CHRONIC HIP PAIN, BILATERAL: ICD-10-CM

## 2021-09-08 DIAGNOSIS — G89.29 CHRONIC HIP PAIN, BILATERAL: ICD-10-CM

## 2021-09-08 DIAGNOSIS — Z28.21 COVID-19 VACCINATION REFUSED: ICD-10-CM

## 2021-09-08 DIAGNOSIS — E03.9 HYPOTHYROIDISM (ACQUIRED): ICD-10-CM

## 2021-09-08 DIAGNOSIS — R79.82 ELEVATED C-REACTIVE PROTEIN (CRP): ICD-10-CM

## 2021-09-08 LAB
ALBUMIN SERPL-MCNC: 3.7 G/DL (ref 3.4–5)
ALBUMIN/GLOB SERPL: 1 {RATIO} (ref 1–2)
ALP LIVER SERPL-CCNC: 67 U/L
ALT SERPL-CCNC: 31 U/L
ANION GAP SERPL CALC-SCNC: 4 MMOL/L (ref 0–18)
AST SERPL-CCNC: 24 U/L (ref 15–37)
BASOPHILS # BLD AUTO: 0.07 X10(3) UL (ref 0–0.2)
BASOPHILS NFR BLD AUTO: 1.2 %
BILIRUB SERPL-MCNC: 0.3 MG/DL (ref 0.1–2)
BUN BLD-MCNC: 15 MG/DL (ref 7–18)
CALCIUM BLD-MCNC: 8.6 MG/DL (ref 8.5–10.1)
CHLORIDE SERPL-SCNC: 110 MMOL/L (ref 98–112)
CO2 SERPL-SCNC: 27 MMOL/L (ref 21–32)
CREAT BLD-MCNC: 0.8 MG/DL
CREAT UR-SCNC: 24.7 MG/DL
CRP SERPL-MCNC: 0.32 MG/DL (ref ?–0.3)
EOSINOPHIL # BLD AUTO: 0.19 X10(3) UL (ref 0–0.7)
EOSINOPHIL NFR BLD AUTO: 3.3 %
ERYTHROCYTE [DISTWIDTH] IN BLOOD BY AUTOMATED COUNT: 14.1 %
EST. AVERAGE GLUCOSE BLD GHB EST-MCNC: 117 MG/DL (ref 68–126)
GLOBULIN PLAS-MCNC: 3.7 G/DL (ref 2.8–4.4)
GLUCOSE BLD-MCNC: 85 MG/DL (ref 70–99)
HBA1C MFR BLD HPLC: 5.7 % (ref ?–5.7)
HCT VFR BLD AUTO: 43.8 %
HGB BLD-MCNC: 13.8 G/DL
IMM GRANULOCYTES # BLD AUTO: 0 X10(3) UL (ref 0–1)
IMM GRANULOCYTES NFR BLD: 0 %
LYMPHOCYTES # BLD AUTO: 2.6 X10(3) UL (ref 1–4)
LYMPHOCYTES NFR BLD AUTO: 45.5 %
M PROTEIN MFR SERPL ELPH: 7.4 G/DL (ref 6.4–8.2)
MCH RBC QN AUTO: 29.7 PG (ref 26–34)
MCHC RBC AUTO-ENTMCNC: 31.5 G/DL (ref 31–37)
MCV RBC AUTO: 94.2 FL
MICROALBUMIN UR-MCNC: <0.5 MG/DL
MONOCYTES # BLD AUTO: 0.5 X10(3) UL (ref 0.1–1)
MONOCYTES NFR BLD AUTO: 8.8 %
NEUTROPHILS # BLD AUTO: 2.35 X10 (3) UL (ref 1.5–7.7)
NEUTROPHILS # BLD AUTO: 2.35 X10(3) UL (ref 1.5–7.7)
NEUTROPHILS NFR BLD AUTO: 41.2 %
OSMOLALITY SERPL CALC.SUM OF ELEC: 292 MOSM/KG (ref 275–295)
PLATELET # BLD AUTO: 293 10(3)UL (ref 150–450)
POTASSIUM SERPL-SCNC: 4.2 MMOL/L (ref 3.5–5.1)
RBC # BLD AUTO: 4.65 X10(6)UL
SED RATE-ML: 8 MM/HR
SODIUM SERPL-SCNC: 141 MMOL/L (ref 136–145)
WBC # BLD AUTO: 5.7 X10(3) UL (ref 4–11)

## 2021-09-08 PROCEDURE — 83036 HEMOGLOBIN GLYCOSYLATED A1C: CPT

## 2021-09-08 PROCEDURE — 82570 ASSAY OF URINE CREATININE: CPT | Performed by: FAMILY MEDICINE

## 2021-09-08 PROCEDURE — 3074F SYST BP LT 130 MM HG: CPT | Performed by: FAMILY MEDICINE

## 2021-09-08 PROCEDURE — 86140 C-REACTIVE PROTEIN: CPT

## 2021-09-08 PROCEDURE — 80053 COMPREHEN METABOLIC PANEL: CPT

## 2021-09-08 PROCEDURE — 99214 OFFICE O/P EST MOD 30 MIN: CPT | Performed by: FAMILY MEDICINE

## 2021-09-08 PROCEDURE — 85025 COMPLETE CBC W/AUTO DIFF WBC: CPT

## 2021-09-08 PROCEDURE — 82043 UR ALBUMIN QUANTITATIVE: CPT | Performed by: FAMILY MEDICINE

## 2021-09-08 PROCEDURE — 85652 RBC SED RATE AUTOMATED: CPT

## 2021-09-08 PROCEDURE — 3008F BODY MASS INDEX DOCD: CPT | Performed by: FAMILY MEDICINE

## 2021-09-08 PROCEDURE — 3078F DIAST BP <80 MM HG: CPT | Performed by: FAMILY MEDICINE

## 2021-09-08 PROCEDURE — 36415 COLL VENOUS BLD VENIPUNCTURE: CPT

## 2021-09-08 RX ORDER — SIMVASTATIN 20 MG
20 TABLET ORAL EVERY EVENING
Qty: 90 TABLET | Refills: 3 | Status: SHIPPED | OUTPATIENT
Start: 2021-09-08

## 2021-09-08 RX ORDER — MONTELUKAST SODIUM 10 MG/1
10 TABLET ORAL NIGHTLY
Qty: 90 TABLET | Refills: 3 | Status: SHIPPED | OUTPATIENT
Start: 2021-09-08 | End: 2021-11-16

## 2021-09-08 RX ORDER — LEVOTHYROXINE SODIUM 112 UG/1
112 TABLET ORAL
Qty: 90 TABLET | Refills: 1 | Status: SHIPPED | OUTPATIENT
Start: 2021-09-08

## 2021-09-08 RX ORDER — ALBUTEROL SULFATE 90 UG/1
2 AEROSOL, METERED RESPIRATORY (INHALATION) EVERY 6 HOURS PRN
Refills: 0 | Status: CANCELLED | OUTPATIENT
Start: 2021-09-08

## 2021-09-08 RX ORDER — LANSOPRAZOLE 30 MG/1
30 CAPSULE, DELAYED RELEASE ORAL DAILY
Qty: 90 CAPSULE | Refills: 0 | Status: CANCELLED | OUTPATIENT
Start: 2021-09-08

## 2021-09-08 RX ORDER — ONDANSETRON 8 MG/1
8 TABLET, ORALLY DISINTEGRATING ORAL EVERY 8 HOURS PRN
Qty: 30 TABLET | Refills: 0 | Status: SHIPPED | OUTPATIENT
Start: 2021-09-08

## 2021-09-08 RX ORDER — MECLIZINE HYDROCHLORIDE 25 MG/1
25 TABLET ORAL 3 TIMES DAILY PRN
Qty: 30 TABLET | Refills: 2 | Status: SHIPPED | OUTPATIENT
Start: 2021-09-08 | End: 2022-01-28

## 2021-09-08 RX ORDER — LANSOPRAZOLE 30 MG/1
30 CAPSULE, DELAYED RELEASE ORAL DAILY
Qty: 90 CAPSULE | Refills: 3 | Status: SHIPPED | OUTPATIENT
Start: 2021-09-08

## 2021-09-08 NOTE — PROGRESS NOTES
Jurgen Adams is a 53year old female. HPI:       No new complaints. Patient refuses pneumococcal vaccination. Patient refuses COVID-19 vaccination.       Wt Readings from Last 6 Encounters:  09/14/21 : 186 lb 12.8 oz (84.7 kg)  09/08/21 : 186 lb 108 (90 Base) MCG/ACT Inhalation Aero Soln Inhale 2 puffs into the lungs every 6 (six) hours as needed for Wheezing or Shortness of Breath (Cough).  1 Inhaler 0   • ALPRAZolam 0.5 MG Oral Tab 1/2 to one tab daily as needed 10 tablet 0   • Polyethylene Glyco bilateral 03/27/2019    Jaiden Baugh M.D.   • Kidney infection 11/30/15    currently on macrobid for kidney infection-instructed to inform surgeon of infection.    • Lateral epicondylitis of right elbow 7/9/2020   • Lung nodule     pt unsure of which lety Use      Smoking status: Never Smoker      Smokeless tobacco: Never Used    Vaping Use      Vaping Use: Never used    Alcohol use: Yes      Comment: weekly    Drug use: No        Family History   Problem Relation Age of Onset   • Cancer Maternal Aunt Ref Rng & Units 6/14/2021 12/17/2020   Cholesterol, Total      <200 mg/dL 170    HDL Cholesterol      40 - 59 mg/dL 92 (H)    Triglycerides      30 - 149 mg/dL 78    LDL Cholesterol Calc      <100 mg/dL 64    VLDL      0 - 30 mg/dL 12    NON HDL CHOL tablet (25 mg total) by mouth 3 (three) times daily as needed for Dizziness. Dispense: 30 tablet; Refill: 2  - ENT - INTERNAL  - ondansetron 8 MG Oral Tablet Dispersible; Take 1 tablet (8 mg total) by mouth every 8 (eight) hours as needed for Nausea.   Dis Nausea. Dispense: 30 tablet; Refill: 0    9. COVID-19 vaccination refused    10.  Pneumococcal vaccine refused        Orders Placed This Encounter      CBC With Differential With Platelet      Comp Metabolic Panel (14)      Lipid Panel      Assay, Thyroid

## 2021-09-08 NOTE — TELEPHONE ENCOUNTER
Requested Prescriptions     Pending Prescriptions Disp Refills   • lansoprazole 30 MG Oral Capsule Delayed Release 90 capsule 0     Sig: Take 1 capsule (30 mg total) by mouth daily.      Last fill was 6/15/21 90 caps  Last OV 4/8/21  Future OV today

## 2021-09-14 ENCOUNTER — OFFICE VISIT (OUTPATIENT)
Dept: RHEUMATOLOGY | Facility: CLINIC | Age: 51
End: 2021-09-14

## 2021-09-14 VITALS
BODY MASS INDEX: 35.27 KG/M2 | SYSTOLIC BLOOD PRESSURE: 122 MMHG | DIASTOLIC BLOOD PRESSURE: 80 MMHG | TEMPERATURE: 98 F | RESPIRATION RATE: 16 BRPM | WEIGHT: 186.81 LBS | HEIGHT: 61 IN | HEART RATE: 76 BPM

## 2021-09-14 DIAGNOSIS — M53.3 CHRONIC SI JOINT PAIN: ICD-10-CM

## 2021-09-14 DIAGNOSIS — M15.9 PRIMARY OSTEOARTHRITIS INVOLVING MULTIPLE JOINTS: ICD-10-CM

## 2021-09-14 DIAGNOSIS — M35.00 SICCA SYNDROME (HCC): ICD-10-CM

## 2021-09-14 DIAGNOSIS — M35.9 UNDIFFERENTIATED CONNECTIVE TISSUE DISEASE (HCC): Primary | ICD-10-CM

## 2021-09-14 DIAGNOSIS — R76.8 POSITIVE ANA (ANTINUCLEAR ANTIBODY): ICD-10-CM

## 2021-09-14 DIAGNOSIS — R21 SKIN RASH: ICD-10-CM

## 2021-09-14 DIAGNOSIS — L60.1 ONYCHOLYSIS: ICD-10-CM

## 2021-09-14 DIAGNOSIS — M79.7 FIBROMYALGIA: ICD-10-CM

## 2021-09-14 DIAGNOSIS — G89.29 CHRONIC SI JOINT PAIN: ICD-10-CM

## 2021-09-14 DIAGNOSIS — M25.50 POLYARTHRALGIA: ICD-10-CM

## 2021-09-14 PROCEDURE — 99214 OFFICE O/P EST MOD 30 MIN: CPT | Performed by: INTERNAL MEDICINE

## 2021-09-14 PROCEDURE — 3079F DIAST BP 80-89 MM HG: CPT | Performed by: INTERNAL MEDICINE

## 2021-09-14 PROCEDURE — 3074F SYST BP LT 130 MM HG: CPT | Performed by: INTERNAL MEDICINE

## 2021-09-14 PROCEDURE — 3008F BODY MASS INDEX DOCD: CPT | Performed by: INTERNAL MEDICINE

## 2021-09-14 RX ORDER — PREDNISONE 1 MG/1
TABLET ORAL
Qty: 30 TABLET | Refills: 0 | Status: SHIPPED | OUTPATIENT
Start: 2021-09-14 | End: 2021-10-14

## 2021-09-14 RX ORDER — OMEGA-3S/DHA/EPA/FISH OIL 1000-1400
1 CAPSULE,DELAYED RELEASE (ENTERIC COATED) ORAL DAILY
COMMUNITY
End: 2021-11-16

## 2021-09-15 NOTE — PROGRESS NOTES
?  RHEUMATOLOGY Follow up   Date of visit: 09/14/2021  ? Patient presents with:   Follow - Up: 5 month, having more joint pain, stiffness and locking, having dry mouth and eyes, hair is very dry also, unexplained bruising, skin feels like it's \"crawling for improvement of symptoms. She will mychart me an updated after completion of steroids as well as after derm evaluation.    She has plans for xrays of the hips, so I have added on SI joint plain films  Discussed that if work up is negative and no improvem Gustabo Klein is a 53year old female with the following active problems who is seen for medically necessary follow-up today. She was seen initially for evaluation of joint pain and abnormal labs. Since her last visit, she had some worsened symptoms.   Was hav located all over and not just over the joints.  This can happen with different types of pressure- 's arm or dog sitting.      States the joint pain has gotten worse over the past 2 months- worst over fingers, wrists, elbows, right shoulder and both k surgical intervention (happened 6 years ago and had micro-tears which did not require surgery then last year required surgery). - no issues with the ankle since that time. + hx of plantar fasciitis, years ago no recent issues. Does wear insoles.    Does a tattoo    • Diabetes [facility])    • Disorder of thyroid    • Diverticulitis of sigmoid colon 10/17/2013   • Diverticulosis 09/27/2013    Pilar Barnhart M.D.   • Diverticulosis of large intestine without hemorrhage 2/3/2016   • Dry eyes, bilateral    • En 1997   • FOOT SURGERY Right 10/18/2019    Tendon repair    • HERNIA SURGERY  10/18/2018    Hiatal hernia Dr. Lv Grissom    • HYSTERECTOMY  2006    partial hystero.    • LAP SLEEVE GASTRECTOMY  10/18/2018    Dr. Saintclair Parson times daily as needed for Dizziness. , Disp: 30 tablet, Rfl: 2  lansoprazole 30 MG Oral Capsule Delayed Release, Take 1 capsule (30 mg total) by mouth daily. , Disp: 90 capsule, Rfl: 3  levothyroxine 112 MCG Oral Tab, Take 1 tablet (112 mcg total) by mouth b Vitamins-Minerals (BARIATRIC MULTIVITAMINS/IRON OR), Take by mouth., Disp: , Rfl:   PATIENT SUPPLIED MEDICATION, Essential Oils for allergies, Disp: , Rfl:   Cholecalciferol (VITAMIN D3) 2000 UNITS Oral Tab, Take 1 tablet by mouth daily.   , Disp: , Rfl: PHYSICAL EXAM   Today's Vitals:  Temperature Blood Pressure Heart Rate Resp Rate SpO2   Temp: 98.2 °F (36.8 °C) BP: 122/80 Pulse: 76 Resp: 16     ?   Current Weight Height BMI BSA Pain   Wt Readings from Last 1 Encounters:  09/14/21 : 186 lb 12.8 oz (84 Lymphadenopathy:      Cervical: No cervical adenopathy. Skin:     General: Skin is warm and dry. Findings: No erythema or rash.       Comments: Multiple tattoos noted  No periungal erythema  Nails manicured but sent picture via mychart with signs o thickening. No spinal canal or foraminal narrowing. L2-L3:  Mild facet hypertrophy. Mild ligamentum flavum thickening. No spinal canal or foraminal narrowing. L3-L4:  Mild to moderate facet hypertrophy. Mild ligamentum flavum thickening.   Mild back pain for months with no known injury. FINDINGS:       SI JOINTS:      There is bilateral sacroiliac joint osteoarthritis noted, appearing moderate in degree on the left and mild on the right.   Regarding the left SI joint, there are moderate re 09/08/2021    AST 24 09/08/2021    ALT 31 09/08/2021    BILT 0.3 09/08/2021    TP 7.4 09/08/2021    ALB 3.7 09/08/2021    GLOBULT 3.2 09/15/2015    GLOBULIN 3.7 09/08/2021    ALBGLOBRAT 1.3 09/15/2015     09/08/2021    K 4.2 09/08/2021     09/0

## 2021-09-21 ENCOUNTER — PATIENT MESSAGE (OUTPATIENT)
Dept: FAMILY MEDICINE CLINIC | Facility: CLINIC | Age: 51
End: 2021-09-21

## 2021-09-21 DIAGNOSIS — R21 RASH AND NONSPECIFIC SKIN ERUPTION: Primary | ICD-10-CM

## 2021-09-21 NOTE — TELEPHONE ENCOUNTER
From: Stephie Yates  To: Rocio Moreno DO  Sent: 9/21/2021 7:14 AM CDT  Subject: dermatologist referral request    Good morning Dr. Julio C Gonzalez, At my last visit we talked about there a couple of spots on my face I'd like to have checked out and now afte

## 2021-09-28 ENCOUNTER — HOSPITAL ENCOUNTER (OUTPATIENT)
Dept: GENERAL RADIOLOGY | Age: 51
Discharge: HOME | End: 2021-09-28
Attending: INTERNAL MEDICINE
Payer: COMMERCIAL

## 2021-09-28 ENCOUNTER — HOSPITAL ENCOUNTER (OUTPATIENT)
Dept: MAMMOGRAPHY | Age: 51
Discharge: HOME | End: 2021-09-28
Attending: OBSTETRICS & GYNECOLOGY
Payer: COMMERCIAL

## 2021-09-28 DIAGNOSIS — G89.29 CHRONIC HIP PAIN, BILATERAL: ICD-10-CM

## 2021-09-28 DIAGNOSIS — M25.552 CHRONIC HIP PAIN, BILATERAL: ICD-10-CM

## 2021-09-28 DIAGNOSIS — Z51.81 ENCOUNTER FOR THERAPEUTIC DRUG MONITORING: ICD-10-CM

## 2021-09-28 DIAGNOSIS — E11.9 TYPE 2 DIABETES MELLITUS WITHOUT COMPLICATION, WITHOUT LONG-TERM CURRENT USE OF INSULIN (HCC): ICD-10-CM

## 2021-09-28 DIAGNOSIS — E66.01 MORBID OBESITY WITH BMI OF 45.0-49.9, ADULT (HCC): ICD-10-CM

## 2021-09-28 DIAGNOSIS — M25.551 CHRONIC HIP PAIN, BILATERAL: ICD-10-CM

## 2021-09-28 DIAGNOSIS — M53.3 CHRONIC SI JOINT PAIN: ICD-10-CM

## 2021-09-28 DIAGNOSIS — G89.29 CHRONIC SI JOINT PAIN: ICD-10-CM

## 2021-09-28 DIAGNOSIS — Z12.31 ENCOUNTER FOR SCREENING MAMMOGRAM FOR MALIGNANT NEOPLASM OF BREAST: ICD-10-CM

## 2021-09-28 PROCEDURE — 73523 X-RAY EXAM HIPS BI 5/> VIEWS: CPT | Performed by: INTERNAL MEDICINE

## 2021-09-28 PROCEDURE — 77067 SCR MAMMO BI INCL CAD: CPT | Performed by: OBSTETRICS & GYNECOLOGY

## 2021-09-28 PROCEDURE — 77063 BREAST TOMOSYNTHESIS BI: CPT | Performed by: OBSTETRICS & GYNECOLOGY

## 2021-09-28 PROCEDURE — 72202 X-RAY EXAM SI JOINTS 3/> VWS: CPT | Performed by: INTERNAL MEDICINE

## 2021-10-07 ENCOUNTER — TELEPHONE (OUTPATIENT)
Dept: FAMILY MEDICINE CLINIC | Facility: CLINIC | Age: 51
End: 2021-10-07

## 2021-10-07 ENCOUNTER — HOSPITAL ENCOUNTER (OUTPATIENT)
Dept: MAMMOGRAPHY | Facility: HOSPITAL | Age: 51
Discharge: HOME | End: 2021-10-07
Attending: OBSTETRICS & GYNECOLOGY
Payer: COMMERCIAL

## 2021-10-07 DIAGNOSIS — R92.2 INCONCLUSIVE MAMMOGRAM: ICD-10-CM

## 2021-10-07 DIAGNOSIS — Z91.89 INCREASED RISK OF BREAST CANCER: Primary | ICD-10-CM

## 2021-10-07 PROCEDURE — 77065 DX MAMMO INCL CAD UNI: CPT | Performed by: OBSTETRICS & GYNECOLOGY

## 2021-10-07 PROCEDURE — 77061 BREAST TOMOSYNTHESIS UNI: CPT | Performed by: OBSTETRICS & GYNECOLOGY

## 2021-10-07 NOTE — TELEPHONE ENCOUNTER
Please call patient and inform her that Dr. Don Hernandez reviewed her mammogram and has placed an order for patient to be seen at the cancer [facility].   Please also inform the patient of below:    Your patient's answers to the health and family his

## 2021-10-14 ENCOUNTER — OFFICE VISIT (OUTPATIENT)
Dept: INTERNAL MEDICINE CLINIC | Facility: CLINIC | Age: 51
End: 2021-10-14

## 2021-10-14 VITALS
HEIGHT: 61.5 IN | DIASTOLIC BLOOD PRESSURE: 74 MMHG | WEIGHT: 192 LBS | HEART RATE: 76 BPM | SYSTOLIC BLOOD PRESSURE: 130 MMHG | BODY MASS INDEX: 35.79 KG/M2

## 2021-10-14 DIAGNOSIS — F43.9 STRESS AT HOME: ICD-10-CM

## 2021-10-14 DIAGNOSIS — Z51.81 ENCOUNTER FOR THERAPEUTIC DRUG MONITORING: Primary | ICD-10-CM

## 2021-10-14 DIAGNOSIS — E11.9 TYPE 2 DIABETES MELLITUS WITHOUT COMPLICATION, WITHOUT LONG-TERM CURRENT USE OF INSULIN (HCC): ICD-10-CM

## 2021-10-14 DIAGNOSIS — E66.01 MORBID OBESITY WITH BMI OF 45.0-49.9, ADULT (HCC): ICD-10-CM

## 2021-10-14 PROCEDURE — 3078F DIAST BP <80 MM HG: CPT | Performed by: INTERNAL MEDICINE

## 2021-10-14 PROCEDURE — 99214 OFFICE O/P EST MOD 30 MIN: CPT | Performed by: INTERNAL MEDICINE

## 2021-10-14 PROCEDURE — 3008F BODY MASS INDEX DOCD: CPT | Performed by: INTERNAL MEDICINE

## 2021-10-14 PROCEDURE — 3075F SYST BP GE 130 - 139MM HG: CPT | Performed by: INTERNAL MEDICINE

## 2021-10-14 NOTE — PROGRESS NOTES
CC: Patient presents with:  Weight Check: 8 pound weight gain       HPI:     Underwent bariatric surgery on 10/15/18. Weight on surgery date: 230 lbs. Notes she is gaining weight for stress  Taking care of family   Starts and derails.    Cut out Oral Tab Take 1 tablet (4 mg total) by mouth nightly as needed. 30 tablet 2   • ALPRAZolam 0.5 MG Oral Tab 1/2 to one tab daily as needed 10 tablet 0   • Polyethylene Glycol 3350 Oral Powder Take 17 g by mouth daily.      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       Family history of breast 45.0-49.9, adult ([facility]  Type 2 diabetes mellitus without complication, without long-term current use of insulin ([facility]  Stress at home   -initial consult: 261 lb , sleeve gastrectomy on 10/15/ 18  -up 8 lb   -reviewed increase in a1c   -would like to hold o

## 2021-10-20 ENCOUNTER — PATIENT MESSAGE (OUTPATIENT)
Dept: INTERNAL MEDICINE CLINIC | Facility: CLINIC | Age: 51
End: 2021-10-20

## 2021-10-20 RX ORDER — PHENTERMINE HYDROCHLORIDE 15 MG/1
15 CAPSULE ORAL EVERY MORNING
Qty: 30 CAPSULE | Refills: 0 | Status: SHIPPED | OUTPATIENT
Start: 2021-10-20 | End: 2021-12-10

## 2021-10-20 NOTE — TELEPHONE ENCOUNTER
From: Jose Puentes  To:  Jaiden Ramirez MD  Sent: 10/20/2021 10:46 AM CDT  Subject: New phentermine dose    Good morning - I called my pharmacy checking if the prescription for my new phentermine dose would be ready soon and they said they haven't received

## 2021-11-16 ENCOUNTER — OFFICE VISIT (OUTPATIENT)
Dept: FAMILY MEDICINE CLINIC | Facility: CLINIC | Age: 51
End: 2021-11-16

## 2021-11-16 VITALS
SYSTOLIC BLOOD PRESSURE: 132 MMHG | HEART RATE: 80 BPM | TEMPERATURE: 98 F | DIASTOLIC BLOOD PRESSURE: 78 MMHG | HEIGHT: 61 IN | WEIGHT: 195 LBS | BODY MASS INDEX: 36.82 KG/M2

## 2021-11-16 DIAGNOSIS — R30.0 DYSURIA: Primary | ICD-10-CM

## 2021-11-16 PROCEDURE — 3075F SYST BP GE 130 - 139MM HG: CPT | Performed by: FAMILY MEDICINE

## 2021-11-16 PROCEDURE — 81003 URINALYSIS AUTO W/O SCOPE: CPT | Performed by: FAMILY MEDICINE

## 2021-11-16 PROCEDURE — 99213 OFFICE O/P EST LOW 20 MIN: CPT | Performed by: FAMILY MEDICINE

## 2021-11-16 PROCEDURE — 3078F DIAST BP <80 MM HG: CPT | Performed by: FAMILY MEDICINE

## 2021-11-16 PROCEDURE — 87086 URINE CULTURE/COLONY COUNT: CPT | Performed by: FAMILY MEDICINE

## 2021-11-16 PROCEDURE — 3008F BODY MASS INDEX DOCD: CPT | Performed by: FAMILY MEDICINE

## 2021-11-16 RX ORDER — KETOCONAZOLE 20 MG/ML
1 SHAMPOO TOPICAL
COMMUNITY
Start: 2021-10-20

## 2021-11-16 RX ORDER — NITROFURANTOIN 25; 75 MG/1; MG/1
100 CAPSULE ORAL 2 TIMES DAILY
Qty: 10 CAPSULE | Refills: 0 | Status: SHIPPED | OUTPATIENT
Start: 2021-11-16 | End: 2022-01-28

## 2021-11-16 RX ORDER — FLUOCINONIDE 0.5 MG/ML
1 SOLUTION TOPICAL DAILY
COMMUNITY
Start: 2021-10-20

## 2021-11-16 RX ORDER — MOMETASONE FUROATE 1 MG/G
1 OINTMENT TOPICAL DAILY
COMMUNITY
Start: 2021-10-20

## 2021-11-16 RX ORDER — PHENAZOPYRIDINE HYDROCHLORIDE 200 MG/1
200 TABLET, FILM COATED ORAL 3 TIMES DAILY PRN
Qty: 6 TABLET | Refills: 0 | Status: SHIPPED | OUTPATIENT
Start: 2021-11-16 | End: 2022-01-28

## 2021-11-16 NOTE — PROGRESS NOTES
Felicia Watts is a 53year old female. HPI:   Patient presents with:  Dysuria  Abdominal Pain: lower abdomen ache/cramping      This 20-year-old female presents to the office with a week history of dysuria.  She admits to some suprapubic cramping and nodule     pt unsure of which side   • Major depressive disorder, recurrent episode, mild with anxious distress ([facility] 7/27/2017   • Migraines    • Morbid obesity with BMI of 45.0-49.9, adult ([facility] 7/13/2014   • Open angle with borderline findings, high ris Cancer Maternal Aunt 68        Late 60's.    • Cancer Mother         basal cell X 3; LUNG   • Heart Disorder Mother    • Cancer Father         colon 1998 and 2008/liver 1998/prostate 2010   • Heart Disorder Father    • Cancer Maternal Aunt         basal betsy total) by mouth daily. 90 tablet 3   • Hydroxychloroquine Sulfate 200 MG Oral Tab Take 1 tablet (200 mg total) by mouth 2 (two) times daily.  180 tablet 1   • clotrimazole-betamethasone 1-0.05 % External Cream Apply 1 Application topically 2 (two) times neelima RASH  Oxycodone               RASH    Comment:ONLY in GENERIC form per pt, Ok with brand form. ROS:     Pertinent positives and pertinent negatives are as listed in HPI. All other review of symptoms were reviewed and negative.     PHYSICAL EXAM:   BP 07/31/2022 Date     *Note: Due to a large number of results and/or encounters for the requested time period, some results have not been displayed. A complete set of results can be found in Results Review.         1. Dysuria    I discussed the results of the DO    Total time: 20 minutes including precharting, H&P, plan of care

## 2021-11-16 NOTE — PATIENT INSTRUCTIONS
Take the Macrobid 100 mg one capsule with breakfast and dinner for 5 days. Take the Pyridium 200 mg one tablet three times a day as needed for bladder pain or cramps. This will turn your urine orange. Drink 64 ounces of water daily.  Limit caffeine in products that you think may be causing your symptoms. · If you were given a prescription medicine, take as directed. Take it until it is all used up. · If a culture was taken, don't have sex until you have been told that it is negative.  A negative cultur intended as a substitute for professional medical care. Always follow your healthcare professional's instructions.

## 2021-11-24 DIAGNOSIS — Z76.89 ENCOUNTER FOR NEW MEDICATION PRESCRIPTION: ICD-10-CM

## 2021-11-24 DIAGNOSIS — R06.02 SHORTNESS OF BREATH: ICD-10-CM

## 2021-11-24 NOTE — TELEPHONE ENCOUNTER
Requested Prescriptions     Pending Prescriptions Disp Refills   • albuterol 108 (90 Base) MCG/ACT Inhalation Aero Soln 1 each 0     Sig: Inhale 2 puffs into the lungs every 6 (six) hours as needed for Wheezing or Shortness of Breath (Cough).      Last fill

## 2021-11-26 RX ORDER — ALBUTEROL SULFATE 90 UG/1
2 AEROSOL, METERED RESPIRATORY (INHALATION) EVERY 6 HOURS PRN
Qty: 1 EACH | Refills: 0 | Status: SHIPPED | OUTPATIENT
Start: 2021-11-26

## 2021-11-30 DIAGNOSIS — M35.9 UNDIFFERENTIATED CONNECTIVE TISSUE DISEASE (HCC): ICD-10-CM

## 2021-11-30 RX ORDER — HYDROXYCHLOROQUINE SULFATE 200 MG/1
TABLET, FILM COATED ORAL
Qty: 180 TABLET | Refills: 1 | Status: SHIPPED | OUTPATIENT
Start: 2021-11-30

## 2021-12-02 ENCOUNTER — PATIENT MESSAGE (OUTPATIENT)
Dept: FAMILY MEDICINE CLINIC | Facility: CLINIC | Age: 51
End: 2021-12-02

## 2021-12-02 ENCOUNTER — HOSPITAL ENCOUNTER (EMERGENCY)
Facility: HOSPITAL | Age: 51
Discharge: LEFT BEFORE DISCHARGE | End: 2021-12-02
Payer: COMMERCIAL

## 2021-12-02 VITALS
HEIGHT: 62 IN | DIASTOLIC BLOOD PRESSURE: 98 MMHG | RESPIRATION RATE: 17 BRPM | OXYGEN SATURATION: 97 % | HEART RATE: 89 BPM | WEIGHT: 185 LBS | BODY MASS INDEX: 34.04 KG/M2 | TEMPERATURE: 99 F | SYSTOLIC BLOOD PRESSURE: 166 MMHG

## 2021-12-02 NOTE — TELEPHONE ENCOUNTER
From: Odalys Law  To: Mk Grissom DO  Sent: 12/2/2021 12:04 PM CST  Subject: Can't speak without coughing    Good morning - My nurse friend told me it was important to let you know that with my having Covid, my body has recently decided that I c

## 2021-12-07 ENCOUNTER — TELEPHONE (OUTPATIENT)
Dept: FAMILY MEDICINE CLINIC | Facility: CLINIC | Age: 51
End: 2021-12-07

## 2021-12-07 NOTE — TELEPHONE ENCOUNTER
Per Dr. Genaro Gonzáles, switch to virtual since she is still significantly symptomatic. Kaybus message sent.

## 2021-12-08 ENCOUNTER — PATIENT MESSAGE (OUTPATIENT)
Dept: FAMILY MEDICINE CLINIC | Facility: CLINIC | Age: 51
End: 2021-12-08

## 2021-12-08 NOTE — TELEPHONE ENCOUNTER
Per viviana David to see patient in the office at the end of the day, last appt of the day 12/9/21. Left message for patient to call back for appointment.

## 2021-12-08 NOTE — TELEPHONE ENCOUNTER
From: Calvin Mclaughlin  To: Jil Julian DO  Sent: 12/8/2021 1:49 PM CST  Subject: Call    My  just answered a call from your office for me and said I would call back. However I still can not speak more than one or two words.  Please respond via

## 2021-12-10 ENCOUNTER — OFFICE VISIT (OUTPATIENT)
Dept: FAMILY MEDICINE CLINIC | Facility: CLINIC | Age: 51
End: 2021-12-10

## 2021-12-10 VITALS
SYSTOLIC BLOOD PRESSURE: 122 MMHG | OXYGEN SATURATION: 97 % | WEIGHT: 192 LBS | HEART RATE: 88 BPM | TEMPERATURE: 97 F | DIASTOLIC BLOOD PRESSURE: 76 MMHG | BODY MASS INDEX: 36.25 KG/M2 | HEIGHT: 61 IN

## 2021-12-10 DIAGNOSIS — J04.0 LARYNGITIS: Primary | ICD-10-CM

## 2021-12-10 DIAGNOSIS — U07.1 COVID-19 VIRUS INFECTION: ICD-10-CM

## 2021-12-10 PROCEDURE — 99214 OFFICE O/P EST MOD 30 MIN: CPT | Performed by: FAMILY MEDICINE

## 2021-12-10 PROCEDURE — 3078F DIAST BP <80 MM HG: CPT | Performed by: FAMILY MEDICINE

## 2021-12-10 PROCEDURE — 3008F BODY MASS INDEX DOCD: CPT | Performed by: FAMILY MEDICINE

## 2021-12-10 PROCEDURE — 3074F SYST BP LT 130 MM HG: CPT | Performed by: FAMILY MEDICINE

## 2021-12-10 NOTE — PROGRESS NOTES
Bryan Shi is a 49year old female. HPI:     Patient presents with:  Cough  Laryngitis  Covid: Positive 11/24/21      Overall greatly improved.   Still has some cough, sometimes difficult to cough up sputum which may be discolored, sometimes amber levothyroxine 112 MCG Oral Tab Take 1 tablet (112 mcg total) by mouth before breakfast. 90 tablet 1   • simvastatin 20 MG Oral Tab Take 1 tablet (20 mg total) by mouth every evening.  90 tablet 3   • ondansetron 8 MG Oral Tablet Dispersible Take 1 tablet (8 Hypothyroidism    • Injury of peroneal tendon of right foot 5/23/2018   • Keratoconjunctivitis sicca not specified as Sjogren's, bilateral 03/27/2019    Annette Pan M.D.   • Lateral epicondylitis of right elbow 7/9/2020   • Lung nodule     pt unsure of Smoker      Smokeless tobacco: Never Used    Vaping Use      Vaping Use: Never used    Alcohol use: Yes      Comment: 6-8 oz. weekly    Drug use: No        Family History   Problem Relation Age of Onset   • Cancer Maternal Aunt         breast, colon   • Br visible rashes  HEAD: NCAT  NECK: supple, no adenopathy, no thyromegaly, no masses. Neck mildly tender to palpation in area of trachea. LUNGS: CTA A/P no wheezes/ronchi/rales/crackles. Moving air very well.   CARDIO: RRR, +S1/S2, no mm  EXTREMITIES: no c

## 2021-12-10 NOTE — PATIENT INSTRUCTIONS
Laryngitis    Laryngitis is a swelling of the tissues around the vocal cords. Symptoms include a hoarse (scratchy) voice. Or your voice may be gone for a few days or longer. This may be caused by a viral illness, such as a head or chest cold.  It may also medical care. Always follow your healthcare professional's instructions.

## 2021-12-14 ENCOUNTER — PATIENT MESSAGE (OUTPATIENT)
Dept: FAMILY MEDICINE CLINIC | Facility: CLINIC | Age: 51
End: 2021-12-14

## 2021-12-14 ENCOUNTER — OFFICE VISIT (OUTPATIENT)
Dept: RHEUMATOLOGY | Facility: CLINIC | Age: 51
End: 2021-12-14

## 2021-12-14 VITALS
RESPIRATION RATE: 16 BRPM | BODY MASS INDEX: 36.25 KG/M2 | SYSTOLIC BLOOD PRESSURE: 124 MMHG | HEIGHT: 61 IN | HEART RATE: 74 BPM | TEMPERATURE: 97 F | WEIGHT: 192 LBS | DIASTOLIC BLOOD PRESSURE: 78 MMHG

## 2021-12-14 DIAGNOSIS — E55.9 VITAMIN D DEFICIENCY: ICD-10-CM

## 2021-12-14 DIAGNOSIS — Z86.16 PERSONAL HISTORY OF COVID-19: ICD-10-CM

## 2021-12-14 DIAGNOSIS — M35.9 UNDIFFERENTIATED CONNECTIVE TISSUE DISEASE (HCC): Primary | ICD-10-CM

## 2021-12-14 DIAGNOSIS — M79.7 FIBROMYALGIA: ICD-10-CM

## 2021-12-14 DIAGNOSIS — M25.50 POLYARTHRALGIA: Primary | ICD-10-CM

## 2021-12-14 DIAGNOSIS — R53.82 CHRONIC FATIGUE: ICD-10-CM

## 2021-12-14 DIAGNOSIS — M15.9 PRIMARY OSTEOARTHRITIS INVOLVING MULTIPLE JOINTS: ICD-10-CM

## 2021-12-14 DIAGNOSIS — R76.8 POSITIVE ANA (ANTINUCLEAR ANTIBODY): ICD-10-CM

## 2021-12-14 DIAGNOSIS — M54.32 SCIATICA, LEFT SIDE: ICD-10-CM

## 2021-12-14 DIAGNOSIS — M25.50 POLYARTHRALGIA: ICD-10-CM

## 2021-12-14 DIAGNOSIS — M35.00 SICCA SYNDROME (HCC): ICD-10-CM

## 2021-12-14 PROCEDURE — 3008F BODY MASS INDEX DOCD: CPT | Performed by: INTERNAL MEDICINE

## 2021-12-14 PROCEDURE — 3074F SYST BP LT 130 MM HG: CPT | Performed by: INTERNAL MEDICINE

## 2021-12-14 PROCEDURE — 99214 OFFICE O/P EST MOD 30 MIN: CPT | Performed by: INTERNAL MEDICINE

## 2021-12-14 PROCEDURE — 3078F DIAST BP <80 MM HG: CPT | Performed by: INTERNAL MEDICINE

## 2021-12-14 RX ORDER — ZINC SULFATE 50(220)MG
220 CAPSULE ORAL DAILY
COMMUNITY
End: 2022-01-28

## 2021-12-14 NOTE — PROGRESS NOTES
RHEUMATOLOGY Follow up   Date of visit: 12/14/2021  ? Patient presents with: Follow - Up: 3 month f/u. Feeling ok. No swelling, but joint pain. Hands, knees and ankles are the worse. Once moving around then hands get better.  Psoriasis has flared and jem is still recovering. I strongly recommended follow up with pulmonology given the covid infection as well as cough and hemoptysis. Recommended she continue exercises taught during PT dedicated for the fibro.  As well as I demonstrated some exercises that with the following active problems who is seen for medically necessary follow-up today. She was seen initially for evaluation of joint pain and abnormal labs. Since her last visit, she hasn't been feeling great.   Did suffer from covid infection end of bruise-like pain is located all over and not just over the joints.  This can happen with different types of pressure- 's arm or dog sitting.      States the joint pain has gotten worse over the past 2 months- worst over fingers, wrists, elbows, right ankle requiring surgical intervention (happened 6 years ago and had micro-tears which did not require surgery then last year required surgery). - no issues with the ankle since that time. + hx of plantar fasciitis, years ago no recent issues.  Does wear i 10/17/2013   • Diverticulosis 09/27/2013    Gilda Snellen, M.D.   • Endometriosis    • Esophageal reflux    • Fibromyalgia    • Glaucoma 1/7/2013   • Hypothyroidism    • Injury of peroneal tendon of right foot 5/23/2018   • Keratoconjunctivitis sicca scar   • TILT TABLE EVALUATION      veinogram 7-6-12 [facility].  General   • UPPER GI ENDOSCOPY,EXAM       Family History:  Family History   Problem Relation Age of Onset   • Cancer Maternal Aunt         breast, colon   • Breast Cancer Maternal Aunt 48 tablet (25 mg total) by mouth 3 (three) times daily as needed for Dizziness. , Disp: 30 tablet, Rfl: 2  lansoprazole 30 MG Oral Capsule Delayed Release, Take 1 capsule (30 mg total) by mouth daily. , Disp: 90 capsule, Rfl: 3  levothyroxine 112 MCG Oral Tab, Comment:rash  Augmentin [Amoxicil*    NAUSEA AND VOMITING  Avocado                 ITCHING  Darvocet [Propoxyph*    NAUSEA AND VOMITING  Latex                   RASH, SWELLING  Nuts                    ANAPHYLAXIS    Comment:MARGOT/ABBI Henry squared. Physical Exam  Vitals and nursing note reviewed. Constitutional:       General: She is not in acute distress. Appearance: She is well-developed. She is not diaphoretic. HENT:      Head: Normocephalic and atraumatic.    Eyes:      Ge Radiology review:     PROCEDURE:  MRI SPINE LUMBAR (CPT=72148)     COMPARISON:  [facility], CT, CT ABDOMEN+PELVIS(CONTRAST ONLY)(CPT=74177), 2/05/2019, 12:11 PM.  [facility], XR, XR LUMBAR SPINE (MIN 4 VIEWS) (CPT=72110), 1/21/2020, 10:30 AM.     I moderate spinal canal stenosis. Bilateral subarticular zone narrowing. Mild left and moderate right foraminal narrowing. L5-S1:  Mild to moderate posterior disc osteophyte complex with moderate facet hypertrophy. No spinal canal stenosis.   Mild ri abnormalities are noted. CONCLUSION:    1. Bilateral SI joint osteoarthritis, mild on the right and moderate on the left. 2. No additional findings. Dictated by:  August Marcus DO on 5/21/2020 at 9:27 AM         Labs:  Lab Results   Component [facility]  12/14/2021    ?

## 2021-12-14 NOTE — TELEPHONE ENCOUNTER
From: Jennifer Ramirez  To: Alberto Taylor DO  Sent: 12/14/2021 4:37 PM CST  Subject: Referral for Dr Gerry Cornelius afternoon. I have another appointment with Dr Ave Horvath in February and will be needing a new referral before then.  She’s asked to make it

## 2021-12-22 ENCOUNTER — PATIENT MESSAGE (OUTPATIENT)
Dept: FAMILY MEDICINE CLINIC | Facility: CLINIC | Age: 51
End: 2021-12-22

## 2021-12-22 ENCOUNTER — HOSPITAL ENCOUNTER (OUTPATIENT)
Dept: CT IMAGING | Age: 51
Discharge: HOME | End: 2021-12-22
Attending: INTERNAL MEDICINE
Payer: COMMERCIAL

## 2021-12-22 DIAGNOSIS — R91.1 RIGHT LOWER LOBE PULMONARY NODULE: ICD-10-CM

## 2021-12-22 PROCEDURE — 71250 CT THORAX DX C-: CPT | Performed by: INTERNAL MEDICINE

## 2021-12-22 NOTE — TELEPHONE ENCOUNTER
From: Bryan Shi  To: Codie Muse, DO  Sent: 12/22/2021 10:48 AM CST  Subject: My voice & ct results     Good morning. Just wanted to let you know my voice has started to come back.  I have a fairly decent voice in the morning (way better than at

## 2022-01-05 ENCOUNTER — TELEPHONE (OUTPATIENT)
Dept: FAMILY MEDICINE CLINIC | Facility: CLINIC | Age: 52
End: 2022-01-05

## 2022-01-05 DIAGNOSIS — R49.0 HOARSE VOICE QUALITY: Primary | ICD-10-CM

## 2022-01-05 DIAGNOSIS — R49.9 VOICE DISTURBANCE: ICD-10-CM

## 2022-01-05 NOTE — TELEPHONE ENCOUNTER
Hoarseness of voice and loss of voice has improved but is not back to normal.    Referral placed for patient to see Dr. Shai Salas, ENT, for further evaluation and care.

## 2022-01-10 ENCOUNTER — LAB ENCOUNTER (OUTPATIENT)
Dept: LAB | Age: 52
End: 2022-01-10
Attending: INTERNAL MEDICINE
Payer: COMMERCIAL

## 2022-01-10 DIAGNOSIS — R53.82 CHRONIC FATIGUE: ICD-10-CM

## 2022-01-10 DIAGNOSIS — M35.9 UNDIFFERENTIATED CONNECTIVE TISSUE DISEASE (HCC): ICD-10-CM

## 2022-01-10 DIAGNOSIS — E78.2 MIXED HYPERLIPIDEMIA: ICD-10-CM

## 2022-01-10 DIAGNOSIS — E03.9 HYPOTHYROIDISM (ACQUIRED): ICD-10-CM

## 2022-01-10 DIAGNOSIS — E55.9 VITAMIN D DEFICIENCY: ICD-10-CM

## 2022-01-10 DIAGNOSIS — E11.9 TYPE 2 DIABETES MELLITUS WITHOUT COMPLICATION, WITHOUT LONG-TERM CURRENT USE OF INSULIN (HCC): ICD-10-CM

## 2022-01-10 DIAGNOSIS — R76.8 POSITIVE ANA (ANTINUCLEAR ANTIBODY): ICD-10-CM

## 2022-01-10 DIAGNOSIS — M25.50 POLYARTHRALGIA: ICD-10-CM

## 2022-01-10 LAB
ALBUMIN SERPL-MCNC: 3.4 G/DL (ref 3.4–5)
ALBUMIN/GLOB SERPL: 1 {RATIO} (ref 1–2)
ALP LIVER SERPL-CCNC: 76 U/L
ALT SERPL-CCNC: 28 U/L
ANION GAP SERPL CALC-SCNC: 6 MMOL/L (ref 0–18)
AST SERPL-CCNC: 21 U/L (ref 15–37)
BASOPHILS # BLD AUTO: 0.08 X10(3) UL (ref 0–0.2)
BASOPHILS NFR BLD AUTO: 1.2 %
BILIRUB SERPL-MCNC: 0.4 MG/DL (ref 0.1–2)
BUN BLD-MCNC: 16 MG/DL (ref 7–18)
CALCIUM BLD-MCNC: 9 MG/DL (ref 8.5–10.1)
CHLORIDE SERPL-SCNC: 108 MMOL/L (ref 98–112)
CHOLEST SERPL-MCNC: 178 MG/DL (ref ?–200)
CO2 SERPL-SCNC: 27 MMOL/L (ref 21–32)
CREAT BLD-MCNC: 0.78 MG/DL
CRP SERPL-MCNC: 0.85 MG/DL (ref ?–0.3)
EOSINOPHIL # BLD AUTO: 0.18 X10(3) UL (ref 0–0.7)
EOSINOPHIL NFR BLD AUTO: 2.8 %
ERYTHROCYTE [DISTWIDTH] IN BLOOD BY AUTOMATED COUNT: 14.4 %
ERYTHROCYTE [SEDIMENTATION RATE] IN BLOOD: 18 MM/HR
EST. AVERAGE GLUCOSE BLD GHB EST-MCNC: 111 MG/DL (ref 68–126)
FASTING PATIENT LIPID ANSWER: YES
FASTING STATUS PATIENT QL REPORTED: YES
GLOBULIN PLAS-MCNC: 3.3 G/DL (ref 2.8–4.4)
GLUCOSE BLD-MCNC: 88 MG/DL (ref 70–99)
HBA1C MFR BLD: 5.5 % (ref ?–5.7)
HCT VFR BLD AUTO: 39.2 %
HDLC SERPL-MCNC: 105 MG/DL (ref 40–59)
HGB BLD-MCNC: 13.1 G/DL
IMM GRANULOCYTES # BLD AUTO: 0.01 X10(3) UL (ref 0–1)
IMM GRANULOCYTES NFR BLD: 0.2 %
LDLC SERPL CALC-MCNC: 58 MG/DL (ref ?–100)
LYMPHOCYTES # BLD AUTO: 1.95 X10(3) UL (ref 1–4)
LYMPHOCYTES NFR BLD AUTO: 29.9 %
MCH RBC QN AUTO: 31.2 PG (ref 26–34)
MCHC RBC AUTO-ENTMCNC: 33.4 G/DL (ref 31–37)
MCV RBC AUTO: 93.3 FL
MONOCYTES # BLD AUTO: 0.4 X10(3) UL (ref 0.1–1)
MONOCYTES NFR BLD AUTO: 6.1 %
NEUTROPHILS # BLD AUTO: 3.9 X10 (3) UL (ref 1.5–7.7)
NEUTROPHILS # BLD AUTO: 3.9 X10(3) UL (ref 1.5–7.7)
NEUTROPHILS NFR BLD AUTO: 59.8 %
NONHDLC SERPL-MCNC: 73 MG/DL (ref ?–130)
OSMOLALITY SERPL CALC.SUM OF ELEC: 293 MOSM/KG (ref 275–295)
PLATELET # BLD AUTO: 312 10(3)UL (ref 150–450)
POTASSIUM SERPL-SCNC: 3.8 MMOL/L (ref 3.5–5.1)
PROT SERPL-MCNC: 6.7 G/DL (ref 6.4–8.2)
RBC # BLD AUTO: 4.2 X10(6)UL
SODIUM SERPL-SCNC: 141 MMOL/L (ref 136–145)
T4 FREE SERPL-MCNC: 1 NG/DL (ref 0.8–1.7)
TRIGL SERPL-MCNC: 83 MG/DL (ref 30–149)
TSI SER-ACNC: 4.88 MIU/ML (ref 0.36–3.74)
VIT B12 SERPL-MCNC: 1611 PG/ML (ref 193–986)
VIT D+METAB SERPL-MCNC: 30.6 NG/ML (ref 30–100)
VLDLC SERPL CALC-MCNC: 12 MG/DL (ref 0–30)
WBC # BLD AUTO: 6.5 X10(3) UL (ref 4–11)

## 2022-01-10 PROCEDURE — 82607 VITAMIN B-12: CPT

## 2022-01-10 PROCEDURE — 80053 COMPREHEN METABOLIC PANEL: CPT

## 2022-01-10 PROCEDURE — 84443 ASSAY THYROID STIM HORMONE: CPT

## 2022-01-10 PROCEDURE — 80061 LIPID PANEL: CPT

## 2022-01-10 PROCEDURE — 36415 COLL VENOUS BLD VENIPUNCTURE: CPT

## 2022-01-10 PROCEDURE — 84439 ASSAY OF FREE THYROXINE: CPT

## 2022-01-10 PROCEDURE — 85652 RBC SED RATE AUTOMATED: CPT

## 2022-01-10 PROCEDURE — 82306 VITAMIN D 25 HYDROXY: CPT

## 2022-01-10 PROCEDURE — 86140 C-REACTIVE PROTEIN: CPT

## 2022-01-10 PROCEDURE — 85025 COMPLETE CBC W/AUTO DIFF WBC: CPT

## 2022-01-10 PROCEDURE — 83036 HEMOGLOBIN GLYCOSYLATED A1C: CPT

## 2022-01-28 ENCOUNTER — OFFICE VISIT (OUTPATIENT)
Dept: FAMILY MEDICINE CLINIC | Facility: CLINIC | Age: 52
End: 2022-01-28
Payer: COMMERCIAL

## 2022-01-28 VITALS
SYSTOLIC BLOOD PRESSURE: 132 MMHG | HEIGHT: 61.81 IN | HEART RATE: 87 BPM | BODY MASS INDEX: 36.25 KG/M2 | OXYGEN SATURATION: 98 % | DIASTOLIC BLOOD PRESSURE: 80 MMHG | WEIGHT: 197 LBS

## 2022-01-28 DIAGNOSIS — R42 VERTIGO: ICD-10-CM

## 2022-01-28 DIAGNOSIS — Z28.21 INFLUENZA VACCINE REFUSED: ICD-10-CM

## 2022-01-28 DIAGNOSIS — J38.4 VOCAL CORD EDEMA: ICD-10-CM

## 2022-01-28 DIAGNOSIS — Z98.84 S/P LAPAROSCOPIC SLEEVE GASTRECTOMY: ICD-10-CM

## 2022-01-28 DIAGNOSIS — E03.9 ACQUIRED HYPOTHYROIDISM: Primary | ICD-10-CM

## 2022-01-28 DIAGNOSIS — R07.89 ATYPICAL CHEST PAIN: ICD-10-CM

## 2022-01-28 DIAGNOSIS — Z86.16 HISTORY OF 2019 NOVEL CORONAVIRUS DISEASE (COVID-19): ICD-10-CM

## 2022-01-28 DIAGNOSIS — I31.9 CHRONIC PERICARDITIS, UNSPECIFIED COMPLICATION STATUS, UNSPECIFIED TYPE: ICD-10-CM

## 2022-01-28 DIAGNOSIS — E78.2 MIXED HYPERLIPIDEMIA: ICD-10-CM

## 2022-01-28 PROCEDURE — 3079F DIAST BP 80-89 MM HG: CPT | Performed by: FAMILY MEDICINE

## 2022-01-28 PROCEDURE — 3075F SYST BP GE 130 - 139MM HG: CPT | Performed by: FAMILY MEDICINE

## 2022-01-28 PROCEDURE — 3008F BODY MASS INDEX DOCD: CPT | Performed by: FAMILY MEDICINE

## 2022-01-28 PROCEDURE — 99214 OFFICE O/P EST MOD 30 MIN: CPT | Performed by: FAMILY MEDICINE

## 2022-01-28 RX ORDER — FLUTICASONE PROPIONATE 44 MCG
AEROSOL WITH ADAPTER (GRAM) INHALATION 2 TIMES DAILY
COMMUNITY
Start: 2022-01-25

## 2022-01-28 RX ORDER — MECLIZINE HYDROCHLORIDE 25 MG/1
25 TABLET ORAL 3 TIMES DAILY PRN
Qty: 30 TABLET | Refills: 2 | OUTPATIENT
Start: 2022-01-28

## 2022-01-28 RX ORDER — MONTELUKAST SODIUM 10 MG/1
TABLET ORAL AS NEEDED
COMMUNITY
Start: 2021-12-04

## 2022-01-28 NOTE — PROGRESS NOTES
Jose C Ruiz is a 49year old female. HPI:       Chest heaviness:  Since covid, not worsening but not better, \"just génesis there\".       ENT told her vocal cords still a little swollen this past Tuesday, also told her no vocal cord polyps and no vo (four) times daily. 60 g 1   • CALCIUM OR Take 2,400 mg by mouth daily. • saccharomyces boulardii 250 MG Oral Cap Pro-biotic 1 capsule by mouth daily     • tiZANidine HCl 4 MG Oral Tab Take 1 tablet (4 mg total) by mouth nightly as needed.  30 tablet 2 45.0-49.9, adult ([facility] [address] 7/13/2014   • Open angle with borderline findings, high risk, bilateral 03/27/2019    Jarome Olszewski, M.D.   • Osteoarthritis    • Other vitreous opacities, left eye 03/27/2019    Jarome Olszewski, M.D.   • Other vitreous opacities, ri breast, basal cell   • Breast Cancer Maternal Aunt 68        Late 60's.    • Cancer Mother         basal cell X 3; LUNG   • Heart Disorder Mother    • Cancer Father         colon 1998 and 2008/liver 1998/prostate 2010   • Heart Disorder Father    • Cancer evaluation.   Regarding pneumonia due to COVID-19 Dr. Becky Kohli is recommending repeat CT in 6 months for resolution, CT chest without contrast.      Interpretation   PROCEDURE:  CT CHEST(CONTRAST ONLY) (CPT=71260)     COMPARISON:  MAMADOU MANZANARES, CT ABDOM subpleural 5.5 mm nodule is nonspecific and not present on most recent CT of the chest performed 3/21/2016. Additional smaller 2 mm right lower lobe nodules are again identified and present dating back to 2/5/2019 CT.      The findings include multiple, in measuring up to 0.2-0.3 cm again appear unchanged. No new pulmonary   nodules. There is new mild ground-glass opacity in the left lower lobe. No new bronchiectasis or cystic lung change. VASCULATURE:  Smooth tapering. BRENDA:  No adenopathy.   MEDIAS 13 - 56 U/L 28 31    ALKALINE PHOSPHATASE      39 - 100 U/L 76 67    Total Bilirubin      0.1 - 2.0 mg/dL 0.4 0.3    PROTEIN, TOTAL      6.4 - 8.2 g/dL 6.7 7.4    Albumin      3.4 - 5.0 g/dL 3.4 3.7    Globulin      2.8 - 4.4 g/dL 3.3 3.7    A/G Ratio present on previous CTs of the chest dated 2021-06-16 and 2017-04-22. Patient had SARS-CoV-2/COVID-19 Nov 2021. Treatment is complicated by history of sleeve gastrectomy, patient should avoid NSAIDs. Consider referral to cardiologist in the near future.

## 2022-01-28 NOTE — TELEPHONE ENCOUNTER
Requested Prescriptions     Refused Prescriptions Disp Refills   • meclizine 25 MG Oral Tab 30 tablet 2     Sig: Take 1 tablet (25 mg total) by mouth 3 (three) times daily as needed for Dizziness.      Refused By: Angela Krishna     Reason for Refusal: Reque

## 2022-01-28 NOTE — TELEPHONE ENCOUNTER
Requested Prescriptions     Pending Prescriptions Disp Refills   • MECLIZINE 25 MG Oral Tab [Pharmacy Med Name: MECLIZINE 25 MG TABLET] 30 tablet 2     Sig: TAKE 1 TABLET BY MOUTH 3 TIMES A DAY AS NEEDED FOR DIZZINESS     Last fill was 9/8/21 30 tabs 2 ref

## 2022-01-30 PROBLEM — R07.89 ATYPICAL CHEST PAIN: Status: ACTIVE | Noted: 2022-01-30

## 2022-01-30 PROBLEM — J38.4 VOCAL CORD EDEMA: Status: ACTIVE | Noted: 2022-01-30

## 2022-01-30 PROBLEM — I31.9: Status: ACTIVE | Noted: 2022-01-30

## 2022-01-30 PROBLEM — I31.9 CHRONIC PERICARDITIS: Status: ACTIVE | Noted: 2022-01-30

## 2022-01-30 PROBLEM — Z86.16 HISTORY OF 2019 NOVEL CORONAVIRUS DISEASE (COVID-19): Status: ACTIVE | Noted: 2022-01-30

## 2022-01-30 RX ORDER — MECLIZINE HYDROCHLORIDE 25 MG/1
TABLET ORAL
Qty: 30 TABLET | Refills: 2 | Status: SHIPPED | OUTPATIENT
Start: 2022-01-30

## 2022-02-07 ENCOUNTER — TELEPHONE (OUTPATIENT)
Dept: FAMILY MEDICINE CLINIC | Facility: CLINIC | Age: 52
End: 2022-02-07

## 2022-02-07 NOTE — TELEPHONE ENCOUNTER
Pt called and said her insurance no longer covers the [employer] diabetic testing kit. She  now has One Touch and she needs lancets and test strips sent to her [email or web address].

## 2022-02-08 RX ORDER — LANCETS 33 GAUGE
1 EACH MISCELLANEOUS 4 TIMES DAILY
Qty: 400 EACH | Refills: 1 | Status: SHIPPED | OUTPATIENT
Start: 2022-02-08 | End: 2022-02-10

## 2022-02-08 RX ORDER — BLOOD-GLUCOSE METER
1 EACH MISCELLANEOUS 2 TIMES DAILY
Qty: 1 KIT | Refills: 0 | Status: CANCELLED | OUTPATIENT
Start: 2022-02-08 | End: 2023-02-08

## 2022-02-08 RX ORDER — BLOOD SUGAR DIAGNOSTIC
STRIP MISCELLANEOUS
Qty: 400 STRIP | Refills: 1 | Status: SHIPPED | OUTPATIENT
Start: 2022-02-08 | End: 2022-02-10

## 2022-02-10 RX ORDER — BLOOD SUGAR DIAGNOSTIC
STRIP MISCELLANEOUS
Qty: 100 STRIP | Refills: 3 | Status: SHIPPED | OUTPATIENT
Start: 2022-02-10

## 2022-02-10 RX ORDER — LANCETS 33 GAUGE
1 EACH MISCELLANEOUS DAILY
Qty: 100 EACH | Refills: 3 | Status: SHIPPED | OUTPATIENT
Start: 2022-02-10

## 2022-03-03 ENCOUNTER — PATIENT MESSAGE (OUTPATIENT)
Dept: FAMILY MEDICINE CLINIC | Facility: CLINIC | Age: 52
End: 2022-03-03

## 2022-03-03 NOTE — TELEPHONE ENCOUNTER
From: Sera Gracai  To: Rajesh Diaz DO  Sent: 3/3/2022 8:03 AM CST  Subject: Referral for Cardiac doctor? Good morning Dr. Roxanne Nichole - During my last visit we discussed a referral for a new cardiac doctor within the [facility]. I don't see a referral in my chart. Can you please put one in and send me the doctor info so I can make an appointment?   Thank you  Carmen Fairchild

## 2022-03-03 NOTE — TELEPHONE ENCOUNTER
The patient is requesting a referral to a new cardiac provider within [facility]. Please review and advise.

## 2022-03-16 ENCOUNTER — HOSPITAL ENCOUNTER (OUTPATIENT)
Age: 52
Discharge: HOME | End: 2022-03-16
Attending: EMERGENCY MEDICINE
Payer: COMMERCIAL

## 2022-03-16 VITALS
SYSTOLIC BLOOD PRESSURE: 159 MMHG | WEIGHT: 198 LBS | HEART RATE: 73 BPM | HEIGHT: 63 IN | BODY MASS INDEX: 35.08 KG/M2 | RESPIRATION RATE: 18 BRPM | DIASTOLIC BLOOD PRESSURE: 88 MMHG | TEMPERATURE: 97 F | OXYGEN SATURATION: 99 %

## 2022-03-16 DIAGNOSIS — H69.81 DYSFUNCTION OF RIGHT EUSTACHIAN TUBE: ICD-10-CM

## 2022-03-16 DIAGNOSIS — S16.1XXA NECK STRAIN, INITIAL ENCOUNTER: Primary | ICD-10-CM

## 2022-03-16 LAB
S PYO AG THROAT QL: NEGATIVE
SARS-COV-2 RNA RESP QL NAA+PROBE: NOT DETECTED

## 2022-03-16 PROCEDURE — 87880 STREP A ASSAY W/OPTIC: CPT

## 2022-03-16 PROCEDURE — 99213 OFFICE O/P EST LOW 20 MIN: CPT

## 2022-03-16 RX ORDER — FLUTICASONE PROPIONATE 50 MCG
2 SPRAY, SUSPENSION (ML) NASAL DAILY
Qty: 16 G | Refills: 0 | Status: SHIPPED | OUTPATIENT
Start: 2022-03-16 | End: 2022-04-15

## 2022-03-16 NOTE — ED INITIAL ASSESSMENT (HPI)
Patient c/o right sided headache with right ear pain for about 1 week, yesterday started developing right sided neck pain. Patient states that she is prone to swimmer's ear. Denies fever or chills. Had covid last November.

## 2022-03-21 ENCOUNTER — LAB ENCOUNTER (OUTPATIENT)
Dept: LAB | Age: 52
End: 2022-03-21
Attending: FAMILY MEDICINE
Payer: COMMERCIAL

## 2022-03-21 DIAGNOSIS — E03.9 ACQUIRED HYPOTHYROIDISM: ICD-10-CM

## 2022-03-21 LAB
T4 FREE SERPL-MCNC: 1 NG/DL (ref 0.8–1.7)
TSI SER-ACNC: 2.92 MIU/ML (ref 0.36–3.74)

## 2022-03-21 PROCEDURE — 84439 ASSAY OF FREE THYROXINE: CPT

## 2022-03-21 PROCEDURE — 84443 ASSAY THYROID STIM HORMONE: CPT

## 2022-03-21 PROCEDURE — 36415 COLL VENOUS BLD VENIPUNCTURE: CPT

## 2022-04-15 ENCOUNTER — OFFICE VISIT (OUTPATIENT)
Dept: FAMILY MEDICINE CLINIC | Facility: CLINIC | Age: 52
End: 2022-04-15
Payer: COMMERCIAL

## 2022-04-15 VITALS
HEART RATE: 77 BPM | OXYGEN SATURATION: 98 % | WEIGHT: 208 LBS | DIASTOLIC BLOOD PRESSURE: 80 MMHG | HEIGHT: 63 IN | SYSTOLIC BLOOD PRESSURE: 124 MMHG | BODY MASS INDEX: 36.86 KG/M2

## 2022-04-15 DIAGNOSIS — R07.89 ATYPICAL CHEST PAIN: ICD-10-CM

## 2022-04-15 DIAGNOSIS — I31.9 CHRONIC PERICARDITIS, UNSPECIFIED COMPLICATION STATUS, UNSPECIFIED TYPE: ICD-10-CM

## 2022-04-15 DIAGNOSIS — E03.9 ACQUIRED HYPOTHYROIDISM: Primary | ICD-10-CM

## 2022-04-15 PROCEDURE — 99213 OFFICE O/P EST LOW 20 MIN: CPT | Performed by: FAMILY MEDICINE

## 2022-04-15 PROCEDURE — 3079F DIAST BP 80-89 MM HG: CPT | Performed by: FAMILY MEDICINE

## 2022-04-15 PROCEDURE — 3074F SYST BP LT 130 MM HG: CPT | Performed by: FAMILY MEDICINE

## 2022-04-15 PROCEDURE — 3008F BODY MASS INDEX DOCD: CPT | Performed by: FAMILY MEDICINE

## 2022-04-15 RX ORDER — LEVOTHYROXINE SODIUM 0.12 MG/1
125 TABLET ORAL
Qty: 90 TABLET | Refills: 1 | Status: SHIPPED | OUTPATIENT
Start: 2022-04-15

## 2022-05-04 RX ORDER — NYSTATIN 100000 [USP'U]/G
1 POWDER TOPICAL 4 TIMES DAILY
Qty: 60 G | Refills: 1 | OUTPATIENT
Start: 2022-05-04

## 2022-05-16 ENCOUNTER — PATIENT MESSAGE (OUTPATIENT)
Dept: RHEUMATOLOGY | Facility: CLINIC | Age: 52
End: 2022-05-16

## 2022-05-16 DIAGNOSIS — R76.8 POSITIVE ANA (ANTINUCLEAR ANTIBODY): ICD-10-CM

## 2022-05-16 DIAGNOSIS — E55.9 VITAMIN D DEFICIENCY: ICD-10-CM

## 2022-05-16 DIAGNOSIS — R79.82 ELEVATED C-REACTIVE PROTEIN (CRP): ICD-10-CM

## 2022-05-16 DIAGNOSIS — M25.50 POLYARTHRALGIA: ICD-10-CM

## 2022-05-16 DIAGNOSIS — R74.8 ELEVATED VITAMIN B12 LEVEL: ICD-10-CM

## 2022-05-16 DIAGNOSIS — M35.9 UNDIFFERENTIATED CONNECTIVE TISSUE DISEASE (HCC): Primary | ICD-10-CM

## 2022-05-22 DIAGNOSIS — E03.9 HYPOTHYROIDISM (ACQUIRED): ICD-10-CM

## 2022-05-22 DIAGNOSIS — Z51.81 ENCOUNTER FOR MEDICATION MONITORING: ICD-10-CM

## 2022-05-22 RX ORDER — LEVOTHYROXINE SODIUM 112 UG/1
TABLET ORAL
Qty: 90 TABLET | Refills: 2 | Status: SHIPPED | OUTPATIENT
Start: 2022-05-22

## 2022-05-22 NOTE — TELEPHONE ENCOUNTER
Refill Passed Protocol:     Pt requesting refill of Levothyroxine    Refill was approved and sent to pharmacy:     Last Office Visit with Provider: 4/15/22  Jesse thyroid labs- normal- 3/21/22    No future appointments.

## 2022-05-26 ENCOUNTER — LAB ENCOUNTER (OUTPATIENT)
Dept: LAB | Age: 52
End: 2022-05-26
Attending: INTERNAL MEDICINE
Payer: COMMERCIAL

## 2022-05-26 DIAGNOSIS — M35.9 UNDIFFERENTIATED CONNECTIVE TISSUE DISEASE (HCC): ICD-10-CM

## 2022-05-26 DIAGNOSIS — R76.8 POSITIVE ANA (ANTINUCLEAR ANTIBODY): ICD-10-CM

## 2022-05-26 DIAGNOSIS — R74.8 ELEVATED VITAMIN B12 LEVEL: ICD-10-CM

## 2022-05-26 DIAGNOSIS — E03.9 ACQUIRED HYPOTHYROIDISM: ICD-10-CM

## 2022-05-26 DIAGNOSIS — M25.50 POLYARTHRALGIA: ICD-10-CM

## 2022-05-26 DIAGNOSIS — R79.82 ELEVATED C-REACTIVE PROTEIN (CRP): ICD-10-CM

## 2022-05-26 DIAGNOSIS — E55.9 VITAMIN D DEFICIENCY: ICD-10-CM

## 2022-05-26 LAB
ALBUMIN SERPL-MCNC: 3.3 G/DL (ref 3.4–5)
ALBUMIN/GLOB SERPL: 0.9 {RATIO} (ref 1–2)
ALP LIVER SERPL-CCNC: 63 U/L
ALT SERPL-CCNC: 25 U/L
ANION GAP SERPL CALC-SCNC: 8 MMOL/L (ref 0–18)
AST SERPL-CCNC: 23 U/L (ref 15–37)
BASOPHILS # BLD AUTO: 0.06 X10(3) UL (ref 0–0.2)
BASOPHILS NFR BLD AUTO: 1.2 %
BILIRUB SERPL-MCNC: 0.4 MG/DL (ref 0.1–2)
BUN BLD-MCNC: 18 MG/DL (ref 7–18)
CALCIUM BLD-MCNC: 8.4 MG/DL (ref 8.5–10.1)
CHLORIDE SERPL-SCNC: 110 MMOL/L (ref 98–112)
CO2 SERPL-SCNC: 24 MMOL/L (ref 21–32)
CREAT BLD-MCNC: 0.83 MG/DL
CRP SERPL-MCNC: 0.41 MG/DL (ref ?–0.3)
EOSINOPHIL # BLD AUTO: 0.19 X10(3) UL (ref 0–0.7)
EOSINOPHIL NFR BLD AUTO: 3.9 %
ERYTHROCYTE [DISTWIDTH] IN BLOOD BY AUTOMATED COUNT: 13.7 %
ERYTHROCYTE [SEDIMENTATION RATE] IN BLOOD: 10 MM/HR
FASTING STATUS PATIENT QL REPORTED: YES
GLOBULIN PLAS-MCNC: 3.7 G/DL (ref 2.8–4.4)
GLUCOSE BLD-MCNC: 92 MG/DL (ref 70–99)
HCT VFR BLD AUTO: 40.5 %
HGB BLD-MCNC: 13.2 G/DL
IMM GRANULOCYTES # BLD AUTO: 0.01 X10(3) UL (ref 0–1)
IMM GRANULOCYTES NFR BLD: 0.2 %
LYMPHOCYTES # BLD AUTO: 1.68 X10(3) UL (ref 1–4)
LYMPHOCYTES NFR BLD AUTO: 34.6 %
MCH RBC QN AUTO: 30.6 PG (ref 26–34)
MCHC RBC AUTO-ENTMCNC: 32.6 G/DL (ref 31–37)
MCV RBC AUTO: 94 FL
MONOCYTES # BLD AUTO: 0.34 X10(3) UL (ref 0.1–1)
MONOCYTES NFR BLD AUTO: 7 %
NEUTROPHILS # BLD AUTO: 2.57 X10 (3) UL (ref 1.5–7.7)
NEUTROPHILS # BLD AUTO: 2.57 X10(3) UL (ref 1.5–7.7)
NEUTROPHILS NFR BLD AUTO: 53.1 %
OSMOLALITY SERPL CALC.SUM OF ELEC: 296 MOSM/KG (ref 275–295)
PLATELET # BLD AUTO: 283 10(3)UL (ref 150–450)
POTASSIUM SERPL-SCNC: 4 MMOL/L (ref 3.5–5.1)
PROT SERPL-MCNC: 7 G/DL (ref 6.4–8.2)
RBC # BLD AUTO: 4.31 X10(6)UL
SODIUM SERPL-SCNC: 142 MMOL/L (ref 136–145)
T4 FREE SERPL-MCNC: 1.2 NG/DL (ref 0.8–1.7)
TSI SER-ACNC: 2.25 MIU/ML (ref 0.36–3.74)
VIT B12 SERPL-MCNC: 1048 PG/ML (ref 193–986)
VIT D+METAB SERPL-MCNC: 45.1 NG/ML (ref 30–100)
WBC # BLD AUTO: 4.9 X10(3) UL (ref 4–11)

## 2022-05-26 PROCEDURE — 80053 COMPREHEN METABOLIC PANEL: CPT

## 2022-05-26 PROCEDURE — 82607 VITAMIN B-12: CPT

## 2022-05-26 PROCEDURE — 86140 C-REACTIVE PROTEIN: CPT

## 2022-05-26 PROCEDURE — 84439 ASSAY OF FREE THYROXINE: CPT

## 2022-05-26 PROCEDURE — 36415 COLL VENOUS BLD VENIPUNCTURE: CPT

## 2022-05-26 PROCEDURE — 84443 ASSAY THYROID STIM HORMONE: CPT

## 2022-05-26 PROCEDURE — 85025 COMPLETE CBC W/AUTO DIFF WBC: CPT

## 2022-05-26 PROCEDURE — 82306 VITAMIN D 25 HYDROXY: CPT

## 2022-05-26 PROCEDURE — 85652 RBC SED RATE AUTOMATED: CPT

## 2022-05-26 PROCEDURE — 86038 ANTINUCLEAR ANTIBODIES: CPT

## 2022-05-27 LAB — ANA SER QL: NEGATIVE

## 2022-05-29 LAB — ANTI-NUCLEAR ANTIBODY (ANA), HEP-2 IGG: DETECTED

## 2022-05-31 ENCOUNTER — OFFICE VISIT (OUTPATIENT)
Dept: RHEUMATOLOGY | Facility: CLINIC | Age: 52
End: 2022-05-31
Payer: COMMERCIAL

## 2022-05-31 VITALS
SYSTOLIC BLOOD PRESSURE: 128 MMHG | HEART RATE: 76 BPM | DIASTOLIC BLOOD PRESSURE: 78 MMHG | WEIGHT: 218 LBS | OXYGEN SATURATION: 99 % | RESPIRATION RATE: 16 BRPM | TEMPERATURE: 98 F | HEIGHT: 61 IN | BODY MASS INDEX: 41.16 KG/M2

## 2022-05-31 DIAGNOSIS — M25.50 POLYARTHRALGIA: ICD-10-CM

## 2022-05-31 DIAGNOSIS — M79.7 FIBROMYALGIA: ICD-10-CM

## 2022-05-31 DIAGNOSIS — M35.9 UNDIFFERENTIATED CONNECTIVE TISSUE DISEASE (HCC): Primary | ICD-10-CM

## 2022-05-31 DIAGNOSIS — M15.9 PRIMARY OSTEOARTHRITIS INVOLVING MULTIPLE JOINTS: ICD-10-CM

## 2022-05-31 DIAGNOSIS — R74.8 ELEVATED VITAMIN B12 LEVEL: ICD-10-CM

## 2022-05-31 DIAGNOSIS — R76.8 POSITIVE ANA (ANTINUCLEAR ANTIBODY): ICD-10-CM

## 2022-05-31 DIAGNOSIS — R79.82 ELEVATED C-REACTIVE PROTEIN (CRP): ICD-10-CM

## 2022-05-31 PROCEDURE — 3078F DIAST BP <80 MM HG: CPT | Performed by: INTERNAL MEDICINE

## 2022-05-31 PROCEDURE — 3008F BODY MASS INDEX DOCD: CPT | Performed by: INTERNAL MEDICINE

## 2022-05-31 PROCEDURE — 99214 OFFICE O/P EST MOD 30 MIN: CPT | Performed by: INTERNAL MEDICINE

## 2022-05-31 PROCEDURE — 3074F SYST BP LT 130 MM HG: CPT | Performed by: INTERNAL MEDICINE

## 2022-05-31 RX ORDER — HYDROXYCHLOROQUINE SULFATE 200 MG/1
200 TABLET, FILM COATED ORAL 2 TIMES DAILY
Qty: 180 TABLET | Refills: 0 | Status: SHIPPED | OUTPATIENT
Start: 2022-05-31

## 2022-05-31 RX ORDER — AMITRIPTYLINE HYDROCHLORIDE 10 MG/1
10 TABLET, FILM COATED ORAL NIGHTLY
Qty: 30 TABLET | Refills: 0 | Status: SHIPPED | OUTPATIENT
Start: 2022-05-31

## 2022-06-13 ENCOUNTER — TELEPHONE (OUTPATIENT)
Dept: FAMILY MEDICINE CLINIC | Facility: CLINIC | Age: 52
End: 2022-06-13

## 2022-06-13 NOTE — TELEPHONE ENCOUNTER
Pt , Linnea Umanzor called said she has hives on neck no other part of body.   She is requesting epi pen  - or if she needs that does she need to be seen

## 2022-06-13 NOTE — TELEPHONE ENCOUNTER
Spoke to patient's . States she has itchy hives on her neck and they are not sure what from. They think maybe she has allergy to tomatoes now. She does have several food allergies. She denies feeling like her throat is swelling. Advised she can take benadryl and use hydrocortisone cream. Advised she can proceed to [facility] for eval as well. Advised to proceed to ER for any feelings of throat.

## 2022-06-23 DIAGNOSIS — M79.7 FIBROMYALGIA: ICD-10-CM

## 2022-06-23 RX ORDER — AMITRIPTYLINE HYDROCHLORIDE 10 MG/1
TABLET, FILM COATED ORAL
Qty: 30 TABLET | Refills: 0 | Status: SHIPPED | OUTPATIENT
Start: 2022-06-23

## 2022-06-30 ENCOUNTER — HOSPITAL ENCOUNTER (OUTPATIENT)
Dept: CT IMAGING | Age: 52
Discharge: HOME | End: 2022-06-30
Attending: INTERNAL MEDICINE
Payer: COMMERCIAL

## 2022-06-30 DIAGNOSIS — J12.82 PNEUMONIA DUE TO COVID-19 VIRUS: ICD-10-CM

## 2022-06-30 DIAGNOSIS — U07.1 PNEUMONIA DUE TO COVID-19 VIRUS: ICD-10-CM

## 2022-06-30 PROCEDURE — 71250 CT THORAX DX C-: CPT | Performed by: INTERNAL MEDICINE

## 2022-07-28 ENCOUNTER — PATIENT MESSAGE (OUTPATIENT)
Dept: FAMILY MEDICINE CLINIC | Facility: CLINIC | Age: 52
End: 2022-07-28

## 2022-07-28 DIAGNOSIS — R04.2 COUGH WITH HEMOPTYSIS: Primary | ICD-10-CM

## 2022-08-02 ENCOUNTER — TELEPHONE (OUTPATIENT)
Dept: FAMILY MEDICINE CLINIC | Facility: CLINIC | Age: 52
End: 2022-08-02

## 2022-08-02 NOTE — TELEPHONE ENCOUNTER
Spoke with patient advised she is do for annual wellness - 9/3.  Per patient she is walking out the door and will call us back to schedule exam.

## 2022-08-27 DIAGNOSIS — M35.9 UNDIFFERENTIATED CONNECTIVE TISSUE DISEASE (HCC): ICD-10-CM

## 2022-08-29 RX ORDER — HYDROXYCHLOROQUINE SULFATE 200 MG/1
TABLET, FILM COATED ORAL
Qty: 180 TABLET | Refills: 0 | Status: SHIPPED | OUTPATIENT
Start: 2022-08-29

## 2022-08-29 RX ORDER — LANSOPRAZOLE 30 MG/1
CAPSULE, DELAYED RELEASE ORAL
Qty: 90 CAPSULE | Refills: 0 | Status: SHIPPED | OUTPATIENT
Start: 2022-08-29

## 2022-08-29 NOTE — TELEPHONE ENCOUNTER
LOV 05/31/2022  Next appt: 09/21/2022  Labs comp on 05/26, most recent labs orders have not yet been completed.    Last filled on 05/31/2022

## 2022-08-29 NOTE — TELEPHONE ENCOUNTER
Called patient. Verbalized understanding to get labs done prior to September appointment and is aware rx has been sent to pharmacy.

## 2022-08-31 ENCOUNTER — LAB ENCOUNTER (OUTPATIENT)
Dept: LAB | Age: 52
End: 2022-08-31
Attending: INTERNAL MEDICINE
Payer: COMMERCIAL

## 2022-08-31 DIAGNOSIS — R79.82 ELEVATED C-REACTIVE PROTEIN (CRP): ICD-10-CM

## 2022-08-31 DIAGNOSIS — E78.2 MIXED HYPERLIPIDEMIA: ICD-10-CM

## 2022-08-31 DIAGNOSIS — M79.7 FIBROMYALGIA: ICD-10-CM

## 2022-08-31 DIAGNOSIS — M25.50 POLYARTHRALGIA: ICD-10-CM

## 2022-08-31 DIAGNOSIS — R74.8 ELEVATED VITAMIN B12 LEVEL: ICD-10-CM

## 2022-08-31 DIAGNOSIS — M35.9 UNDIFFERENTIATED CONNECTIVE TISSUE DISEASE (HCC): ICD-10-CM

## 2022-08-31 DIAGNOSIS — R76.8 POSITIVE ANA (ANTINUCLEAR ANTIBODY): ICD-10-CM

## 2022-08-31 DIAGNOSIS — R07.89 ATYPICAL CHEST PAIN: Primary | ICD-10-CM

## 2022-08-31 LAB
C3 SERPL-MCNC: 129 MG/DL (ref 90–180)
C4 SERPL-MCNC: 25.6 MG/DL (ref 10–40)
CHOLEST SERPL-MCNC: 173 MG/DL (ref ?–200)
CK SERPL-CCNC: 70 U/L
CRP SERPL HS-MCNC: 4.75 MG/L (ref ?–3)
CRP SERPL-MCNC: 0.52 MG/DL (ref ?–0.3)
ERYTHROCYTE [SEDIMENTATION RATE] IN BLOOD: 12 MM/HR
FASTING PATIENT LIPID ANSWER: YES
HDLC SERPL-MCNC: 91 MG/DL (ref 40–59)
LDLC SERPL CALC-MCNC: 63 MG/DL (ref ?–100)
NONHDLC SERPL-MCNC: 82 MG/DL (ref ?–130)
TRIGL SERPL-MCNC: 107 MG/DL (ref 30–149)
VIT B12 SERPL-MCNC: 1107 PG/ML (ref 193–986)
VLDLC SERPL CALC-MCNC: 16 MG/DL (ref 0–30)

## 2022-08-31 PROCEDURE — 82550 ASSAY OF CK (CPK): CPT

## 2022-08-31 PROCEDURE — 86160 COMPLEMENT ANTIGEN: CPT

## 2022-08-31 PROCEDURE — 82607 VITAMIN B-12: CPT

## 2022-08-31 PROCEDURE — 36415 COLL VENOUS BLD VENIPUNCTURE: CPT

## 2022-08-31 PROCEDURE — 86038 ANTINUCLEAR ANTIBODIES: CPT

## 2022-08-31 PROCEDURE — 86141 C-REACTIVE PROTEIN HS: CPT

## 2022-08-31 PROCEDURE — 80061 LIPID PANEL: CPT

## 2022-08-31 PROCEDURE — 85652 RBC SED RATE AUTOMATED: CPT

## 2022-08-31 PROCEDURE — 86140 C-REACTIVE PROTEIN: CPT

## 2022-09-01 ENCOUNTER — PATIENT MESSAGE (OUTPATIENT)
Dept: FAMILY MEDICINE CLINIC | Facility: CLINIC | Age: 52
End: 2022-09-01

## 2022-09-01 DIAGNOSIS — Z98.84 S/P LAPAROSCOPIC SLEEVE GASTRECTOMY: Primary | ICD-10-CM

## 2022-09-01 NOTE — TELEPHONE ENCOUNTER
From: Bea Tam  To: Casper Muse, DO  Sent: 9/1/2022 7:48 AM CDT  Subject: Referral back to Dr Misty Ring? Good morning - I'd like to go back to Dr. Charla Auguste to discuss the vitamins I take since my weight loss surgery and believe I need another referral to do so. Can you please provide one? I do not have an appointment with her yet.   Thank you   Sandrine Bhakta

## 2022-09-02 LAB — ANTI-NUCLEAR ANTIBODY (ANA), HEP-2 IGG: DETECTED

## 2022-09-21 ENCOUNTER — OFFICE VISIT (OUTPATIENT)
Dept: RHEUMATOLOGY | Facility: CLINIC | Age: 52
End: 2022-09-21

## 2022-09-21 VITALS
WEIGHT: 226 LBS | HEART RATE: 82 BPM | OXYGEN SATURATION: 98 % | SYSTOLIC BLOOD PRESSURE: 144 MMHG | DIASTOLIC BLOOD PRESSURE: 68 MMHG | TEMPERATURE: 98 F | RESPIRATION RATE: 16 BRPM | HEIGHT: 61 IN | BODY MASS INDEX: 42.67 KG/M2

## 2022-09-21 DIAGNOSIS — M79.7 FIBROMYALGIA: ICD-10-CM

## 2022-09-21 DIAGNOSIS — M25.50 POLYARTHRALGIA: ICD-10-CM

## 2022-09-21 DIAGNOSIS — M15.9 PRIMARY OSTEOARTHRITIS INVOLVING MULTIPLE JOINTS: ICD-10-CM

## 2022-09-21 DIAGNOSIS — M35.9 UNDIFFERENTIATED CONNECTIVE TISSUE DISEASE (HCC): Primary | ICD-10-CM

## 2022-09-21 DIAGNOSIS — R79.82 ELEVATED C-REACTIVE PROTEIN (CRP): ICD-10-CM

## 2022-09-21 DIAGNOSIS — R76.8 POSITIVE ANA (ANTINUCLEAR ANTIBODY): ICD-10-CM

## 2022-09-21 PROCEDURE — 3077F SYST BP >= 140 MM HG: CPT | Performed by: INTERNAL MEDICINE

## 2022-09-21 PROCEDURE — 3008F BODY MASS INDEX DOCD: CPT | Performed by: INTERNAL MEDICINE

## 2022-09-21 PROCEDURE — 3078F DIAST BP <80 MM HG: CPT | Performed by: INTERNAL MEDICINE

## 2022-09-21 PROCEDURE — 99214 OFFICE O/P EST MOD 30 MIN: CPT | Performed by: INTERNAL MEDICINE

## 2022-09-21 RX ORDER — ZINC GLUCONATE 50 MG
TABLET ORAL
COMMUNITY

## 2022-10-13 DIAGNOSIS — E03.9 ACQUIRED HYPOTHYROIDISM: ICD-10-CM

## 2022-10-13 DIAGNOSIS — Z51.81 ENCOUNTER FOR MEDICATION MONITORING: ICD-10-CM

## 2022-10-13 DIAGNOSIS — E78.2 MIXED HYPERLIPIDEMIA: ICD-10-CM

## 2022-10-13 DIAGNOSIS — E11.9 TYPE 2 DIABETES MELLITUS WITHOUT COMPLICATION, WITHOUT LONG-TERM CURRENT USE OF INSULIN (HCC): ICD-10-CM

## 2022-10-13 RX ORDER — SIMVASTATIN 20 MG
20 TABLET ORAL EVERY EVENING
Qty: 90 TABLET | Refills: 0 | Status: SHIPPED | OUTPATIENT
Start: 2022-10-13

## 2022-10-13 RX ORDER — LEVOTHYROXINE SODIUM 0.12 MG/1
TABLET ORAL
Qty: 90 TABLET | Refills: 1 | Status: SHIPPED | OUTPATIENT
Start: 2022-10-13

## 2022-10-27 ENCOUNTER — OFFICE VISIT (OUTPATIENT)
Dept: INTERNAL MEDICINE CLINIC | Facility: CLINIC | Age: 52
End: 2022-10-27
Payer: COMMERCIAL

## 2022-10-27 VITALS
DIASTOLIC BLOOD PRESSURE: 84 MMHG | RESPIRATION RATE: 16 BRPM | HEART RATE: 86 BPM | HEIGHT: 61 IN | WEIGHT: 229 LBS | SYSTOLIC BLOOD PRESSURE: 138 MMHG | OXYGEN SATURATION: 97 % | BODY MASS INDEX: 43.23 KG/M2

## 2022-10-27 DIAGNOSIS — Z51.81 ENCOUNTER FOR THERAPEUTIC DRUG MONITORING: Primary | ICD-10-CM

## 2022-10-27 DIAGNOSIS — I47.1 ATRIAL TACHYCARDIA (HCC): ICD-10-CM

## 2022-10-27 DIAGNOSIS — R63.5 WEIGHT GAIN: ICD-10-CM

## 2022-10-27 DIAGNOSIS — E66.01 MORBID OBESITY WITH BMI OF 45.0-49.9, ADULT (HCC): ICD-10-CM

## 2022-10-27 DIAGNOSIS — E11.9 TYPE 2 DIABETES MELLITUS WITHOUT COMPLICATION, WITHOUT LONG-TERM CURRENT USE OF INSULIN (HCC): ICD-10-CM

## 2022-10-27 PROBLEM — I47.19 ATRIAL TACHYCARDIA: Status: ACTIVE | Noted: 2022-10-27

## 2022-10-27 PROBLEM — I47.19 ATRIAL TACHYCARDIA (HCC): Status: ACTIVE | Noted: 2022-10-27

## 2022-10-27 PROCEDURE — 3008F BODY MASS INDEX DOCD: CPT | Performed by: INTERNAL MEDICINE

## 2022-10-27 PROCEDURE — 99214 OFFICE O/P EST MOD 30 MIN: CPT | Performed by: INTERNAL MEDICINE

## 2022-10-27 PROCEDURE — 3079F DIAST BP 80-89 MM HG: CPT | Performed by: INTERNAL MEDICINE

## 2022-10-27 PROCEDURE — 3075F SYST BP GE 130 - 139MM HG: CPT | Performed by: INTERNAL MEDICINE

## 2022-10-27 RX ORDER — ONDANSETRON 8 MG/1
8 TABLET, ORALLY DISINTEGRATING ORAL EVERY 8 HOURS PRN
Qty: 20 TABLET | Refills: 1 | Status: SHIPPED | OUTPATIENT
Start: 2022-10-27

## 2022-10-27 RX ORDER — TIRZEPATIDE 5 MG/.5ML
5 INJECTION, SOLUTION SUBCUTANEOUS WEEKLY
Qty: 2 ML | Refills: 0 | Status: SHIPPED | OUTPATIENT
Start: 2022-10-27

## 2022-10-27 RX ORDER — TIRZEPATIDE 2.5 MG/.5ML
2.5 INJECTION, SOLUTION SUBCUTANEOUS WEEKLY
Qty: 2 ML | Refills: 0 | Status: SHIPPED | OUTPATIENT
Start: 2022-10-27

## 2022-11-01 DIAGNOSIS — Z51.81 ENCOUNTER FOR MEDICATION MONITORING: ICD-10-CM

## 2022-11-01 DIAGNOSIS — J30.2 SEASONAL ALLERGIC RHINITIS, UNSPECIFIED TRIGGER: ICD-10-CM

## 2022-11-01 RX ORDER — MONTELUKAST SODIUM 10 MG/1
TABLET ORAL
Qty: 90 TABLET | Refills: 3 | OUTPATIENT
Start: 2022-11-01

## 2022-11-01 RX ORDER — NYSTATIN 100000 [USP'U]/G
1 POWDER TOPICAL 4 TIMES DAILY
Qty: 60 G | Refills: 1 | Status: SHIPPED | OUTPATIENT
Start: 2022-11-01

## 2022-11-03 ENCOUNTER — OFFICE VISIT (OUTPATIENT)
Dept: FAMILY MEDICINE CLINIC | Facility: CLINIC | Age: 52
End: 2022-11-03
Payer: COMMERCIAL

## 2022-11-03 VITALS
HEIGHT: 61 IN | OXYGEN SATURATION: 98 % | HEART RATE: 72 BPM | WEIGHT: 232.81 LBS | RESPIRATION RATE: 20 BRPM | SYSTOLIC BLOOD PRESSURE: 120 MMHG | TEMPERATURE: 97 F | DIASTOLIC BLOOD PRESSURE: 72 MMHG | BODY MASS INDEX: 43.95 KG/M2

## 2022-11-03 DIAGNOSIS — E78.6 LOW HDL (UNDER 40): ICD-10-CM

## 2022-11-03 DIAGNOSIS — Z51.81 ENCOUNTER FOR MEDICATION MONITORING: ICD-10-CM

## 2022-11-03 DIAGNOSIS — E78.2 MIXED HYPERLIPIDEMIA: ICD-10-CM

## 2022-11-03 DIAGNOSIS — M75.81 ROTATOR CUFF TENDINITIS, RIGHT: Primary | ICD-10-CM

## 2022-11-03 DIAGNOSIS — E03.9 ACQUIRED HYPOTHYROIDISM: ICD-10-CM

## 2022-11-03 PROCEDURE — 3074F SYST BP LT 130 MM HG: CPT | Performed by: FAMILY MEDICINE

## 2022-11-03 PROCEDURE — 3008F BODY MASS INDEX DOCD: CPT | Performed by: FAMILY MEDICINE

## 2022-11-03 PROCEDURE — 99214 OFFICE O/P EST MOD 30 MIN: CPT | Performed by: FAMILY MEDICINE

## 2022-11-03 PROCEDURE — 3078F DIAST BP <80 MM HG: CPT | Performed by: FAMILY MEDICINE

## 2022-11-20 RX ORDER — LEVOTHYROXINE SODIUM 0.12 MG/1
125 TABLET ORAL
Qty: 90 TABLET | Refills: 3 | Status: SHIPPED | OUTPATIENT
Start: 2022-11-20

## 2022-11-20 RX ORDER — SIMVASTATIN 20 MG
20 TABLET ORAL EVERY EVENING
Qty: 90 TABLET | Refills: 3 | Status: SHIPPED | OUTPATIENT
Start: 2022-11-20

## 2022-11-24 DIAGNOSIS — M35.9 UNDIFFERENTIATED CONNECTIVE TISSUE DISEASE (HCC): ICD-10-CM

## 2022-11-25 RX ORDER — HYDROXYCHLOROQUINE SULFATE 200 MG/1
TABLET, FILM COATED ORAL
Qty: 180 TABLET | Refills: 0 | Status: SHIPPED | OUTPATIENT
Start: 2022-11-25

## 2022-11-25 NOTE — TELEPHONE ENCOUNTER
Future Appointments   Date Time Provider Zahra Carolina   11/28/2022 10:20 AM JOSE ALFREDO VENEGAS RM1 [facility]   1/10/2023 12:40 PM Giovanni Hung MD [facility] [address]   2/7/2023  8:30 AM Arely Llamas DO [facility] [place]     LOV: 09/21/2022  Most recent labs completed on 08/31/2022

## 2022-11-26 RX ORDER — LANSOPRAZOLE 30 MG/1
CAPSULE, DELAYED RELEASE ORAL
Qty: 90 CAPSULE | Refills: 0 | Status: SHIPPED | OUTPATIENT
Start: 2022-11-26

## 2022-11-28 ENCOUNTER — HOSPITAL ENCOUNTER (OUTPATIENT)
Dept: MAMMOGRAPHY | Age: 52
Discharge: HOME | End: 2022-11-28
Payer: COMMERCIAL

## 2022-11-28 DIAGNOSIS — Z12.31 ENCOUNTER FOR SCREENING MAMMOGRAM FOR MALIGNANT NEOPLASM OF BREAST: ICD-10-CM

## 2022-11-28 PROCEDURE — 77063 BREAST TOMOSYNTHESIS BI: CPT

## 2022-11-28 PROCEDURE — 77067 SCR MAMMO BI INCL CAD: CPT

## 2022-12-06 ENCOUNTER — TELEPHONE (OUTPATIENT)
Dept: FAMILY MEDICINE CLINIC | Facility: CLINIC | Age: 52
End: 2022-12-06

## 2022-12-06 NOTE — TELEPHONE ENCOUNTER
Pt calling states has terrible cough with mucus and migraine that started yesterday    Pt did test this morning for Covid and was negative

## 2022-12-07 ENCOUNTER — HOSPITAL ENCOUNTER (OUTPATIENT)
Age: 52
Discharge: HOME | End: 2022-12-07
Payer: COMMERCIAL

## 2022-12-07 ENCOUNTER — APPOINTMENT (OUTPATIENT)
Dept: GENERAL RADIOLOGY | Age: 52
End: 2022-12-07
Attending: NURSE PRACTITIONER
Payer: COMMERCIAL

## 2022-12-07 VITALS
BODY MASS INDEX: 41.54 KG/M2 | HEART RATE: 75 BPM | SYSTOLIC BLOOD PRESSURE: 142 MMHG | HEIGHT: 61 IN | DIASTOLIC BLOOD PRESSURE: 58 MMHG | WEIGHT: 220 LBS | OXYGEN SATURATION: 95 % | RESPIRATION RATE: 20 BRPM

## 2022-12-07 DIAGNOSIS — R05.9 COUGH, UNSPECIFIED TYPE: Primary | ICD-10-CM

## 2022-12-07 LAB
DDIMER WHOLE BLOOD: <200 NG/ML DDU (ref ?–400)
POCT INFLUENZA A: NEGATIVE
POCT INFLUENZA B: NEGATIVE
SARS-COV-2 RNA RESP QL NAA+PROBE: NOT DETECTED

## 2022-12-07 PROCEDURE — 99214 OFFICE O/P EST MOD 30 MIN: CPT

## 2022-12-07 PROCEDURE — 87502 INFLUENZA DNA AMP PROBE: CPT | Performed by: NURSE PRACTITIONER

## 2022-12-07 PROCEDURE — 85378 FIBRIN DEGRADE SEMIQUANT: CPT | Performed by: NURSE PRACTITIONER

## 2022-12-07 PROCEDURE — 71046 X-RAY EXAM CHEST 2 VIEWS: CPT | Performed by: NURSE PRACTITIONER

## 2022-12-07 PROCEDURE — 36415 COLL VENOUS BLD VENIPUNCTURE: CPT

## 2022-12-07 RX ORDER — CODEINE PHOSPHATE AND GUAIFENESIN 10; 100 MG/5ML; MG/5ML
5 SOLUTION ORAL EVERY 6 HOURS PRN
Qty: 118 ML | Refills: 0 | Status: SHIPPED | OUTPATIENT
Start: 2022-12-07

## 2022-12-07 RX ORDER — BENZONATATE 100 MG/1
100 CAPSULE ORAL 3 TIMES DAILY PRN
Qty: 30 CAPSULE | Refills: 0 | Status: SHIPPED | OUTPATIENT
Start: 2022-12-07 | End: 2023-01-06

## 2022-12-07 NOTE — ED INITIAL ASSESSMENT (HPI)
Cough- started  Yesterday. Pt did a home covid test yesterday result negative. Pt also c/o chest hurting when coughing. Pt is not vaccinated  Back pain -started yesterday. Takes mucinex multi symptom/ motrin.

## 2022-12-15 ENCOUNTER — TELEPHONE (OUTPATIENT)
Dept: PHYSICAL THERAPY | Age: 52
End: 2022-12-15

## 2022-12-15 ENCOUNTER — TELEPHONE (OUTPATIENT)
Dept: FAMILY MEDICINE CLINIC | Facility: CLINIC | Age: 52
End: 2022-12-15

## 2022-12-15 NOTE — TELEPHONE ENCOUNTER
Will send msg to referral department regarding this request.    msg sent to [facility]/referrals

## 2022-12-15 NOTE — TELEPHONE ENCOUNTER
Ryan Childress  [phone]  From Therapy Needs referral status changed to pending instead open for px therapy referral ID# [id number]

## 2022-12-21 NOTE — TELEPHONE ENCOUNTER
Patient reported productive cough, congestion and dull aching pain to right chest and back that started this morning. Denied other symptoms.  Offered virtual visit with Dr. Lawson Medrano, declined and stated she would go to [address] [place] [address]. No

## 2022-12-22 ENCOUNTER — TELEMEDICINE (OUTPATIENT)
Dept: INTERNAL MEDICINE CLINIC | Facility: CLINIC | Age: 52
End: 2022-12-22
Payer: COMMERCIAL

## 2022-12-22 DIAGNOSIS — Z98.84 S/P LAPAROSCOPIC SLEEVE GASTRECTOMY: ICD-10-CM

## 2022-12-22 DIAGNOSIS — E66.01 MORBID OBESITY WITH BMI OF 45.0-49.9, ADULT (HCC): ICD-10-CM

## 2022-12-22 DIAGNOSIS — I47.1 ATRIAL TACHYCARDIA (HCC): ICD-10-CM

## 2022-12-22 DIAGNOSIS — E11.9 TYPE 2 DIABETES MELLITUS WITHOUT COMPLICATION, WITHOUT LONG-TERM CURRENT USE OF INSULIN (HCC): ICD-10-CM

## 2022-12-22 NOTE — PATIENT INSTRUCTIONS
Goals: Aim for 1200 calories (60-75 grams of protein per day)  Limit to 1 cookie a day during the holidays   10 minutes strength training 3 days per week ([email or web address] or [email or web address])     Email address: [email or web address]

## 2023-01-03 ENCOUNTER — TELEPHONE (OUTPATIENT)
Dept: PHYSICAL THERAPY | Facility: HOSPITAL | Age: 53
End: 2023-01-03

## 2023-01-04 ENCOUNTER — OFFICE VISIT (OUTPATIENT)
Dept: PHYSICAL THERAPY | Age: 53
End: 2023-01-04
Attending: FAMILY MEDICINE
Payer: COMMERCIAL

## 2023-01-04 DIAGNOSIS — M75.81 ROTATOR CUFF TENDINITIS, RIGHT: ICD-10-CM

## 2023-01-04 PROCEDURE — 97162 PT EVAL MOD COMPLEX 30 MIN: CPT | Performed by: PHYSICAL THERAPIST

## 2023-01-04 PROCEDURE — 97110 THERAPEUTIC EXERCISES: CPT | Performed by: PHYSICAL THERAPIST

## 2023-01-05 ENCOUNTER — LAB ENCOUNTER (OUTPATIENT)
Dept: LAB | Age: 53
End: 2023-01-05
Attending: INTERNAL MEDICINE
Payer: COMMERCIAL

## 2023-01-05 ENCOUNTER — OFFICE VISIT (OUTPATIENT)
Dept: PHYSICAL THERAPY | Age: 53
End: 2023-01-05
Attending: FAMILY MEDICINE
Payer: COMMERCIAL

## 2023-01-05 DIAGNOSIS — M25.50 POLYARTHRALGIA: ICD-10-CM

## 2023-01-05 DIAGNOSIS — E11.9 TYPE 2 DIABETES MELLITUS WITHOUT COMPLICATION, WITHOUT LONG-TERM CURRENT USE OF INSULIN (HCC): ICD-10-CM

## 2023-01-05 DIAGNOSIS — Z51.81 ENCOUNTER FOR THERAPEUTIC DRUG MONITORING: ICD-10-CM

## 2023-01-05 DIAGNOSIS — M35.9 UNDIFFERENTIATED CONNECTIVE TISSUE DISEASE (HCC): ICD-10-CM

## 2023-01-05 DIAGNOSIS — I47.1 ATRIAL TACHYCARDIA (HCC): ICD-10-CM

## 2023-01-05 DIAGNOSIS — E66.01 MORBID OBESITY WITH BMI OF 45.0-49.9, ADULT (HCC): ICD-10-CM

## 2023-01-05 DIAGNOSIS — R79.82 ELEVATED C-REACTIVE PROTEIN (CRP): ICD-10-CM

## 2023-01-05 DIAGNOSIS — R76.8 POSITIVE ANA (ANTINUCLEAR ANTIBODY): ICD-10-CM

## 2023-01-05 LAB
ALBUMIN SERPL-MCNC: 3.4 G/DL (ref 3.4–5)
ALBUMIN/GLOB SERPL: 1 {RATIO} (ref 1–2)
ALP LIVER SERPL-CCNC: 69 U/L
ALT SERPL-CCNC: 24 U/L
ANION GAP SERPL CALC-SCNC: 5 MMOL/L (ref 0–18)
AST SERPL-CCNC: 19 U/L (ref 15–37)
BASOPHILS # BLD AUTO: 0.09 X10(3) UL (ref 0–0.2)
BASOPHILS NFR BLD AUTO: 1.8 %
BILIRUB SERPL-MCNC: 0.4 MG/DL (ref 0.1–2)
BUN BLD-MCNC: 16 MG/DL (ref 7–18)
CALCIUM BLD-MCNC: 9.3 MG/DL (ref 8.5–10.1)
CHLORIDE SERPL-SCNC: 108 MMOL/L (ref 98–112)
CO2 SERPL-SCNC: 27 MMOL/L (ref 21–32)
CREAT BLD-MCNC: 0.8 MG/DL
CRP SERPL-MCNC: 0.56 MG/DL (ref ?–0.3)
EOSINOPHIL # BLD AUTO: 0.14 X10(3) UL (ref 0–0.7)
EOSINOPHIL NFR BLD AUTO: 2.8 %
ERYTHROCYTE [DISTWIDTH] IN BLOOD BY AUTOMATED COUNT: 14.1 %
ERYTHROCYTE [SEDIMENTATION RATE] IN BLOOD: 26 MM/HR
EST. AVERAGE GLUCOSE BLD GHB EST-MCNC: 111 MG/DL (ref 68–126)
FASTING STATUS PATIENT QL REPORTED: YES
GFR SERPLBLD BASED ON 1.73 SQ M-ARVRAT: 89 ML/MIN/1.73M2 (ref 60–?)
GLOBULIN PLAS-MCNC: 3.3 G/DL (ref 2.8–4.4)
GLUCOSE BLD-MCNC: 87 MG/DL (ref 70–99)
HBA1C MFR BLD: 5.5 % (ref ?–5.7)
HCT VFR BLD AUTO: 39.9 %
HGB BLD-MCNC: 13 G/DL
IMM GRANULOCYTES # BLD AUTO: 0.01 X10(3) UL (ref 0–1)
IMM GRANULOCYTES NFR BLD: 0.2 %
LYMPHOCYTES # BLD AUTO: 1.78 X10(3) UL (ref 1–4)
LYMPHOCYTES NFR BLD AUTO: 36.1 %
MCH RBC QN AUTO: 29.9 PG (ref 26–34)
MCHC RBC AUTO-ENTMCNC: 32.6 G/DL (ref 31–37)
MCV RBC AUTO: 91.7 FL
MONOCYTES # BLD AUTO: 0.36 X10(3) UL (ref 0.1–1)
MONOCYTES NFR BLD AUTO: 7.3 %
NEUTROPHILS # BLD AUTO: 2.55 X10 (3) UL (ref 1.5–7.7)
NEUTROPHILS # BLD AUTO: 2.55 X10(3) UL (ref 1.5–7.7)
NEUTROPHILS NFR BLD AUTO: 51.8 %
OSMOLALITY SERPL CALC.SUM OF ELEC: 291 MOSM/KG (ref 275–295)
PLATELET # BLD AUTO: 279 10(3)UL (ref 150–450)
POTASSIUM SERPL-SCNC: 4.1 MMOL/L (ref 3.5–5.1)
PROT SERPL-MCNC: 6.7 G/DL (ref 6.4–8.2)
RBC # BLD AUTO: 4.35 X10(6)UL
SODIUM SERPL-SCNC: 140 MMOL/L (ref 136–145)
WBC # BLD AUTO: 4.9 X10(3) UL (ref 4–11)

## 2023-01-05 PROCEDURE — 80053 COMPREHEN METABOLIC PANEL: CPT

## 2023-01-05 PROCEDURE — 36415 COLL VENOUS BLD VENIPUNCTURE: CPT

## 2023-01-05 PROCEDURE — 85025 COMPLETE CBC W/AUTO DIFF WBC: CPT

## 2023-01-05 PROCEDURE — 86140 C-REACTIVE PROTEIN: CPT

## 2023-01-05 PROCEDURE — 97140 MANUAL THERAPY 1/> REGIONS: CPT | Performed by: PHYSICAL THERAPIST

## 2023-01-05 PROCEDURE — 86038 ANTINUCLEAR ANTIBODIES: CPT

## 2023-01-05 PROCEDURE — 83036 HEMOGLOBIN GLYCOSYLATED A1C: CPT

## 2023-01-05 PROCEDURE — 97110 THERAPEUTIC EXERCISES: CPT | Performed by: PHYSICAL THERAPIST

## 2023-01-05 PROCEDURE — 85652 RBC SED RATE AUTOMATED: CPT

## 2023-01-05 PROCEDURE — 84425 ASSAY OF VITAMIN B-1: CPT

## 2023-01-07 LAB — ANTI-NUCLEAR ANTIBODY (ANA), HEP-2 IGG: DETECTED

## 2023-01-09 ENCOUNTER — OFFICE VISIT (OUTPATIENT)
Dept: PHYSICAL THERAPY | Age: 53
End: 2023-01-09
Attending: FAMILY MEDICINE
Payer: COMMERCIAL

## 2023-01-09 DIAGNOSIS — M35.9 UNDIFFERENTIATED CONNECTIVE TISSUE DISEASE (HCC): ICD-10-CM

## 2023-01-09 DIAGNOSIS — R76.8 POSITIVE ANA (ANTINUCLEAR ANTIBODY): Primary | ICD-10-CM

## 2023-01-09 PROCEDURE — 97140 MANUAL THERAPY 1/> REGIONS: CPT | Performed by: PHYSICAL THERAPIST

## 2023-01-09 PROCEDURE — 97110 THERAPEUTIC EXERCISES: CPT | Performed by: PHYSICAL THERAPIST

## 2023-01-10 ENCOUNTER — OFFICE VISIT (OUTPATIENT)
Dept: INTERNAL MEDICINE CLINIC | Facility: CLINIC | Age: 53
End: 2023-01-10
Payer: COMMERCIAL

## 2023-01-10 VITALS
RESPIRATION RATE: 14 BRPM | HEART RATE: 72 BPM | SYSTOLIC BLOOD PRESSURE: 134 MMHG | DIASTOLIC BLOOD PRESSURE: 72 MMHG | BODY MASS INDEX: 42.29 KG/M2 | HEIGHT: 61 IN | WEIGHT: 224 LBS

## 2023-01-10 DIAGNOSIS — Z98.84 S/P LAPAROSCOPIC SLEEVE GASTRECTOMY: ICD-10-CM

## 2023-01-10 DIAGNOSIS — Z51.81 ENCOUNTER FOR THERAPEUTIC DRUG MONITORING: ICD-10-CM

## 2023-01-10 DIAGNOSIS — E66.01 MORBID OBESITY WITH BMI OF 45.0-49.9, ADULT (HCC): Primary | ICD-10-CM

## 2023-01-10 DIAGNOSIS — E11.9 TYPE 2 DIABETES MELLITUS WITHOUT COMPLICATION, WITHOUT LONG-TERM CURRENT USE OF INSULIN (HCC): ICD-10-CM

## 2023-01-10 LAB — VITAMIN B1 (THIAMINE), WHOLE B: 143 NMOL/L

## 2023-01-10 PROCEDURE — 3075F SYST BP GE 130 - 139MM HG: CPT | Performed by: INTERNAL MEDICINE

## 2023-01-10 PROCEDURE — 3078F DIAST BP <80 MM HG: CPT | Performed by: INTERNAL MEDICINE

## 2023-01-10 PROCEDURE — 3008F BODY MASS INDEX DOCD: CPT | Performed by: INTERNAL MEDICINE

## 2023-01-10 PROCEDURE — 99214 OFFICE O/P EST MOD 30 MIN: CPT | Performed by: INTERNAL MEDICINE

## 2023-01-10 RX ORDER — LANSOPRAZOLE 30 MG/1
CAPSULE, DELAYED RELEASE ORAL
Qty: 90 CAPSULE | Refills: 0 | Status: SHIPPED | OUTPATIENT
Start: 2023-01-10

## 2023-01-11 ENCOUNTER — TELEPHONE (OUTPATIENT)
Dept: FAMILY MEDICINE CLINIC | Facility: CLINIC | Age: 53
End: 2023-01-11

## 2023-01-11 ENCOUNTER — OFFICE VISIT (OUTPATIENT)
Dept: PHYSICAL THERAPY | Age: 53
End: 2023-01-11
Attending: FAMILY MEDICINE
Payer: COMMERCIAL

## 2023-01-11 PROCEDURE — 97110 THERAPEUTIC EXERCISES: CPT

## 2023-01-11 PROCEDURE — 97140 MANUAL THERAPY 1/> REGIONS: CPT

## 2023-01-11 RX ORDER — TIRZEPATIDE 2.5 MG/.5ML
2.5 INJECTION, SOLUTION SUBCUTANEOUS WEEKLY
Qty: 2 ML | Refills: 0 | Status: SHIPPED | OUTPATIENT
Start: 2023-01-11

## 2023-01-11 NOTE — TELEPHONE ENCOUNTER
Pt called and stated she hurt her arm on Monday. Stated its clicking and has limited mobility. States by the end of day her fingers are numb. Pt is requesting an apt.

## 2023-01-11 NOTE — TELEPHONE ENCOUNTER
Patient stated a glass crockpot lid fell on her left shoulder on Monday 1/9/23. Since then, left arm mobility is limited and it is numb and tingly when she moves it and at the end of the day. She also reported clicking noise when she moves left arm. Patient instructed to proceed for [address] [place] [address] for eval. Verbalized understanding.

## 2023-01-13 ENCOUNTER — OFFICE VISIT (OUTPATIENT)
Dept: FAMILY MEDICINE CLINIC | Facility: CLINIC | Age: 53
End: 2023-01-13
Payer: COMMERCIAL

## 2023-01-13 VITALS
DIASTOLIC BLOOD PRESSURE: 79 MMHG | WEIGHT: 228.19 LBS | HEART RATE: 76 BPM | BODY MASS INDEX: 43.08 KG/M2 | RESPIRATION RATE: 20 BRPM | TEMPERATURE: 97 F | OXYGEN SATURATION: 100 % | HEIGHT: 61 IN | SYSTOLIC BLOOD PRESSURE: 110 MMHG

## 2023-01-13 DIAGNOSIS — M75.81 ROTATOR CUFF TENDINITIS, RIGHT: ICD-10-CM

## 2023-01-13 DIAGNOSIS — M25.512 ACUTE PAIN OF LEFT SHOULDER: Primary | ICD-10-CM

## 2023-01-13 PROCEDURE — 3008F BODY MASS INDEX DOCD: CPT | Performed by: FAMILY MEDICINE

## 2023-01-13 PROCEDURE — 3074F SYST BP LT 130 MM HG: CPT | Performed by: FAMILY MEDICINE

## 2023-01-13 PROCEDURE — 3078F DIAST BP <80 MM HG: CPT | Performed by: FAMILY MEDICINE

## 2023-01-13 PROCEDURE — 99214 OFFICE O/P EST MOD 30 MIN: CPT | Performed by: FAMILY MEDICINE

## 2023-01-13 NOTE — TELEPHONE ENCOUNTER
Spoke to patient. Patient declined IC evaluation suggestion the other day. She is requesting an xray. She complains of pain to left shoulder after a glass lid struck it. Pain with movement. She has range of motion but c/o pain. Intermittently hears a clicking noise and her fingers become numb and tingly. Does not feel there is swelling. There is a bruise. Please advise.

## 2023-01-13 NOTE — TELEPHONE ENCOUNTER
Patient seen in the office on 1/13/2023. (3) assistive equipment and person (2) assistive person (2) assistive person/Weight-bear as tolerated.

## 2023-01-14 ENCOUNTER — HOSPITAL ENCOUNTER (OUTPATIENT)
Dept: GENERAL RADIOLOGY | Age: 53
Discharge: HOME | End: 2023-01-14
Attending: FAMILY MEDICINE
Payer: COMMERCIAL

## 2023-01-14 DIAGNOSIS — M25.512 ACUTE PAIN OF LEFT SHOULDER: ICD-10-CM

## 2023-01-14 PROCEDURE — 73030 X-RAY EXAM OF SHOULDER: CPT | Performed by: FAMILY MEDICINE

## 2023-01-16 ENCOUNTER — OFFICE VISIT (OUTPATIENT)
Dept: PHYSICAL THERAPY | Age: 53
End: 2023-01-16
Attending: FAMILY MEDICINE
Payer: COMMERCIAL

## 2023-01-16 PROCEDURE — 97110 THERAPEUTIC EXERCISES: CPT

## 2023-01-18 ENCOUNTER — OFFICE VISIT (OUTPATIENT)
Dept: PHYSICAL THERAPY | Age: 53
End: 2023-01-18
Attending: FAMILY MEDICINE
Payer: COMMERCIAL

## 2023-01-18 ENCOUNTER — TELEPHONE (OUTPATIENT)
Dept: FAMILY MEDICINE CLINIC | Facility: CLINIC | Age: 53
End: 2023-01-18

## 2023-01-18 ENCOUNTER — TELEPHONE (OUTPATIENT)
Dept: PHYSICAL THERAPY | Age: 53
End: 2023-01-18

## 2023-01-18 ENCOUNTER — PATIENT MESSAGE (OUTPATIENT)
Dept: RHEUMATOLOGY | Facility: CLINIC | Age: 53
End: 2023-01-18

## 2023-01-18 PROCEDURE — 97140 MANUAL THERAPY 1/> REGIONS: CPT | Performed by: PHYSICAL THERAPIST

## 2023-01-18 PROCEDURE — 97110 THERAPEUTIC EXERCISES: CPT | Performed by: PHYSICAL THERAPIST

## 2023-01-18 NOTE — TELEPHONE ENCOUNTER
Ernesto Weiss,  I placed an order in usual fashion for physical therapy for this patient, I do not know what they are talking about regarding \"open\" and \"pending\".

## 2023-01-18 NOTE — TELEPHONE ENCOUNTER
Sabina Carney from [facility] called to request the status of  referral dated 1/13/23 for PT be changed from OPEN to PENDING      Please Advise    Thank you

## 2023-01-19 ENCOUNTER — OFFICE VISIT (OUTPATIENT)
Dept: INTERNAL MEDICINE CLINIC | Facility: CLINIC | Age: 53
End: 2023-01-19
Payer: COMMERCIAL

## 2023-01-19 DIAGNOSIS — Z98.84 S/P LAPAROSCOPIC SLEEVE GASTRECTOMY: ICD-10-CM

## 2023-01-19 DIAGNOSIS — E03.9 HYPOTHYROIDISM, UNSPECIFIED TYPE: ICD-10-CM

## 2023-01-19 DIAGNOSIS — E66.01 CLASS 3 SEVERE OBESITY WITH BODY MASS INDEX (BMI) OF 40.0 TO 44.9 IN ADULT, UNSPECIFIED OBESITY TYPE, UNSPECIFIED WHETHER SERIOUS COMORBIDITY PRESENT (HCC): ICD-10-CM

## 2023-01-19 PROCEDURE — 97803 MED NUTRITION INDIV SUBSEQ: CPT

## 2023-01-19 NOTE — PATIENT INSTRUCTIONS
Goals:    Aim for 1200 calories (60-75 grams of protein per day)  Continue 10 minutes strength training minimum of 3 days per week ([email or web address] or [email or web address])  1-2 servings per day of fruit

## 2023-01-20 ENCOUNTER — LAB ENCOUNTER (OUTPATIENT)
Dept: LAB | Age: 53
End: 2023-01-20
Attending: INTERNAL MEDICINE
Payer: COMMERCIAL

## 2023-01-20 DIAGNOSIS — M35.9 UNDIFFERENTIATED CONNECTIVE TISSUE DISEASE (HCC): ICD-10-CM

## 2023-01-20 DIAGNOSIS — R76.8 POSITIVE ANA (ANTINUCLEAR ANTIBODY): ICD-10-CM

## 2023-01-20 PROCEDURE — 36415 COLL VENOUS BLD VENIPUNCTURE: CPT

## 2023-01-20 PROCEDURE — 86038 ANTINUCLEAR ANTIBODIES: CPT

## 2023-01-23 ENCOUNTER — APPOINTMENT (OUTPATIENT)
Dept: PHYSICAL THERAPY | Age: 53
End: 2023-01-23
Attending: FAMILY MEDICINE
Payer: COMMERCIAL

## 2023-01-23 LAB — NUCLEAR IGG TITR SER IF: NEGATIVE {TITER}

## 2023-01-31 ENCOUNTER — APPOINTMENT (OUTPATIENT)
Dept: PHYSICAL THERAPY | Age: 53
End: 2023-01-31
Attending: FAMILY MEDICINE
Payer: COMMERCIAL

## 2023-02-01 ENCOUNTER — APPOINTMENT (OUTPATIENT)
Dept: PHYSICAL THERAPY | Age: 53
End: 2023-02-01
Attending: FAMILY MEDICINE
Payer: COMMERCIAL

## 2023-02-06 ENCOUNTER — TELEPHONE (OUTPATIENT)
Dept: FAMILY MEDICINE CLINIC | Facility: CLINIC | Age: 53
End: 2023-02-06

## 2023-02-06 ENCOUNTER — PATIENT MESSAGE (OUTPATIENT)
Dept: FAMILY MEDICINE CLINIC | Facility: CLINIC | Age: 53
End: 2023-02-06

## 2023-02-06 ENCOUNTER — OFFICE VISIT (OUTPATIENT)
Dept: PHYSICAL THERAPY | Age: 53
End: 2023-02-06
Attending: FAMILY MEDICINE
Payer: COMMERCIAL

## 2023-02-06 DIAGNOSIS — M79.7 FIBROMYALGIA: ICD-10-CM

## 2023-02-06 DIAGNOSIS — M25.50 POLYARTHRALGIA: Primary | ICD-10-CM

## 2023-02-06 PROCEDURE — 97161 PT EVAL LOW COMPLEX 20 MIN: CPT | Performed by: PHYSICAL THERAPIST

## 2023-02-06 PROCEDURE — 97110 THERAPEUTIC EXERCISES: CPT | Performed by: PHYSICAL THERAPIST

## 2023-02-06 NOTE — TELEPHONE ENCOUNTER
Pt called and stated she has an appointment with Ramo Ribera for Rheumatology but that office called her and stated she has no more visits with that referral. Pt needs a new referral. Stated she sent a [employer] message as well.

## 2023-02-07 ENCOUNTER — OFFICE VISIT (OUTPATIENT)
Dept: RHEUMATOLOGY | Facility: CLINIC | Age: 53
End: 2023-02-07
Payer: COMMERCIAL

## 2023-02-07 VITALS
WEIGHT: 224 LBS | HEART RATE: 68 BPM | RESPIRATION RATE: 16 BRPM | HEIGHT: 61 IN | DIASTOLIC BLOOD PRESSURE: 70 MMHG | OXYGEN SATURATION: 94 % | SYSTOLIC BLOOD PRESSURE: 132 MMHG | BODY MASS INDEX: 42.29 KG/M2

## 2023-02-07 DIAGNOSIS — R21 SKIN RASH: ICD-10-CM

## 2023-02-07 DIAGNOSIS — R79.82 ELEVATED C-REACTIVE PROTEIN (CRP): ICD-10-CM

## 2023-02-07 DIAGNOSIS — M35.9 UNDIFFERENTIATED CONNECTIVE TISSUE DISEASE (HCC): Primary | ICD-10-CM

## 2023-02-07 DIAGNOSIS — M25.50 POLYARTHRALGIA: ICD-10-CM

## 2023-02-07 DIAGNOSIS — R76.8 POSITIVE ANA (ANTINUCLEAR ANTIBODY): ICD-10-CM

## 2023-02-07 DIAGNOSIS — M15.9 PRIMARY OSTEOARTHRITIS INVOLVING MULTIPLE JOINTS: ICD-10-CM

## 2023-02-07 DIAGNOSIS — M79.7 FIBROMYALGIA: ICD-10-CM

## 2023-02-07 PROCEDURE — 3078F DIAST BP <80 MM HG: CPT | Performed by: INTERNAL MEDICINE

## 2023-02-07 PROCEDURE — 99214 OFFICE O/P EST MOD 30 MIN: CPT | Performed by: INTERNAL MEDICINE

## 2023-02-07 PROCEDURE — 3008F BODY MASS INDEX DOCD: CPT | Performed by: INTERNAL MEDICINE

## 2023-02-07 PROCEDURE — 3075F SYST BP GE 130 - 139MM HG: CPT | Performed by: INTERNAL MEDICINE

## 2023-02-07 RX ORDER — HYDROXYCHLOROQUINE SULFATE 200 MG/1
200 TABLET, FILM COATED ORAL 2 TIMES DAILY
Qty: 180 TABLET | Refills: 0 | Status: SHIPPED | OUTPATIENT
Start: 2023-02-07

## 2023-02-09 ENCOUNTER — OFFICE VISIT (OUTPATIENT)
Dept: PHYSICAL THERAPY | Age: 53
End: 2023-02-09
Attending: FAMILY MEDICINE
Payer: COMMERCIAL

## 2023-02-09 PROCEDURE — 97110 THERAPEUTIC EXERCISES: CPT | Performed by: PHYSICAL THERAPIST

## 2023-02-09 PROCEDURE — 97140 MANUAL THERAPY 1/> REGIONS: CPT | Performed by: PHYSICAL THERAPIST

## 2023-02-13 ENCOUNTER — APPOINTMENT (OUTPATIENT)
Dept: PHYSICAL THERAPY | Age: 53
End: 2023-02-13
Attending: FAMILY MEDICINE
Payer: COMMERCIAL

## 2023-02-14 ENCOUNTER — APPOINTMENT (OUTPATIENT)
Dept: PHYSICAL THERAPY | Age: 53
End: 2023-02-14
Attending: FAMILY MEDICINE
Payer: COMMERCIAL

## 2023-02-15 ENCOUNTER — APPOINTMENT (OUTPATIENT)
Dept: PHYSICAL THERAPY | Age: 53
End: 2023-02-15
Attending: FAMILY MEDICINE
Payer: COMMERCIAL

## 2023-02-16 ENCOUNTER — OFFICE VISIT (OUTPATIENT)
Dept: PHYSICAL THERAPY | Age: 53
End: 2023-02-16
Attending: FAMILY MEDICINE
Payer: COMMERCIAL

## 2023-02-16 ENCOUNTER — TELEPHONE (OUTPATIENT)
Dept: PHYSICAL THERAPY | Age: 53
End: 2023-02-16

## 2023-02-16 PROCEDURE — 97140 MANUAL THERAPY 1/> REGIONS: CPT | Performed by: PHYSICAL THERAPIST

## 2023-02-16 PROCEDURE — 97110 THERAPEUTIC EXERCISES: CPT | Performed by: PHYSICAL THERAPIST

## 2023-02-19 NOTE — TELEPHONE ENCOUNTER
Requesting Mounjaro  LOV: 1/10/23  RTC: one month  Last Relevant Labs: 1/5/23  Filled: 1/11/23 #2ml with 0 refills  Mounjaro 2.5 mg    Future Appointments   Date Time Provider Zahra Carolina   3/13/2023  9:30 AM Antonina Lacey RD [facility]   4/11/2023  8:40 AM Nick Stiles MD [facility]   7/14/2023  2:00 PM [facility]lamas,  [facility]

## 2023-02-21 ENCOUNTER — TELEPHONE (OUTPATIENT)
Dept: FAMILY MEDICINE CLINIC | Facility: CLINIC | Age: 53
End: 2023-02-21

## 2023-02-21 RX ORDER — TIRZEPATIDE 2.5 MG/.5ML
INJECTION, SOLUTION SUBCUTANEOUS
Qty: 2 ML | Refills: 0 | Status: SHIPPED | OUTPATIENT
Start: 2023-02-21

## 2023-02-21 NOTE — TELEPHONE ENCOUNTER
Patient calling injured her right wrist getting out of a camper does not feel like it's broke but would like Dr Gorge Sandhu to look at it

## 2023-02-21 NOTE — TELEPHONE ENCOUNTER
Is she symptomatic with her sugars in the 80s?   If she is not symptomatic then I would not worry, the mounjaro cant cause danger levels hypoglycemia

## 2023-02-22 ENCOUNTER — OFFICE VISIT (OUTPATIENT)
Dept: FAMILY MEDICINE CLINIC | Facility: CLINIC | Age: 53
End: 2023-02-22
Payer: COMMERCIAL

## 2023-02-22 VITALS
RESPIRATION RATE: 20 BRPM | BODY MASS INDEX: 42.22 KG/M2 | DIASTOLIC BLOOD PRESSURE: 70 MMHG | SYSTOLIC BLOOD PRESSURE: 126 MMHG | HEART RATE: 75 BPM | TEMPERATURE: 97 F | OXYGEN SATURATION: 98 % | HEIGHT: 61 IN | WEIGHT: 223.63 LBS

## 2023-02-22 DIAGNOSIS — S69.91XA INJURY OF RIGHT WRIST, INITIAL ENCOUNTER: ICD-10-CM

## 2023-02-22 DIAGNOSIS — M25.531 ACUTE PAIN OF RIGHT WRIST: Primary | ICD-10-CM

## 2023-02-22 PROCEDURE — 3074F SYST BP LT 130 MM HG: CPT | Performed by: FAMILY MEDICINE

## 2023-02-22 PROCEDURE — 3078F DIAST BP <80 MM HG: CPT | Performed by: FAMILY MEDICINE

## 2023-02-22 PROCEDURE — 3008F BODY MASS INDEX DOCD: CPT | Performed by: FAMILY MEDICINE

## 2023-02-22 PROCEDURE — 99214 OFFICE O/P EST MOD 30 MIN: CPT | Performed by: FAMILY MEDICINE

## 2023-02-22 NOTE — PATIENT INSTRUCTIONS
-Okay to use an arm sling during the day and use wrist splint when sleeping at night.    -Complete the x-ray of the wrist prior to your appointment with the orthopedic specialist.

## 2023-02-23 ENCOUNTER — HOSPITAL ENCOUNTER (OUTPATIENT)
Dept: GENERAL RADIOLOGY | Age: 53
Discharge: HOME | End: 2023-02-23
Attending: FAMILY MEDICINE
Payer: COMMERCIAL

## 2023-02-23 DIAGNOSIS — R42 VERTIGO: ICD-10-CM

## 2023-02-23 DIAGNOSIS — M25.531 ACUTE PAIN OF RIGHT WRIST: ICD-10-CM

## 2023-02-23 DIAGNOSIS — S69.91XA INJURY OF RIGHT WRIST, INITIAL ENCOUNTER: ICD-10-CM

## 2023-02-23 PROCEDURE — 73110 X-RAY EXAM OF WRIST: CPT | Performed by: FAMILY MEDICINE

## 2023-02-23 RX ORDER — MECLIZINE HYDROCHLORIDE 25 MG/1
25 TABLET ORAL 3 TIMES DAILY PRN
Qty: 30 TABLET | Refills: 2 | Status: SHIPPED | OUTPATIENT
Start: 2023-02-23

## 2023-02-24 ENCOUNTER — TELEPHONE (OUTPATIENT)
Dept: ORTHOPEDICS CLINIC | Facility: CLINIC | Age: 53
End: 2023-02-24

## 2023-02-24 DIAGNOSIS — M25.531 RIGHT WRIST PAIN: Primary | ICD-10-CM

## 2023-02-24 NOTE — TELEPHONE ENCOUNTER
Last Imaging  XR WRIST COMPLETE (MIN 3 VIEWS), RIGHT (CPT=73110)  Narrative: PROCEDURE:  XR WRIST COMPLETE (MIN 3 VIEWS), RIGHT (CPT=73110)     LOCATION:  [facility]       TECHNIQUE:  Three views were obtained. COMPARISON:  None. INDICATIONS:  S69.91XA Injury of right wrist, initial encounter M25.531 Acute pain of right wrist     PATIENT STATED HISTORY: (As transcribed by Technologist)  Patient injured right wrist five days ago, patient heard \"pop\" and since has had pain to anterior right wrist.          FINDINGS:    BONES:  There is no fracture, subluxation or dislocation. Joint spaces are maintained. SOFT TISSUES:  No soft tissue swelling is identified. EFFUSION:  None visible. OTHER:  Negative. Impression: CONCLUSION:  No fracture or dislocation.         Dictated by (CST): Ariane Jaimes MD on 2/23/2023 at 10:12 AM       Finalized by (CST): Ariane Jaimes MD on 2/23/2023 at 10:13 AM          Future Appointments   Date Time Provider [facility]   3/13/2023  9:30 AM Pallavi Odonnell RD [facility] [address]   3/23/2023  9:00 AM Valentino Ina, DO [facility] [place]   4/6/2023  2:20 PM Wallace Soler MD [facility]   4/11/2023  8:40 AM Laney Boothe MD [facility]   7/14/2023  2:00 PM Shyanne Llamas DO [facility] [place] [facility]

## 2023-02-28 ENCOUNTER — PATIENT MESSAGE (OUTPATIENT)
Dept: FAMILY MEDICINE CLINIC | Facility: CLINIC | Age: 53
End: 2023-02-28

## 2023-03-02 ENCOUNTER — TELEPHONE (OUTPATIENT)
Dept: FAMILY MEDICINE CLINIC | Facility: CLINIC | Age: 53
End: 2023-03-02

## 2023-03-02 DIAGNOSIS — M25.531 ACUTE PAIN OF RIGHT WRIST: Primary | ICD-10-CM

## 2023-03-02 DIAGNOSIS — S69.91XA INJURY OF RIGHT WRIST, INITIAL ENCOUNTER: ICD-10-CM

## 2023-03-02 RX ORDER — PYRAZINAMIDE 500 MG/1
1-2 TABLET ORAL 2 TIMES DAILY PRN
Qty: 30 TABLET | Refills: 1 | Status: SHIPPED | OUTPATIENT
Start: 2023-03-02

## 2023-03-02 NOTE — TELEPHONE ENCOUNTER
Recommend patient contact the [address] [place] [address] Ortho office and see if one of the other Ortho's could get her in to be seen sooner. In the meantime, an order has been placed for occupational therapy. Please ask patient if she would like to try tramadol or Tylenol with codeine.

## 2023-03-02 NOTE — TELEPHONE ENCOUNTER
I did ask if any other orthos could see me sooner and none were available. I think I'm allergic to tramadol. Can Dr. Feng Davila put in a prescription for Tylenol w codeine please? Thank you very much  Josue Vick  Would it also be possible to order an MRI in the meantime, since that will likely be something the ortho does when I finally get in? This will also help us see what we're dealing with?   Thank you again - Josue Vick

## 2023-03-02 NOTE — TELEPHONE ENCOUNTER
Looks like Tramadol caused a rash         Ultram [Tramadol Hcl] Ultram [Tramadol Hcl]  RASH Not Specified

## 2023-03-03 ENCOUNTER — TELEPHONE (OUTPATIENT)
Dept: FAMILY MEDICINE CLINIC | Facility: CLINIC | Age: 53
End: 2023-03-03

## 2023-03-06 ENCOUNTER — PATIENT MESSAGE (OUTPATIENT)
Dept: FAMILY MEDICINE CLINIC | Facility: CLINIC | Age: 53
End: 2023-03-06

## 2023-03-07 NOTE — TELEPHONE ENCOUNTER
From: Tom Feliz  To: Ruben Collado DO  Sent: 3/6/2023 10:18 AM CST  Subject: Would I need an appointment to talk disability? Good morning Dr. Doe Pollack - I started to fill out paperwork to apply for disability and realize I might need a doctor to sign off on this. I'd like to talk with you about it - would I need an appointment? With the various issues and constant pain that I have I'm feeling disability might be needed. Please let me know if we can talk and if I need an appointment.   Than you  Wei Walton

## 2023-03-13 ENCOUNTER — TELEPHONE (OUTPATIENT)
Dept: PHYSICAL THERAPY | Age: 53
End: 2023-03-13

## 2023-03-13 ENCOUNTER — HOSPITAL ENCOUNTER (OUTPATIENT)
Dept: MRI IMAGING | Age: 53
Discharge: HOME | End: 2023-03-13
Attending: FAMILY MEDICINE
Payer: COMMERCIAL

## 2023-03-13 DIAGNOSIS — S69.91XA INJURY OF RIGHT WRIST, INITIAL ENCOUNTER: ICD-10-CM

## 2023-03-13 DIAGNOSIS — M25.531 ACUTE PAIN OF RIGHT WRIST: ICD-10-CM

## 2023-03-13 PROCEDURE — 73221 MRI JOINT UPR EXTREM W/O DYE: CPT | Performed by: FAMILY MEDICINE

## 2023-03-13 RX ORDER — BLOOD SUGAR DIAGNOSTIC
STRIP MISCELLANEOUS
Qty: 100 STRIP | Refills: 3 | Status: SHIPPED | OUTPATIENT
Start: 2023-03-13

## 2023-03-16 ENCOUNTER — PATIENT MESSAGE (OUTPATIENT)
Dept: FAMILY MEDICINE CLINIC | Facility: CLINIC | Age: 53
End: 2023-03-16

## 2023-03-16 DIAGNOSIS — H40.1132 PRIMARY OPEN ANGLE GLAUCOMA OF BOTH EYES, MODERATE STAGE: Primary | ICD-10-CM

## 2023-03-16 DIAGNOSIS — H43.813 POSTERIOR VITREOUS DETACHMENT OF BOTH EYES: ICD-10-CM

## 2023-03-16 NOTE — TELEPHONE ENCOUNTER
From: Dayanara You  To: Dedra Quiroz DO  Sent: 3/16/2023 9:51 AM CDT  Subject: referral for Dr Maryam Lang please? Good morning - I'd like to make an appointment w Dr. Daniel Palencia because I feel like my vision has changed. Can I please get a new referral? I know they won't accept an appointment until I have one.   Thank you  Ranulfo Foster

## 2023-03-21 ENCOUNTER — TELEPHONE (OUTPATIENT)
Dept: PHYSICAL THERAPY | Facility: HOSPITAL | Age: 53
End: 2023-03-21

## 2023-03-22 NOTE — PROGRESS NOTES
Peter Roa is a 51year old female. HPI:         Back pain:  Overall has had some mild improvement. Tizanidine with partial temporary relief. Worst in the morning.   Has not started physical therapy for her back yet, she has been getting physica Reference ECG taken HCl 25 MG Oral Tab Take 1 tablet (25 mg total) by mouth 3 (three) times daily as needed for Dizziness.  30 tablet 0   • PATIENT SUPPLIED MEDICATION Essential Oils for allergies     • Cholecalciferol (VITAMIN D3) 2000 UNITS Oral Tab Take 1 tablet by mouth da Procedure Laterality Date   • ANGIOGRAM      2-24-12 [place]   • ANGIOPLASTY (CORONARY)     • BARIATRIC SLEEVE GASTRECTOMY LAPAROSCOPIC N/A 10/15/2018    Performed by Cherylene Delton, MD at [address] [facility] [address]   • COLONOSCOPY     • COLONOSCOPY  07/19/2018   • LEONCIO basal cell   • Cancer Paternal Aunt         lung     REVIEW OF SYSTEMS:   GENERAL HEALTH: overall feels well  RESPIRATORY: Denies: JOSE/POLK/Cough/Wheeze  CARDIOVASCULAR: Denies CP/palpitations  GI: Denies GI symptoms  : denies urinary symptoms  NEURO: d and L5-S1 facet arthropathy. Stable appearance of   embolization coils in the right mid-lower paraspinal region.     =====  CONCLUSION:    Stable advanced degenerative disc and facet changes L4-5 and L5-S1. No significant change since 2014.        Dictate

## 2023-03-23 ENCOUNTER — OFFICE VISIT (OUTPATIENT)
Dept: FAMILY MEDICINE CLINIC | Facility: CLINIC | Age: 53
End: 2023-03-23
Payer: COMMERCIAL

## 2023-03-23 VITALS
WEIGHT: 223.19 LBS | DIASTOLIC BLOOD PRESSURE: 70 MMHG | BODY MASS INDEX: 42.14 KG/M2 | RESPIRATION RATE: 20 BRPM | SYSTOLIC BLOOD PRESSURE: 130 MMHG | HEIGHT: 61 IN | TEMPERATURE: 97 F | OXYGEN SATURATION: 99 % | HEART RATE: 57 BPM

## 2023-03-23 DIAGNOSIS — E78.2 MIXED HYPERLIPIDEMIA: ICD-10-CM

## 2023-03-23 DIAGNOSIS — E03.9 ACQUIRED HYPOTHYROIDISM: ICD-10-CM

## 2023-03-23 DIAGNOSIS — E78.6 LOW HDL (UNDER 40): ICD-10-CM

## 2023-03-23 DIAGNOSIS — M25.531 ACUTE PAIN OF RIGHT WRIST: Primary | ICD-10-CM

## 2023-03-23 DIAGNOSIS — S69.91XD INJURY OF RIGHT WRIST, SUBSEQUENT ENCOUNTER: ICD-10-CM

## 2023-03-23 DIAGNOSIS — R19.5 STOOL CLAY COLOR: ICD-10-CM

## 2023-03-23 PROCEDURE — 3008F BODY MASS INDEX DOCD: CPT | Performed by: FAMILY MEDICINE

## 2023-03-23 PROCEDURE — 3078F DIAST BP <80 MM HG: CPT | Performed by: FAMILY MEDICINE

## 2023-03-23 PROCEDURE — 3075F SYST BP GE 130 - 139MM HG: CPT | Performed by: FAMILY MEDICINE

## 2023-03-23 PROCEDURE — 99214 OFFICE O/P EST MOD 30 MIN: CPT | Performed by: FAMILY MEDICINE

## 2023-03-24 ENCOUNTER — OFFICE VISIT (OUTPATIENT)
Dept: OCCUPATIONAL MEDICINE | Age: 53
End: 2023-03-24
Attending: FAMILY MEDICINE
Payer: COMMERCIAL

## 2023-03-24 DIAGNOSIS — S69.91XA INJURY OF RIGHT WRIST, INITIAL ENCOUNTER: ICD-10-CM

## 2023-03-24 DIAGNOSIS — M25.531 ACUTE PAIN OF RIGHT WRIST: ICD-10-CM

## 2023-03-24 PROCEDURE — 97110 THERAPEUTIC EXERCISES: CPT

## 2023-03-24 PROCEDURE — 97166 OT EVAL MOD COMPLEX 45 MIN: CPT

## 2023-03-28 ENCOUNTER — APPOINTMENT (OUTPATIENT)
Dept: OCCUPATIONAL MEDICINE | Age: 53
End: 2023-03-28
Attending: FAMILY MEDICINE
Payer: COMMERCIAL

## 2023-03-30 ENCOUNTER — OFFICE VISIT (OUTPATIENT)
Dept: ORTHOPEDICS CLINIC | Facility: CLINIC | Age: 53
End: 2023-03-30
Payer: COMMERCIAL

## 2023-03-30 VITALS — BODY MASS INDEX: 41.35 KG/M2 | WEIGHT: 219 LBS | HEIGHT: 61 IN

## 2023-03-30 DIAGNOSIS — M25.531 RIGHT WRIST PAIN: Primary | ICD-10-CM

## 2023-03-30 PROCEDURE — 20550 NJX 1 TENDON SHEATH/LIGAMENT: CPT | Performed by: ORTHOPAEDIC SURGERY

## 2023-03-30 PROCEDURE — 99204 OFFICE O/P NEW MOD 45 MIN: CPT | Performed by: ORTHOPAEDIC SURGERY

## 2023-03-30 PROCEDURE — 3008F BODY MASS INDEX DOCD: CPT | Performed by: ORTHOPAEDIC SURGERY

## 2023-03-30 PROCEDURE — 20600 DRAIN/INJ JOINT/BURSA W/O US: CPT | Performed by: ORTHOPAEDIC SURGERY

## 2023-03-30 RX ORDER — BETAMETHASONE SODIUM PHOSPHATE AND BETAMETHASONE ACETATE 3; 3 MG/ML; MG/ML
6 INJECTION, SUSPENSION INTRA-ARTICULAR; INTRALESIONAL; INTRAMUSCULAR; SOFT TISSUE ONCE
Status: COMPLETED | OUTPATIENT
Start: 2023-03-30 | End: 2023-03-30

## 2023-03-30 RX ADMIN — BETAMETHASONE SODIUM PHOSPHATE AND BETAMETHASONE ACETATE 6 MG: 3; 3 INJECTION, SUSPENSION INTRA-ARTICULAR; INTRALESIONAL; INTRAMUSCULAR; SOFT TISSUE at 14:22:00

## 2023-04-04 ENCOUNTER — APPOINTMENT (OUTPATIENT)
Dept: OCCUPATIONAL MEDICINE | Age: 53
End: 2023-04-04
Attending: FAMILY MEDICINE
Payer: COMMERCIAL

## 2023-04-07 ENCOUNTER — APPOINTMENT (OUTPATIENT)
Dept: OCCUPATIONAL MEDICINE | Age: 53
End: 2023-04-07
Attending: FAMILY MEDICINE
Payer: COMMERCIAL

## 2023-04-11 ENCOUNTER — OFFICE VISIT (OUTPATIENT)
Dept: INTERNAL MEDICINE CLINIC | Facility: CLINIC | Age: 53
End: 2023-04-11
Payer: COMMERCIAL

## 2023-04-11 ENCOUNTER — APPOINTMENT (OUTPATIENT)
Dept: OCCUPATIONAL MEDICINE | Age: 53
End: 2023-04-11
Attending: FAMILY MEDICINE
Payer: COMMERCIAL

## 2023-04-11 VITALS
RESPIRATION RATE: 18 BRPM | BODY MASS INDEX: 42.86 KG/M2 | OXYGEN SATURATION: 100 % | HEART RATE: 69 BPM | WEIGHT: 227 LBS | HEIGHT: 61 IN | DIASTOLIC BLOOD PRESSURE: 74 MMHG | SYSTOLIC BLOOD PRESSURE: 124 MMHG

## 2023-04-11 DIAGNOSIS — E11.9 TYPE 2 DIABETES MELLITUS WITHOUT COMPLICATION, WITHOUT LONG-TERM CURRENT USE OF INSULIN (HCC): ICD-10-CM

## 2023-04-11 DIAGNOSIS — M62.84 SARCOPENIA: ICD-10-CM

## 2023-04-11 DIAGNOSIS — E16.2 HYPOGLYCEMIA: ICD-10-CM

## 2023-04-11 DIAGNOSIS — Z51.81 ENCOUNTER FOR THERAPEUTIC DRUG MONITORING: ICD-10-CM

## 2023-04-11 DIAGNOSIS — E66.01 CLASS 3 SEVERE OBESITY WITH BODY MASS INDEX (BMI) OF 40.0 TO 44.9 IN ADULT, UNSPECIFIED OBESITY TYPE, UNSPECIFIED WHETHER SERIOUS COMORBIDITY PRESENT (HCC): Primary | ICD-10-CM

## 2023-04-11 PROCEDURE — 3078F DIAST BP <80 MM HG: CPT | Performed by: INTERNAL MEDICINE

## 2023-04-11 PROCEDURE — 99214 OFFICE O/P EST MOD 30 MIN: CPT | Performed by: INTERNAL MEDICINE

## 2023-04-11 PROCEDURE — 3074F SYST BP LT 130 MM HG: CPT | Performed by: INTERNAL MEDICINE

## 2023-04-11 PROCEDURE — 3008F BODY MASS INDEX DOCD: CPT | Performed by: INTERNAL MEDICINE

## 2023-04-14 ENCOUNTER — APPOINTMENT (OUTPATIENT)
Dept: OCCUPATIONAL MEDICINE | Age: 53
End: 2023-04-14
Attending: FAMILY MEDICINE
Payer: COMMERCIAL

## 2023-04-18 ENCOUNTER — APPOINTMENT (OUTPATIENT)
Dept: OCCUPATIONAL MEDICINE | Age: 53
End: 2023-04-18
Attending: FAMILY MEDICINE
Payer: COMMERCIAL

## 2023-04-18 DIAGNOSIS — M35.9 UNDIFFERENTIATED CONNECTIVE TISSUE DISEASE (HCC): ICD-10-CM

## 2023-04-19 RX ORDER — HYDROXYCHLOROQUINE SULFATE 200 MG/1
TABLET, FILM COATED ORAL
Qty: 180 TABLET | Refills: 0 | Status: SHIPPED | OUTPATIENT
Start: 2023-04-19

## 2023-04-19 NOTE — TELEPHONE ENCOUNTER
Future Appointments   Date Time Provider Zahra Mariai   4/28/2023  8:00 AM Sammy Roldan, DO [facility] [place]   5/11/2023  9:00 AM Pablo Car MD [facility] ORTHO 75 [facility]   5/15/2023  9:00 AM Jun Cheatham, DO [facility] SUB GI   7/14/2023  2:00 PM Hilda Llamas, DO [facility] [place]   8/2/2023  8:20 AM Becky Jenkins MD [facility]     LOV  2/7/2023    Last refill  2/7/2023  180 tabs, 0 refills

## 2023-04-20 ENCOUNTER — APPOINTMENT (OUTPATIENT)
Dept: OCCUPATIONAL MEDICINE | Age: 53
End: 2023-04-20
Attending: FAMILY MEDICINE
Payer: COMMERCIAL

## 2023-04-20 RX ORDER — LANSOPRAZOLE 30 MG/1
30 CAPSULE, DELAYED RELEASE ORAL DAILY
Qty: 90 CAPSULE | Refills: 0 | Status: SHIPPED | OUTPATIENT
Start: 2023-04-20

## 2023-04-24 ENCOUNTER — APPOINTMENT (OUTPATIENT)
Dept: OCCUPATIONAL MEDICINE | Age: 53
End: 2023-04-24
Attending: FAMILY MEDICINE
Payer: COMMERCIAL

## 2023-04-25 ENCOUNTER — LAB ENCOUNTER (OUTPATIENT)
Dept: LAB | Age: 53
End: 2023-04-25
Attending: FAMILY MEDICINE
Payer: COMMERCIAL

## 2023-04-25 DIAGNOSIS — M79.7 FIBROMYALGIA: ICD-10-CM

## 2023-04-25 DIAGNOSIS — E78.2 MIXED HYPERLIPIDEMIA: ICD-10-CM

## 2023-04-25 DIAGNOSIS — M25.50 PAIN IN JOINT, MULTIPLE SITES: ICD-10-CM

## 2023-04-25 DIAGNOSIS — M35.9 UNDIFFERENTIATED CONNECTIVE TISSUE DISEASE (HCC): ICD-10-CM

## 2023-04-25 DIAGNOSIS — M35.9 DIFFUSE DISEASE OF CONNECTIVE TISSUE (HCC): Primary | ICD-10-CM

## 2023-04-25 DIAGNOSIS — E78.6 LOW HDL (UNDER 40): ICD-10-CM

## 2023-04-25 DIAGNOSIS — E03.9 ACQUIRED HYPOTHYROIDISM: ICD-10-CM

## 2023-04-25 DIAGNOSIS — M25.50 POLYARTHRALGIA: ICD-10-CM

## 2023-04-25 DIAGNOSIS — R79.82 ELEVATED C-REACTIVE PROTEIN (CRP): ICD-10-CM

## 2023-04-25 DIAGNOSIS — R76.8 FALSE POSITIVE SEROLOGICAL TEST FOR SYPHILIS: ICD-10-CM

## 2023-04-25 LAB
ALBUMIN SERPL-MCNC: 3.1 G/DL (ref 3.4–5)
ALBUMIN/GLOB SERPL: 0.8 {RATIO} (ref 1–2)
ALP LIVER SERPL-CCNC: 73 U/L
ALT SERPL-CCNC: 26 U/L
ANION GAP SERPL CALC-SCNC: 3 MMOL/L (ref 0–18)
AST SERPL-CCNC: 17 U/L (ref 15–37)
BASOPHILS # BLD AUTO: 0.05 X10(3) UL (ref 0–0.2)
BASOPHILS NFR BLD AUTO: 1 %
BILIRUB SERPL-MCNC: 0.3 MG/DL (ref 0.1–2)
BUN BLD-MCNC: 15 MG/DL (ref 7–18)
CALCIUM BLD-MCNC: 8.5 MG/DL (ref 8.5–10.1)
CHLORIDE SERPL-SCNC: 110 MMOL/L (ref 98–112)
CHOLEST SERPL-MCNC: 151 MG/DL (ref ?–200)
CO2 SERPL-SCNC: 26 MMOL/L (ref 21–32)
CREAT BLD-MCNC: 0.83 MG/DL
CRP SERPL-MCNC: 0.51 MG/DL (ref ?–0.3)
EOSINOPHIL # BLD AUTO: 0.19 X10(3) UL (ref 0–0.7)
EOSINOPHIL NFR BLD AUTO: 3.8 %
ERYTHROCYTE [DISTWIDTH] IN BLOOD BY AUTOMATED COUNT: 14.3 %
ERYTHROCYTE [SEDIMENTATION RATE] IN BLOOD: 20 MM/HR
FASTING PATIENT LIPID ANSWER: YES
FASTING STATUS PATIENT QL REPORTED: YES
GFR SERPLBLD BASED ON 1.73 SQ M-ARVRAT: 85 ML/MIN/1.73M2 (ref 60–?)
GLOBULIN PLAS-MCNC: 3.8 G/DL (ref 2.8–4.4)
GLUCOSE BLD-MCNC: 89 MG/DL (ref 70–99)
HCT VFR BLD AUTO: 39.9 %
HDLC SERPL-MCNC: 88 MG/DL (ref 40–59)
HGB BLD-MCNC: 13.1 G/DL
IMM GRANULOCYTES # BLD AUTO: 0.02 X10(3) UL (ref 0–1)
IMM GRANULOCYTES NFR BLD: 0.4 %
LDLC SERPL CALC-MCNC: 48 MG/DL (ref ?–100)
LYMPHOCYTES # BLD AUTO: 1.98 X10(3) UL (ref 1–4)
LYMPHOCYTES NFR BLD AUTO: 39.4 %
MCH RBC QN AUTO: 29.4 PG (ref 26–34)
MCHC RBC AUTO-ENTMCNC: 32.8 G/DL (ref 31–37)
MCV RBC AUTO: 89.7 FL
MONOCYTES # BLD AUTO: 0.42 X10(3) UL (ref 0.1–1)
MONOCYTES NFR BLD AUTO: 8.3 %
NEUTROPHILS # BLD AUTO: 2.37 X10 (3) UL (ref 1.5–7.7)
NEUTROPHILS # BLD AUTO: 2.37 X10(3) UL (ref 1.5–7.7)
NEUTROPHILS NFR BLD AUTO: 47.1 %
NONHDLC SERPL-MCNC: 63 MG/DL (ref ?–130)
OSMOLALITY SERPL CALC.SUM OF ELEC: 288 MOSM/KG (ref 275–295)
PLATELET # BLD AUTO: 325 10(3)UL (ref 150–450)
POTASSIUM SERPL-SCNC: 3.9 MMOL/L (ref 3.5–5.1)
PROT SERPL-MCNC: 6.9 G/DL (ref 6.4–8.2)
RBC # BLD AUTO: 4.45 X10(6)UL
SODIUM SERPL-SCNC: 139 MMOL/L (ref 136–145)
T4 FREE SERPL-MCNC: 1.2 NG/DL (ref 0.8–1.7)
TRIGL SERPL-MCNC: 83 MG/DL (ref 30–149)
TSI SER-ACNC: 0.2 MIU/ML (ref 0.36–3.74)
VLDLC SERPL CALC-MCNC: 12 MG/DL (ref 0–30)
WBC # BLD AUTO: 5 X10(3) UL (ref 4–11)

## 2023-04-25 PROCEDURE — 86038 ANTINUCLEAR ANTIBODIES: CPT

## 2023-04-25 PROCEDURE — 85652 RBC SED RATE AUTOMATED: CPT

## 2023-04-25 PROCEDURE — 80061 LIPID PANEL: CPT

## 2023-04-25 PROCEDURE — 80053 COMPREHEN METABOLIC PANEL: CPT

## 2023-04-25 PROCEDURE — 86140 C-REACTIVE PROTEIN: CPT

## 2023-04-25 PROCEDURE — 85025 COMPLETE CBC W/AUTO DIFF WBC: CPT

## 2023-04-25 PROCEDURE — 84443 ASSAY THYROID STIM HORMONE: CPT

## 2023-04-25 PROCEDURE — 84439 ASSAY OF FREE THYROXINE: CPT

## 2023-04-25 PROCEDURE — 36415 COLL VENOUS BLD VENIPUNCTURE: CPT

## 2023-04-25 PROCEDURE — 86225 DNA ANTIBODY NATIVE: CPT

## 2023-04-27 LAB
DSDNA IGG SERPL IA-ACNC: 9.2 IU/ML
ENA AB SER QL IA: 0.1 UG/L
ENA AB SER QL IA: NEGATIVE

## 2023-04-28 ENCOUNTER — OFFICE VISIT (OUTPATIENT)
Dept: FAMILY MEDICINE CLINIC | Facility: CLINIC | Age: 53
End: 2023-04-28
Payer: COMMERCIAL

## 2023-04-28 VITALS
DIASTOLIC BLOOD PRESSURE: 78 MMHG | TEMPERATURE: 97 F | BODY MASS INDEX: 42.31 KG/M2 | WEIGHT: 227 LBS | OXYGEN SATURATION: 98 % | HEART RATE: 78 BPM | RESPIRATION RATE: 18 BRPM | SYSTOLIC BLOOD PRESSURE: 130 MMHG | HEIGHT: 61.42 IN

## 2023-04-28 DIAGNOSIS — E03.9 ACQUIRED HYPOTHYROIDISM: ICD-10-CM

## 2023-04-28 DIAGNOSIS — Z79.899 ENCOUNTER FOR LONG-TERM CURRENT USE OF MEDICATION: ICD-10-CM

## 2023-04-28 DIAGNOSIS — B37.2 CANDIDAL INTERTRIGO: ICD-10-CM

## 2023-04-28 DIAGNOSIS — Z00.00 ROUTINE GENERAL MEDICAL EXAMINATION AT A HEALTH CARE FACILITY: Primary | ICD-10-CM

## 2023-04-28 DIAGNOSIS — L30.9 ECZEMA, UNSPECIFIED TYPE: ICD-10-CM

## 2023-04-28 DIAGNOSIS — Z78.0 POSTMENOPAUSAL: ICD-10-CM

## 2023-04-28 PROCEDURE — 99214 OFFICE O/P EST MOD 30 MIN: CPT | Performed by: FAMILY MEDICINE

## 2023-04-28 PROCEDURE — 3008F BODY MASS INDEX DOCD: CPT | Performed by: FAMILY MEDICINE

## 2023-04-28 PROCEDURE — 3078F DIAST BP <80 MM HG: CPT | Performed by: FAMILY MEDICINE

## 2023-04-28 PROCEDURE — 3075F SYST BP GE 130 - 139MM HG: CPT | Performed by: FAMILY MEDICINE

## 2023-04-28 PROCEDURE — 99396 PREV VISIT EST AGE 40-64: CPT | Performed by: FAMILY MEDICINE

## 2023-04-28 RX ORDER — NYSTATIN 100000 [USP'U]/G
1 POWDER TOPICAL 3 TIMES DAILY
Qty: 60 G | Refills: 1 | Status: SHIPPED | OUTPATIENT
Start: 2023-04-28

## 2023-04-28 RX ORDER — PREDNISOLONE ACETATE 10 MG/ML
SUSPENSION/ DROPS OPHTHALMIC
COMMUNITY
Start: 2023-04-11

## 2023-05-04 RX ORDER — CLOTRIMAZOLE AND BETAMETHASONE DIPROPIONATE 10; .64 MG/G; MG/G
1 CREAM TOPICAL 2 TIMES DAILY PRN
Qty: 60 G | Refills: 3 | OUTPATIENT
Start: 2023-05-04

## 2023-05-08 ENCOUNTER — TELEPHONE (OUTPATIENT)
Dept: ORTHOPEDICS CLINIC | Facility: CLINIC | Age: 53
End: 2023-05-08

## 2023-05-08 NOTE — TELEPHONE ENCOUNTER
Future Appointments   Date Time Provider Zahra Carolina   5/16/2023  9:30 AM Twan Hayes MD [facility]THO [facility] [place]       Patient states that she won't be available at the date requested so she scheduled the following week.

## 2023-05-16 ENCOUNTER — OFFICE VISIT (OUTPATIENT)
Dept: ORTHOPEDICS CLINIC | Facility: CLINIC | Age: 53
End: 2023-05-16
Payer: COMMERCIAL

## 2023-05-16 VITALS — HEIGHT: 61 IN | BODY MASS INDEX: 41.54 KG/M2 | WEIGHT: 220 LBS

## 2023-05-16 DIAGNOSIS — M65.4 TENOSYNOVITIS OF RADIAL STYLOID: Primary | ICD-10-CM

## 2023-05-16 PROCEDURE — 99213 OFFICE O/P EST LOW 20 MIN: CPT | Performed by: ORTHOPAEDIC SURGERY

## 2023-05-16 PROCEDURE — 3008F BODY MASS INDEX DOCD: CPT | Performed by: ORTHOPAEDIC SURGERY

## 2023-05-16 NOTE — PROGRESS NOTES
[facility] better  Deq - CSI defer because of insomnia, try voltaren and thumb spica

## 2023-05-17 ENCOUNTER — ORDER TRANSCRIPTION (OUTPATIENT)
Dept: ADMINISTRATIVE | Facility: HOSPITAL | Age: 53
End: 2023-05-17

## 2023-05-17 DIAGNOSIS — R07.89 OTHER CHEST PAIN: Primary | ICD-10-CM

## 2023-05-17 DIAGNOSIS — E78.2 MIXED HYPERLIPIDEMIA: ICD-10-CM

## 2023-05-17 DIAGNOSIS — R00.2 PALPITATIONS: ICD-10-CM

## 2023-05-18 ENCOUNTER — HOSPITAL ENCOUNTER (OUTPATIENT)
Dept: MRI IMAGING | Facility: HOSPITAL | Age: 53
Discharge: HOME | End: 2023-05-18
Attending: INTERNAL MEDICINE
Payer: COMMERCIAL

## 2023-05-18 DIAGNOSIS — R10.9 RIGHT SIDED ABDOMINAL PAIN: ICD-10-CM

## 2023-05-18 PROCEDURE — A9575 INJ GADOTERATE MEGLUMI 0.1ML: HCPCS | Performed by: INTERNAL MEDICINE

## 2023-05-18 PROCEDURE — 74183 MRI ABD W/O CNTR FLWD CNTR: CPT | Performed by: INTERNAL MEDICINE

## 2023-05-18 PROCEDURE — 76376 3D RENDER W/INTRP POSTPROCES: CPT | Performed by: INTERNAL MEDICINE

## 2023-05-18 RX ORDER — GADOTERATE MEGLUMINE 376.9 MG/ML
20 INJECTION INTRAVENOUS
Status: COMPLETED | OUTPATIENT
Start: 2023-05-18 | End: 2023-05-18

## 2023-05-18 RX ADMIN — GADOTERATE MEGLUMINE 20 ML: 376.9 INJECTION INTRAVENOUS at 09:19:00

## 2023-05-22 NOTE — IMAGING NOTE
Call placed to pt regarding CTA Gated coronary arteries study. Instructed to arrive at 9:30am.  May eat a light breakfast/lunch but drink plenty of fluids a day before and morning of procedure. .   Advised to hold caffeine 12 hrs prior to procedure. May take usual meds.

## 2023-05-22 NOTE — IMAGING NOTE
14:00 PM Spoke to the patient regarding reported radiology iodinated contrast allergy. Patient reports that she is not allergic but does experience nausea when given. 14:05 PM Left VM instructing the patient to take Zyrtec the morning of her scan to prevent nausea from radiology iodinated contrast allergy.

## 2023-05-24 ENCOUNTER — HOSPITAL ENCOUNTER (OUTPATIENT)
Dept: CT IMAGING | Facility: HOSPITAL | Age: 53
Discharge: HOME | End: 2023-05-24
Attending: INTERNAL MEDICINE
Payer: COMMERCIAL

## 2023-05-24 VITALS — SYSTOLIC BLOOD PRESSURE: 124 MMHG | DIASTOLIC BLOOD PRESSURE: 73 MMHG | RESPIRATION RATE: 14 BRPM | HEART RATE: 59 BPM

## 2023-05-24 DIAGNOSIS — R07.89 OTHER CHEST PAIN: ICD-10-CM

## 2023-05-24 DIAGNOSIS — R00.2 PALPITATIONS: ICD-10-CM

## 2023-05-24 DIAGNOSIS — E78.2 MIXED HYPERLIPIDEMIA: ICD-10-CM

## 2023-05-24 LAB
CREAT BLD-MCNC: 0.8 MG/DL
GFR SERPLBLD BASED ON 1.73 SQ M-ARVRAT: 89 ML/MIN/1.73M2 (ref 60–?)

## 2023-05-24 PROCEDURE — 82565 ASSAY OF CREATININE: CPT

## 2023-05-24 PROCEDURE — 75574 CT ANGIO HRT W/3D IMAGE: CPT | Performed by: INTERNAL MEDICINE

## 2023-05-24 RX ORDER — METOPROLOL TARTRATE 5 MG/5ML
INJECTION INTRAVENOUS
Status: DISCONTINUED
Start: 2023-05-24 | End: 2023-05-24 | Stop reason: WASHOUT

## 2023-05-24 RX ORDER — NITROGLYCERIN 0.4 MG/1
TABLET SUBLINGUAL
Status: COMPLETED
Start: 2023-05-24 | End: 2023-05-24

## 2023-05-24 RX ADMIN — NITROGLYCERIN 0.4 MG: 0.4 TABLET SUBLINGUAL at 10:47:00

## 2023-05-24 NOTE — IMAGING NOTE
Bea Tam to CT Rm 4. Pt denies use of long acting nitrates like, Imdur, Cialis, Levitra and Viagra. O2 applied via nasal cannula @ 2LPM.  Positioned pt on table. Procedure explained and questions answered. Vital signs monitored and noted in Flowsheet. GFR = 89  Patient had IV infiltrated with contrast administration; IV de-accessed and warm pack applied. Patient wishes to complete the test.  DR Diann Wiley updated. Ok to re dose with SL nitroglycerin  New IV access established using US guidance; Blood return rechecked with arm elevated . Contrast injected followed by saline flush at 1052  Contrast = 90ml  0.9 NS flush = 102ml  Average HR = 57 BPM      Patient tolerated the procedure without complication. Denies any contrast reaction. Discontinued IV saline lock. IV extravasication instructions reviewed with patient. Escorted pt to womens Dressing Room and discharged in stable condition.

## 2023-05-25 NOTE — IMAGING NOTE
Spoke to patient regarding IV infiltrate when received CT contrast yesterday. Pt reports swelling has decreased and minimal discomfort.

## 2023-06-21 ENCOUNTER — HOSPITAL ENCOUNTER (OUTPATIENT)
Dept: LAB | Facility: HOSPITAL | Age: 53
Discharge: HOME | End: 2023-06-21
Attending: NURSE PRACTITIONER
Payer: COMMERCIAL

## 2023-06-21 DIAGNOSIS — E03.9 ACQUIRED HYPOTHYROIDISM: ICD-10-CM

## 2023-06-21 LAB
NT-PROBNP SERPL-MCNC: 121 PG/ML (ref ?–125)
T4 FREE SERPL-MCNC: 1.4 NG/DL (ref 0.8–1.7)
TSI SER-ACNC: 0.23 MIU/ML (ref 0.36–3.74)

## 2023-06-21 PROCEDURE — 84443 ASSAY THYROID STIM HORMONE: CPT

## 2023-06-21 PROCEDURE — 36415 COLL VENOUS BLD VENIPUNCTURE: CPT | Performed by: NURSE PRACTITIONER

## 2023-06-21 PROCEDURE — 84439 ASSAY OF FREE THYROXINE: CPT

## 2023-06-21 PROCEDURE — 83880 ASSAY OF NATRIURETIC PEPTIDE: CPT | Performed by: NURSE PRACTITIONER

## 2023-06-22 ENCOUNTER — OFFICE VISIT (OUTPATIENT)
Dept: ORTHOPEDICS CLINIC | Facility: CLINIC | Age: 53
End: 2023-06-22
Payer: COMMERCIAL

## 2023-06-22 VITALS — HEIGHT: 61 IN | WEIGHT: 220 LBS | BODY MASS INDEX: 41.54 KG/M2

## 2023-06-22 DIAGNOSIS — S63.091A SUBLUXATION OF RIGHT EXTENSOR CARPI ULNARIS TENDON, INITIAL ENCOUNTER: ICD-10-CM

## 2023-06-22 DIAGNOSIS — S69.81XD INJURY OF TRIANGULAR FIBROCARTILAGE COMPLEX (TFCC) OF RIGHT WRIST, SUBSEQUENT ENCOUNTER: Primary | ICD-10-CM

## 2023-06-22 PROCEDURE — 3008F BODY MASS INDEX DOCD: CPT | Performed by: ORTHOPAEDIC SURGERY

## 2023-06-22 PROCEDURE — 99214 OFFICE O/P EST MOD 30 MIN: CPT | Performed by: ORTHOPAEDIC SURGERY

## 2023-06-22 NOTE — PROGRESS NOTES
SURGICAL BOOKING SHEET   Name: Joellen Henriquez  MRN: [id number]   : 1970    Surgical Date:   23   Surgical Consent:   Right wrist arthroscopy and debridement, possible TFC repair, and ECU subsheath repair   Diagnosis:    (S69.81XD) Injury of triangular fibrocartilage complex (TFCC) of right wrist, subsequent encounter  (primary encounter diagnosis)  (S63.091A) Subluxation of right extensor carpi ulnaris tendon, initial encounter    Procedure Codes:   ECU Subsheath Repair + Tenosynovectomy (CPT E3342397 + [id number]), Wrist A/S Joint/TFC Debridement (CPT 09742) and TFC Repair Open (CPT 86823)   Disposition:   Outpatient   Operative Time:   2 hrs   Antibiotics:   Ancef 2g   Anesthesia Type:   Regional   Clearance:    NONE   Equipment:   TFC Repair: Arthrex TFC Foveal Repair System, [facility] [place], 1.9 mm arthroscope, 2.5 mm small joint full-radius arthroscopic shaver, Smith [employer] Nephew small joint RF wand (standby), small cannulated drill bits (standby), Kwires, Nitinol Wire x2,  [place] Blade, Mini- C-arm, 0 Vircryl, 4-0 nylon, 3-0 moncryl, 4-0 moncryl, Exofin, 2 inch/3 inch ace wrap, Orthoglass 5x30 , Lino [employer] Juggerknot Suture Beaver 1.0mm 2-0 x 3   Positioning:   Supine with arm table on OR table   Assistant:   Assistant: Aliza Thayer PA-C   Follow Up:   7-10 days post op with Ian Lange   Pain Medication:   TBD, allergy to narcotics   Therapy:   [facility]

## 2023-06-23 ENCOUNTER — TELEPHONE (OUTPATIENT)
Dept: ORTHOPEDICS CLINIC | Facility: CLINIC | Age: 53
End: 2023-06-23

## 2023-06-23 DIAGNOSIS — S69.81XD INJURY OF TRIANGULAR FIBROCARTILAGE COMPLEX (TFCC) OF RIGHT WRIST, SUBSEQUENT ENCOUNTER: Primary | ICD-10-CM

## 2023-06-23 DIAGNOSIS — S63.091A SUBLUXATION OF RIGHT EXTENSOR CARPI ULNARIS TENDON, INITIAL ENCOUNTER: ICD-10-CM

## 2023-07-05 ENCOUNTER — PATIENT MESSAGE (OUTPATIENT)
Dept: RHEUMATOLOGY | Facility: CLINIC | Age: 53
End: 2023-07-05

## 2023-07-11 ENCOUNTER — TELEPHONE (OUTPATIENT)
Dept: FAMILY MEDICINE CLINIC | Facility: CLINIC | Age: 53
End: 2023-07-11

## 2023-07-11 DIAGNOSIS — M25.562 ACUTE PAIN OF LEFT KNEE: Primary | ICD-10-CM

## 2023-07-11 NOTE — TELEPHONE ENCOUNTER
Left voice message for patient that referral was placed for [facility] orthopedics. Advised patient in message to call [phone] for an appointment and further evaluation.

## 2023-07-11 NOTE — TELEPHONE ENCOUNTER
Pt called and stated that she has been having right leg pain. States it has been going on for the past 5 days. States is a pressure like pain. Pt seeking advice.

## 2023-07-11 NOTE — TELEPHONE ENCOUNTER
Spoke with patient reports left knee \"pressure\" for aprox 5 days denies known injury reports feels like when she turns or pivots something in knee may pop. Reports minimal swelling at this time. Reports pain is 4-5/10 when ambulating 2/10 when takes tylenol. Please advise if patient should continue to monitor or if ortho evaluation would be warranted.

## 2023-07-13 ENCOUNTER — PATIENT MESSAGE (OUTPATIENT)
Dept: FAMILY MEDICINE CLINIC | Facility: CLINIC | Age: 53
End: 2023-07-13

## 2023-07-13 DIAGNOSIS — E03.9 ACQUIRED HYPOTHYROIDISM: Primary | ICD-10-CM

## 2023-07-14 ENCOUNTER — OFFICE VISIT (OUTPATIENT)
Dept: RHEUMATOLOGY | Facility: CLINIC | Age: 53
End: 2023-07-14
Payer: COMMERCIAL

## 2023-07-14 VITALS
WEIGHT: 239 LBS | TEMPERATURE: 98 F | SYSTOLIC BLOOD PRESSURE: 126 MMHG | RESPIRATION RATE: 20 BRPM | OXYGEN SATURATION: 98 % | BODY MASS INDEX: 45.12 KG/M2 | DIASTOLIC BLOOD PRESSURE: 78 MMHG | HEIGHT: 61 IN | HEART RATE: 86 BPM

## 2023-07-14 DIAGNOSIS — J30.1 SEASONAL ALLERGIC RHINITIS DUE TO POLLEN: ICD-10-CM

## 2023-07-14 DIAGNOSIS — R76.8 POSITIVE ANA (ANTINUCLEAR ANTIBODY): ICD-10-CM

## 2023-07-14 DIAGNOSIS — M25.50 POLYARTHRALGIA: ICD-10-CM

## 2023-07-14 DIAGNOSIS — M79.7 FIBROMYALGIA: ICD-10-CM

## 2023-07-14 DIAGNOSIS — M25.562 ACUTE PAIN OF LEFT KNEE: ICD-10-CM

## 2023-07-14 DIAGNOSIS — M35.9 UNDIFFERENTIATED CONNECTIVE TISSUE DISEASE (HCC): Primary | ICD-10-CM

## 2023-07-14 PROCEDURE — 3078F DIAST BP <80 MM HG: CPT | Performed by: INTERNAL MEDICINE

## 2023-07-14 PROCEDURE — 99214 OFFICE O/P EST MOD 30 MIN: CPT | Performed by: INTERNAL MEDICINE

## 2023-07-14 PROCEDURE — 3074F SYST BP LT 130 MM HG: CPT | Performed by: INTERNAL MEDICINE

## 2023-07-14 PROCEDURE — 3008F BODY MASS INDEX DOCD: CPT | Performed by: INTERNAL MEDICINE

## 2023-07-14 RX ORDER — MONTELUKAST SODIUM 4 MG/1
4 TABLET, CHEWABLE ORAL DAILY
Qty: 90 TABLET | Refills: 0 | Status: SHIPPED | OUTPATIENT
Start: 2023-07-14 | End: 2023-10-12

## 2023-07-14 RX ORDER — HYDROXYCHLOROQUINE SULFATE 200 MG/1
200 TABLET, FILM COATED ORAL 2 TIMES DAILY
Qty: 180 TABLET | Refills: 1 | Status: SHIPPED | OUTPATIENT
Start: 2023-07-14

## 2023-07-14 RX ORDER — LEVOTHYROXINE SODIUM 112 UG/1
112 TABLET ORAL
Qty: 90 TABLET | Refills: 0 | Status: SHIPPED | OUTPATIENT
Start: 2023-07-14

## 2023-07-14 RX ORDER — METHYLPREDNISOLONE 4 MG/1
TABLET ORAL
Qty: 1 EACH | Refills: 0 | Status: SHIPPED | OUTPATIENT
Start: 2023-07-14

## 2023-07-14 NOTE — TELEPHONE ENCOUNTER
Samantha Gandara, your hypothyroidism is overtreated, your TSH is overly suppressed. I would like for you to go back down on your dose of levothyroxine to 112 mcg daily. Please stop taking the 125 mcg dose and switch to the 112 mcg dose as soon as possible. Please send a [employer] message or call to let me know if you have the 112 mcg dose at home or if we need to send in a prescription for you. Lets recheck your thyroid function tests again in 8 weeks, and remember to stay off any vitamins or supplements containing biotin for a good 5 days prior to getting the blood draw.   Thank you,  Dr. Linda Hawley   Written by Osvaldo Lindsay DO on 7/13/2023  5:10 PM CDT  Seen by patient Allayne Romberg on 7/13/2023  5:12 PM

## 2023-07-14 NOTE — TELEPHONE ENCOUNTER
From: Jonathan Dimas  To: Juan Riley DO  Sent: 7/13/2023 5:14 PM CDT  Subject: Question regarding ASSAY, THYROID STIM HORMONE    Ok. I do NOT have any of the lower dose at home. Please send a new prescription over to my [facility] pharmacy.    Thank you  Mae Mae

## 2023-07-17 ENCOUNTER — LAB ENCOUNTER (OUTPATIENT)
Dept: LAB | Age: 53
End: 2023-07-17
Attending: INTERNAL MEDICINE
Payer: COMMERCIAL

## 2023-07-17 ENCOUNTER — HOSPITAL ENCOUNTER (OUTPATIENT)
Dept: GENERAL RADIOLOGY | Age: 53
Discharge: HOME | End: 2023-07-17
Attending: ORTHOPAEDIC SURGERY
Payer: COMMERCIAL

## 2023-07-17 DIAGNOSIS — M25.562 LEFT KNEE PAIN, UNSPECIFIED CHRONICITY: ICD-10-CM

## 2023-07-17 DIAGNOSIS — M35.9 UNDIFFERENTIATED CONNECTIVE TISSUE DISEASE (HCC): ICD-10-CM

## 2023-07-17 DIAGNOSIS — R76.8 POSITIVE ANA (ANTINUCLEAR ANTIBODY): ICD-10-CM

## 2023-07-17 PROCEDURE — 86038 ANTINUCLEAR ANTIBODIES: CPT

## 2023-07-17 PROCEDURE — 73564 X-RAY EXAM KNEE 4 OR MORE: CPT | Performed by: ORTHOPAEDIC SURGERY

## 2023-07-17 PROCEDURE — 83516 IMMUNOASSAY NONANTIBODY: CPT

## 2023-07-17 PROCEDURE — 36415 COLL VENOUS BLD VENIPUNCTURE: CPT

## 2023-07-17 PROCEDURE — 86235 NUCLEAR ANTIGEN ANTIBODY: CPT

## 2023-07-18 ENCOUNTER — PATIENT MESSAGE (OUTPATIENT)
Dept: RHEUMATOLOGY | Facility: CLINIC | Age: 53
End: 2023-07-18

## 2023-07-18 ENCOUNTER — PATIENT MESSAGE (OUTPATIENT)
Dept: INTERNAL MEDICINE CLINIC | Facility: CLINIC | Age: 53
End: 2023-07-18

## 2023-07-18 ENCOUNTER — TELEMEDICINE (OUTPATIENT)
Dept: INTERNAL MEDICINE CLINIC | Facility: CLINIC | Age: 53
End: 2023-07-18
Payer: COMMERCIAL

## 2023-07-18 DIAGNOSIS — M25.562 ACUTE PAIN OF LEFT KNEE: Primary | ICD-10-CM

## 2023-07-18 DIAGNOSIS — E03.9 HYPOTHYROIDISM, UNSPECIFIED TYPE: ICD-10-CM

## 2023-07-18 DIAGNOSIS — E66.01 CLASS 3 SEVERE OBESITY WITH BODY MASS INDEX (BMI) OF 40.0 TO 44.9 IN ADULT, UNSPECIFIED OBESITY TYPE, UNSPECIFIED WHETHER SERIOUS COMORBIDITY PRESENT (HCC): ICD-10-CM

## 2023-07-18 DIAGNOSIS — Z51.81 ENCOUNTER FOR THERAPEUTIC DRUG MONITORING: ICD-10-CM

## 2023-07-18 DIAGNOSIS — E11.9 TYPE 2 DIABETES MELLITUS WITHOUT COMPLICATION, WITHOUT LONG-TERM CURRENT USE OF INSULIN (HCC): Primary | ICD-10-CM

## 2023-07-18 PROCEDURE — 99214 OFFICE O/P EST MOD 30 MIN: CPT | Performed by: INTERNAL MEDICINE

## 2023-07-18 NOTE — TELEPHONE ENCOUNTER
From: Supriya Block  To: Prudence Ledesma MD  Sent: 7/18/2023 7:36 AM CDT  Subject: today's visit    Good morning - I hurt my knee and don't think I can handle the walking required to get into the office for my schedule appointment today. Can we turn this into a video visit? If not then I need to reschedule please. Sorry for the late notice.   Thank you  Judy Casas

## 2023-07-20 ENCOUNTER — LAB ENCOUNTER (OUTPATIENT)
Dept: LAB | Age: 53
End: 2023-07-20
Attending: INTERNAL MEDICINE
Payer: COMMERCIAL

## 2023-07-20 DIAGNOSIS — R19.5 CLAY-COLORED STOOLS: ICD-10-CM

## 2023-07-20 LAB — NUCLEAR IGG TITR SER IF: NEGATIVE {TITER}

## 2023-07-20 PROCEDURE — 82705 FATS/LIPIDS FECES QUAL: CPT

## 2023-07-20 PROCEDURE — 82656 EL-1 FECAL QUAL/SEMIQ: CPT

## 2023-07-21 LAB
FATS NEUTRAL: NORMAL
FATS TOTAL: NORMAL

## 2023-07-24 ENCOUNTER — NURSE ONLY (OUTPATIENT)
Dept: INTERNAL MEDICINE CLINIC | Facility: CLINIC | Age: 53
End: 2023-07-24
Payer: COMMERCIAL

## 2023-07-25 LAB — PANCREATIC ELAST FECAL: 169 UG ELAST./G

## 2023-07-26 ENCOUNTER — HOSPITAL ENCOUNTER (OUTPATIENT)
Dept: BONE DENSITY | Age: 53
Discharge: HOME | End: 2023-07-26
Attending: FAMILY MEDICINE
Payer: COMMERCIAL

## 2023-07-26 ENCOUNTER — APPOINTMENT (OUTPATIENT)
Dept: GENERAL RADIOLOGY | Age: 53
End: 2023-07-26
Payer: COMMERCIAL

## 2023-07-26 ENCOUNTER — TELEPHONE (OUTPATIENT)
Dept: RHEUMATOLOGY | Facility: CLINIC | Age: 53
End: 2023-07-26

## 2023-07-26 ENCOUNTER — TELEPHONE (OUTPATIENT)
Dept: FAMILY MEDICINE CLINIC | Facility: CLINIC | Age: 53
End: 2023-07-26

## 2023-07-26 ENCOUNTER — HOSPITAL ENCOUNTER (EMERGENCY)
Age: 53
Discharge: HOME | End: 2023-07-26
Payer: COMMERCIAL

## 2023-07-26 ENCOUNTER — TELEPHONE (OUTPATIENT)
Dept: INTERNAL MEDICINE CLINIC | Facility: CLINIC | Age: 53
End: 2023-07-26

## 2023-07-26 VITALS
BODY MASS INDEX: 44.37 KG/M2 | WEIGHT: 235 LBS | RESPIRATION RATE: 18 BRPM | HEART RATE: 83 BPM | TEMPERATURE: 98 F | SYSTOLIC BLOOD PRESSURE: 138 MMHG | HEIGHT: 61 IN | OXYGEN SATURATION: 97 % | DIASTOLIC BLOOD PRESSURE: 75 MMHG

## 2023-07-26 DIAGNOSIS — M25.569 KNEE PAIN, ACUTE: Primary | ICD-10-CM

## 2023-07-26 DIAGNOSIS — M25.562 ACUTE PAIN OF LEFT KNEE: Primary | ICD-10-CM

## 2023-07-26 DIAGNOSIS — Z78.0 POSTMENOPAUSAL: ICD-10-CM

## 2023-07-26 DIAGNOSIS — S86.912A STRAIN OF LEFT KNEE, INITIAL ENCOUNTER: ICD-10-CM

## 2023-07-26 PROCEDURE — 73562 X-RAY EXAM OF KNEE 3: CPT

## 2023-07-26 PROCEDURE — 99284 EMERGENCY DEPT VISIT MOD MDM: CPT

## 2023-07-26 PROCEDURE — 77080 DXA BONE DENSITY AXIAL: CPT | Performed by: FAMILY MEDICINE

## 2023-07-26 RX ORDER — HYDROCODONE BITARTRATE AND ACETAMINOPHEN 5; 325 MG/1; MG/1
1 TABLET ORAL EVERY 6 HOURS PRN
Qty: 10 TABLET | Refills: 0 | Status: SHIPPED | OUTPATIENT
Start: 2023-07-26 | End: 2023-07-30 | Stop reason: ALTCHOICE

## 2023-07-26 NOTE — DISCHARGE INSTRUCTIONS
Rest, elevate, ice. Wear Ace wrap for support. Use crutches as needed, though you may weight-bear as tolerated. Follow-up with orthopedics. Go to ER with any new or worsening symptoms.

## 2023-07-26 NOTE — TELEPHONE ENCOUNTER
Pt called office, please change MRI to be done at [address] [place] [address] does not approve imaging to be completed at [facility].

## 2023-07-26 NOTE — TELEPHONE ENCOUNTER
Called pt, spoke to pt spouse. Pt is at [facility] ER for co left knee pain and swelling. Pt is having xray of knee now. Explained that Dr. Matias Soto placed an order for MRI L knee with instructions to call CS to schedule. Voices understanding and will keep office updated.

## 2023-07-27 ENCOUNTER — PATIENT OUTREACH (OUTPATIENT)
Dept: CASE MANAGEMENT | Age: 53
End: 2023-07-27

## 2023-07-27 NOTE — PROGRESS NOTES
1st attempt ED f/up apt request     ORTHO -existing apt (8/18)    Stephan Mendes  PCP  [id number]   [phone]  [facility] for office to call pt to schedule    Confirmed w/ pt  Closing encounter

## 2023-07-30 RX ORDER — HYDROCODONE BITARTRATE AND ACETAMINOPHEN 5; 325 MG/1; MG/1
1-2 TABLET ORAL EVERY 6 HOURS PRN
Qty: 10 TABLET | Refills: 0 | Status: SHIPPED | OUTPATIENT
Start: 2023-07-30 | End: 2023-08-04

## 2023-07-30 NOTE — ED NOTES
Contacted this patient to inform her of this RX, pt states she would like the medication sent to sarbjit at [address] [place] [address] in [facility] because they have the Norco brand medication and she has to have this medication. Dr Tuan Fan notified, pharmacy updated and medications were sent.  The hydrocodone was also cancelled from the previous unfilled prescription at [facility]

## 2023-07-30 NOTE — ED NOTES
Pt calling the department stating she was not able to  her hydrocodone prescription because they did not have it in stock. RN did contact the pharmacy directly, and [facility] pharmacist states he does have it available, but this patient wants the Brand name Ingrid Phillipsa only because the patient told the pharmacist she is allergic to the generic hydrocodone.

## 2023-07-31 ENCOUNTER — TELEPHONE (OUTPATIENT)
Dept: FAMILY MEDICINE CLINIC | Facility: CLINIC | Age: 53
End: 2023-07-31

## 2023-07-31 NOTE — TELEPHONE ENCOUNTER
Patient was in [facility] for twisted knee, patient was prescribed a generic norco but patient is allergic to generic brand and would like a medication sent to [facility] in [place].  Please advise

## 2023-07-31 NOTE — TELEPHONE ENCOUNTER
Spouse requesting alternative pain med to generic norco, patient is allergic, no longer make brand. Prescribed at ER on 7/26/23 for acute knee pain, strain of left knee. Please advise.

## 2023-07-31 NOTE — TELEPHONE ENCOUNTER
Multiple allergies and history of gastric sleeve take tylenol and Voltaren gel apply 3-4 times a day

## 2023-08-02 LAB
ANTI-EJ: NEGATIVE
ANTI-JO-1: <20 UNITS
ANTI-KU: NEGATIVE
ANTI-MDA-5: <20 UNITS
ANTI-MI-2 AB: NEGATIVE
ANTI-NXP-2: <20 UNITS
ANTI-OJ: NEGATIVE
ANTI-PL-12: NEGATIVE
ANTI-PL-7: NEGATIVE
ANTI-PM/SCL100: <20 UNITS
ANTI-SAE1: <20 UNITS
ANTI-SRP AB: NEGATIVE
ANTI-SSA 52KD IGG: <20 UNITS
ANTI-TIF-1GAMMA: <20 UNITS
ANTI-U1 RNP: <20 UNITS
ANTI-U2 RNP: NEGATIVE
ANTI-U3 RNP: NEGATIVE

## 2023-08-03 RX ORDER — LANSOPRAZOLE 30 MG/1
30 CAPSULE, DELAYED RELEASE ORAL DAILY
Qty: 90 CAPSULE | Refills: 0 | OUTPATIENT
Start: 2023-08-03

## 2023-08-04 ENCOUNTER — OFFICE VISIT (OUTPATIENT)
Dept: FAMILY MEDICINE CLINIC | Facility: CLINIC | Age: 53
End: 2023-08-04
Payer: COMMERCIAL

## 2023-08-04 DIAGNOSIS — M25.562 LEFT KNEE PAIN, UNSPECIFIED CHRONICITY: Primary | ICD-10-CM

## 2023-08-04 PROCEDURE — 99213 OFFICE O/P EST LOW 20 MIN: CPT | Performed by: FAMILY MEDICINE

## 2023-08-04 NOTE — PROGRESS NOTES
Wilbert Raymond is a 47year old female. Patient presents with:  Knee Pain: ER visit on 08/26/2023 for left knee pain, had x-ray. Patient is here for follow up. Pain is better today but is still having pain. Ortho appointment is scheduled for August 18th        HPI:         Knee Pain: ER visit on 7/26/2023 for left knee pain, had x-ray. Patient is here for follow up. Pain is better today but is still having pain. Ortho appointment is scheduled for August 18th with Dr. Syd Subramanian. No significant swelling of the left knee. Knee sometimes feels unstable. Wt Readings from Last 6 Encounters:  07/26/23 : 235 lb (106.6 kg)  07/14/23 : 239 lb (108.4 kg)  06/22/23 : 220 lb (99.8 kg)  05/16/23 : 220 lb (99.8 kg)  05/15/23 : 220 lb (99.8 kg)  04/28/23 : 227 lb (103 kg)     There is no height or weight on file to calculate BMI. Current Outpatient Medications   Medication Sig Dispense Refill    Pancrelipase, Lip-Prot-Amyl, (CREON) 49076-931153 units Oral Cap DR Particles Take 36,000 Units by mouth 3 (three) times daily with meals. 180 capsule 5    Pancrelipase, Lip-Prot-Amyl, (CREON) 66682-89950 units Oral Cap DR Particles Take 12,000 Units by mouth in the morning and 12,000 Units before bedtime. Take the lower dose with snacks. 270 capsule 0    levothyroxine 112 MCG Oral Tab Take 1 tablet (112 mcg total) by mouth before breakfast. 90 tablet 0    montelukast 4 MG Oral Chew Tab Chew 1 tablet (4 mg total) by mouth daily. 90 tablet 0    hydroxychloroquine 200 MG Oral Tab Take 1 tablet (200 mg total) by mouth 2 (two) times daily. 180 tablet 1    metoprolol succinate ER 25 MG Oral Tablet 24 Hr Take 0.5 tablets (12.5 mg total) by mouth daily. Nystatin 423032 UNIT/GM External Powder Apply 1 Application topically 3 (three) times daily. 60 g 1    lansoprazole 30 MG Oral Capsule Delayed Release Take 1 capsule (30 mg total) by mouth daily.  90 capsule 0    Glucose Blood (ONETOUCH VERIO) In Vitro Strip Daily 100 strip 3 meclizine 25 MG Oral Tab Take 1 tablet (25 mg total) by mouth 3 (three) times daily as needed for Dizziness. 30 tablet 2    simvastatin 20 MG Oral Tab Take 1 tablet (20 mg total) by mouth every evening. 90 tablet 3    estradiol 1 MG Oral Tab Take 1 tablet (1 mg total) by mouth in the morning. 90 tablet 3    ondansetron (ZOFRAN ODT) 8 MG Oral Tablet Dispersible Take 1 tablet (8 mg total) by mouth every 8 (eight) hours as needed for Nausea. 20 tablet 1    Zinc 50 MG Oral Tab Take by mouth. TURMERIC CURCUMIN OR Take by mouth. OneTouch Delica Lancets 80K Does not apply Misc 1 lancet by Finger stick route daily. 100 each 3    albuterol 108 (90 Base) MCG/ACT Inhalation Aero Soln Inhale 2 puffs into the lungs every 6 (six) hours as needed for Wheezing or Shortness of Breath (Cough). 1 each 0    ascorbic acid 500 MG Oral Chew Tab Chew 1 tablet (500 mg total) by mouth daily. clotrimazole-betamethasone 1-0.05 % External Cream Apply 1 Application topically 2 (two) times daily as needed. 60 g 3    CALCIUM OR Take 2,400 mg by mouth daily. saccharomyces boulardii 250 MG Oral Cap Pro-biotic 1 capsule by mouth daily      tiZANidine HCl 4 MG Oral Tab Take 1 tablet (4 mg total) by mouth nightly as needed. 30 tablet 2    ALPRAZolam 0.5 MG Oral Tab 1/2 to one tab daily as needed 10 tablet 0    Polyethylene Glycol 3350 Oral Powder Take 17 g by mouth once a week.      magnesium 250 MG Oral Tab Take 1 tablet (250 mg total) by mouth daily. Multiple Vitamins-Minerals (BARIATRIC MULTIVITAMINS/IRON OR) Take 1 capsule by mouth daily. PATIENT SUPPLIED MEDICATION Essential Oils for allergies      Cholecalciferol (VITAMIN D3) 2000 UNITS Oral Tab Take 1 tablet (2,000 Units total) by mouth daily. 2 tab a day to make 4,000 units      semaglutide-weight management 0.25 MG/0.5ML Subcutaneous Solution Auto-injector Inject 0.5 mL (0.25 mg total) into the skin once a week.  Sample  [id number]  10-31-23 (Patient not taking: Reported on 8/4/2023)      semaglutide-weight management 0.25 MG/0.5ML Subcutaneous Solution Auto-injector Inject 0.5 mL (0.25 mg total) into the skin once a week for 4 doses. (Patient not taking: Reported on 8/4/2023) 2 mL 0    semaglutide-weight management 0.5 MG/0.5ML Subcutaneous Solution Auto-injector Inject 0.5 mL (0.5 mg total) into the skin once a week for 4 doses. (Patient not taking: Reported on 8/4/2023) 2 mL 0    PEG 3350-KCl-Na Bicarb-NaCl 420 g Oral Recon Soln Take as directed by physician (Patient not taking: Reported on 8/4/2023) 4000 mL 0    Fluocinonide 0.05 % External Solution Apply 1 Application. topically daily. (Patient not taking: Reported on 8/4/2023)        Past Medical History:   Diagnosis Date    Abdominal pain     Acute atopic conjunctivitis, bilateral 03/27/2019    Noel Hamlin M.D. Acute meniscal tear, medial, right, initial encounter 05/28/2019    MRI 5/21/2019: Mild undersurface tearing of the posterior root ligament of the medial meniscus. Allergic rhinitis     Anxiety     Arthritis     Atrial tachycardia (HCC)     Back pain     Benign paroxysmal positional vertigo 06/01/2020    Bleeding nose     Bloating     Blood in urine     Body piercing     Calculus of kidney     Change in hair     Chest pain     Chest pain on exertion     Colon polyp 09/27/2013    Lina Clark M.D. Constipation     Decorative tattoo     Diabetes ([address] 1/7/2013    Diarrhea, unspecified     Diverticulitis of sigmoid colon 10/17/2013    Diverticulosis 09/27/2013    Lina Clark M.D.     Dizziness     Easy bruising     Endometriosis     Esophageal reflux     Essential hypertension     Eye disease     Fibromyalgia     Food intolerance     Frequent urination     Glaucoma 01/07/2013    Headache disorder     Heart palpitations atrial tachecardia 01/2022    Heartburn     High cholesterol     Hoarseness, chronic     Hyperlipidemia     Hypothyroidism     Injury of peroneal tendon of right foot 05/23/2018    Itch of skin     Keratoconjunctivitis sicca not specified as Sjogren's, bilateral 03/27/2019    Amandeep Platt M.D. Lateral epicondylitis of right elbow 07/09/2020    Leaking of urine     Lung nodule     pt unsure of which side    Major depressive disorder, recurrent episode, mild with anxious distress ([facility] [address] 07/27/2017    Morbid obesity with BMI of 45.0-49.9, adult ([facility] [address] 07/13/2014    Obesity     Open angle with borderline findings, high risk, bilateral 03/27/2019    Amandeep Platt M.D. Osteoarthritis     Other vitreous opacities, left eye 03/27/2019    Amandeep Platt M.D. Other vitreous opacities, right eye 03/27/2019    Amandeep Platt M.D. Pain in joints     Painful urination     Polycystic ovaries     ovary embedded in pelvic wall--right side    PONV (postoperative nausea and vomiting)     Posterior vitreous detachment of both eyes 12/08/2017    Right > Left    Primary open angle glaucoma of both eyes, moderate stage 06/01/2016    Rash     Sleep disturbance     Spitting up blood     Sputum production     Stress     Trochanteric bursitis of both hips 08/03/2016    Left >>  Right     Vitreous degeneration, right eye 03/27/2019    Amandeep Platt M.D. Wears glasses     Weight gain       Past Surgical History:   Procedure Laterality Date    ANGIOGRAM      2-24-12 [facility]    ANGIOPLASTY (CORONARY)      COLONOSCOPY      COLONOSCOPY  07/19/2018    [facility] [address] Right 10/18/2019    Tendon repair     GASTRIC BYPASS,OBESITY,SB RECONSTRUC  2018    gastric sleeve    HERNIA SURGERY  10/18/2018    Hiatal hernia Dr. Solomon Rodriges  2006    partial hystero.     LAP SLEEVE GASTRECTOMY  10/18/2018    Dr. Mindy Woodruff Bilateral 2015    ovaries removed after partial.    OTHER  10/15/2018    Sleeve biatric surgery    OTHER SURGICAL HISTORY  2012    removal of right ovary REMOVAL OF KIDNEY STONE      age 16, does not remember what kind of surgery. no abdominal or flank scar    TILT TABLE EVALUATION      veinogram 7-6-12 [facility]    TOTAL ABDOM HYSTERECTOMY      UPPER GI ENDOSCOPY,EXAM        Social History:    Social History     Socioeconomic History    Marital status:    Tobacco Use    Smoking status: Never    Smokeless tobacco: Never   Vaping Use    Vaping Use: Never used   Substance and Sexual Activity    Alcohol use: Yes     Comment: weekly    Drug use: No    Sexual activity: Yes     Partners: Male     Birth control/protection: Hysterectomy   Other Topics Concern    Caffeine Concern Yes     Comment: 20-30 oz of coffee daily    Stress Concern Yes    Weight Concern Yes    Special Diet No    Exercise No    Seat Belt Yes         Family History   Problem Relation Age of Onset    Cancer Maternal Aunt         breast, colon    Breast Cancer Maternal Aunt         In her 52's. Ovarian Cancer Maternal Aunt 60        In her 63's. Cancer Maternal Aunt         breast, basal cell    Breast Cancer Maternal Aunt         Late 60's.     Ovarian Cancer Maternal Aunt     Cancer Mother         basal cell X 3; LUNG    Heart Disorder Mother         SVT    Hypertension Mother     Dementia Mother         early signs 2022    Depression Mother     Cancer Father         Colon & prostate cancers    Heart Disorder Father     Colon Cancer Father     Prostate Cancer Father     Diabetes Father     Hypertension Father     Heart Attack Father     Cancer Maternal Aunt         basal cell    Cancer Maternal Aunt         basal cell    Cancer Maternal Aunt         bone    Cancer Cousin         esoph,adenocarcinoma    Cancer Cousin         Non Hodgkins Lymphoma    Cancer Cousin         basal cell    Cancer Paternal Aunt         lung    Colon Polyps Brother     Alcohol and Other Disorders Associated Brother     Alcohol and Other Disorders Associated Maternal Grandfather     Alcohol and Other Disorders Associated Paternal Grandfather     Colon Polyps Brother     No Known Problems Son     No Known Problems Son      REVIEW OF SYSTEMS:   GENERAL HEALTH: Overall feels well. SKIN: denies any unusual skin lesions or rashes   RESPIRATORY: Denies: JOSE/POLK  CARDIOVASCULAR: Denies CP/palpitations  VASCULAR: Denies LE edema  NEURO: denies numbness or tingling of lower extremities  PSYCH: denies depression and anxiety      Immunization History  Administered            Date(s) Administered    TDAP                  05/14/2013    Pended                  Date(s) Pended    Influenza Vaccine Refused                          01/30/2022      EXAM:   LMP 01/01/2006 (Approximate)   GENERAL: NAD, pleasant overweight  female  SKIN: no visible rashes  HEAD: NCAT  VASCULAR: No lower extremity edema  GI: NT/ND, no pulsations, no r/r/g, no masses appreciated, no HSM appreciated  EXTREMITIES: Bilateral knees with no significant effusion. Left knee with mild tenderness to palpation in 1 area. Pain is reproduced with passive testing with combining forces of compression and then rotation. NEURO: Alert and Oriented x3. No gross motor or gross sensory abnormalities. PSYCH: Affect normal.  Normal thought content. DATA:    7/26/2023 [facility] emergency department provider note and results read and reviewed. XR KNEE (3 VIEWS), LEFT (AKS=17809)    Result Date: 7/26/2023  CONCLUSION:  1. Osseous structures are intact. LOCATION:  [facility]   Dictated by (CST): Mercedes Roman MD on 7/26/2023 at 2:09 PM     Finalized by (CST): Mercedes Roman MD on 7/26/2023 at 2:10 PM       XR DEXA BONE DENSITOMETRY (CPT=77080)    Result Date: 7/26/2023  CONCLUSION:  Bone mineral density values are within normal range when compared to normal patient population.   The World Health Organization has defined the following categories based on bone density: Normal bone:  T-score better than -1 Osteopenia: T-score between -1 and -2.5 Osteoporosis: T-score less than -2.5   LOCATION:  [facility]     Dictated by (CST): Russell Thompson MD on 7/26/2023 at 8:31 AM     Finalized by (CST): Russell Thompson MD on 7/26/2023 at 8:31 AM       XR KNEE, COMPLETE (4 OR MORE VIEWS), LEFT (JQX=04716)    Result Date: 7/17/2023  CONCLUSION:  Negative   LOCATION:  THE [facility]   Dictated by (CST): Chinyere Israel MD on 7/17/2023 at 2:10 PM     Finalized by (CST): Chinyere Israel MD on 7/17/2023 at 2:10 PM           ASSESSMENT AND PLAN:       Left knee pain, unspecified chronicity  (primary encounter diagnosis)    1. Left knee pain, unspecified chronicity  Patient to keep upcoming appointment she has scheduled with Dr. Moose Jeffries, orthopedic surgeon. Recommend patient limit weightbearing activities for now. Return in about 3 months (around 10/30/2023) for Chronic conditions. Sooner if needed. Jaime Alexander

## 2023-08-13 NOTE — PROGRESS NOTES
[address], [address]  Dept: [phone]    5/23/2018  Bariatric Patient Follow-up Evaluation    Chief Complaint:  Morbid obesity     History of Present Ill 45.0-49.9, adult ([facility] [address] 7/13/2014   • Polycystic ovaries     ovary embedded in pelvic wall--right side   • PONV (postoperative nausea and vomiting)    • Posterior vitreous detachment of both eyes 12/8/2017    Right > Left   • Primary open angle glaucoma of Topics    Smoking status: Never Smoker                                                                Smokeless tobacco: Never Used                        Alcohol use: Yes           0.0 oz/week       Comment: three times per week-2 shots Tequila    Drug us 3  •  estradiol 1 MG Oral Tab, Take 1 mg by mouth daily. , Disp: , Rfl:   •  PATIENT SUPPLIED MEDICATION, Essential Oils for allergies, Disp: , Rfl:   •  Cholecalciferol (VITAMIN D3) 2000 UNITS Oral Tab, Take 1 tablet by mouth daily.   , Disp: , Rfl:   •  Cy benefit from significant and sustained weight loss    Dietitian - started, additional visits needed  Cardiology - seen, needs stress echo  Pulmonology - Jun 1st  Psych - Jun 4th  GI - May 31 visit, needs EGD colonoscopy  Labs - no concerns, cont to take vi rolling walker

## 2023-08-17 ENCOUNTER — HOSPITAL ENCOUNTER (OUTPATIENT)
Dept: MRI IMAGING | Age: 53
Discharge: HOME | End: 2023-08-17
Attending: INTERNAL MEDICINE
Payer: COMMERCIAL

## 2023-08-17 DIAGNOSIS — M25.562 ACUTE PAIN OF LEFT KNEE: ICD-10-CM

## 2023-08-17 PROCEDURE — 73721 MRI JNT OF LWR EXTRE W/O DYE: CPT | Performed by: INTERNAL MEDICINE

## 2023-08-18 ENCOUNTER — OFFICE VISIT (OUTPATIENT)
Dept: ORTHOPEDICS CLINIC | Facility: CLINIC | Age: 53
End: 2023-08-18
Payer: COMMERCIAL

## 2023-08-18 VITALS — HEIGHT: 61 IN | BODY MASS INDEX: 45.31 KG/M2 | WEIGHT: 240 LBS

## 2023-08-18 DIAGNOSIS — M25.562 ACUTE PAIN OF LEFT KNEE: Primary | ICD-10-CM

## 2023-08-18 DIAGNOSIS — S83.242A OTHER TEAR OF MEDIAL MENISCUS OF LEFT KNEE AS CURRENT INJURY, INITIAL ENCOUNTER: ICD-10-CM

## 2023-08-18 PROCEDURE — 99214 OFFICE O/P EST MOD 30 MIN: CPT | Performed by: ORTHOPAEDIC SURGERY

## 2023-08-18 PROCEDURE — 3008F BODY MASS INDEX DOCD: CPT | Performed by: ORTHOPAEDIC SURGERY

## 2023-08-18 RX ORDER — METHYLPREDNISOLONE 4 MG/1
TABLET ORAL
Qty: 21 TABLET | Refills: 0 | Status: SHIPPED | OUTPATIENT
Start: 2023-08-18

## 2023-08-21 ENCOUNTER — TELEPHONE (OUTPATIENT)
Dept: PHYSICAL THERAPY | Facility: HOSPITAL | Age: 53
End: 2023-08-21

## 2023-08-28 ENCOUNTER — TELEPHONE (OUTPATIENT)
Dept: FAMILY MEDICINE CLINIC | Facility: CLINIC | Age: 53
End: 2023-08-28

## 2023-08-29 ENCOUNTER — TELEPHONE (OUTPATIENT)
Dept: PHYSICAL THERAPY | Facility: HOSPITAL | Age: 53
End: 2023-08-29

## 2023-08-29 RX ORDER — BLOOD SUGAR DIAGNOSTIC
STRIP MISCELLANEOUS
Qty: 100 STRIP | Refills: 11 | Status: SHIPPED | OUTPATIENT
Start: 2023-08-29

## 2023-08-29 RX ORDER — LANCETS 33 GAUGE
EACH MISCELLANEOUS
Qty: 100 EACH | Refills: 11 | Status: SHIPPED | OUTPATIENT
Start: 2023-08-29

## 2023-08-31 ENCOUNTER — APPOINTMENT (OUTPATIENT)
Dept: PHYSICAL THERAPY | Age: 53
End: 2023-08-31
Attending: ORTHOPAEDIC SURGERY
Payer: COMMERCIAL

## 2023-09-01 ENCOUNTER — MED REC SCAN ONLY (OUTPATIENT)
Dept: ORTHOPEDICS CLINIC | Facility: CLINIC | Age: 53
End: 2023-09-01

## 2023-09-05 ENCOUNTER — PATIENT MESSAGE (OUTPATIENT)
Dept: RHEUMATOLOGY | Facility: CLINIC | Age: 53
End: 2023-09-05

## 2023-09-05 DIAGNOSIS — M35.9 UNDIFFERENTIATED CONNECTIVE TISSUE DISEASE (HCC): Primary | ICD-10-CM

## 2023-09-05 DIAGNOSIS — M25.50 POLYARTHRALGIA: ICD-10-CM

## 2023-09-05 DIAGNOSIS — R79.82 ELEVATED C-REACTIVE PROTEIN (CRP): ICD-10-CM

## 2023-09-05 NOTE — TELEPHONE ENCOUNTER
Future Appointments   Date Time Provider Zahra Caity   10/4/2023  1:20 PM JUANPABLO, PROCEDURE SGIEDW None   10/4/2023  1:50 PM JUANPABLO, PROCEDURE SGIEDW None   10/30/2023 11:00 AM Tonia Crowder MD [facility] [address]   11/2/2023  8:40 AM STEVEN Stevens EMG ORTH[facility]   1/5/2024  1:15 PM Hilda Llamas DO EMGR[facility]UMHBSN EM[facility] 7/17/23  RTO in 5mo

## 2023-09-05 NOTE — TELEPHONE ENCOUNTER
From: Saige Lorenzo  To: Bartlett Ganser, DO  Sent: 9/5/2023 11:03 AM CDT  Subject: Blood draw? Good morning Dr. Jose Pierce - I can't remember, can you tell me when I'm due for my next blood draw? Also, would it be possible to load a physical copy of it into my chart or have available for me to ?  My insurance changed and the [facility] are no longer covered for lab work, I'll need to go to a [facility].  Thank you   [employer]

## 2023-09-06 ENCOUNTER — APPOINTMENT (OUTPATIENT)
Dept: PHYSICAL THERAPY | Age: 53
End: 2023-09-06
Attending: ORTHOPAEDIC SURGERY
Payer: COMMERCIAL

## 2023-09-08 ENCOUNTER — APPOINTMENT (OUTPATIENT)
Dept: PHYSICAL THERAPY | Age: 53
End: 2023-09-08
Attending: ORTHOPAEDIC SURGERY
Payer: COMMERCIAL

## 2023-09-11 ENCOUNTER — PATIENT MESSAGE (OUTPATIENT)
Dept: ORTHOPEDICS CLINIC | Facility: CLINIC | Age: 53
End: 2023-09-11

## 2023-09-11 ENCOUNTER — APPOINTMENT (OUTPATIENT)
Dept: PHYSICAL THERAPY | Age: 53
End: 2023-09-11
Attending: ORTHOPAEDIC SURGERY
Payer: COMMERCIAL

## 2023-09-11 NOTE — TELEPHONE ENCOUNTER
From: Leslie Gates  To: Elizabeth Ha MD  Sent: 9/11/2023 10:11 AM CDT  Subject: October 20th surgery    Good morning Dr. Adelita Jernigan - I'm trying to get through with a phone call and it's taking a very long time. I've recently had an insurance change and need to cancel my scheduled wrist surgery set for October 20th.     Thank you   Marie Acosta

## 2023-09-13 ENCOUNTER — APPOINTMENT (OUTPATIENT)
Dept: PHYSICAL THERAPY | Age: 53
End: 2023-09-13
Attending: ORTHOPAEDIC SURGERY
Payer: COMMERCIAL

## 2023-09-18 ENCOUNTER — APPOINTMENT (OUTPATIENT)
Dept: PHYSICAL THERAPY | Age: 53
End: 2023-09-18
Attending: ORTHOPAEDIC SURGERY
Payer: COMMERCIAL

## 2023-09-20 ENCOUNTER — TELEPHONE (OUTPATIENT)
Dept: FAMILY MEDICINE CLINIC | Facility: CLINIC | Age: 53
End: 2023-09-20

## 2023-09-20 DIAGNOSIS — E03.9 ACQUIRED HYPOTHYROIDISM: Primary | ICD-10-CM

## 2023-09-20 NOTE — TELEPHONE ENCOUNTER
Patient requesting blood work orders (thyroid Blood Work), patient aware there are no pending orders from Dr. Pro Coffey. Please send orders to [facility].    Informed patient to allow up to 24 to 48 Business hours for a call back with a status.

## 2023-09-20 NOTE — TELEPHONE ENCOUNTER
Patient called and stated that labs were supposed to be ordered to go to [address]. Patient stated that she requested them back on 9/5/23.      Please see patient message from 9/5/23

## 2023-09-22 LAB
ABSOLUTE BASOPHILS: 63 CELLS/UL (ref 0–200)
ABSOLUTE EOSINOPHILS: 143 CELLS/UL (ref 15–500)
ABSOLUTE LYMPHOCYTES: 1955 CELLS/UL (ref 850–3900)
ABSOLUTE MONOCYTES: 359 CELLS/UL (ref 200–950)
ABSOLUTE NEUTROPHILS: 3181 CELLS/UL (ref 1500–7800)
ALBUMIN/GLOBULIN RATIO: 1.4 (CALC) (ref 1–2.5)
ALBUMIN: 3.9 G/DL (ref 3.6–5.1)
ALKALINE PHOSPHATASE: 79 U/L (ref 37–153)
ALT: 21 U/L (ref 6–29)
AST: 20 U/L (ref 10–35)
BASOPHILS: 1.1 %
BILIRUBIN, TOTAL: 0.4 MG/DL (ref 0.2–1.2)
BUN: 15 MG/DL (ref 7–25)
C-REACTIVE PROTEIN: 6.9 MG/L
CALCIUM: 9.2 MG/DL (ref 8.6–10.4)
CARBON DIOXIDE: 26 MMOL/L (ref 20–32)
CHLORIDE: 106 MMOL/L (ref 98–110)
CREATININE: 0.84 MG/DL (ref 0.5–1.03)
EGFR: 84 ML/MIN/1.73M2
EOSINOPHILS: 2.5 %
GLOBULIN: 2.7 G/DL (CALC) (ref 1.9–3.7)
GLUCOSE: 95 MG/DL (ref 65–99)
HEMATOCRIT: 41.1 % (ref 35–45)
HEMOGLOBIN: 13.3 G/DL (ref 11.7–15.5)
LYMPHOCYTES: 34.3 %
MCH: 29.7 PG (ref 27–33)
MCHC: 32.4 G/DL (ref 32–36)
MCV: 91.7 FL (ref 80–100)
MONOCYTES: 6.3 %
MPV: 9.8 FL (ref 7.5–12.5)
NEUTROPHILS: 55.8 %
PLATELET COUNT: 326 THOUSAND/UL (ref 140–400)
POTASSIUM: 4.1 MMOL/L (ref 3.5–5.3)
PROTEIN, TOTAL: 6.6 G/DL (ref 6.1–8.1)
RDW: 13.2 % (ref 11–15)
RED BLOOD CELL COUNT: 4.48 MILLION/UL (ref 3.8–5.1)
SED RATE BY MODIFIED$WESTERGREN: 11 MM/H
SODIUM: 140 MMOL/L (ref 135–146)
WHITE BLOOD CELL COUNT: 5.7 THOUSAND/UL (ref 3.8–10.8)

## 2023-10-09 RX ORDER — LEVOTHYROXINE SODIUM 112 UG/1
112 TABLET ORAL
Qty: 30 TABLET | Refills: 0 | Status: SHIPPED | OUTPATIENT
Start: 2023-10-09

## 2023-10-09 NOTE — TELEPHONE ENCOUNTER
Medication(s) to Refill:   Requested Prescriptions     Pending Prescriptions Disp Refills    LEVOTHYROXINE 112 MCG Oral Tab [Pharmacy Med Name: LEVOTHYROXINE 112 MCG TABLET] 90 tablet 0     Sig: TAKE 1 TABLET BY MOUTH BEFORE BREAKFAST.      Last Time Medication was Filled:  7/14/23    Recent Visits  Date Type Provider Dept   08/04/23 Office Visit DO Brenna Turner [facility]     Future Appointments  No visits were found

## 2023-10-11 ENCOUNTER — ORDER TRANSCRIPTION (OUTPATIENT)
Dept: ADMINISTRATIVE | Facility: HOSPITAL | Age: 53
End: 2023-10-11

## 2023-10-12 DIAGNOSIS — J30.1 SEASONAL ALLERGIC RHINITIS DUE TO POLLEN: ICD-10-CM

## 2023-10-12 RX ORDER — MONTELUKAST SODIUM 4 MG/1
4 TABLET, CHEWABLE ORAL DAILY
Qty: 90 TABLET | Refills: 0 | OUTPATIENT
Start: 2023-10-12

## 2023-10-12 NOTE — TELEPHONE ENCOUNTER
Future Appointments   Date Time Provider Zahra Carolina   10/30/2023 11:00 AM Kami Rain MD [facility] [address] [facility] [address]   11/2/2023  8:40 AM STEVEN Man EMG ORTHO 75 [facility]   12/6/2023 12:50 PM JUANPABLO, PROCEDURE SGIEDW None   12/6/2023  1:20 PM JUANPABLO, PROCEDURE SGIEDW None   1/5/2024  1:15 PM Hilda Llamas,  EMGRHEUMHBSN [facility] [place]     LOV  7/14/2023    Last refill  7/14/2023   90 tabs, 0 refills

## 2023-10-13 ENCOUNTER — PATIENT MESSAGE (OUTPATIENT)
Dept: FAMILY MEDICINE CLINIC | Facility: CLINIC | Age: 53
End: 2023-10-13

## 2023-10-13 LAB
T4, FREE: 1.3 NG/DL (ref 0.8–1.8)
TSH: 1.34 MIU/L

## 2023-10-16 ENCOUNTER — TELEPHONE (OUTPATIENT)
Dept: FAMILY MEDICINE CLINIC | Facility: CLINIC | Age: 53
End: 2023-10-16

## 2023-10-16 ENCOUNTER — ORDER TRANSCRIPTION (OUTPATIENT)
Dept: ADMINISTRATIVE | Facility: HOSPITAL | Age: 53
End: 2023-10-16

## 2023-10-16 DIAGNOSIS — E03.9 ACQUIRED HYPOTHYROIDISM: Primary | ICD-10-CM

## 2023-10-16 DIAGNOSIS — E78.5 HYPERLIPIDEMIA: ICD-10-CM

## 2023-10-16 DIAGNOSIS — R07.9 CHEST PAIN: Primary | ICD-10-CM

## 2023-10-16 RX ORDER — LEVOTHYROXINE SODIUM 112 UG/1
112 TABLET ORAL
Qty: 90 TABLET | Refills: 0 | Status: SHIPPED | OUTPATIENT
Start: 2023-10-16 | End: 2023-10-16

## 2023-10-16 RX ORDER — LEVOTHYROXINE SODIUM 112 UG/1
112 TABLET ORAL
Qty: 90 TABLET | Refills: 1 | Status: SHIPPED | OUTPATIENT
Start: 2023-10-16

## 2023-10-16 NOTE — TELEPHONE ENCOUNTER
Patient calling in regards to Levothyroxine 112 MCG medication. Patient has new insurance and has to use [facility]. Pt also had her thyroid labs done on 10/12/23 that haven't been reviewed by Dr. Artem Angel yet. Pt unsure if her Levothyroxine 112 MCG medication dosing would change based on her most recent labs. Once labs have been addressed please send new/refill Levothyroxine medication to [facility] on file. Patient has been without her thyroid medication for a week. Sending [facility]. See pt's [employer] message sent below on 10/13/2023. [employer] message encounter closed. October 16, 2023  Leslie Gates   to [facility] Staff (supporting Niesha Suarez DO)         10/16/23 10:39 AM  Good morning Dr. Josiane Murphy recently had a change in insurance plans and can no longer use [facility].  I have aprox 2 weeks worth of my Creon medication left. Can you please send over a new prescription order to [facility] located [facility] in [place]  Please let me know if there will be any issues with this.   Thank you  Marie Acosta

## 2023-10-16 NOTE — TELEPHONE ENCOUNTER
Patient completed thyroid labwork on 10/13. She is requesting test results and new/refill prescription based on test results. She had been taking levothyroxine 112mcg. She has been without levothyroxine for 1 week. Insurance has changed and she need new prescription to go to [employer] on file.

## 2023-10-16 NOTE — TELEPHONE ENCOUNTER
TSH and free T4 in normal range, recommend continue same dose of levothyroxine for now. We will adjust dose in the future as indicated. Levothyroxine prescription sent for a 90-day supply with 1 additional refill. Recommend patient schedule appointment for wellness visit/hypothyroidism/hyperlipidemia for early to mid March 2024.

## 2023-10-25 ENCOUNTER — PATIENT MESSAGE (OUTPATIENT)
Dept: FAMILY MEDICINE CLINIC | Facility: CLINIC | Age: 53
End: 2023-10-25

## 2023-10-30 RX ORDER — LANSOPRAZOLE 30 MG/1
30 CAPSULE, DELAYED RELEASE ORAL DAILY
Qty: 90 CAPSULE | Refills: 0 | Status: SHIPPED | OUTPATIENT
Start: 2023-10-30

## 2023-10-30 NOTE — TELEPHONE ENCOUNTER
From: Tom Feliz  To: Ruben Collado  Sent: 10/25/2023 11:20 AM CDT  Subject: Lansoprazole refill? Good morning Dr. Doe Pollack - With my recent insurance change I've also had to switch pharmacies so I can't request a regular refill. I'll be running out of my Lansoprazole within the next week and half. Can you please send a new prescription for it over to my new pharmacy? [facility] in [place].   Thank you  Wei Walton

## 2023-11-05 DIAGNOSIS — J30.1 SEASONAL ALLERGIC RHINITIS DUE TO POLLEN: ICD-10-CM

## 2023-11-06 NOTE — TELEPHONE ENCOUNTER
LOV: 07/14/2023  Future Appointments   Date Time Provider Zahra Carolina   12/6/2023 12:50 PM SARAH, PROCEDURE SGIEDW None   12/6/2023  1:20 PM JUANPABLO, PROCEDURE SGIEDW None   1/5/2024  1:15 PM Hilda Llamas DO EMGR[facility]UM[facility]BSN [facility] [place]   3/6/2024  9:00 AM Gaurav Herr DO EMG [facility] [place]   LABS:   Component      Latest Ref Rng 9/20/2023   WBC      3.8 - 10.8 Thousand/uL 5.7    RBC      3.80 - 5.10 Million/uL 4.48    Hemoglobin      11.7 - 15.5 g/dL 13.3    Hematocrit      35.0 - 45.0 % 41.1    MCV      80.0 - 100.0 fL 91.7    MCH      27.0 - 33.0 pg 29.7    MCHC      32.0 - 36.0 g/dL 32.4    RDW      11.0 - 15.0 % 13.2    Platelet Count      992 - 400 Thousand/uL 326    MPV      7.5 - 12.5 fL 9.8    Neutrophils Absolute      1,500 - 7,800 cells/uL 3,181    Lymphocytes Absolute      850 - 3,900 cells/uL 1,955    Monocytes Absolute      200 - 950 cells/uL 359    Eosinophils Absolute      15 - 500 cells/uL 143    Basophils Absolute      0 - 200 cells/uL 63    Neutrophils %      % 55.8    Lymphocytes %      % 34.3    Monocytes %      % 6.3    Eosinophils %      % 2.5    Basophils %      % 1.1    Glucose      65 - 99 mg/dL 95    BUN      7 - 25 mg/dL 15    CREATININE      0.50 - 1.03 mg/dL 0.84    EGFR      > OR = 60 mL/min/1.73m2 84    BUN/CREATININE RATIO      6 - 22 (calc) SEE NOTE:    Sodium      135 - 146 mmol/L 140    Potassium      3.5 - 5.3 mmol/L 4.1    Chloride      98 - 110 mmol/L 106    Carbon Dioxide, Total      20 - 32 mmol/L 26    CALCIUM      8.6 - 10.4 mg/dL 9.2    PROTEIN, TOTAL      6.1 - 8.1 g/dL 6.6    Albumin      3.6 - 5.1 g/dL 3.9    Globulin      1.9 - 3.7 g/dL (calc) 2.7    A/G Ratio      1.0 - 2.5 (calc) 1.4    Total Bilirubin      0.2 - 1.2 mg/dL 0.4    Alkaline Phosphatase      37 - 153 U/L 79    AST (SGOT)      10 - 35 U/L 20    ALT (SGPT)      6 - 29 U/L 21      LF: 07/14/2023     QTY    90    Refills       0

## 2023-11-08 RX ORDER — MONTELUKAST SODIUM 4 MG/1
4 TABLET, CHEWABLE ORAL DAILY
Qty: 90 TABLET | Refills: 0 | OUTPATIENT
Start: 2023-11-08

## 2023-11-13 ENCOUNTER — TELEPHONE (OUTPATIENT)
Dept: FAMILY MEDICINE CLINIC | Facility: CLINIC | Age: 53
End: 2023-11-13

## 2023-11-13 DIAGNOSIS — Z51.81 ENCOUNTER FOR MEDICATION MONITORING: ICD-10-CM

## 2023-11-13 DIAGNOSIS — E78.2 MIXED HYPERLIPIDEMIA: ICD-10-CM

## 2023-11-13 RX ORDER — SIMVASTATIN 20 MG
20 TABLET ORAL EVERY EVENING
Qty: 90 TABLET | Refills: 1 | Status: SHIPPED | OUTPATIENT
Start: 2023-11-13

## 2023-11-13 NOTE — TELEPHONE ENCOUNTER
Shelly Hensley requesting medication refill for simvastatin 20 MG Oral Tab . Patient Contacted Pharmacy (Yes/No): yes  LOV: 8/4/2023   Last Refill Date:   27 or 80 Day Supply:  Pharmacy Location: [facility]  Prescribed By: Dr. Kalpana Sneed  Next Scheduled Appointment: 11/20/2023      Informed patient to allow up to 24 to 48 Business hours for a call back from a nurse.

## 2023-11-14 ENCOUNTER — TELEPHONE (OUTPATIENT)
Dept: FAMILY MEDICINE CLINIC | Facility: CLINIC | Age: 53
End: 2023-11-14

## 2023-11-14 ENCOUNTER — APPOINTMENT (OUTPATIENT)
Dept: ADMINISTRATIVE | Facility: HOSPITAL | Age: 53
End: 2023-11-14
Payer: COMMERCIAL

## 2023-11-14 RX ORDER — LANCETS 30 GAUGE
EACH MISCELLANEOUS
Qty: 100 EACH | Refills: 11 | Status: SHIPPED | OUTPATIENT
Start: 2023-11-14

## 2023-11-14 RX ORDER — GLUCOSAMINE HCL/CHONDROITIN SU 500-400 MG
CAPSULE ORAL
Qty: 100 STRIP | Refills: 11 | Status: SHIPPED | OUTPATIENT
Start: 2023-11-14

## 2023-11-14 NOTE — TELEPHONE ENCOUNTER
Pt's insurance no longer covers the One touch. They cover contour per pharmacy.     Scripts sent to pharmacy for Contour

## 2023-11-16 ENCOUNTER — OFFICE VISIT (OUTPATIENT)
Dept: FAMILY MEDICINE CLINIC | Facility: CLINIC | Age: 53
End: 2023-11-16
Payer: COMMERCIAL

## 2023-11-16 VITALS
TEMPERATURE: 97 F | DIASTOLIC BLOOD PRESSURE: 80 MMHG | HEIGHT: 61 IN | BODY MASS INDEX: 47.16 KG/M2 | OXYGEN SATURATION: 97 % | HEART RATE: 77 BPM | SYSTOLIC BLOOD PRESSURE: 130 MMHG | WEIGHT: 249.81 LBS

## 2023-11-16 DIAGNOSIS — R73.9 HYPERGLYCEMIA: ICD-10-CM

## 2023-11-16 DIAGNOSIS — E03.9 ACQUIRED HYPOTHYROIDISM: ICD-10-CM

## 2023-11-16 DIAGNOSIS — E78.2 MIXED HYPERLIPIDEMIA: ICD-10-CM

## 2023-11-16 DIAGNOSIS — M79.672 PAIN OF LEFT HEEL: Primary | ICD-10-CM

## 2023-11-16 DIAGNOSIS — Z12.31 ENCOUNTER FOR SCREENING MAMMOGRAM FOR MALIGNANT NEOPLASM OF BREAST: ICD-10-CM

## 2023-11-16 DIAGNOSIS — R03.0 ELEVATED BLOOD PRESSURE READING WITHOUT DIAGNOSIS OF HYPERTENSION: ICD-10-CM

## 2023-11-16 PROCEDURE — 99214 OFFICE O/P EST MOD 30 MIN: CPT | Performed by: FAMILY MEDICINE

## 2023-11-16 PROCEDURE — 3075F SYST BP GE 130 - 139MM HG: CPT | Performed by: FAMILY MEDICINE

## 2023-11-16 PROCEDURE — 3079F DIAST BP 80-89 MM HG: CPT | Performed by: FAMILY MEDICINE

## 2023-11-16 PROCEDURE — 3008F BODY MASS INDEX DOCD: CPT | Performed by: FAMILY MEDICINE

## 2023-11-16 NOTE — PATIENT INSTRUCTIONS
-Check your blood pressure 2-3 times a week, if the top number is persistently running in the 130s or higher, or if the bottom number is consistently running in the 80s or higher, please schedule a sooner appointment to see Dr. aTnia Roman.

## 2023-12-05 ENCOUNTER — ANESTHESIA EVENT (OUTPATIENT)
Dept: ENDOSCOPY | Facility: HOSPITAL | Age: 53
End: 2023-12-05
Payer: COMMERCIAL

## 2023-12-06 ENCOUNTER — HOSPITAL ENCOUNTER (OUTPATIENT)
Facility: HOSPITAL | Age: 53
Setting detail: HOSPITAL OUTPATIENT SURGERY
Discharge: HOME | End: 2023-12-06
Attending: INTERNAL MEDICINE | Admitting: INTERNAL MEDICINE
Payer: COMMERCIAL

## 2023-12-06 ENCOUNTER — ANESTHESIA (OUTPATIENT)
Dept: ENDOSCOPY | Facility: HOSPITAL | Age: 53
End: 2023-12-06
Payer: COMMERCIAL

## 2023-12-06 VITALS
OXYGEN SATURATION: 96 % | TEMPERATURE: 98 F | RESPIRATION RATE: 18 BRPM | HEIGHT: 61 IN | WEIGHT: 240 LBS | SYSTOLIC BLOOD PRESSURE: 122 MMHG | HEART RATE: 76 BPM | BODY MASS INDEX: 45.31 KG/M2 | DIASTOLIC BLOOD PRESSURE: 73 MMHG

## 2023-12-06 DIAGNOSIS — R19.5 CLAY-COLORED STOOLS: ICD-10-CM

## 2023-12-06 DIAGNOSIS — R10.9 RIGHT SIDED ABDOMINAL PAIN: ICD-10-CM

## 2023-12-06 PROCEDURE — 0DB68ZX EXCISION OF STOMACH, VIA NATURAL OR ARTIFICIAL OPENING ENDOSCOPIC, DIAGNOSTIC: ICD-10-PCS | Performed by: INTERNAL MEDICINE

## 2023-12-06 PROCEDURE — 0DBL8ZX EXCISION OF TRANSVERSE COLON, VIA NATURAL OR ARTIFICIAL OPENING ENDOSCOPIC, DIAGNOSTIC: ICD-10-PCS | Performed by: INTERNAL MEDICINE

## 2023-12-06 PROCEDURE — 0DBK8ZX EXCISION OF ASCENDING COLON, VIA NATURAL OR ARTIFICIAL OPENING ENDOSCOPIC, DIAGNOSTIC: ICD-10-PCS | Performed by: INTERNAL MEDICINE

## 2023-12-06 PROCEDURE — 0DB98ZX EXCISION OF DUODENUM, VIA NATURAL OR ARTIFICIAL OPENING ENDOSCOPIC, DIAGNOSTIC: ICD-10-PCS | Performed by: INTERNAL MEDICINE

## 2023-12-06 PROCEDURE — 0DB78ZX EXCISION OF STOMACH, PYLORUS, VIA NATURAL OR ARTIFICIAL OPENING ENDOSCOPIC, DIAGNOSTIC: ICD-10-PCS | Performed by: INTERNAL MEDICINE

## 2023-12-06 PROCEDURE — 0DB48ZX EXCISION OF ESOPHAGOGASTRIC JUNCTION, VIA NATURAL OR ARTIFICIAL OPENING ENDOSCOPIC, DIAGNOSTIC: ICD-10-PCS | Performed by: INTERNAL MEDICINE

## 2023-12-06 PROCEDURE — 88305 TISSUE EXAM BY PATHOLOGIST: CPT | Performed by: INTERNAL MEDICINE

## 2023-12-06 RX ORDER — LIDOCAINE HYDROCHLORIDE 10 MG/ML
INJECTION, SOLUTION EPIDURAL; INFILTRATION; INTRACAUDAL; PERINEURAL AS NEEDED
Status: DISCONTINUED | OUTPATIENT
Start: 2023-12-06 | End: 2023-12-06 | Stop reason: SURG

## 2023-12-06 RX ORDER — SODIUM CHLORIDE, SODIUM LACTATE, POTASSIUM CHLORIDE, CALCIUM CHLORIDE 600; 310; 30; 20 MG/100ML; MG/100ML; MG/100ML; MG/100ML
INJECTION, SOLUTION INTRAVENOUS CONTINUOUS
Status: DISCONTINUED | OUTPATIENT
Start: 2023-12-06 | End: 2023-12-06

## 2023-12-06 RX ADMIN — LIDOCAINE HYDROCHLORIDE 25 MG: 10 INJECTION, SOLUTION EPIDURAL; INFILTRATION; INTRACAUDAL; PERINEURAL at 13:03:00

## 2023-12-06 RX ADMIN — SODIUM CHLORIDE, SODIUM LACTATE, POTASSIUM CHLORIDE, CALCIUM CHLORIDE: 600; 310; 30; 20 INJECTION, SOLUTION INTRAVENOUS at 13:00:00

## 2023-12-06 NOTE — OPERATIVE REPORT
Operative Report-Esophagogastroduodenoscopy with cold biopsies  Ankur Townsend 11/30/1970   [facility] [id number] MRN [id number]   Admission Date 12/6/2023 Operation Date 12/6/2023   Attending Physician Lynne Landau, DO Operating Physician [address] [place] [address],[address] [place] [address],      PREOPERATIVE DIAGNOSIS/INDICATION: RUQ pain, altered bowel habits  POSTOPERTATIVE DIAGNOSIS: Hiatal hernia, irregular SCJ  PROCEDURE PERFORMED: EGD  INFORMED CONSENT: Once a brief history and physical examination was performed, the risks, benefits and alternatives to the procedure were discussed with the patient and/or family and informed consent was obtained. The risks of sedation, perforation, missed lesions and need for surgery were all discussed. Patient expressed understanding of the risks and agreed to proceed. PROCEDURE DESCRIPTION:  The patient was then brought to the endoscopy suite where her pulse, pulse oximetry and blood pressure were monitored. She was placed in the left lateral decubitus position and deep sedation was administered. Once adequate sedation was achieved, a bite block was placed and a lubricated tip of an Olympus video upper endoscope was inserted through the oropharynx and gently manipulated through the esophagus into the stomach and the distal duodenum. Upon withdrawal of the endoscope, careful visualization of the mucosa was performed. FINDINGS:  ESOPHAGUS: See below. EGJ: The GEJ was located at 38 cm. The SCJ was located at 33 cm and was slightly irregular. STOMACH: There was a 5 cm hiatal hernia. DUODENUM: Normal.   THERAPEUTICS: Biopsies were performed from the duodenum, antrum/body and SCJ with a cold biopsy forceps. RECOMMENDATIONS:   Post EGD precautions, watch for bleeding, infection, perforation, adverse drug reactions   Follow biopsies.   Continue Lansoprazole 30 mg/day  CC Report:     [address] [place] [address],[address] [place] [address],   12/6/2023  1:10 PM

## 2023-12-06 NOTE — H&P
History & Physical Examination    Patient Name: Sivakumar Becerril  MRN: [id number]  [facility]: [id number]  YOB: 1970    Diagnosis: altered bowel habits, GERD, RUQ pain, family h/o colon cancer    Present Illness: as above    Medications Prior to Admission   Medication Sig Dispense Refill Last Dose    BLACK COHOSH OR Take by mouth. simvastatin 20 MG Oral Tab Take 1 tablet (20 mg total) by mouth every evening. 90 tablet 1 12/5/2023    lansoprazole 30 MG Oral Capsule Delayed Release Take 1 capsule (30 mg total) by mouth daily. 90 capsule 0 12/6/2023    levothyroxine 112 MCG Oral Tab Take 1 tablet (112 mcg total) by mouth before breakfast. 90 tablet 1 12/6/2023    hydroxychloroquine 200 MG Oral Tab Take 1 tablet (200 mg total) by mouth 2 (two) times daily. 180 tablet 1 12/6/2023    metoprolol succinate ER 25 MG Oral Tablet 24 Hr Take 0.5 tablets (12.5 mg total) by mouth daily. 12/5/2023    Nystatin 396570 UNIT/GM External Powder Apply 1 Application topically 3 (three) times daily. 60 g 1     meclizine 25 MG Oral Tab Take 1 tablet (25 mg total) by mouth 3 (three) times daily as needed for Dizziness. 30 tablet 2 Past Month    ondansetron (ZOFRAN ODT) 8 MG Oral Tablet Dispersible Take 1 tablet (8 mg total) by mouth every 8 (eight) hours as needed for Nausea. 20 tablet 1 Past Month    Zinc 50 MG Oral Tab Take by mouth. 12/6/2023    TURMERIC CURCUMIN OR Take by mouth. albuterol 108 (90 Base) MCG/ACT Inhalation Aero Soln Inhale 2 puffs into the lungs every 6 (six) hours as needed for Wheezing or Shortness of Breath (Cough). 1 each 0 Past Month    Fluocinonide 0.05 % External Solution Apply 1 Application  topically daily. ascorbic acid 500 MG Oral Chew Tab Chew 1 tablet (500 mg total) by mouth daily. 12/5/2023    clotrimazole-betamethasone 1-0.05 % External Cream Apply 1 Application topically 2 (two) times daily as needed. 60 g 3     CALCIUM OR Take 2,400 mg by mouth daily.    12/5/2023 saccharomyces boulardii 250 MG Oral Cap Pro-biotic 1 capsule by mouth daily   2023    tiZANidine HCl 4 MG Oral Tab Take 1 tablet (4 mg total) by mouth nightly as needed. 30 tablet 2 Past Month    ALPRAZolam 0.5 MG Oral Tab 1/2 to one tab daily as needed 10 tablet 0 Past Month    magnesium 250 MG Oral Tab Take 1 tablet (250 mg total) by mouth daily. 2023    Multiple Vitamins-Minerals (BARIATRIC MULTIVITAMINS/IRON OR) Take 1 capsule by mouth daily. 2023    PATIENT SUPPLIED MEDICATION Essential Oils for allergies       Cholecalciferol (VITAMIN D3) 2000 UNITS Oral Tab Take 1 tablet (2,000 Units total) by mouth daily. 2 tab a day to make 4,000 units   2023    Blood Glucose Monitoring Suppl Does not apply Device THIS IS FOR 1 CONTOUR GLUCOSE MONITOR 1 each 0     Glucose Blood (BLOOD GLUCOSE TEST) In Vitro Strip USE 1 CONTOUR TEST STRIP TO TEST UP TO 6 TIMES A  strip 11     Lancets Does not apply Misc USE 1 LANCET UP TO 6 TIMES A  each 11     [] Pancrelipase, Lip-Prot-Amyl, (CREON) 16163-68581 units Oral Cap DR Particles Take 12,000 Units by mouth in the morning, at noon, and at bedtime. Take with snacks (Patient taking differently: Take 12,000 Units by mouth in the morning, at noon, and at bedtime. Take with snacks- on hold waiting on insurance) 270 capsule 5     PEG 3350-KCl-Na Bicarb-NaCl 420 g Oral Recon Soln Take as directed by physician 4000 mL 0      Current Facility-Administered Medications   Medication Dose Route Frequency    lactated ringers infusion   Intravenous Continuous       Allergies:    Allergies   Allergen Reactions    Berries ANAPHYLAXIS    Casein ANAPHYLAXIS    Levofloxacin RASH     rash    Augmentin [Amoxicillin-Pot Clavulanate] NAUSEA AND VOMITING    Avocado ITCHING    Darvocet [Propoxyphene N-Apap] NAUSEA AND VOMITING    Latex RASH and SWELLING    Nuts ANAPHYLAXIS     PECANS/CASHEWS  Water chestnuts-Itching    Ultram [Tramadol Hcl] RASH    Zithromax RASH Codeine ITCHING     Not associated with rash    Oxycodone RASH     ONLY in GENERIC form per pt, Ok with brand form. Radiology Contrast Iodinated Dyes NAUSEA ONLY     Patient instructed to take Zyrtec prior to administration of radiology contrast iodinated dye to prevent nausea. Past Medical History:   Diagnosis Date    Abdominal pain     Acute atopic conjunctivitis, bilateral 03/27/2019    Mirna Cheatham M.D. Acute meniscal tear, medial, right, initial encounter 05/28/2019    MRI 5/21/2019: Mild undersurface tearing of the posterior root ligament of the medial meniscus. Allergic rhinitis     Anxiety     Arrhythmia     paroxsymal atrial tachycardia    Arthritis     Atrial tachycardia     Back pain     Benign paroxysmal positional vertigo 06/01/2020    Bleeding nose     Bloating     Blood in urine     Body piercing     Calculus of kidney     Change in hair     Chest pain     Chest pain on exertion     Colon polyp 09/27/2013    Lamont Winters M.D. Constipation     Decorative tattoo     [facility] 1/7/2013    Diarrhea, unspecified     Diverticulitis of sigmoid colon 10/17/2013    Diverticulosis 09/27/2013    Lamont Winters M.D. Dizziness     Easy bruising     Endometriosis     Esophageal reflux     Essential hypertension     Eye disease     Fibromyalgia     Food intolerance     Frequent urination     Glaucoma 01/07/2013    Headache disorder     Heart palpitations atrial tachecardia 01/2022    Heartburn     High cholesterol     History of COVID-19     Hoarseness, chronic     Hyperlipidemia     Hypothyroidism     Injury of peroneal tendon of right foot 05/23/2018    Itch of skin     Keratoconjunctivitis sicca not specified as Sjogren's, bilateral 03/27/2019    Mirna Cheatham M.D.     Lateral epicondylitis of right elbow 07/09/2020    Leaking of urine     Lung nodule     pt unsure of which side    Major depressive disorder, recurrent episode, mild with anxious distress ([facility] 07/27/2017    Migraines     Morbid obesity with BMI of 45.0-49.9, adult ([facility] 07/13/2014    Obesity     Open angle with borderline findings, high risk, bilateral 03/27/2019    Indra Castro M.D. Osteoarthritis     back    Other vitreous opacities, left eye 03/27/2019    Indra Castro M.D. Other vitreous opacities, right eye 03/27/2019    Indra Castro M.D. Pain in joints     Painful urination     Polycystic ovaries     ovary embedded in pelvic wall--right side    PONV (postoperative nausea and vomiting)     slow to awaken    Posterior vitreous detachment of both eyes 12/08/2017    Right > Left    Primary open angle glaucoma of both eyes, moderate stage 06/01/2016    Rash     Sleep disturbance     Spitting up blood     Sputum production     Stress     Trochanteric bursitis of both hips 08/03/2016    Left >>  Right     Visual impairment     glasses    Vitreous degeneration, right eye 03/27/2019    Indra Castro M.D. Wears glasses     Weight gain      Past Surgical History:   Procedure Laterality Date    ANGIOGRAM      2-24-12 [place]    ANGIOPLASTY (CORONARY)      COLONOSCOPY      COLONOSCOPY  07/19/2018    [address] Right 10/18/2019    Tendon repair     GASTRIC BYPASS,OBESITY,SB RECONSTRUC  2018    gastric sleeve    HERNIA SURGERY  10/18/2018    Hiatal hernia Dr. Vladimir Larson  2006    partial hystero. LAP SLEEVE GASTRECTOMY  10/18/2018    Dr. Luis A Alonzo Bilateral 2015    ovaries removed after partial.    OTHER  10/15/2018    Sleeve biatric surgery    OTHER SURGICAL HISTORY  2012    removal of right ovary    REMOVAL OF KIDNEY STONE      age 16, does not remember what kind of surgery. no abdominal or flank scar    TILT TABLE EVALUATION      veinogram 7-6-12 Carlos Simpler.  General    TOTAL ABDOM HYSTERECTOMY      UPPER GI ENDOSCOPY,EXAM       Family History   Problem Relation Age of Onset    Cancer Maternal Aunt         breast, colon    Breast Cancer Maternal Aunt         In her 52's. Ovarian Cancer Maternal Aunt 60        In her 63's. Cancer Maternal Aunt         breast, basal cell    Breast Cancer Maternal Aunt         Late 60's. Ovarian Cancer Maternal Aunt     Cancer Mother         basal cell X 3; LUNG    Heart Disorder Mother         SVT    Hypertension Mother     Dementia Mother         early signs 2022    Depression Mother     Cancer Father         Colon & prostate cancers    Heart Disorder Father     Colon Cancer Father     Prostate Cancer Father     Diabetes Father     Hypertension Father     Heart Attack Father     Cancer Maternal Aunt         basal cell    Cancer Maternal Aunt         basal cell    Cancer Maternal Aunt         bone    Cancer Cousin         esoph,adenocarcinoma    Cancer Cousin         Non Hodgkins Lymphoma    Cancer Cousin         basal cell    Cancer Paternal Aunt         lung    Colon Polyps Brother     Alcohol and Other Disorders Associated Brother     Alcohol and Other Disorders Associated Maternal Grandfather     Alcohol and Other Disorders Associated Paternal Grandfather     Colon Polyps Brother     No Known Problems Son     No Known Problems Son      Social History     Tobacco Use    Smoking status: Never    Smokeless tobacco: Never   Substance Use Topics    Alcohol use: Yes     Comment: weekly       SYSTEM Check if Review is Normal Check if Physical Exam is Normal If not normal, please explain:   HEENT [ x] [ x]    NECK & BACK [ x] [ x]    HEART [ x] [ x]    LUNGS [ x] [ x]    ABDOMEN [ ] [ x] See hpi   UROGENITAL [x ] [ ] Exam declined   EXTREMITIES [ x] [x ]    OTHER        [ x ] I have discussed the risks and benefits and alternatives with the patient/family. They understand and agree to proceed with plan of care. [ x ] I have reviewed the History and Physical done within the last 30 days. Any changes noted above.     Lucita Chung [address], DO  12/6/2023  12:11 PM

## 2023-12-06 NOTE — OPERATIVE REPORT
Operative Report-Colonoscopy with snare polypectomy    John Harper 11/30/1970   [facility] [id number] MRN [id number]   Admission Date 12/6/2023 Operation Date 12/6/2023   Attending Physician Winnie Upton DO Operating Physician Geneva Morse DO     PREOPERATIVE DIAGNOSIS/INDICATION: H/o polyps  POSTOPERTATIVE DIAGNOSIS: Polyps, Diverticulosis, Internal hemorrhoids  PROCEDURE PERFORMED: COLONOSCOPY  INFORMED CONSENT:  Once a brief history and physical examination was performed, the risks, benefits and alternatives to the procedure were discussed with the patient and/or family and informed consent was obtained. The risks of sedation, perforation, missed lesions and need for surgery were all discussed. Patient expressed understanding of the risks and agreed to proceed. PROCEDURE DESCRIPTION:The patient was then brought to the endoscopy suite where her pulse, pulse oximetry and blood pressure were monitored. She was placed in the left lateral decubitus position and deep sedation was administered. Once adequate sedation was achieved, a rectal exam was performed which was normal. A lubricated tip of an Olympus video colonoscope was then inserted through the rectum and gently manipulated under direct visualization to the cecal pole and the terminal ileum. The quality of the preparation was fair. Upon withdrawal of the colonoscope, careful visualization of the mucosa was performed. Heath Prep Score: Right Colon 2 Transverse colon 2 Left colon 2  FINDINGS/THERAPEUTICS:  TERMINAL ILEUM: The Terminal Ileum was normal.   COLON: There was a 4 mm, sessile polyp in the ascending colon which was removed with the cold snare. There was a 4 mm, sessile polyp in the transverse colon which was removed with the cold snare. There was diverticulosis in the left colon. Two passes were made to the right colon. There was scattered liquid stool throughout the colon and much irrigation and suctioning was required.    RECTUM: Grade II Internal hemorrhoids. RECOMMENDATIONS:   Post Colonoscopy/polypectomy precautions, watch for bleeding, infection, perforation, adverse drug reactions   Follow biopsies. Repeat colonoscopy in 2 years with a 1 day extended prep and avoid seeds/nuts/grains for 2 weeks before.   CC Report:   Antony Joaquin DO  12/6/2023  1:35 PM

## 2023-12-06 NOTE — ANESTHESIA POSTPROCEDURE EVALUATION
Niranjan Martinez 15 Patient Status:  Hospital Outpatient Surgery   Age/Gender 47year old female MRN [id number]   Location [address] [facility] 28 Attending Niko Patel, [address] [facility] Day # 0 PCP Rere Castro DO       Anesthesia Post-op Note    ESOPHAGOGASTRODUODENOSCOPY (EGD) with biopsy and colonoscopy with cold snare polypectomy    Procedure Summary       Date: 12/06/23 Room / Location: 140[facility] ENDOSCOPY 02 / [facility] ENDOSCOPY    Anesthesia Start: 1300 Anesthesia Stop: 0739    Procedures:       ESOPHAGOGASTRODUODENOSCOPY (EGD) with biopsy and colonoscopy with cold snare polypectomy      COLONOSCOPY Diagnosis:       Tomás-colored stools      Right sided abdominal pain      (hiatal hernia, irregular z line, colon polyp, hemrrhoids, diverticulosis)    Surgeons: Niko Patel DO Anesthesiologist: Remedios Ratliff MD    Anesthesia Type: MAC ASA Status: 3            Anesthesia Type: MAC    Vitals Value Taken Time   /88 12/06/23 1350   Temp 98.0 12/06/23 1350   Pulse 67 12/06/23 1350   Resp 18 12/06/23 1350   SpO2 98 % 12/06/23 1350   Vitals shown include unfiled device data. Patient Location: Endoscopy    Anesthesia Type: MAC    Airway Patency: patent    Postop Pain Control: adequate    Mental Status: mildly sedated but able to meaningfully participate in the post-anesthesia evaluation    Nausea/Vomiting: none    Cardiopulmonary/Hydration status: stable euvolemic    Complications: no apparent anesthesia related complications    Postop vital signs: stable    Dental Exam: Unchanged from Preop    Patient to be discharged home when criteria met.

## 2023-12-06 NOTE — DISCHARGE INSTRUCTIONS
Home Care Instructions for Colonoscopy and EGD With Sedation    Diet:  - Resume your regular diet as tolerated unless otherwise instructed. - start with light meals to minimize bloating.  - Do not drink alcohol today. Medication:  - If you have questions about resuming your normal medications, please contact your Primary Care Physician. Activities:  - Take it easy today. Do not return to work today. - Do not drive today. - Do not operate any machinery today (including kitchen equipment). Colonoscopy:  - You may notice some rectal \"spotting\" (a little blood on the toilet tissue) for a day or two after the exam. This is normal.  - If you experience any rectal bleeding (not spotting), persistent tenderness or sharp severe abdominal pains, oral temperature over 100 degrees Farenheit, light-headedness or dizziness, or any other problems, contact your doctor. EGD :  - You may have a sore throat for 2-3 days following the exam. This is normal. Gargling with warm salt water (1/2 tsp salt to 1 glass warm water) or using throat lozenges will help. - If you experience any sharp pain in your neck, abdomen or chest, vomiting of blood, oral temperature over 100 degrees Farenheit, light-headedness or dizziness, or any other problems, contact your doctor. **If unable to reach your doctor, please go to the [facility] Emergency Room**    - Your referring physician will receive a full report of your examination.  - If you do not hear from your doctor's office within two weeks of your biopsy, please call them for your results.     Additional Comments/Instructions (if applicable):

## 2023-12-13 ENCOUNTER — TELEPHONE (OUTPATIENT)
Dept: RHEUMATOLOGY | Facility: CLINIC | Age: 53
End: 2023-12-13

## 2023-12-13 ENCOUNTER — PATIENT MESSAGE (OUTPATIENT)
Dept: RHEUMATOLOGY | Facility: CLINIC | Age: 53
End: 2023-12-13

## 2023-12-13 DIAGNOSIS — M25.50 POLYARTHRALGIA: ICD-10-CM

## 2023-12-13 DIAGNOSIS — M35.9 UNDIFFERENTIATED CONNECTIVE TISSUE DISEASE (HCC): ICD-10-CM

## 2023-12-13 DIAGNOSIS — R76.8 POSITIVE ANA (ANTINUCLEAR ANTIBODY): ICD-10-CM

## 2023-12-13 RX ORDER — HYDROXYCHLOROQUINE SULFATE 200 MG/1
200 TABLET, FILM COATED ORAL 2 TIMES DAILY
Qty: 180 TABLET | Refills: 1 | Status: CANCELLED
Start: 2023-12-13

## 2023-12-13 RX ORDER — HYDROXYCHLOROQUINE SULFATE 200 MG/1
200 TABLET, FILM COATED ORAL 2 TIMES DAILY
Qty: 180 TABLET | Refills: 1 | Status: CANCELLED | OUTPATIENT
Start: 2023-12-13

## 2023-12-13 RX ORDER — HYDROXYCHLOROQUINE SULFATE 200 MG/1
200 TABLET, FILM COATED ORAL 2 TIMES DAILY
Qty: 180 TABLET | Refills: 1 | Status: SHIPPED | OUTPATIENT
Start: 2023-12-13

## 2023-12-13 NOTE — TELEPHONE ENCOUNTER
LOV: 7/17/23  RTO in 4-5mo  Last eye exam 4/11/23  Future Appointments   Date Time Provider Zahra Caity   1/5/2024  1:15 PM Brody Benavidez DO [facility]   3/6/2024  9:00 AM Jairo Bazzi DO [facility]   LABS:  9/22/23

## 2023-12-13 NOTE — TELEPHONE ENCOUNTER
Future Appointments   Date Time Provider Zahar [place]   1/5/2024  1:15 PM Daniel Davila, DO [facility]   3/6/2024  9:00 AM Chrissy Gonzalez DO [facility] [place]     Last office visit: 7/14/2023    Last fill: 7/14/2023 180 tab, 1 refill      Disregared.  Pt refill was addressed in my chart message that was sent at the same time as the phone call request

## 2024-01-04 RX ORDER — LANSOPRAZOLE 30 MG/1
30 CAPSULE, DELAYED RELEASE ORAL DAILY
Qty: 90 CAPSULE | Refills: 0 | Status: SHIPPED | OUTPATIENT
Start: 2024-01-04

## 2024-01-04 NOTE — TELEPHONE ENCOUNTER
Requested Prescriptions     Signed Prescriptions Disp Refills    LANSOPRAZOLE 30 MG Oral Capsule Delayed Release 90 capsule 0     Sig: TAKE 1 CAPSULE(30 MG) BY MOUTH DAILY     Authorizing Provider: ANTONETTE HARRIS     Ordering User: LILIANA ABREU     Refilled per protocol/OV notes     n/a

## 2024-01-05 ENCOUNTER — OFFICE VISIT (OUTPATIENT)
Dept: RHEUMATOLOGY | Facility: CLINIC | Age: 54
End: 2024-01-05
Payer: COMMERCIAL

## 2024-01-05 VITALS
HEIGHT: 61 IN | HEART RATE: 76 BPM | TEMPERATURE: 98 F | DIASTOLIC BLOOD PRESSURE: 66 MMHG | SYSTOLIC BLOOD PRESSURE: 132 MMHG | OXYGEN SATURATION: 99 % | BODY MASS INDEX: 46.82 KG/M2 | RESPIRATION RATE: 16 BRPM | WEIGHT: 248 LBS

## 2024-01-05 DIAGNOSIS — M15.9 PRIMARY OSTEOARTHRITIS INVOLVING MULTIPLE JOINTS: ICD-10-CM

## 2024-01-05 DIAGNOSIS — M25.50 POLYARTHRALGIA: ICD-10-CM

## 2024-01-05 DIAGNOSIS — R79.82 ELEVATED C-REACTIVE PROTEIN (CRP): ICD-10-CM

## 2024-01-05 DIAGNOSIS — M79.7 FIBROMYALGIA: ICD-10-CM

## 2024-01-05 DIAGNOSIS — M35.9 UNDIFFERENTIATED CONNECTIVE TISSUE DISEASE (HCC): Primary | ICD-10-CM

## 2024-01-05 DIAGNOSIS — M25.552 BILATERAL HIP PAIN: ICD-10-CM

## 2024-01-05 DIAGNOSIS — R53.82 CHRONIC FATIGUE: ICD-10-CM

## 2024-01-05 DIAGNOSIS — M51.36 DDD (DEGENERATIVE DISC DISEASE), LUMBAR: ICD-10-CM

## 2024-01-05 DIAGNOSIS — E55.9 VITAMIN D DEFICIENCY: ICD-10-CM

## 2024-01-05 DIAGNOSIS — M25.551 BILATERAL HIP PAIN: ICD-10-CM

## 2024-01-05 PROCEDURE — 3075F SYST BP GE 130 - 139MM HG: CPT | Performed by: INTERNAL MEDICINE

## 2024-01-05 PROCEDURE — 3008F BODY MASS INDEX DOCD: CPT | Performed by: INTERNAL MEDICINE

## 2024-01-05 PROCEDURE — 99214 OFFICE O/P EST MOD 30 MIN: CPT | Performed by: INTERNAL MEDICINE

## 2024-01-05 PROCEDURE — 3078F DIAST BP <80 MM HG: CPT | Performed by: INTERNAL MEDICINE

## 2024-01-05 RX ORDER — DULOXETIN HYDROCHLORIDE 30 MG/1
30 CAPSULE, DELAYED RELEASE ORAL NIGHTLY
Qty: 90 CAPSULE | Refills: 0 | Status: SHIPPED | OUTPATIENT
Start: 2024-01-05

## 2024-01-05 NOTE — PROGRESS NOTES
RHEUMATOLOGY Follow up   Date of visit: 01/05/2024  ?  Chief Complaint   Patient presents with    Follow - Up     Patient comes into the office today for a 6 month f/u apt with  for undifferentiated connective tissue disease. Patient states that everything hurts but otherwise she is doing ok.      ?  ASSESSMENT, DISCUSSION & PLAN   Assessment:  1. Undifferentiated connective tissue disease (HCC)    2. Polyarthralgia    3. Elevated C-reactive protein (CRP)    4. Fibromyalgia    5. Primary osteoarthritis involving multiple joints    6. Bilateral hip pain    7. DDD (degenerative disc disease), lumbar    8. Vitamin D deficiency    9. Chronic fatigue        Discussion:  Ms. Rosa Shaver is a 54 yo woman with a hx of hypertension, hyperlipidemia, depression, reflux as well as obesity s/p bariatric surgery who presents for evaluation of worsened joint and muscle pain. She does have a significant family history of ankylosing spondylitis is her two brothers who were both diagnosed within the past one year.  She does have some signs of arthritis likely contributed/worsened by her previous obesity.      Her autoimmune testing was grossly negative with the exception of borderline LUIS ARMANDO/SSA positivity. She does have significant dry eyes, but unclear if truly Sjogren's or not.  Repeat testing on multiple occasions now have shown while LUIS ARMANDO has been equivocal, SSA SSB antibodies have been negative.     She has started plaquenil, last year, and feels like this is helping with some of her joint pain and fatigue.  She does have signs of fibromyalgia, now likely worsened due to recent social stressors. Offered amitriptyline again but pt has been feeling better overall since her last visit.    Due to recent worsened photosensitivity, repeated her autoimmune serologies and checked myositis panel. These were both negative. Previously reminded pt of importance of sun protection and also reminded that plaquenil can make more sun  sensitive.   Otherwise, continue yearly eye exams to monitor for toxicities from plaquenil.     Due to the worsened pain overall, I recommended duloxetine to see if it will help.   Also, will get updated xrays of lumbar spine, hips and SI joints.     Okay to follow back with me in 6months or sooner as needed.   We will be in communication once labs results.   Also recommended pt reach out in 6 weeks with update on how doing with the duloxetine.       Risks and benefits of Cymbalta discussed at length including but not limited to liver damage, abnormal bleeding, and suicidal thoughts and behaviors. Medication should be discontinued immediately if any suicidal ideations experienced as well as reaching out to PCP or going to ER immediately. Other side effects discussed including GI discomfort (nausea, constipation), headache, drowsiness, difficulty sleeping, and dizziness. Patient verbalized understanding of above and agrees to proceed with starting therapy with Cymbalta.      Patient verbalized understanding of above instructions. No further questions at this time.    Code selection for this visit was based on time spent (33min) on date of service in preparing to see the patient, obtaining and/or reviewing separately obtained history, performing a medically appropriate examination, counseling and educating the patient/family/caregiver, ordering medications or testing, referring and communicating with other healthcare providers, documenting clinical information in the E HR, independently interpreting results and communicating results to the patient/family/caregiver and care coordination with the patient's other providers.       ?  Plan:  Diagnoses and all orders for this visit:    Undifferentiated connective tissue disease (HCC)    Polyarthralgia    Elevated C-reactive protein (CRP)  -     SED RATE, WESTLEAREN [809] [Q]  -     C-REACTIVE PROTEIN [4420] [Q]    Fibromyalgia  -     DULoxetine 30 MG Oral Cap DR Particles;  Take 1 capsule (30 mg total) by mouth at bedtime.    Primary osteoarthritis involving multiple joints  -     DULoxetine 30 MG Oral Cap DR Particles; Take 1 capsule (30 mg total) by mouth at bedtime.    Bilateral hip pain  -     XR HIP W OR WO PELVIS(MIN 5 VIEWS),BILAT(CPT=73523); Future    DDD (degenerative disc disease), lumbar  -     XR LUMBAR SPINE (MIN 4 VIEWS) (CPT=72110); Future  -     DULoxetine 30 MG Oral Cap DR Particles; Take 1 capsule (30 mg total) by mouth at bedtime.    Vitamin D deficiency  -     Vitamin D; Future    Chronic fatigue  -     Vitamin D; Future  -     Vitamin B12  -     Iron And Tibc  -     Ferritin  -     SED RATE, WESTERGREN [809] [Q]  -     C-REACTIVE PROTEIN [4420] [Q]          Return in about 6 months (around 7/5/2024).  ?  HPI   Rosa Shaver is a 53 year old female with the following active problems who is seen for medically necessary follow-up today. She was seen initially for evaluation of joint pain and abnormal labs.     Since her last visit, she has been doing okay.    Left knee pain- had MRI showing meniscal tear. Went through PT which helped but cost was too much. Tried to do at home but then felt worse.  Has not followed back with ortho due to other stressors.   Also her  had worsened back pain that ended up requiring emergent surgery due to severe pain and needing to ambulate with a walker. Doing well after the surgery.   Still has knee pain and thinks aggravated by sleep position at this time.     + increased pain in the fingers. Denies obvious swelling except puffiness by the end of the day (similar to her feet). Still able to wear rings.   + continued bilateral hip and lower back pain, has difficulty with first few steps.   Is more open to tx with antidepressant.     Prior rashes still intermittent, typically responds to topical steroids. No recent issues with sun exposure but relates to weather. No recent benadryl use like before for the rash.      Prior  headaches improved, but thinks related to sinuses. No longer taking zyrtec and nasal spray. Still with migraines about monthly- takes tylenol/benadryl which helps.     Still with some shortness of breath/chest pressure but better than summer. Attributes some of the symptoms to weight gain.   Was seen by cardiology and workup negative except has atrial tachycardia.   Increased abd pain- right sided, intermittent RLQ pain but recent cscope was grossly negative     + dry eyes, using drops (systane balance)  + dry mouth, not using biotene products as much; but drinking water constantly; denies recent cavities     Currently taking tylenol as needed, taking more consistently due to the knee pain.  Avoid NSAIDs due to hx of gastric sleeve surgery   Continues with weight gain. Following with weight loss clinic. Could not tolerate mounjaro due to hypoglycemia and side effects. Not on anything at this time.     HPI from initial consultation  referred for rheumatologic evaluation due to joint, muscle and back pain.      States she has been suffering starting several months ago with pain that feels like bruise pain. Denies any visible bruise changes of the skin. States the bruise-like pain is located all over and not just over the joints. This can happen with different types of pressure- 's arm or dog sitting.      States the joint pain has gotten worse over the past 2 months- worst over fingers, wrists, elbows, right shoulder and both knees. Denies obvious swelling over the joints.   Also suffers from low back pain along the spine and across the bottom there. Describes as a deep pain. States most severe in the mornings as well as at the end of the day. Does notice some improvement with movement and activity.   + hx of eczema which have been persistent despite the warmer weather recently. Applies topicals when she remembers. States areas she applies on a regular basis due respond and subside.      + morning stiffness,  depends on activity level, can last at least hour   Takes tylenol as needed when pain severe which does help slightly . Avoids NSAIDs due to hx of bariatric surgery   Also tries different essential oils for her muscle aches   + hair thinning, thought to be related to bariatric surgery (Oct 2018); also admits to hair loss related to stress   + issues with blood sugars, feels very sensitive to low blood sugars or any elevations 20 over her fasting sugars - has not mentioned to PCP yet. As discussed with weight loss physician and was told to adjust diet, however despite diet changes still having some symptoms. Has not had any LOC but does get dizzy and some confusion.   + hx of one miscarriage during 1st trimester, able to conceive twice afterwards - relatively normal per pt   + rare difficulty swallowing  + blisters over fingertips when under stress - none currently   + follows with ophthalmology- has been diagnosed with early glaucoma as well as hx of vitreal detachment due to forceful vomiting. Also has dry eyes- uses Restasis, no other eye drops    + admits to frequent swollen glands in the neck since a child; gets about once yearly   + hx of tendon tear in her ankle requiring surgical intervention (happened 6 years ago and had micro-tears which did not require surgery then last year required surgery). - no issues with the ankle since that time.   + hx of plantar fasciitis, years ago no recent issues. Does wear insoles.   Does admit to lose 91 since her bariatric surgery Oct 2018.   Was going through physical therapy and told that her gait was abnormal from years of obesity   Wake up at night due to hip pain, needs to reposition.   + nail changes of fingernails 2-3 fingers b/l, left pinky fingernail worst.   + easy bruising      Denies new skin nodule formation.  The patient denies oral or nasal ulcers, photosensitive rash, Raynaud's phenomenon, prior hematologic abnormality, prior renal or liver disease (other  than kidney stones), or history of seizures.  No history of prior blood clot in the legs or lungs, strokes or ischemic phenomenon.  Denies nonhealing ulcers on the fingertips.  The patient denies any history of uveitis, crampy abdominal pain, constipation, diarrhea, nodular painful shin bruises, Achilles heel pain, psoriatic lesions, history of dactylitis.  There are no symptoms of severe dry mouth.  No fevers, chills, night sweats, or unexplained weakness.  Denies chronic sinus infections/disease or epistaxis.  Denies chronic cough or hemoptysis.   Denies obvious blood in urine      Family hx:   Both brothers formally diagnosed with ankylosing spondylitis this past year. Unclear if has psoriasis.   ?  Past Medical History:  Past Medical History:   Diagnosis Date    Abdominal pain     Acute atopic conjunctivitis, bilateral 03/27/2019    Yamil Vaughn M.D.    Acute meniscal tear, medial, right, initial encounter 05/28/2019    MRI 5/21/2019: Mild undersurface tearing of the posterior root ligament of the medial meniscus.    Allergic rhinitis     Anxiety     Arrhythmia     paroxsymal atrial tachycardia    Arthritis     Atrial tachycardia     Back pain     Benign paroxysmal positional vertigo 06/01/2020    Bleeding nose     Bloating     Blood in urine     Body piercing     Calculus of kidney     Change in hair     Chest pain     Chest pain on exertion     Colon polyp 09/27/2013    Gail Katz M.D.    Constipation     Decorative tattoo     Diabetes (HCC) 1/7/2013    Diarrhea, unspecified     Diverticulitis of sigmoid colon 10/17/2013    Diverticulosis 09/27/2013    Gail Katz M.D.    Dizziness     Easy bruising     Endometriosis     Esophageal reflux     Essential hypertension     Eye disease     Fibromyalgia     Food intolerance     Frequent urination     Glaucoma 01/07/2013    Headache disorder     Heart palpitations atrial tachecardia 01/2022    Heartburn     High cholesterol     History of  COVID-19     Hoarseness, chronic     Hyperlipidemia     Hypothyroidism     Injury of peroneal tendon of right foot 05/23/2018    Itch of skin     Keratoconjunctivitis sicca not specified as Sjogren's, bilateral 03/27/2019    Yamil Vaughn M.D.    Lateral epicondylitis of right elbow 07/09/2020    Leaking of urine     Lung nodule     pt unsure of which side    Major depressive disorder, recurrent episode, mild with anxious distress ([facility]) 07/27/2017    Migraines     Morbid obesity with BMI of 45.0-49.9, adult ([facility]) 07/13/2014    Obesity     Open angle with borderline findings, high risk, bilateral 03/27/2019    Yamil Vaughn M.D.    Osteoarthritis     back    Other vitreous opacities, left eye 03/27/2019    Yamil Vaughn M.D.    Other vitreous opacities, right eye 03/27/2019    Yamil Vaughn M.D.    Pain in joints     Painful urination     Polycystic ovaries     ovary embedded in pelvic wall--right side    PONV (postoperative nausea and vomiting)     slow to awaken    Posterior vitreous detachment of both eyes 12/08/2017    Right > Left    Primary open angle glaucoma of both eyes, moderate stage 06/01/2016    Rash     Sleep disturbance     Spitting up blood     Sputum production     Stress     Trochanteric bursitis of both hips 08/03/2016    Left >>  Right     Visual impairment     glasses    Vitreous degeneration, right eye 03/27/2019    Yamil Vaughn M.D.    Wears glasses     Weight gain      Past Surgical History:  Past Surgical History:   Procedure Laterality Date    ANGIOGRAM      2-24-12 [place]    ANGIOPLASTY (CORONARY)      COLONOSCOPY      COLONOSCOPY  07/19/2018    COLONOSCOPY N/A 12/6/2023    Procedure: COLONOSCOPY;  Surgeon: Rupa Cheatham DO;  Location:  ENDOSCOPY    D & C  1996 & 1997    DILATION & CURETTAGE CERVICAL STUMP      1996 and 1997    FOOT SURGERY Right 10/18/2019    Tendon repair     GASTRIC BYPASS,OBESITY,SB RECONSTRUC  2018    gastric sleeve    HERNIA SURGERY   10/18/2018    Hiatal hernia Dr. Terri Mooney     HYSTERECTOMY  2006    partial hystero.    LAP SLEEVE GASTRECTOMY  10/18/2018    Dr. terri mooney           OOPHORECTOMY Bilateral 2015    ovaries removed after partial.    OTHER  10/15/2018    Sleeve biatric surgery    OTHER SURGICAL HISTORY      removal of right ovary    REMOVAL OF KIDNEY STONE      age 14, does not remember what kind of surgery. no abdominal or flank scar    TILT TABLE EVALUATION      veinogram 12 [place]    TOTAL ABDOM HYSTERECTOMY      UPPER GI ENDOSCOPY,EXAM       Family History:  Family History   Problem Relation Age of Onset    Cancer Maternal Aunt         breast, colon    Breast Cancer Maternal Aunt 50        In her 50's.    Ovarian Cancer Maternal Aunt 60        In her 60's.    Cancer Maternal Aunt         breast, basal cell    Breast Cancer Maternal Aunt 68        Late 60's.    Ovarian Cancer Maternal Aunt     Cancer Mother         basal cell X 3; LUNG    Heart Disorder Mother         SVT    Hypertension Mother     Dementia Mother         early signs     Depression Mother     Cancer Father         Colon & prostate cancers    Heart Disorder Father     Colon Cancer Father     Prostate Cancer Father     Diabetes Father     Hypertension Father     Heart Attack Father     Cancer Maternal Aunt         basal cell    Cancer Maternal Aunt         basal cell    Cancer Maternal Aunt         bone    Cancer Cousin         esoph,adenocarcinoma    Cancer Cousin         Non Hodgkins Lymphoma    Cancer Cousin         basal cell    Cancer Paternal Aunt         lung    Colon Polyps Brother     Alcohol and Other Disorders Associated Brother     Alcohol and Other Disorders Associated Maternal Grandfather     Alcohol and Other Disorders Associated Paternal Grandfather     Colon Polyps Brother     No Known Problems Son     No Known Problems Son      Social History:  Social History     Socioeconomic History    Marital status:    Tobacco  Use    Smoking status: Never    Smokeless tobacco: Never   Vaping Use    Vaping Use: Never used   Substance and Sexual Activity    Alcohol use: Yes     Comment: weekly    Drug use: No    Sexual activity: Yes     Partners: Male     Birth control/protection: Hysterectomy   Other Topics Concern    Caffeine Concern Yes     Comment: 20-30 oz of coffee daily    Stress Concern Yes    Weight Concern Yes    Special Diet No    Exercise No    Seat Belt Yes     Medications:  Outpatient Medications Marked as Taking for the 1/5/24 encounter (Office Visit) with Hilda Llamas DO   Medication Sig Dispense Refill    DULoxetine 30 MG Oral Cap DR Particles Take 1 capsule (30 mg total) by mouth at bedtime. 90 capsule 0    LANSOPRAZOLE 30 MG Oral Capsule Delayed Release TAKE 1 CAPSULE(30 MG) BY MOUTH DAILY 90 capsule 0    hydroxychloroquine 200 MG Oral Tab Take 1 tablet (200 mg total) by mouth 2 (two) times daily. 180 tablet 1    Blood Glucose Monitoring Suppl Does not apply Device THIS IS FOR 1 CONTOUR GLUCOSE MONITOR 1 each 0    Glucose Blood (BLOOD GLUCOSE TEST) In Vitro Strip USE 1 CONTOUR TEST STRIP TO TEST UP TO 6 TIMES A  strip 11    Lancets Does not apply Misc USE 1 LANCET UP TO 6 TIMES A  each 11    simvastatin 20 MG Oral Tab Take 1 tablet (20 mg total) by mouth every evening. 90 tablet 1    levothyroxine 112 MCG Oral Tab Take 1 tablet (112 mcg total) by mouth before breakfast. 90 tablet 1    metoprolol succinate ER 25 MG Oral Tablet 24 Hr Take 0.5 tablets (12.5 mg total) by mouth daily.      Nystatin 320325 UNIT/GM External Powder Apply 1 Application topically 3 (three) times daily. 60 g 1    meclizine 25 MG Oral Tab Take 1 tablet (25 mg total) by mouth 3 (three) times daily as needed for Dizziness. 30 tablet 2    ondansetron (ZOFRAN ODT) 8 MG Oral Tablet Dispersible Take 1 tablet (8 mg total) by mouth every 8 (eight) hours as needed for Nausea. 20 tablet 1    Zinc 50 MG Oral Tab Take by mouth.      TURMERIC  CURCUMIN OR Take by mouth.      albuterol 108 (90 Base) MCG/ACT Inhalation Aero Soln Inhale 2 puffs into the lungs every 6 (six) hours as needed for Wheezing or Shortness of Breath (Cough). 1 each 0    Fluocinonide 0.05 % External Solution Apply 1 Application  topically daily.      ascorbic acid 500 MG Oral Chew Tab Chew 1 tablet (500 mg total) by mouth daily.      clotrimazole-betamethasone 1-0.05 % External Cream Apply 1 Application topically 2 (two) times daily as needed. 60 g 3    CALCIUM OR Take 2,400 mg by mouth daily.      saccharomyces boulardii 250 MG Oral Cap Pro-biotic 1 capsule by mouth daily      tiZANidine HCl 4 MG Oral Tab Take 1 tablet (4 mg total) by mouth nightly as needed. 30 tablet 2    ALPRAZolam 0.5 MG Oral Tab 1/2 to one tab daily as needed 10 tablet 0    magnesium 250 MG Oral Tab Take 1 tablet (250 mg total) by mouth daily.      Multiple Vitamins-Minerals (BARIATRIC MULTIVITAMINS/IRON OR) Take 1 capsule by mouth daily.      PATIENT SUPPLIED MEDICATION Essential Oils for allergies      Cholecalciferol (VITAMIN D3) 2000 UNITS Oral Tab Take 1 tablet (2,000 Units total) by mouth daily. 2 tab a day to make 4,000 units       Modified Medications    No medications on file     Medications Discontinued During This Encounter   Medication Reason    BLACK COHOSH OR     Pancrelipase, Lip-Prot-Amyl, (CREON) 76659-62180 units Oral Cap DR Particles     Pancrelipase, Lip-Prot-Amyl, (CREON) 23953-746273 units Oral Cap DR Particles     PEG 3350-KCl-Na Bicarb-NaCl 420 g Oral Recon Soln      ?  ?  Allergies:  Allergies   Allergen Reactions    Berries ANAPHYLAXIS    Casein ANAPHYLAXIS    Levofloxacin RASH     rash    Augmentin [Amoxicillin-Pot Clavulanate] NAUSEA AND VOMITING    Avocado ITCHING    Darvocet [Propoxyphene N-Apap] NAUSEA AND VOMITING    Latex RASH and SWELLING    Nuts ANAPHYLAXIS     PECANS/CASHEWS  Water chestnuts-Itching    Ultram [Tramadol Hcl] RASH    Zithromax RASH    Codeine ITCHING     Not  associated with rash    Oxycodone RASH     ONLY in GENERIC form per pt, Ok with brand form.    Radiology Contrast Iodinated Dyes NAUSEA ONLY     Patient instructed to take Zyrtec prior to administration of radiology contrast iodinated dye to prevent nausea.     ?  REVIEW OF SYSTEMS   ?  Review of Systems   Constitutional:  Positive for malaise/fatigue. Negative for chills, fever and weight loss.   HENT:  Positive for tinnitus. Negative for congestion, nosebleeds and sinus pain.    Eyes:  Positive for photophobia. Negative for blurred vision, pain and redness.        Dry eyes   Respiratory:  Positive for shortness of breath. Negative for cough and hemoptysis.    Cardiovascular:  Positive for chest pain (no change/stable). Negative for palpitations and leg swelling.   Gastrointestinal:  Positive for abdominal pain and heartburn. Negative for blood in stool, constipation, diarrhea and nausea.   Genitourinary:  Positive for dysuria and urgency. Negative for frequency and hematuria.        Intermittent   Musculoskeletal:  Positive for back pain, joint pain, myalgias and neck pain.   Skin:  Positive for rash. Negative for itching.   Neurological:  Positive for dizziness, tingling (left toes) and headaches.   Endo/Heme/Allergies:  Positive for environmental allergies. Bruises/bleeds easily.   Psychiatric/Behavioral:  Negative for depression. The patient is not nervous/anxious and does not have insomnia.      PHYSICAL EXAM   Today's Vitals:  Temperature Blood Pressure Heart Rate Resp Rate SpO2   Temp: 97.9 °F (36.6 °C) BP: 132/66 Pulse: 76 Resp: 16 SpO2: 99 %   ?  Current Weight Height BMI BSA Pain   Wt Readings from Last 1 Encounters:   01/05/24 248 lb (112.5 kg)    Height: 5' 1\" (154.9 cm) Body mass index is 46.86 kg/m². Body surface area is 2.07 meters squared.         Physical Exam  Vitals and nursing note reviewed.   Constitutional:       General: She is not in acute distress.     Appearance: She is well-developed. She  is obese. She is not diaphoretic.   HENT:      Head: Normocephalic and atraumatic.   Eyes:      General: No scleral icterus.     Extraocular Movements: Extraocular movements intact.      Conjunctiva/sclera: Conjunctivae normal.   Neck:      Vascular: No JVD.      Trachea: No tracheal deviation.   Cardiovascular:      Rate and Rhythm: Normal rate and regular rhythm.      Heart sounds: Normal heart sounds. No murmur heard.  Pulmonary:      Effort: Pulmonary effort is normal. No respiratory distress.      Breath sounds: Normal breath sounds. No wheezing.   Musculoskeletal:         General: Tenderness present. No swelling.      Cervical back: Neck supple.      Comments: Tenderness throughout without active synovitis particularly over MCPs and MTPs diffusely - stable  Lateral hip pain b/l, left along ITB and right lateral/posteriorly   No swelling, redness or restriction of motion of the DIPs, PIPs, MCPs, wrists, elbows, ankles, or joints of the feet.  Bilateral shoulders with full ROM.  :umbar Spinous process tenderness. B/L SI joint tenderness    (prior exam)  Posterior Tender Point Exam  Occiput +/+  Trapezius +/+  Supraspinatus +/+  Gluteal deferred   Greater trochanter +/+  Anterior Tender Point Exam:  Low cervical +/+  Second rib +/+  Lateral epicondyle +/+  Knee +/+  16 tender points positive   Lymphadenopathy:      Cervical: No cervical adenopathy.   Skin:     General: Skin is warm and dry.      Findings: No erythema or rash.      Comments: Multiple tattoos noted  No periungal erythema  Nails manicured   Neurological:      Mental Status: She is alert and oriented to person, place, and time.      Cranial Nerves: No cranial nerve deficit.   Psychiatric:         Mood and Affect: Mood normal.         Behavior: Behavior normal.       Radiology review:       Narrative   PROCEDURE:  MRI KNEE, LEFT (YIP=23144)     COMPARISON:  None.     INDICATIONS:  M25.562 Acute pain of left knee     TECHNIQUE:  Axial, coronal, and  sagittal proton density with and without fat saturation images were obtained.     PATIENT STATED HISTORY: (As transcribed by Technologist)  Left lateral knee pain since twisting injury one month ago.         FINDINGS:    LIGAMENTS:          The ACL, PCL, patellar retinacula, and lateral collateral ligament are intact.  Grade 1 sprain of the medial collateral ligament is present.  MENISCI:            Radial tear involving the posterior horn of the medial meniscus is noted.  TENDONS:            The tendinous insertions about the knee are intact without significant tendinosis or tears.  MUSCULATURE:        No evidence of strain, edema, or atrophy.  BONY COMPARTMENTS:  Mild fissuring of the trochlear cartilage is noted.  No chondral defects are noted.  Mild chondromalacia is noted  SYNOVIUM:           No evidence for effusion, synovitis, or intraarticular bodies.                      Impression   CONCLUSION:  Radial tear involving the posterior horn of the medial meniscus.        LOCATION:  [facility]                   Dictated by (CST): Shashank Linder MD on 8/17/2023 at 11:33 AM      Finalized by (CST): Shashank Linder MD on 8/17/2023 at 11:48 AM         PROCEDURE:  CT CHEST (CPT=71250)       LOCATION:  [facility]         COMPARISON:  [facility] , CT, CT ANGIOGRAPHY, CHEST (CPT=71275), 3/21/2016, 0:11 AM.  [place], CT, CT CHEST (CPT=71250), 12/22/2021, 8:56 AM.       INDICATIONS:  J12.82 Pneumonia due to COVID-19 virus U07.1 Pneumonia due to COVID-19 virus       TECHNIQUE:  Unenhanced multislice CT scanning is performed through the chest.  Dose reduction techniques were used. Dose information is transmitted to the ACR (American College of Radiology) NRDR (National Radiology Data Registry) which includes the Dose    Index Registry.       PATIENT STATED HISTORY: (As transcribed by Technologist)  Patient states to have ongoing chest pain. She has a history of pneumonia due to Covid-19.            FINDINGS:     LUNGS:  Resolved  ground-glass opacities in left lower lobe.  Subpleural micro nodules in the right lower lobe on images 68, 86, 95, 108 and 123 are stable dating back to 3/21/2016.  No new pulmonary opacity..   VASCULATURE:  Thrombus cannot be excluded without intravenous contrast.     BRENDA:  No mass or adenopathy.     MEDIASTINUM:  No mass or adenopathy.  Hiatal hernia and changes of gastric sleeve again demonstrated.   CARDIAC:  No enlargement, pericardial thickening, or significant calcification.  Resolved pericardial thickening.   PLEURA:  No mass or effusion.     THORACIC AORTA:  Unremarkable as seen on non-contrast imaging.   CHEST WALL:  No mass or axillary adenopathy.     LIMITED ABDOMEN:  Uncomplicated diverticula of visualized hepatic flexure of colon.   BONES:  Mild degenerative changes of spine.                        Impression   CONCLUSION:     1. Interval resolution of ground-glass opacities left lower lobe consistent with resolved pneumonia.   2. Resolved pericardial thickening/fluid.   3. No new chest pathology.  Stability of right lower lobe micronodules dating back to 2016 indicates benign etiology.           Dictated by (CST): Farhan Carter MD on 6/30/2022 at 2:45 PM       Finalized by (CST): Farhan Carter MD on 6/30/2022 at 2:56 PM        PROCEDURE:  MRI SPINE LUMBAR (CPT=72148)     COMPARISON:  [facility], CT, CT ABDOMEN+PELVIS(CONTRAST ONLY)(CPT=74177), 2/05/2019, 12:11 PM.  [facility], XR, XR LUMBAR SPINE (MIN 4 VIEWS) (CPT=72110), 1/21/2020, 10:30 AM.     INDICATIONS:  M19.90 Unspecified osteoarthritis, unspecified site Z82.69 Family history of other diseases of the musculoskeletal system and connective tissue G89.29 Other ch*     TECHNIQUE:  Multiplanar T1 and T2 weighted images including fat suppression sequences.  Images acquired in sagittal and axial planes.       PATIENT STATED HISTORY: (As transcribed by Technologist)  The patient has had low back pain for months with no known injury.          FINDINGS:    There is lumbar lordosis.  The vertebral body heights are maintained.  There is scattered disc desiccation.  There is moderate disc height loss at L4-5 and L5-S1.  There are scattered endplate degenerative changes including endplate edema at L4-5.  No   suspicious focal osseous lesion is evident.  The conus and cauda equina nerve roots are unremarkable in caliber and signal.  There is sclerosis along the SI joints, left greater than right, better characterized on the concurrent MRI of the SI joints.     T12-L1:  Unremarkable.     L1-L2:  Mild facet hypertrophy.  Mild ligamentum flavum thickening.  No spinal canal or foraminal narrowing.     L2-L3:  Mild facet hypertrophy.  Mild ligamentum flavum thickening.  No spinal canal or foraminal narrowing.     L3-L4:  Mild to moderate facet hypertrophy.  Mild ligamentum flavum thickening.  Mild disc bulge.  Post small left foraminal/extra-foraminal disc protrusion.  No spinal canal stenosis.  Mild to moderate left and no right foraminal narrowing.     L4-5:  Moderate degenerative disc disease.  Mild to moderate posterior disc osteophyte complex extending into the far right lateral region.  Mild to moderate facet hypertrophy.  Mild ligamentum flavum thickening.  Mild to moderate spinal canal stenosis.    Bilateral subarticular zone narrowing.  Mild left and moderate right foraminal narrowing.     L5-S1:  Mild to moderate posterior disc osteophyte complex with moderate facet hypertrophy.  No spinal canal stenosis.  Mild right and moderate left foraminal narrowing.     CONCLUSION:       1. Moderate degenerative changes in primarily the lower lumbar spine.  Mild to moderate spinal canal stenosis is seen at L4-5.  Moderate right foraminal narrowing is seen at L4-5.  Moderate left foraminal narrowing is seen at L5-S1.  Moderate   degenerative disc disease noted at L4-5.  Please see above for further details.  2. There is sclerosis along the SI joints, left greater  than right, better characterized on the concurrent MRI of the SI joints.     Dictated by: Stromberg, LeRoy, MD on 5/21/2020 at 9:00 AM     PROCEDURE:  MRI SI JOINTS(CPT=72195)     COMPARISON:  [facility], , MRI SPINE LUMBAR (CPT=72148), 5/21/2020, 8:11 AM.     INDICATIONS:  Low back pain for several months.     TECHNIQUE:    MRI of the sacroiliac joints was performed with axial T1, axial STIR, coronal T1,                                      coronal STIR, sagittal T1, and sagittal STIR images without contrast.        PATIENT STATED HISTORY: (As transcribed by Technologist)  The patient has had low back pain for months with no known injury.         FINDINGS:       SI JOINTS:      There is bilateral sacroiliac joint osteoarthritis noted, appearing moderate in degree on the left and mild on the right.  Regarding the left SI joint, there are moderate regions of subchondral sclerosis, subchondral cystic change and   subchondral edematous changes.  The right SI joint reveals a minimal degree of subchondral sclerosis and edema.      BONY STRUCTURES:      No fracture, pars defect, significant scoliosis, or osseous lesion.       INTRAPELVIC STRUCTURES:  The visualized intrapelvic structures are unremarkable.  No visualized muscular abnormalities are noted.     CONCLUSION:    1. Bilateral SI joint osteoarthritis, mild on the right and moderate on the left.  2. No additional findings.        Dictated by: Mallory Keith DO on 5/21/2020 at 9:27 AM         Labs:  Lab Results   Component Value Date    WBC 5.7 09/20/2023    RBC 4.48 09/20/2023    HGB 13.3 09/20/2023    HCT 41.1 09/20/2023     09/20/2023    MPV 8.7 10/16/2018    MCV 91.7 09/20/2023    MCH 29.7 09/20/2023    MCHC 32.4 09/20/2023    RDW 13.2 09/20/2023    NEPRELIM 2.37 04/25/2023    NEPERCENT 55.8 09/20/2023    LYPERCENT 34.3 09/20/2023    MOPERCENT 6.3 09/20/2023    EOPERCENT 2.5 09/20/2023    BAPERCENT 1.1 09/20/2023    NE 3,181 09/20/2023    LYMABS 1,955  09/20/2023    MOABSO 359 09/20/2023    EOABSO 143 09/20/2023    BAABSO 63 09/20/2023     Lab Results   Component Value Date    GLU 95 09/20/2023    BUN 15 09/20/2023    BUNCREA SEE NOTE: 09/20/2023    CREATSERUM 0.84 09/20/2023    ANIONGAP 3 04/25/2023    GFR 84 11/09/2017    GFRNAA 82 05/26/2022    GFRAA 95 05/26/2022    CA 9.2 09/20/2023    OSMOCALC 288 04/25/2023    ALKPHO 79 09/20/2023    AST 20 09/20/2023    ALT 21 09/20/2023    BILT 0.4 09/20/2023    TP 6.6 09/20/2023    ALB 3.9 09/20/2023    GLOBULIN 2.7 09/20/2023    AGRATIO 1.4 09/20/2023     09/20/2023    K 4.1 09/20/2023     09/20/2023    CO2 26 09/20/2023       Additional Labs:  9/2023  CRP normal  ESR 11 normal    07/2023  LUIS ARMANDO screen negative  Myositis antibody panel negative    04/2023  ESR 20 normal  CRP 0.51 borderline elevated    01/2023  LUIS ARMANDO screen negative  LUIS ARMANDO by IFA 1: 80 speckled  ESR 26  CRP 0.56 borderline  CBC grossly normal  CMP grossly normal    08/2022   LUIS ARMANDO by IFA 1:160 speckled   CK normal   B12 1107 elevated  C3 and C4 normal   ESR 12 normal  CRP 0.52 borderline elevated    05/2022  LUIS ARMANDO by IFA 1:160 speckled  SHAILA panel negative   B12 1048 high   Vit D 45.1 normal   ESR 10 normal   CRP 0.41 borderline     09/2021  CRP 0.32  ESR 8 normal    03/2021  LUIS ARMANDO 1:160 speckled  [facility] ARMANDO screen negative  SHAILA panel negative   CRP 6.58 elevated   ESR normal    01/2021  CRP 0.76  CCP negative  RF negative    12/2020  LUIS ARMANDO 1:160 homogenous  C4 24.3  C3 136  CRP 1.06 borderline  ESR normal  SHAILA panel negative     04/2020    [facility], [place], RNP, SCL 70, Kelley 1, dsDNA, centromere, histone negative  CK normal  RF negative  CCP negative  HLA-B27 negative  ESR normal  CRP normal   LUIS ARMANDO 1: 160 speckled    05/2017  LUIS ARMANDO negative  CRP normal   ESR normal     Hilda Farhad, DO  [facility]  01/05/2024    ?

## 2024-01-18 ENCOUNTER — TELEPHONE (OUTPATIENT)
Dept: FAMILY MEDICINE CLINIC | Facility: CLINIC | Age: 54
End: 2024-01-18

## 2024-01-18 NOTE — TELEPHONE ENCOUNTER
Patients spouse calling for spouse, asking if she could get an antibiotic called in to [facility] for head cold, sinus and congestion, chest congestion.   Please advise.

## 2024-01-18 NOTE — TELEPHONE ENCOUNTER
Pt.'s  called to say she has all of the exact symptoms that he had and he would like an antibiotic called in for her. Let him know it would be forwarded for physician review.

## 2024-01-19 RX ORDER — DOXYCYCLINE HYCLATE 100 MG
100 TABLET ORAL 2 TIMES DAILY
Qty: 14 TABLET | Refills: 0 | Status: SHIPPED | OUTPATIENT
Start: 2024-01-19 | End: 2024-01-26

## 2024-01-19 NOTE — TELEPHONE ENCOUNTER
More information is needed.  How many days has this patient had symptoms for?  Please verify patient's allergy list as she has several antibiotic allergies and I want to make sure they are accurate in case an antibiotic is indicated.

## 2024-01-19 NOTE — TELEPHONE ENCOUNTER
Pt. States the symptoms have been going on for 1 week as of today.   Reviewed all allergies and they are correctly placed in Epic.  Antibiotics being Zithromax, Levofloxacin, and Augmentin.

## 2024-01-19 NOTE — TELEPHONE ENCOUNTER
Prescription sent for doxycycline.  Recommend patient only take the antibiotic if after 10 days she is no better at all or worse.

## 2024-01-22 NOTE — TELEPHONE ENCOUNTER
Spoke w/ pt's . She started the antibiotic this morning. Supportive care measures given. Advised follow up if symptoms worsening or not improved. Verbalized understanding.

## 2024-01-23 ENCOUNTER — TELEPHONE (OUTPATIENT)
Dept: FAMILY MEDICINE CLINIC | Facility: CLINIC | Age: 54
End: 2024-01-23

## 2024-01-23 NOTE — TELEPHONE ENCOUNTER
Spoke w/pt.'s spouse Lawson and let him know that at this point the pt. Should be seen. Dr. Moulton is [facility] today. Pt. Can go to [facility] or [facility] to be seen today or reach out to schedule w/Dr. Moulton later this week. Pt. Has several allergies to Antibiotics so if she feels she is having a reaction then she should stop taking medication until she's seen. Patient's spouse Lawson verbalized agreement and understanding.

## 2024-01-23 NOTE — TELEPHONE ENCOUNTER
Pt's spouse, Lawson calling on behalf of pt to ask what Dr. Moulton would recommend. Pt started taking her Doxycycline Hyclate 100 MG Oral Tab yesterday. Pt took 2 pills and noticed she was having left arm pain from her bicep down to her wrist. Pt would like to know if she should continue to take or is there another medication she can take?    Please advise.

## 2024-02-12 LAB
% SATURATION: 28 % (CALC) (ref 16–45)
C-REACTIVE PROTEIN: 3.4 MG/L
FERRITIN: 20 NG/ML (ref 16–232)
IRON BINDING CAPACITY: 393 MCG/DL (CALC) (ref 250–450)
IRON, TOTAL: 109 MCG/DL (ref 45–160)
SED RATE BY MODIFIED$WESTERGREN: 17 MM/H
VITAMIN B12: 1114 PG/ML (ref 200–1100)
VITAMIN D, 25-OH, TOTAL: 41 NG/ML (ref 30–100)

## 2024-02-28 ENCOUNTER — PATIENT MESSAGE (OUTPATIENT)
Dept: FAMILY MEDICINE CLINIC | Facility: CLINIC | Age: 54
End: 2024-02-28

## 2024-02-28 NOTE — TELEPHONE ENCOUNTER
Pt is due for labs around 4/1. She has OV scheduled with you 3/6 so she's asking if it's ok to do those labs now. Please advise, thanks.

## 2024-02-28 NOTE — TELEPHONE ENCOUNTER
From: Rosa hSaver  To: Oneyda Moulton  Sent: 2/28/2024 9:33 AM CST  Subject: blood draw?    Good morning - I have an appointment coming up next week and saw that there are orders for blood draw to be done in April - do you want me to wait until then or get the blood draw now before my upcoming appointment?    Thank you   Rosa

## 2024-02-29 ENCOUNTER — HOSPITAL ENCOUNTER (OUTPATIENT)
Dept: GENERAL RADIOLOGY | Age: 54
Discharge: HOME | End: 2024-02-29
Attending: INTERNAL MEDICINE
Payer: COMMERCIAL

## 2024-02-29 ENCOUNTER — HOSPITAL ENCOUNTER (OUTPATIENT)
Dept: MAMMOGRAPHY | Age: 54
Discharge: HOME | End: 2024-02-29
Attending: FAMILY MEDICINE
Payer: COMMERCIAL

## 2024-02-29 DIAGNOSIS — M51.36 DDD (DEGENERATIVE DISC DISEASE), LUMBAR: ICD-10-CM

## 2024-02-29 DIAGNOSIS — Z12.31 ENCOUNTER FOR SCREENING MAMMOGRAM FOR MALIGNANT NEOPLASM OF BREAST: ICD-10-CM

## 2024-02-29 DIAGNOSIS — M25.551 BILATERAL HIP PAIN: ICD-10-CM

## 2024-02-29 DIAGNOSIS — M25.552 BILATERAL HIP PAIN: ICD-10-CM

## 2024-02-29 PROCEDURE — 73523 X-RAY EXAM HIPS BI 5/> VIEWS: CPT | Performed by: INTERNAL MEDICINE

## 2024-02-29 PROCEDURE — 72110 X-RAY EXAM L-2 SPINE 4/>VWS: CPT | Performed by: INTERNAL MEDICINE

## 2024-02-29 PROCEDURE — 77067 SCR MAMMO BI INCL CAD: CPT | Performed by: FAMILY MEDICINE

## 2024-02-29 PROCEDURE — 77063 BREAST TOMOSYNTHESIS BI: CPT | Performed by: FAMILY MEDICINE

## 2024-03-01 ENCOUNTER — PATIENT MESSAGE (OUTPATIENT)
Dept: RHEUMATOLOGY | Facility: CLINIC | Age: 54
End: 2024-03-01

## 2024-03-01 ENCOUNTER — PATIENT MESSAGE (OUTPATIENT)
Dept: FAMILY MEDICINE CLINIC | Facility: CLINIC | Age: 54
End: 2024-03-01

## 2024-03-01 DIAGNOSIS — M25.552 BILATERAL HIP PAIN: ICD-10-CM

## 2024-03-01 DIAGNOSIS — M25.551 BILATERAL HIP PAIN: ICD-10-CM

## 2024-03-01 DIAGNOSIS — M51.36 DDD (DEGENERATIVE DISC DISEASE), LUMBAR: Primary | ICD-10-CM

## 2024-03-04 NOTE — TELEPHONE ENCOUNTER
From: Rosa Shaver  To: Oneyda Moulton  Sent: 3/1/2024 3:07 PM CST  Subject: Blood draw    Can you please send the orders for the blood draw over to [facility] in [place], on [facility] [place] [facility] [place] AND print out hard copies for me to ? Or can you put copies in "[employer] that I can print? They claim to have issues with our orders showing up in their system and I don't want to show for an appointment and have them not be able to do it.    Thank you!  Rosa

## 2024-03-06 ENCOUNTER — HOSPITAL ENCOUNTER (OUTPATIENT)
Dept: GENERAL RADIOLOGY | Age: 54
Discharge: HOME | End: 2024-03-06
Attending: FAMILY MEDICINE
Payer: COMMERCIAL

## 2024-03-06 ENCOUNTER — OFFICE VISIT (OUTPATIENT)
Dept: FAMILY MEDICINE CLINIC | Facility: CLINIC | Age: 54
End: 2024-03-06
Payer: COMMERCIAL

## 2024-03-06 VITALS
HEIGHT: 61 IN | HEART RATE: 75 BPM | OXYGEN SATURATION: 99 % | SYSTOLIC BLOOD PRESSURE: 132 MMHG | DIASTOLIC BLOOD PRESSURE: 78 MMHG | TEMPERATURE: 98 F | BODY MASS INDEX: 47.58 KG/M2 | WEIGHT: 252 LBS | RESPIRATION RATE: 13 BRPM

## 2024-03-06 DIAGNOSIS — N94.10 DYSPAREUNIA IN FEMALE: ICD-10-CM

## 2024-03-06 DIAGNOSIS — M79.671 ACUTE FOOT PAIN, RIGHT: ICD-10-CM

## 2024-03-06 DIAGNOSIS — E03.9 ACQUIRED HYPOTHYROIDISM: ICD-10-CM

## 2024-03-06 DIAGNOSIS — E66.01 MORBID OBESITY WITH BMI OF 45.0-49.9, ADULT (HCC): ICD-10-CM

## 2024-03-06 DIAGNOSIS — Z87.898 HISTORY OF NAUSEA AND VOMITING: ICD-10-CM

## 2024-03-06 DIAGNOSIS — E78.2 MIXED HYPERLIPIDEMIA: ICD-10-CM

## 2024-03-06 DIAGNOSIS — R10.31 RIGHT INGUINAL PAIN: ICD-10-CM

## 2024-03-06 DIAGNOSIS — Z51.81 ENCOUNTER FOR MEDICATION MONITORING: ICD-10-CM

## 2024-03-06 DIAGNOSIS — R10.2 PELVIC PAIN: ICD-10-CM

## 2024-03-06 DIAGNOSIS — Z00.00 ROUTINE GENERAL MEDICAL EXAMINATION AT A HEALTH CARE FACILITY: Primary | ICD-10-CM

## 2024-03-06 DIAGNOSIS — R73.03 PREDIABETES: ICD-10-CM

## 2024-03-06 DIAGNOSIS — R74.01 ELEVATED ALT MEASUREMENT: ICD-10-CM

## 2024-03-06 LAB
ABSOLUTE BASOPHILS: 48 CELLS/UL (ref 0–200)
ABSOLUTE EOSINOPHILS: 139 CELLS/UL (ref 15–500)
ABSOLUTE LYMPHOCYTES: 1690 CELLS/UL (ref 850–3900)
ABSOLUTE MONOCYTES: 341 CELLS/UL (ref 200–950)
ABSOLUTE NEUTROPHILS: 2582 CELLS/UL (ref 1500–7800)
ALBUMIN/GLOBULIN RATIO: 1.3 (CALC) (ref 1–2.5)
ALBUMIN: 4 G/DL (ref 3.6–5.1)
ALKALINE PHOSPHATASE: 96 U/L (ref 37–153)
ALT: 33 U/L (ref 6–29)
AST: 26 U/L (ref 10–35)
BASOPHILS: 1 %
BILIRUBIN, TOTAL: 0.4 MG/DL (ref 0.2–1.2)
BUN: 17 MG/DL (ref 7–25)
CALCIUM: 9.3 MG/DL (ref 8.6–10.4)
CARBON DIOXIDE: 25 MMOL/L (ref 20–32)
CHLORIDE: 106 MMOL/L (ref 98–110)
CHOL/HDLC RATIO: 1.9 (CALC)
CHOLESTEROL, TOTAL: 161 MG/DL
CREATININE: 0.77 MG/DL (ref 0.5–1.03)
EGFR: 93 ML/MIN/1.73M2
EOSINOPHILS: 2.9 %
GLOBULIN: 3 G/DL (CALC) (ref 1.9–3.7)
GLUCOSE: 123 MG/DL (ref 65–99)
HDL CHOLESTEROL: 87 MG/DL
HEMATOCRIT: 39.4 % (ref 35–45)
HEMOGLOBIN A1C: 6 % OF TOTAL HGB
HEMOGLOBIN: 12.9 G/DL (ref 11.7–15.5)
LDL-CHOLESTEROL: 57 MG/DL (CALC)
LYMPHOCYTES: 35.2 %
MCH: 29.5 PG (ref 27–33)
MCHC: 32.7 G/DL (ref 32–36)
MCV: 90.2 FL (ref 80–100)
MONOCYTES: 7.1 %
MPV: 9.8 FL (ref 7.5–12.5)
NEUTROPHILS: 53.8 %
NON-HDL CHOLESTEROL: 74 MG/DL (CALC)
PLATELET COUNT: 327 THOUSAND/UL (ref 140–400)
POTASSIUM: 4.1 MMOL/L (ref 3.5–5.3)
PROTEIN, TOTAL: 7 G/DL (ref 6.1–8.1)
RDW: 13.9 % (ref 11–15)
RED BLOOD CELL COUNT: 4.37 MILLION/UL (ref 3.8–5.1)
SODIUM: 141 MMOL/L (ref 135–146)
T4, FREE: 1.2 NG/DL (ref 0.8–1.8)
TRIGLYCERIDES: 87 MG/DL
TSH: 1.54 MIU/L
WHITE BLOOD CELL COUNT: 4.8 THOUSAND/UL (ref 3.8–10.8)

## 2024-03-06 PROCEDURE — 73630 X-RAY EXAM OF FOOT: CPT | Performed by: FAMILY MEDICINE

## 2024-03-06 RX ORDER — PANCRELIPASE 60000; 12000; 38000 [USP'U]/1; [USP'U]/1; [USP'U]/1
CAPSULE, DELAYED RELEASE PELLETS ORAL
COMMUNITY
Start: 2024-01-10

## 2024-03-06 RX ORDER — SIMVASTATIN 20 MG
20 TABLET ORAL EVERY EVENING
Qty: 90 TABLET | Refills: 3 | Status: SHIPPED | OUTPATIENT
Start: 2024-03-06

## 2024-03-06 RX ORDER — LANSOPRAZOLE 30 MG/1
30 CAPSULE, DELAYED RELEASE ORAL DAILY
Qty: 90 CAPSULE | Refills: 1 | Status: SHIPPED | OUTPATIENT
Start: 2024-03-06

## 2024-03-06 RX ORDER — ONDANSETRON 8 MG/1
8 TABLET, ORALLY DISINTEGRATING ORAL EVERY 8 HOURS PRN
Qty: 20 TABLET | Refills: 1 | Status: SHIPPED | OUTPATIENT
Start: 2024-03-06

## 2024-03-06 RX ORDER — LEVOTHYROXINE SODIUM 112 UG/1
112 TABLET ORAL
Qty: 90 TABLET | Refills: 3 | Status: SHIPPED | OUTPATIENT
Start: 2024-03-06

## 2024-03-06 NOTE — PROGRESS NOTES
Chief Complaint   Patient presents with    Physical    Pain     R foot as of last night    Other     Pelvic pain, right inguinal pain, dyspareunia, prediabetes, elevated ALT, history of nausea and vomiting    Hypothyroidism         HPI:   Rosa Shaver is a 53 year old female       Abdominal pain:  X 2months.   Intermittent and very brief.  Pain after sex but other times as well.  \"Shots of pain\".  Denies blood in stool and black stool.  Denies n/v.      Foot pain:  Acute.  Right foot.  Noticed last night.  Outer side of the foot.  Pain worse with weightbearing.  At rest pain is still present but less.            Symptoms: denies discharge, itching, burning or dysuria.   H/o hysterectomy for benign disease, PCOS, endometriosis.      Last colonoscopy: UTD  Last mammogram: UTD      Wt Readings from Last 6 Encounters:   03/06/24 252 lb (114.3 kg)   01/05/24 248 lb (112.5 kg)   12/06/23 240 lb (108.9 kg)   11/16/23 249 lb 12.8 oz (113.3 kg)   08/18/23 240 lb (108.9 kg)   07/26/23 235 lb (106.6 kg)     Body mass index is 47.61 kg/m².       Patient Active Problem List   Diagnosis    Allergic rhinitis    Eczema    GERD (gastroesophageal reflux disease)    Chronic low back pain    Morbid obesity with BMI of 45.0-49.9, adult (HCC)    Mixed hyperlipidemia    Right inguinal pain    Low HDL (under 40)    Lower abdominal adhesions    Influenza vaccination declined    Acquired hypothyroidism    S/P laparoscopy 7 from 94-99    Primary open angle glaucoma of both eyes, moderate stage    Bursitis    Family history of skin cancer    Encounter for long-term current use of medication    Chronic pain of both knees    Chondromalacia patellae, right knee    Migraine without aura and without status migrainosus, not intractable    Family history of CHF (congestive heart failure)    Family history of cardiomyopathy    B12 deficiency    Pelvic pain    Microscopic hematuria    Posterior vitreous detachment of both eyes    Heartburn     Family history of colon cancer    Personal history of colonic polyps    Cafe au lait spots    S/P laparoscopic sleeve gastrectomy    Calcaneal spur of right foot    Metatarsalgia of right foot    Patellofemoral arthritis of right knee    Glaucoma suspect of both eyes    Intraocular pressure increase, bilateral    Encounter for medication monitoring    Surgical Menopause 2015 age 44 yrs old - on ERT    Hx of laparoscopy - RSO 2012    H/O laparoscopy - LSO with lysis 2015    H/O: hysterectomy - 2006, abdominal for endometriosis.     Family history of breast cancer    BRCA negative    Excess skin of abdomen    Excess skin of breast    Panniculitis    Family history of ankylosing spondylitis    Postoperative malabsorption (HCC)    Hypoglycemia    Acute pain of left wrist    Left foot pain    Vertigo    Elevated C-reactive protein (CRP)    COVID-19 vaccination refused    Pneumococcal vaccine refused    Laryngitis    COVID-19 virus infection    History of 2019 novel coronavirus disease (COVID-19)    Vocal cord edema    Atypical chest pain    Chronic pericarditis (HCC)    Atrial tachycardia    Acute pain of right wrist    Injury of right wrist    Pain of left heel    Acute foot pain, right    Dyspareunia in female    Prediabetes    Elevated ALT measurement    History of nausea and vomiting     Current Outpatient Medications   Medication Sig Dispense Refill    CREON 57730-61755 units Oral Cap DR Particles TAKE 73138 UNTS BY MOUTH EVERY MORNING AND 65876 UNITS EVERY NIGHT AT BEDTIME. TAKE TWICE DAILY WITH SNACKS      lansoprazole 30 MG Oral Capsule Delayed Release Take 1 capsule (30 mg total) by mouth daily. 90 capsule 1    levothyroxine 112 MCG Oral Tab Take 1 tablet (112 mcg total) by mouth before breakfast. 90 tablet 3    ondansetron (ZOFRAN ODT) 8 MG Oral Tablet Dispersible Take 1 tablet (8 mg total) by mouth every 8 (eight) hours as needed for Nausea. 20 tablet 1    simvastatin 20 MG Oral Tab Take 1 tablet (20 mg total)  by mouth every evening. 90 tablet 3    hydroxychloroquine 200 MG Oral Tab Take 1 tablet (200 mg total) by mouth 2 (two) times daily. 180 tablet 1    Blood Glucose Monitoring Suppl Does not apply Device THIS IS FOR 1 CONTOUR GLUCOSE MONITOR 1 each 0    Glucose Blood (BLOOD GLUCOSE TEST) In Vitro Strip USE 1 CONTOUR TEST STRIP TO TEST UP TO 6 TIMES A  strip 11    Lancets Does not apply Misc USE 1 LANCET UP TO 6 TIMES A  each 11    metoprolol succinate ER 25 MG Oral Tablet 24 Hr Take 0.5 tablets (12.5 mg total) by mouth daily.      Nystatin 950977 UNIT/GM External Powder Apply 1 Application topically 3 (three) times daily. 60 g 1    meclizine 25 MG Oral Tab Take 1 tablet (25 mg total) by mouth 3 (three) times daily as needed for Dizziness. 30 tablet 2    Zinc 50 MG Oral Tab Take by mouth.      TURMERIC CURCUMIN OR Take by mouth.      albuterol 108 (90 Base) MCG/ACT Inhalation Aero Soln Inhale 2 puffs into the lungs every 6 (six) hours as needed for Wheezing or Shortness of Breath (Cough). 1 each 0    Fluocinonide 0.05 % External Solution Apply 1 Application  topically daily.      ascorbic acid 500 MG Oral Chew Tab Chew 1 tablet (500 mg total) by mouth daily.      clotrimazole-betamethasone 1-0.05 % External Cream Apply 1 Application topically 2 (two) times daily as needed. 60 g 3    CALCIUM OR Take 2,400 mg by mouth daily.      saccharomyces boulardii 250 MG Oral Cap Pro-biotic 1 capsule by mouth daily      tiZANidine HCl 4 MG Oral Tab Take 1 tablet (4 mg total) by mouth nightly as needed. 30 tablet 2    ALPRAZolam 0.5 MG Oral Tab 1/2 to one tab daily as needed 10 tablet 0    magnesium 250 MG Oral Tab Take 1 tablet (250 mg total) by mouth daily.      Multiple Vitamins-Minerals (BARIATRIC MULTIVITAMINS/IRON OR) Take 1 capsule by mouth daily.      PATIENT SUPPLIED MEDICATION Essential Oils for allergies      Cholecalciferol (VITAMIN D3) 2000 UNITS Oral Tab Take 1 tablet (2,000 Units total) by mouth daily. 2  tab a day to make 4,000 units        Past Medical History:   Diagnosis Date    Abdominal pain     Acute atopic conjunctivitis, bilateral 03/27/2019    Yamil Vaughn M.D.    Acute meniscal tear, medial, right, initial encounter 05/28/2019    MRI 5/21/2019: Mild undersurface tearing of the posterior root ligament of the medial meniscus.    Allergic rhinitis     Anxiety     Arrhythmia     paroxsymal atrial tachycardia    Arthritis     Atrial tachycardia     Back pain     Benign paroxysmal positional vertigo 06/01/2020    Bleeding nose     Bloating     Blood in urine     Body piercing     Calculus of kidney     Change in hair     Chest pain     Chest pain on exertion     Colon polyp 09/27/2013    Gail Katz M.D.    Constipation     Decorative tattoo     Diabetes ([facility]) 1/7/2013    Diarrhea, unspecified     Diverticulitis of sigmoid colon 10/17/2013    Diverticulosis 09/27/2013    Gail Katz M.D.    Dizziness     Easy bruising     Endometriosis     Esophageal reflux     Essential hypertension     Eye disease     Fibromyalgia     Food intolerance     Frequent urination     Glaucoma 01/07/2013    Headache disorder     Heart palpitations atrial tachecardia 01/2022    Heartburn     High cholesterol     History of COVID-19     History of nausea and vomiting 3/6/2024    Hoarseness, chronic     Hyperlipidemia     Hypothyroidism     Injury of peroneal tendon of right foot 05/23/2018    Itch of skin     Keratoconjunctivitis sicca not specified as Sjogren's, bilateral 03/27/2019    Yamil Vaughn M.D.    Lateral epicondylitis of right elbow 07/09/2020    Leaking of urine     Lung nodule     pt unsure of which side    Major depressive disorder, recurrent episode, mild with anxious distress ([facility]) 07/27/2017    Migraines     Morbid obesity with BMI of 45.0-49.9, adult ([facility]) 07/13/2014    Obesity     Open angle with borderline findings, high risk, bilateral 03/27/2019    Yamil Vaughn M.D.    Osteoarthritis      back    Other vitreous opacities, left eye 2019    Yamil Vaughn M.D.    Other vitreous opacities, right eye 2019    Yamil Vaughn M.D.    Pain in joints     Painful urination     Polycystic ovaries     ovary embedded in pelvic wall--right side    PONV (postoperative nausea and vomiting)     slow to awaken    Posterior vitreous detachment of both eyes 2017    Right > Left    Primary open angle glaucoma of both eyes, moderate stage 2016    Rash     Sleep disturbance     Spitting up blood     Sputum production     Stress     Trochanteric bursitis of both hips 2016    Left >>  Right     Visual impairment     glasses    Vitreous degeneration, right eye 2019    Yamil Vaughn M.D.    Wears glasses     Weight gain       Past Surgical History:   Procedure Laterality Date    ANGIOGRAM      12 [place]    ANGIOPLASTY (CORONARY)      COLONOSCOPY      COLONOSCOPY  2018    COLONOSCOPY N/A 2023    Procedure: COLONOSCOPY;  Surgeon: Rupa Cheatham DO;  Location:  ENDOSCOPY    D & C   &     DILATION & CURETTAGE CERVICAL STUMP       and     FOOT SURGERY Right 10/18/2019    Tendon repair     GASTRIC BYPASS,OBESITY,SB RECONSTRUC  2018    gastric sleeve    HERNIA SURGERY  10/18/2018    Hiatal hernia Dr. Terri Avila     HYSTERECTOMY  2006    partial hystero.    LAP SLEEVE GASTRECTOMY  10/18/2018    Dr. terri avila           OOPHORECTOMY Bilateral     ovaries removed after partial.    OTHER  10/15/2018    Sleeve biatric surgery    OTHER SURGICAL HISTORY      removal of right ovary    REMOVAL OF KIDNEY STONE      age 14, does not remember what kind of surgery. no abdominal or flank scar    TILT TABLE EVALUATION      veinogram [facility]    TOTAL ABDOM HYSTERECTOMY      UPPER GI ENDOSCOPY,EXAM        Family History   Problem Relation Age of Onset    Cancer Mother         basal cell X 3; LUNG    Heart Disorder Mother         SVT     Hypertension Mother     Dementia Mother         early signs 2022    Depression Mother     Cancer Father         Colon & prostate cancers    Heart Disorder Father     Colon Cancer Father     Prostate Cancer Father     Diabetes Father     Hypertension Father     Heart Attack Father     Colon Polyps Brother     Alcohol and Other Disorders Associated Brother     Colon Polyps Brother     No Known Problems Son     No Known Problems Son     Alcohol and Other Disorders Associated Maternal Grandfather     Alcohol and Other Disorders Associated Paternal Grandfather     Cancer Maternal Aunt         breast, colon    Breast Cancer Maternal Aunt 50        In her 50's.    Ovarian Cancer Maternal Aunt 60        In her 60's.    Cancer Maternal Aunt         breast, basal cell    Breast Cancer Maternal Aunt 68        Late 60's.    Ovarian Cancer Maternal Aunt     Cancer Maternal Aunt         basal cell    Cancer Maternal Aunt         basal cell    Cancer Maternal Aunt         bone    Cancer Paternal Aunt         lung    Breast Cancer Cousin 62    Cancer Cousin         esoph,adenocarcinoma    Cancer Cousin         Non Hodgkins Lymphoma    Cancer Cousin         basal cell      Social History:   Social History     Socioeconomic History    Marital status:    Tobacco Use    Smoking status: Never    Smokeless tobacco: Never   Vaping Use    Vaping Use: Never used   Substance and Sexual Activity    Alcohol use: Yes     Comment: weekly    Drug use: No    Sexual activity: Yes     Partners: Male     Birth control/protection: Hysterectomy   Other Topics Concern    Caffeine Concern Yes     Comment: 20-30 oz of coffee daily    Stress Concern Yes    Weight Concern Yes    Special Diet No    Exercise No    Seat Belt Yes     Occ: currently not working. Marital Status: . Children: 2.   Exercise: none. Has treadmill and rower and heavy bag at home but not using.  Diet: doesn't watch     REVIEW OF SYSTEMS:   GENERAL: Overall feels well.   Positive for weight gain  SKIN: denies any unusual skin lesions or rashes  EYES: denies vision changes  HEENT: denies upper respiratory symptoms  LUNGS: denies cough or shortness of breath with exertion  CHEST:  denies breast changes or pain  CARDIOVASCULAR: denies chest pain or tightness on exertion  VASCULAR: No lower extremity swelling  GI: denies nausea, vomiting, bowel movement changes, or blood in stool  : No complaint of urinary problems, vaginal discharge or discomfort  MUSCULOSKELETAL: As in HPI  NEURO: denies headaches or dizziness  PSYCH: denies depression or anxiety  ENDOCRINE: No complaints of temperature intolerance, polyuria, or excessive sweating.  LYMPHATICS: No complaints of swollen glands      EXAM:   /78   Pulse 75   Temp 97.8 °F (36.6 °C) (Temporal)   Resp 13   Ht 5' 1\" (1.549 m)   Wt 252 lb (114.3 kg)   LMP 01/01/2006 (Approximate)   SpO2 99%   BMI 47.61 kg/m²   Body mass index is 47.61 kg/m².   GENERAL: NAD, Pleasant obese  female.  SKIN: No visible rashes or suspicious lesions.  HEENT: atraumatic, normocephalic, EACs and TMs clear normal bilaterally.  Nose: No nasal discharge.  OP: MMM.  Posteriorly no exudate or erythema.  EYES: PERRL, EOMI, sclera, conjunctiva are clear  NECK: supple, no adenopathy/thyromegaly/masses  LUNGS: Clear to auscultation bilateral, no rales, rhonchi or wheezing  CARDIO: RRR without murmur normal S1S2  ABD: Obese. Soft, non tender to palpation.  No masses, HSM, or pulsations appreciated.  MUSCULOSKELETAL: Right foot: Positive tenderness to palpation in area of fourth and fifth metatarsals especially proximally.  Gait is abnormal due to right foot pain.  EXTREMITIES: no clubbing, cyanosis, or edema  NEURO: Alert and oriented x3.   PSYCH: normal affect      Immunization History   Administered Date(s) Administered    TDAP 05/14/2013   Pended Date(s) Pended    Influenza Vaccine Refused 01/30/2022       DATA:      Lab Results   Component Value  Date    A1C 6.0 (H) 03/05/2024    A1C 5.5 01/05/2023    A1C 5.5 01/10/2022     01/05/2023     01/10/2022     09/08/2021     Lab Results   Component Value Date    CHOLEST 161 03/05/2024    CHOLEST 151 04/25/2023    CHOLEST 173 08/31/2022     Lab Results   Component Value Date    HDL 87 03/05/2024    HDL 88 (H) 04/25/2023    HDL 91 (H) 08/31/2022     Lab Results   Component Value Date    LDL 57 03/05/2024    LDL 48 04/25/2023    LDL 63 08/31/2022     Lab Results   Component Value Date    TRIG 87 03/05/2024    TRIG 83 04/25/2023    TRIG 107 08/31/2022     Lab Results   Component Value Date    AST 26 03/05/2024    AST 20 09/20/2023    AST 17 04/25/2023     Lab Results   Component Value Date    ALT 33 (H) 03/05/2024    ALT 21 09/20/2023    ALT 26 04/25/2023       Thyroid:    Lab Results   Component Value Date    TSH 1.54 03/05/2024    TSH 1.34 10/12/2023    TSH 0.227 (L) 06/21/2023    T4F 1.2 03/05/2024    T4F 1.3 10/12/2023    T4F 1.4 06/21/2023         ASSESSMENT AND PLAN:   Rosa Shaver is a 53 year old female who presents for a complete physical exam.        Encounter Diagnoses   Name Primary?    Routine general medical examination at a health care facility Yes    Acute foot pain, right     Pelvic pain     Dyspareunia in female     Right inguinal pain     Morbid obesity with BMI of 45.0-49.9, adult (HCC)     Mixed hyperlipidemia     Prediabetes     Elevated ALT measurement     Acquired hypothyroidism     History of nausea and vomiting     Encounter for medication monitoring          1. Routine general medical examination at a health care facility  Patient provided handout on women's health and prevention.     2. Acute foot pain, right  X-ray of foot results received after patient's office visit, negative for fracture, however this does not rule out stress fracture.  Patient may need MRI in the near future.  Patient referred to Dr. Abel for further evaluation and care.    - Podiatry Referral  - In Network  - XR FOOT WEIGHTBEARING (3 VIEWS), RIGHT   (CPT=73630); Future    3. Pelvic pain  4. Dyspareunia in female  Patient referred to Dr. Padilla and colleagues for further evaluation and care.    - OBG Referral - [facility] ([place])    5. Right inguinal pain  Patient referred to Dr. Lepe to evaluate for possible inguinal hernia or other hernia.  - Surgery Referral - In Network    6. Morbid obesity with BMI of 45.0-49.9, adult (HCC)  Recommend healthy diet including green leafy vegetables, fresh fruits and lean protein.  Aerobic exercise 30 minutes five days a week for cardiovascular fitness and 45-60 minutes 6-7 days a week for weight loss.     7. Mixed hyperlipidemia  Lipids to goal.  Recommend continue simvastatin 20 mg nightly.  Recommend reevaluate lipid panel at 12-month interval.  Follow-up on mixed hyperlipidemia in 12 months.    - simvastatin 20 MG Oral Tab; Take 1 tablet (20 mg total) by mouth every evening.  Dispense: 90 tablet; Refill: 3    8. Prediabetes  Recommend healthy diet such as Mediterranean diet.  Patient currently unable to exercise due to acute right foot pain and right inguinal pain.    9. Elevated ALT measurement  Patient to follow-up with Dr. Cheatham.    - Gastro Referral - In Network    10. Acquired hypothyroidism  Thyroid function tests in normal range.  Recommend continue levothyroxine 112 mcg every morning.  We will adjust dose in the future as indicated.  Recommend recheck TFTs at 12-month interval, however sooner if significant weight loss or weight gain.    - levothyroxine 112 MCG Oral Tab; Take 1 tablet (112 mcg total) by mouth before breakfast.  Dispense: 90 tablet; Refill: 3    11. History of nausea and vomiting  Patient has history of vomiting so violently that it adversely affected her eyes.  Prescription for ondansetron as below.    - ondansetron (ZOFRAN ODT) 8 MG Oral Tablet Dispersible; Take 1 tablet (8 mg total) by mouth every 8 (eight) hours as needed for Nausea.   Dispense: 20 tablet; Refill: 1    12. Encounter for medication monitoring  Medication use, risks, benefits, side effects and precautions discussed, patient verbalizes understanding. Questions encouraged and answered to patient's satisfaction.    - levothyroxine 112 MCG Oral Tab; Take 1 tablet (112 mcg total) by mouth before breakfast.  Dispense: 90 tablet; Refill: 3  - ondansetron (ZOFRAN ODT) 8 MG Oral Tablet Dispersible; Take 1 tablet (8 mg total) by mouth every 8 (eight) hours as needed for Nausea.  Dispense: 20 tablet; Refill: 1  - simvastatin 20 MG Oral Tab; Take 1 tablet (20 mg total) by mouth every evening.  Dispense: 90 tablet; Refill: 3        Meds & Refills for this Visit:  Requested Prescriptions     Signed Prescriptions Disp Refills    lansoprazole 30 MG Oral Capsule Delayed Release 90 capsule 1     Sig: Take 1 capsule (30 mg total) by mouth daily.    levothyroxine 112 MCG Oral Tab 90 tablet 3     Sig: Take 1 tablet (112 mcg total) by mouth before breakfast.    ondansetron (ZOFRAN ODT) 8 MG Oral Tablet Dispersible 20 tablet 1     Sig: Take 1 tablet (8 mg total) by mouth every 8 (eight) hours as needed for Nausea.    simvastatin 20 MG Oral Tab 90 tablet 3     Sig: Take 1 tablet (20 mg total) by mouth every evening.       Imaging & Consults:  PODIATRY - INTERNAL  OBG - INTERNAL  GASTRO - INTERNAL  SURGERY - INTERNAL    Return in about 6 months (around 9/6/2024) for Chronic conditions.  Sooner if needed..

## 2024-03-24 DIAGNOSIS — M79.7 FIBROMYALGIA: ICD-10-CM

## 2024-03-24 DIAGNOSIS — M15.9 PRIMARY OSTEOARTHRITIS INVOLVING MULTIPLE JOINTS: ICD-10-CM

## 2024-03-24 DIAGNOSIS — M51.36 DDD (DEGENERATIVE DISC DISEASE), LUMBAR: ICD-10-CM

## 2024-03-25 RX ORDER — DULOXETIN HYDROCHLORIDE 30 MG/1
30 CAPSULE, DELAYED RELEASE ORAL NIGHTLY
Qty: 30 CAPSULE | Refills: 0 | Status: SHIPPED | OUTPATIENT
Start: 2024-03-25

## 2024-03-25 NOTE — TELEPHONE ENCOUNTER
Last office visit: 1/5/2024    Next Rheum Apt:7/5/2024 Hilda Llamas DO    Last fill: unknown     Labs:   Lab Results   Component Value Date    CREATSERUM 0.77 03/05/2024    GFR 84 11/09/2017    ALKPHO 96 03/05/2024    AST 26 03/05/2024    ALT 33 (H) 03/05/2024    BILT 0.4 03/05/2024    TP 7.0 03/05/2024    ALB 4.0 03/05/2024       Lab Results   Component Value Date    WBC 4.8 03/05/2024    HGB 12.9 03/05/2024     03/05/2024    NEPRELIM 2.37 04/25/2023    NEPERCENT 53.8 03/05/2024    LYPERCENT 35.2 03/05/2024    NE 2,582 03/05/2024    LYMABS 1,690 03/05/2024

## 2024-04-01 RX ORDER — LANSOPRAZOLE 30 MG/1
30 CAPSULE, DELAYED RELEASE ORAL DAILY
Qty: 90 CAPSULE | Refills: 1 | Status: SHIPPED | OUTPATIENT
Start: 2024-04-01

## 2024-04-01 NOTE — TELEPHONE ENCOUNTER
Requested Prescriptions     Signed Prescriptions Disp Refills    LANSOPRAZOLE 30 MG Oral Capsule Delayed Release 90 capsule 1     Sig: TAKE 1 CAPSULE(30 MG) BY MOUTH DAILY     Authorizing Provider: ANTONETTE HARRIS     Ordering User: LILIANA ABREU     Refilled per protocol/OV notes

## 2024-05-06 ENCOUNTER — APPOINTMENT (OUTPATIENT)
Dept: CT IMAGING | Age: 54
End: 2024-05-06
Attending: EMERGENCY MEDICINE
Payer: COMMERCIAL

## 2024-05-06 ENCOUNTER — HOSPITAL ENCOUNTER (OUTPATIENT)
Age: 54
Discharge: HOME | End: 2024-05-06
Attending: EMERGENCY MEDICINE
Payer: COMMERCIAL

## 2024-05-06 VITALS
DIASTOLIC BLOOD PRESSURE: 79 MMHG | TEMPERATURE: 98 F | SYSTOLIC BLOOD PRESSURE: 153 MMHG | HEART RATE: 74 BPM | WEIGHT: 245 LBS | OXYGEN SATURATION: 100 % | RESPIRATION RATE: 20 BRPM | HEIGHT: 61 IN | BODY MASS INDEX: 46.26 KG/M2

## 2024-05-06 DIAGNOSIS — K57.92 ACUTE DIVERTICULITIS: Primary | ICD-10-CM

## 2024-05-06 LAB
#MXD IC: 0.5 X10ˆ3/UL (ref 0.1–1)
BILIRUB UR QL STRIP: NEGATIVE
BUN BLD-MCNC: 17 MG/DL (ref 7–18)
CHLORIDE BLD-SCNC: 105 MMOL/L (ref 98–112)
CLARITY UR: CLEAR
CO2 BLD-SCNC: 23 MMOL/L (ref 21–32)
COLOR UR: YELLOW
CREAT BLD-MCNC: 0.8 MG/DL
EGFRCR SERPLBLD CKD-EPI 2021: 89 ML/MIN/1.73M2 (ref 60–?)
GLUCOSE BLD-MCNC: 148 MG/DL (ref 70–99)
GLUCOSE UR STRIP-MCNC: NEGATIVE MG/DL
HCT VFR BLD AUTO: 39.8 %
HCT VFR BLD CALC: 41 %
HGB BLD-MCNC: 13 G/DL
ISTAT IONIZED CALCIUM FOR CHEM 8: 1.19 MMOL/L (ref 1.12–1.32)
LEUKOCYTE ESTERASE UR QL STRIP: NEGATIVE
LYMPHOCYTES # BLD AUTO: 2.6 X10ˆ3/UL (ref 1–4)
LYMPHOCYTES NFR BLD AUTO: 24.5 %
MCH RBC QN AUTO: 29.9 PG (ref 26–34)
MCHC RBC AUTO-ENTMCNC: 32.7 G/DL (ref 31–37)
MCV RBC AUTO: 91.5 FL (ref 80–100)
MIXED CELL %: 4.3 %
NEUTROPHILS # BLD AUTO: 7.4 X10ˆ3/UL (ref 1.5–7.7)
NEUTROPHILS NFR BLD AUTO: 71.2 %
NITRITE UR QL STRIP: NEGATIVE
PH UR STRIP: 6 [PH]
PLATELET # BLD AUTO: 337 X10ˆ3/UL (ref 150–450)
POTASSIUM BLD-SCNC: 3.6 MMOL/L (ref 3.6–5.1)
PROT UR STRIP-MCNC: 30 MG/DL
RBC # BLD AUTO: 4.35 X10ˆ6/UL
SODIUM BLD-SCNC: 139 MMOL/L (ref 136–145)
SP GR UR STRIP: >=1.03
UROBILINOGEN UR STRIP-ACNC: <2 MG/DL
WBC # BLD AUTO: 10.5 X10ˆ3/UL (ref 4–11)

## 2024-05-06 PROCEDURE — 96375 TX/PRO/DX INJ NEW DRUG ADDON: CPT

## 2024-05-06 PROCEDURE — 99215 OFFICE O/P EST HI 40 MIN: CPT

## 2024-05-06 PROCEDURE — 74177 CT ABD & PELVIS W/CONTRAST: CPT | Performed by: EMERGENCY MEDICINE

## 2024-05-06 PROCEDURE — 85025 COMPLETE CBC W/AUTO DIFF WBC: CPT | Performed by: EMERGENCY MEDICINE

## 2024-05-06 PROCEDURE — 96374 THER/PROPH/DIAG INJ IV PUSH: CPT

## 2024-05-06 PROCEDURE — 81002 URINALYSIS NONAUTO W/O SCOPE: CPT

## 2024-05-06 PROCEDURE — 80047 BASIC METABLC PNL IONIZED CA: CPT

## 2024-05-06 RX ORDER — ONDANSETRON 2 MG/ML
4 INJECTION INTRAMUSCULAR; INTRAVENOUS ONCE
Status: COMPLETED | OUTPATIENT
Start: 2024-05-06 | End: 2024-05-06

## 2024-05-06 RX ORDER — HYDROCODONE BITARTRATE AND ACETAMINOPHEN 5; 300 MG/1; MG/1
1 TABLET ORAL EVERY 6 HOURS PRN
Qty: 12 TABLET | Refills: 0 | Status: SHIPPED | OUTPATIENT
Start: 2024-05-06

## 2024-05-06 RX ORDER — ONDANSETRON 4 MG/1
4 TABLET, ORALLY DISINTEGRATING ORAL EVERY 4 HOURS PRN
Qty: 10 TABLET | Refills: 0 | Status: SHIPPED | OUTPATIENT
Start: 2024-05-06 | End: 2024-05-13

## 2024-05-06 RX ORDER — KETOROLAC TROMETHAMINE 30 MG/ML
15 INJECTION, SOLUTION INTRAMUSCULAR; INTRAVENOUS ONCE
Status: COMPLETED | OUTPATIENT
Start: 2024-05-06 | End: 2024-05-06

## 2024-05-06 RX ORDER — METRONIDAZOLE 500 MG/1
500 TABLET ORAL 3 TIMES DAILY
Qty: 21 TABLET | Refills: 0 | Status: SHIPPED | OUTPATIENT
Start: 2024-05-06 | End: 2024-05-08

## 2024-05-06 RX ORDER — CEFDINIR 300 MG/1
300 CAPSULE ORAL 2 TIMES DAILY
Qty: 14 CAPSULE | Refills: 0 | Status: SHIPPED | OUTPATIENT
Start: 2024-05-06 | End: 2024-05-13

## 2024-05-06 NOTE — ED PROVIDER NOTES
Patient Seen in: [facility]      History     Chief Complaint   Patient presents with    Abdominal Pain     Left abdominal pain w a low grade fever - Entered by patient     Stated Complaint: Abdominal Pain - Left abdominal pain w a low grade fever    Subjective:   HPI    53-year-old female with a past medical history as below including diverticulitis, kidney stones and ovarian cyst presents with left lower quadrant abdominal pain that started yesterday morning.  She reports low-grade fevers up to 99.5 F.  She reports some nausea without vomiting.  She reports feeling slightly constipated.  Denies rectal bleeding or melena.  Denies urinary symptoms.  Denies cough or cold symptoms.    Objective:   Past Medical History:    Abdominal pain    Acute atopic conjunctivitis, bilateral    Yamil Vaughn M.D.    Acute meniscal tear, medial, right, initial encounter    MRI 5/21/2019: Mild undersurface tearing of the posterior root ligament of the medial meniscus.    Allergic rhinitis    Anxiety    Arrhythmia    paroxsymal atrial tachycardia    Arthritis    Atrial tachycardia (HCC)    Back pain    Benign paroxysmal positional vertigo    Bleeding nose    Bloating    Blood in urine    Body piercing    Calculus of kidney    Change in hair    Chest pain    Chest pain on exertion    Colon polyp    Gail Katz M.D.    Constipation    Decorative tattoo    Diabetes (HCC)    Diarrhea, unspecified    Diverticulitis of sigmoid colon    Diverticulosis    Gail Katz M.D.    Dizziness    Easy bruising    Endometriosis    Esophageal reflux    Essential hypertension    Eye disease    Fibromyalgia    Food intolerance    Frequent urination    Glaucoma    Headache disorder    Heart palpitations    Heartburn    High cholesterol    History of COVID-19    History of nausea and vomiting    Hoarseness, chronic    Hyperlipidemia    Hypothyroidism    Injury of peroneal tendon of right foot    Itch of skin     Keratoconjunctivitis sicca not specified as Sjogren's, bilateral    Yamil Vaughn M.D.    Lateral epicondylitis of right elbow    Leaking of urine    Lung nodule    pt unsure of which side    Major depressive disorder, recurrent episode, mild with anxious distress (HCC)    Migraines    Morbid obesity with BMI of 45.0-49.9, adult (HCC)    Obesity    Open angle with borderline findings, high risk, bilateral    Yamil Vuaghn M.D.    Osteoarthritis    back    Other vitreous opacities, left eye    Yamil Vaughn M.D.    Other vitreous opacities, right eye    Yamil Vaughn M.D.    Pain in joints    Painful urination    Polycystic ovaries    ovary embedded in pelvic wall--right side    PONV (postoperative nausea and vomiting)    slow to awaken    Posterior vitreous detachment of both eyes    Right > Left    Primary open angle glaucoma of both eyes, moderate stage    Rash    Sleep disturbance    Spitting up blood    Sputum production    Stress    Trochanteric bursitis of both hips    Left >>  Right     Visual impairment    glasses    Vitreous degeneration, right eye    Yamil Vaughn M.D.    Wears glasses    Weight gain              No pertinent past surgical history.              No pertinent social history.            Review of Systems    Positive for stated complaint: Abdominal Pain - Left abdominal pain w a low grade fever  Other systems are as noted in HPI.  Constitutional and vital signs reviewed.      All other systems reviewed and negative except as noted above.    Physical Exam     ED Triage Vitals [05/06/24 1737]   /77   Pulse 89   Resp 18   Temp 97.9 °F (36.6 °C)   Temp src Temporal   SpO2 96 %   O2 Device None (Room air)       Current:/79   Pulse 74   Temp 97.9 °F (36.6 °C) (Temporal)   Resp 20   Ht 154.9 cm (5' 1\")   Wt 111.1 kg   LMP 01/01/2006 (Approximate)   SpO2 100%   BMI 46.29 kg/m²         Physical Exam  Vitals and nursing note reviewed.   Constitutional:       General: She is  not in acute distress.     Appearance: She is well-developed. She is obese. She is not ill-appearing.   HENT:      Head: Normocephalic and atraumatic.      Mouth/Throat:      Mouth: Mucous membranes are moist.   Eyes:      General: No scleral icterus.  Cardiovascular:      Rate and Rhythm: Normal rate and regular rhythm.   Pulmonary:      Effort: Pulmonary effort is normal.      Breath sounds: Normal breath sounds.   Abdominal:      General: Bowel sounds are normal. There is no distension.      Palpations: Abdomen is soft.      Tenderness: There is abdominal tenderness. There is no guarding or rebound.      Comments: Left lower quadrant tenderness   Skin:     General: Skin is warm and dry.   Neurological:      General: No focal deficit present.      Mental Status: She is alert and oriented to person, place, and time.      Cranial Nerves: No cranial nerve deficit.      Motor: No weakness.   Psychiatric:         Mood and Affect: Mood normal.         Behavior: Behavior normal.               ED Course     Labs Reviewed   [facility] POCT URINALYSIS DIPSTICK - Abnormal; Notable for the following components:       Result Value    Protein urine 30 (*)     Ketone, Urine Trace (*)     Blood, Urine Trace-Intact (*)     All other components within normal limits   POCT ISTAT CHEM8 CARTRIDGE - Abnormal; Notable for the following components:    ISTAT Glucose 148 (*)     All other components within normal limits   POCT CBC             CT ABDOMEN+PELVIS(CONTRAST ONLY)(CPT=74177)    Result Date: 5/6/2024  CONCLUSION:  Acute diverticulitis descending colon.   LOCATION:  [id number]   Dictated by (CST): Alexander Scruggs MD on 5/06/2024 at 7:30 PM     Finalized by (CST): Alexander Scruggs MD on 5/06/2024 at 7:31 PM               MDM   53-year-old female with a past medical history as below including diverticulitis and ovarian cyst presents with left lower quadrant abdominal pain that started yesterday morning.    Differential includes but is not  limited to diverticulitis, colitis, constipation related pain, ureteral calculus, ovarian cyst      Labs are unremarkable with WBC 10.5.    Independent interpretation of CT abdomen/pelvis shows anterior changes along the inflammatory changes and diverticula in the descending colon consistent with diverticulitis.  Radiology report reviewed as above.    Patient has multiple drug allergies.  Will treat with Rx for cefdinir and metronidazole.  Patient states she has tolerated Vicodin in the past, will prescribe for pain.  Instructed to follow-up with PCP in 2 to 3 days.  Return precaution discussed.    Medical Decision Making  Amount and/or Complexity of Data Reviewed  Labs: ordered. Decision-making details documented in ED Course.  Radiology: ordered and independent interpretation performed. Decision-making details documented in ED Course.    Risk  Prescription drug management.        Disposition and Plan     Clinical Impression:  1. Acute diverticulitis         Disposition:  Discharge  5/6/2024  8:08 pm    Follow-up:  Oneyda Moulton DO  [address] [address] [address]  [phone]    Schedule an appointment as soon as possible for a visit in 2 days            Medications Prescribed:  Current Discharge Medication List        START taking these medications    Details   cefdinir 300 MG Oral Cap Take 1 capsule (300 mg total) by mouth 2 (two) times daily for 7 days.  Qty: 14 capsule, Refills: 0      metRONIDAZOLE 500 MG Oral Tab Take 1 tablet (500 mg total) by mouth 3 (three) times daily for 7 days.  Qty: 21 tablet, Refills: 0      !! ondansetron 4 MG Oral Tablet Dispersible Take 1 tablet (4 mg total) by mouth every 4 (four) hours as needed for Nausea.  Qty: 10 tablet, Refills: 0      HYDROcodone-acetaminophen 5-300 MG Oral Tab Take 1 tablet by mouth every 6 (six) hours as needed.  Qty: 12 tablet, Refills: 0    Associated Diagnoses: Acute diverticulitis       !! - Potential duplicate medications found.  Please discuss with provider.

## 2024-05-06 NOTE — ED INITIAL ASSESSMENT (HPI)
Pt here c/o left lower abd pain since yesterday morning.   Fever 99.5 today.    Denies vomiting, slight nausea.   Constant abd pain which is worse on mov't and palpation.

## 2024-05-13 RX ORDER — LANCETS
EACH MISCELLANEOUS
Qty: 100 EACH | Refills: 11 | Status: SHIPPED | OUTPATIENT
Start: 2024-05-13

## 2024-05-21 DIAGNOSIS — R76.8 POSITIVE ANA (ANTINUCLEAR ANTIBODY): ICD-10-CM

## 2024-05-21 DIAGNOSIS — M25.50 POLYARTHRALGIA: ICD-10-CM

## 2024-05-21 DIAGNOSIS — M35.9 UNDIFFERENTIATED CONNECTIVE TISSUE DISEASE (HCC): Primary | ICD-10-CM

## 2024-05-21 RX ORDER — HYDROXYCHLOROQUINE SULFATE 200 MG/1
200 TABLET, FILM COATED ORAL 2 TIMES DAILY
Qty: 180 TABLET | Refills: 1 | Status: SHIPPED | OUTPATIENT
Start: 2024-05-21

## 2024-05-21 NOTE — TELEPHONE ENCOUNTER
Last office visit: 1/5/2024    Next Rheum Apt:7/5/2024 Farhad Hilda,     Last fill:  12/13/2023  180 tabs, 1 refill     Labs:   Lab Results   Component Value Date    CREATSERUM 0.77 03/05/2024    GFR 84 11/09/2017    ALKPHO 96 03/05/2024    AST 26 03/05/2024    ALT 33 (H) 03/05/2024    BILT 0.4 03/05/2024    TP 7.0 03/05/2024    ALB 4.0 03/05/2024       Lab Results   Component Value Date    WBC 4.8 03/05/2024    HGB 12.9 03/05/2024     03/05/2024    NEPRELIM 2.37 04/25/2023    NEPERCENT 71.2 05/06/2024    LYPERCENT 24.5 05/06/2024    NE 2,582 03/05/2024    LYMABS 1,690 03/05/2024

## 2024-06-04 ENCOUNTER — TELEPHONE (OUTPATIENT)
Dept: RHEUMATOLOGY | Facility: CLINIC | Age: 54
End: 2024-06-04

## 2024-06-06 RX ORDER — PANCRELIPASE 60000; 12000; 38000 [USP'U]/1; [USP'U]/1; [USP'U]/1
CAPSULE, DELAYED RELEASE PELLETS ORAL
Qty: 270 CAPSULE | Refills: 0 | OUTPATIENT
Start: 2024-06-06

## 2024-06-16 ENCOUNTER — PATIENT MESSAGE (OUTPATIENT)
Dept: FAMILY MEDICINE CLINIC | Facility: CLINIC | Age: 54
End: 2024-06-16

## 2024-06-16 DIAGNOSIS — R06.02 SHORTNESS OF BREATH: ICD-10-CM

## 2024-06-16 DIAGNOSIS — Z76.89 ENCOUNTER FOR NEW MEDICATION PRESCRIPTION: ICD-10-CM

## 2024-06-17 DIAGNOSIS — Z51.81 ENCOUNTER FOR MEDICATION MONITORING: ICD-10-CM

## 2024-06-17 DIAGNOSIS — E03.9 ACQUIRED HYPOTHYROIDISM: ICD-10-CM

## 2024-06-17 NOTE — TELEPHONE ENCOUNTER
From: Rosa Shaver  To: Oneyda Moulton  Sent: 2024 3:15 PM CDT  Subject: New prescription for inhaler?    Hello - I've had a prescription for an inhaler from you in the past. I cut my grass last week and felt like I needed to use it (not an emergency) and realized my inhaler is  by over a year.   Can I please get a new prescription?  My current pharmacy is  [facility] at   [address] [place] [address] [phone]  Thank you   Rosa Shaver

## 2024-06-18 RX ORDER — LEVOTHYROXINE SODIUM 112 UG/1
112 TABLET ORAL
Qty: 90 TABLET | Refills: 3 | Status: SHIPPED | OUTPATIENT
Start: 2024-06-18

## 2024-06-18 NOTE — TELEPHONE ENCOUNTER
Requested Prescriptions     Signed Prescriptions Disp Refills    LEVOTHYROXINE 112 MCG Oral Tab 90 tablet 3     Sig: TAKE 1 TABLET(112 MCG) BY MOUTH BEFORE BREAKFAST     Authorizing Provider: ANTONETTE HARRIS     Ordering User: LILIANA ABREU        Refilled per protocol/OV notes

## 2024-06-19 NOTE — TELEPHONE ENCOUNTER
Albuterol refill request:  LF was 11/26/21 but patient reports feeling like she might need it after cutting her grass.   Med pended for approval if appropriate, thanks.   LOV 3/6/24

## 2024-06-20 RX ORDER — ALBUTEROL SULFATE 90 UG/1
2 AEROSOL, METERED RESPIRATORY (INHALATION) EVERY 6 HOURS PRN
Qty: 1 EACH | Refills: 0 | Status: SHIPPED | OUTPATIENT
Start: 2024-06-20

## 2024-06-27 ENCOUNTER — HOSPITAL ENCOUNTER (EMERGENCY)
Age: 54
Discharge: HOME | End: 2024-06-27
Attending: EMERGENCY MEDICINE
Payer: COMMERCIAL

## 2024-06-27 ENCOUNTER — APPOINTMENT (OUTPATIENT)
Dept: GENERAL RADIOLOGY | Age: 54
End: 2024-06-27
Attending: EMERGENCY MEDICINE
Payer: COMMERCIAL

## 2024-06-27 VITALS
OXYGEN SATURATION: 97 % | RESPIRATION RATE: 16 BRPM | SYSTOLIC BLOOD PRESSURE: 138 MMHG | TEMPERATURE: 98 F | DIASTOLIC BLOOD PRESSURE: 65 MMHG | BODY MASS INDEX: 48.33 KG/M2 | HEIGHT: 61 IN | HEART RATE: 74 BPM | WEIGHT: 256 LBS

## 2024-06-27 DIAGNOSIS — M94.0 ACUTE COSTOCHONDRITIS: ICD-10-CM

## 2024-06-27 DIAGNOSIS — R07.89 CHEST PAIN, ATYPICAL: Primary | ICD-10-CM

## 2024-06-27 LAB
ALBUMIN SERPL-MCNC: 3.5 G/DL (ref 3.4–5)
ALBUMIN/GLOB SERPL: 0.9 {RATIO} (ref 1–2)
ALP LIVER SERPL-CCNC: 106 U/L
ALT SERPL-CCNC: 39 U/L
ANION GAP SERPL CALC-SCNC: 5 MMOL/L (ref 0–18)
AST SERPL-CCNC: 26 U/L (ref 15–37)
BASOPHILS # BLD AUTO: 0.07 X10(3) UL (ref 0–0.2)
BASOPHILS NFR BLD AUTO: 1 %
BILIRUB SERPL-MCNC: 0.3 MG/DL (ref 0.1–2)
BUN BLD-MCNC: 21 MG/DL (ref 9–23)
CALCIUM BLD-MCNC: 9.1 MG/DL (ref 8.5–10.1)
CHLORIDE SERPL-SCNC: 110 MMOL/L (ref 98–112)
CO2 SERPL-SCNC: 22 MMOL/L (ref 21–32)
CREAT BLD-MCNC: 0.94 MG/DL
D DIMER PPP FEU-MCNC: 0.46 UG/ML FEU (ref ?–0.52)
EGFRCR SERPLBLD CKD-EPI 2021: 73 ML/MIN/1.73M2 (ref 60–?)
EOSINOPHIL # BLD AUTO: 0.15 X10(3) UL (ref 0–0.7)
EOSINOPHIL NFR BLD AUTO: 2 %
ERYTHROCYTE [DISTWIDTH] IN BLOOD BY AUTOMATED COUNT: 14.9 %
GLOBULIN PLAS-MCNC: 3.9 G/DL (ref 2.8–4.4)
GLUCOSE BLD-MCNC: 109 MG/DL (ref 70–99)
HCT VFR BLD AUTO: 39 %
HGB BLD-MCNC: 13.2 G/DL
IMM GRANULOCYTES # BLD AUTO: 0.02 X10(3) UL (ref 0–1)
IMM GRANULOCYTES NFR BLD: 0.3 %
LYMPHOCYTES # BLD AUTO: 2.72 X10(3) UL (ref 1–4)
LYMPHOCYTES NFR BLD AUTO: 37 %
MCH RBC QN AUTO: 30.3 PG (ref 26–34)
MCHC RBC AUTO-ENTMCNC: 33.8 G/DL (ref 31–37)
MCV RBC AUTO: 89.7 FL
MONOCYTES # BLD AUTO: 0.51 X10(3) UL (ref 0.1–1)
MONOCYTES NFR BLD AUTO: 6.9 %
NEUTROPHILS # BLD AUTO: 3.88 X10 (3) UL (ref 1.5–7.7)
NEUTROPHILS # BLD AUTO: 3.88 X10(3) UL (ref 1.5–7.7)
NEUTROPHILS NFR BLD AUTO: 52.8 %
OSMOLALITY SERPL CALC.SUM OF ELEC: 288 MOSM/KG (ref 275–295)
PLATELET # BLD AUTO: 272 10(3)UL (ref 150–450)
POTASSIUM SERPL-SCNC: 4 MMOL/L (ref 3.5–5.1)
PROT SERPL-MCNC: 7.4 G/DL (ref 6.4–8.2)
RBC # BLD AUTO: 4.35 X10(6)UL
SODIUM SERPL-SCNC: 137 MMOL/L (ref 136–145)
TROPONIN I SERPL HS-MCNC: <3 NG/L
WBC # BLD AUTO: 7.4 X10(3) UL (ref 4–11)

## 2024-06-27 PROCEDURE — 85025 COMPLETE CBC W/AUTO DIFF WBC: CPT | Performed by: EMERGENCY MEDICINE

## 2024-06-27 PROCEDURE — 93010 ELECTROCARDIOGRAM REPORT: CPT

## 2024-06-27 PROCEDURE — 36415 COLL VENOUS BLD VENIPUNCTURE: CPT

## 2024-06-27 PROCEDURE — 84484 ASSAY OF TROPONIN QUANT: CPT | Performed by: EMERGENCY MEDICINE

## 2024-06-27 PROCEDURE — 99284 EMERGENCY DEPT VISIT MOD MDM: CPT

## 2024-06-27 PROCEDURE — 93005 ELECTROCARDIOGRAM TRACING: CPT

## 2024-06-27 PROCEDURE — 80053 COMPREHEN METABOLIC PANEL: CPT | Performed by: EMERGENCY MEDICINE

## 2024-06-27 PROCEDURE — 99285 EMERGENCY DEPT VISIT HI MDM: CPT

## 2024-06-27 PROCEDURE — 85379 FIBRIN DEGRADATION QUANT: CPT | Performed by: EMERGENCY MEDICINE

## 2024-06-27 PROCEDURE — 71045 X-RAY EXAM CHEST 1 VIEW: CPT | Performed by: EMERGENCY MEDICINE

## 2024-06-27 RX ORDER — NAPROXEN 500 MG/1
500 TABLET ORAL 2 TIMES DAILY PRN
Qty: 20 TABLET | Refills: 0 | Status: SHIPPED | OUTPATIENT
Start: 2024-06-27

## 2024-06-27 RX ORDER — CYCLOBENZAPRINE HCL 10 MG
10 TABLET ORAL 3 TIMES DAILY PRN
Qty: 20 TABLET | Refills: 0 | Status: SHIPPED | OUTPATIENT
Start: 2024-06-27 | End: 2024-07-04

## 2024-06-27 NOTE — ED INITIAL ASSESSMENT (HPI)
Chest pain started a few days ago, comes and goes, woke up today feeling like left arm did a hard workout like fatigue, few bouts of dizziness, gets short of breath with exertion, denies cough/fever, sent here from [facility]

## 2024-06-27 NOTE — ED PROVIDER NOTES
Patient Seen in: [facility] [place]      History     Chief Complaint   Patient presents with    Chest Pain Angina     Stated Complaint: Chest pain and left arm pain    Subjective:   HPI    53-year-old female presents to the emergency department with complaints of intermittent episodes of sharp stabbing chest pain.  She states that it comes on randomly and will last for few seconds she describes it as feeling a stabbing sensation in her chest.  She has had some issues with chest pain in the past.  She has had workups including an angiogram 12 years ago and a gated coronary CTA of her chest within the last year.  No history of DVT or PE.  No history of any trauma.  No cough cold or congestion.  No other acute complaints    Objective:   Past Medical History:    Abdominal pain    Acute atopic conjunctivitis, bilateral    Yamil Vaughn M.D.    Acute meniscal tear, medial, right, initial encounter    MRI 5/21/2019: Mild undersurface tearing of the posterior root ligament of the medial meniscus.    Allergic rhinitis    Anxiety    Arrhythmia    paroxsymal atrial tachycardia    Arthritis    Atrial tachycardia (HCC)    Back pain    Benign paroxysmal positional vertigo    Bleeding nose    Bloating    Blood in urine    Body piercing    Calculus of kidney    Change in hair    Chest pain    Chest pain on exertion    Colon polyp    Gail Katz M.D.    Constipation    Decorative tattoo    Diabetes (HCC)    Diarrhea, unspecified    Diverticulitis of sigmoid colon    Diverticulosis    Gail Katz M.D.    Dizziness    Easy bruising    Endometriosis    Esophageal reflux    Essential hypertension    Eye disease    Fibromyalgia    Food intolerance    Frequent urination    Glaucoma    Headache disorder    Heart palpitations    Heartburn    High cholesterol    History of COVID-19    History of nausea and vomiting    Hoarseness, chronic    Hyperlipidemia    Hypothyroidism    Injury of peroneal  tendon of right foot    Itch of skin    Keratoconjunctivitis sicca not specified as Sjogren's, bilateral    Yamil Vaughn M.D.    Lateral epicondylitis of right elbow    Leaking of urine    Lung nodule    pt unsure of which side    Major depressive disorder, recurrent episode, mild with anxious distress (HCC)    Migraines    Morbid obesity with BMI of 45.0-49.9, adult (HCC)    Obesity    Open angle with borderline findings, high risk, bilateral    Yamil Vaughn M.D.    Osteoarthritis    back    Other vitreous opacities, left eye    Yamil Vaughn M.D.    Other vitreous opacities, right eye    Yamil Vaughn M.D.    Pain in joints    Painful urination    Polycystic ovaries    ovary embedded in pelvic wall--right side    PONV (postoperative nausea and vomiting)    slow to awaken    Posterior vitreous detachment of both eyes    Right > Left    Primary open angle glaucoma of both eyes, moderate stage    Rash    Sleep disturbance    Spitting up blood    Sputum production    Stress    Trochanteric bursitis of both hips    Left >>  Right     Visual impairment    glasses    Vitreous degeneration, right eye    Yamil Vaughn M.D.    Wears glasses    Weight gain              Past Surgical History:   Procedure Laterality Date    Angiogram      12 [facility]    Angioplasty (coronary)      Colonoscopy      Colonoscopy  2018    Colonoscopy N/A 2023    Procedure: COLONOSCOPY;  Surgeon: Rupa Cheatham DO;  Location:  ENDOSCOPY    D & c   &     Dilation & curettage cervical stump       and     Foot surgery Right 10/18/2019    Tendon repair     Gastric bypass,obesity,sb reconstruc  2018    gastric sleeve    Hernia surgery  10/18/2018    Hiatal hernia Dr. Terri Avila     Hysterectomy  2006    partial hystero.    Lap sleeve gastrectomy  10/18/2018    Dr. terri avila           Oophorectomy Bilateral     ovaries removed after partial.    Other  10/15/2018    Sleeve biatric surgery     Other surgical history  2012    removal of right ovary    Removal of kidney stone      age 14, does not remember what kind of surgery. no abdominal or flank scar    Tilt table evaluation      veinogram 7-6-12 [place]    Total abdom hysterectomy      Upper gi endoscopy,exam                  Social History     Socioeconomic History    Marital status:    Tobacco Use    Smoking status: Never    Smokeless tobacco: Never   Vaping Use    Vaping status: Never Used   Substance and Sexual Activity    Alcohol use: Yes     Alcohol/week: 4.0 standard drinks of alcohol     Types: 4 Standard drinks or equivalent per week     Comment: weekly    Drug use: No    Sexual activity: Yes     Partners: Male     Birth control/protection: Hysterectomy   Other Topics Concern    Caffeine Concern Yes     Comment: 20-30 oz of coffee daily    Stress Concern Yes    Weight Concern Yes    Special Diet No    Exercise No    Seat Belt Yes     Social Determinants of Health     Physical Activity: Unknown (1/25/2021)    [facility]    Exercise Vital Sign     Days of Exercise per Week: 4 days              Review of Systems   All other systems reviewed and are negative.      Positive for stated Chief Complaint: Chest Pain Angina    Other systems are as noted in HPI.  Constitutional and vital signs reviewed.      All other systems reviewed and negative except as noted above.    Physical Exam     ED Triage Vitals [06/27/24 1559]   BP (!) 168/67   Pulse 71   Resp 16   Temp 97.8 °F (36.6 °C)   Temp src Oral   SpO2 98 %   O2 Device None (Room air)       Current Vitals:   Vital Signs  BP: 138/65  Pulse: 74  Resp: 16  Temp: 97.8 °F (36.6 °C)  Temp src: Oral    Oxygen Therapy  SpO2: 97 %  O2 Device: None (Room air)            Physical Exam  Vitals and nursing note reviewed.   Constitutional:       Appearance: Normal appearance. She is well-developed.   HENT:      Head: Normocephalic and atraumatic.    Cardiovascular:      Rate and Rhythm: Normal rate and regular rhythm.      Pulses: Normal pulses.      Heart sounds: Normal heart sounds.   Pulmonary:      Effort: Pulmonary effort is normal.      Breath sounds: Normal breath sounds. No stridor. No wheezing.      Comments: Easily reproducible chest wall pain particularly when pushing in the second intercostal space  Chest:      Chest wall: Tenderness present.   Abdominal:      General: Bowel sounds are normal.      Palpations: Abdomen is soft.      Tenderness: There is no abdominal tenderness. There is no rebound.   Musculoskeletal:         General: No tenderness. Normal range of motion.      Cervical back: Normal range of motion and neck supple.   Lymphadenopathy:      Cervical: No cervical adenopathy.   Skin:     General: Skin is warm and dry.      Coloration: Skin is not pale.   Neurological:      General: No focal deficit present.      Mental Status: She is alert and oriented to person, place, and time.      Cranial Nerves: No cranial nerve deficit.      Coordination: Coordination normal.              ED Course     Labs Reviewed   COMP METABOLIC PANEL (14) - Abnormal; Notable for the following components:       Result Value    Glucose 109 (*)     A/G Ratio 0.9 (*)     All other components within normal limits   TROPONIN I HIGH SENSITIVITY - Normal   D-DIMER - Normal   CBC WITH DIFFERENTIAL WITH PLATELET    Narrative:     The following orders were created for panel order CBC With Differential With Platelet.  Procedure                               Abnormality         Status                     ---------                               -----------         ------                     CBC W/ DIFFERENTIAL[[id number]]                              Final result                 Please view results for these tests on the individual orders.   RAINBOW DRAW LAVENDER   RAINBOW DRAW LIGHT GREEN   RAINBOW DRAW BLUE   CBC W/ DIFFERENTIAL     EKG    Rate, intervals and axes as  noted on EKG Report.  Rate: 74  Rhythm: Sinus Rhythm  Reading: No acute ST segment elevation some underlying artifact                 XR CHEST AP PORTABLE  (CPT=71045)    Result Date: 6/27/2024  PROCEDURE:  XR CHEST AP PORTABLE  (CPT=71045)  TECHNIQUE:  AP chest radiograph was obtained.  COMPARISON:  [facility], XR, XR CHEST PA + LAT CHEST (CPT=71046), 12/07/2022, 11:29 AM.  [facility], XR, XR CHEST PA + LAT CHEST (CPT=71046), 5/18/2021, 9:22 AM.  INDICATIONS:  Chest pain and left arm pain  PATIENT STATED HISTORY: (As transcribed by Technologist)  Pt c/o left side chest pain and left arm soreness that began this week.                CONCLUSION:  Normal heart size and vascularity.  The lungs are clear.  No mass, pneumonia or CHF.  No effusion or lymphadenopathy.  Small hiatal hernia noted.  No acute disease.   LOCATION:  [facility]      Dictated by (CST): Vladimir Serna MD on 6/27/2024 at 4:28 PM     Finalized by (CST): Vladimir Serna MD on 6/27/2024 at 4:29 PM              MDM      Patient had IV established and blood work obtained she was placed on a monitor.  I reviewed her records and she had a low calcium score.  She had been seen by cardiology yesterday.  Overall they felt that she had atypical symptoms as well.  She is in the process of getting a rheumatologic workup.  She had a chest x-ray here personally viewed not appreciate any pneumothorax pleural effusion or consolidation that could potentially give her chest pain.  I reviewed radiology report they did not note any acute abnormality either.  She had negative troponin which goes against any acute cardiac injury.  This coupled with her extensive workups in the past I do not feel that there is concern for ischemia.  Also, she has very atypical symptoms that is much more musculoskeletal as reproducible and an intermittent sharp stabbing pain associate with any particular activity.  She had a negative D-dimer.  I did contact cardiology and they did not  feel that she required any further cardiac workup either.  They were more concerned about noncardiac etiologies.  I reviewed all this with her.  We discussed care and treatment of costochondritis.  She was discharged in good condition                                   Medical Decision Making      Disposition and Plan     Clinical Impression:  1. Chest pain, atypical    2. Acute costochondritis         Disposition:  Discharge  6/27/2024  5:23 pm    Follow-up:  Oneyda Moulton DO  [address] [facility] [address]  [facility] [address] [phone]    Schedule an appointment as soon as possible for a visit  As needed          Medications Prescribed:  Discharge Medication List as of 6/27/2024  5:25 PM        START taking these medications    Details   naproxen 500 MG Oral Tab Take 1 tablet (500 mg total) by mouth 2 (two) times daily as needed., Normal, Disp-20 tablet, R-0      cyclobenzaprine 10 MG Oral Tab Take 1 tablet (10 mg total) by mouth 3 (three) times daily as needed for Muscle spasms., Normal, Disp-20 tablet, R-0

## 2024-06-29 LAB
ATRIAL RATE: 74 BPM
P AXIS: 43 DEGREES
P-R INTERVAL: 140 MS
Q-T INTERVAL: 426 MS
QRS DURATION: 96 MS
QTC CALCULATION (BEZET): 472 MS
R AXIS: 16 DEGREES
T AXIS: 15 DEGREES
VENTRICULAR RATE: 74 BPM

## 2024-07-05 ENCOUNTER — HOSPITAL ENCOUNTER (EMERGENCY)
Facility: HOSPITAL | Age: 54
Discharge: HOME | End: 2024-07-05
Attending: EMERGENCY MEDICINE
Payer: COMMERCIAL

## 2024-07-05 ENCOUNTER — APPOINTMENT (OUTPATIENT)
Dept: CT IMAGING | Facility: HOSPITAL | Age: 54
End: 2024-07-05
Attending: EMERGENCY MEDICINE
Payer: COMMERCIAL

## 2024-07-05 ENCOUNTER — OFFICE VISIT (OUTPATIENT)
Dept: RHEUMATOLOGY | Facility: CLINIC | Age: 54
End: 2024-07-05
Payer: COMMERCIAL

## 2024-07-05 VITALS
DIASTOLIC BLOOD PRESSURE: 68 MMHG | WEIGHT: 254 LBS | BODY MASS INDEX: 47.95 KG/M2 | TEMPERATURE: 98 F | HEIGHT: 61 IN | HEART RATE: 78 BPM | SYSTOLIC BLOOD PRESSURE: 147 MMHG | RESPIRATION RATE: 18 BRPM | OXYGEN SATURATION: 99 %

## 2024-07-05 VITALS
BODY MASS INDEX: 48.15 KG/M2 | WEIGHT: 255 LBS | OXYGEN SATURATION: 97 % | TEMPERATURE: 99 F | SYSTOLIC BLOOD PRESSURE: 132 MMHG | HEIGHT: 61 IN | HEART RATE: 75 BPM | DIASTOLIC BLOOD PRESSURE: 70 MMHG

## 2024-07-05 DIAGNOSIS — M35.9 UNDIFFERENTIATED CONNECTIVE TISSUE DISEASE (HCC): Primary | ICD-10-CM

## 2024-07-05 DIAGNOSIS — M79.7 FIBROMYALGIA: ICD-10-CM

## 2024-07-05 DIAGNOSIS — M15.9 PRIMARY OSTEOARTHRITIS INVOLVING MULTIPLE JOINTS: ICD-10-CM

## 2024-07-05 DIAGNOSIS — M51.36 DDD (DEGENERATIVE DISC DISEASE), LUMBAR: ICD-10-CM

## 2024-07-05 DIAGNOSIS — Z79.899 LONG-TERM USE OF PLAQUENIL: ICD-10-CM

## 2024-07-05 DIAGNOSIS — M25.50 POLYARTHRALGIA: ICD-10-CM

## 2024-07-05 DIAGNOSIS — R79.82 ELEVATED C-REACTIVE PROTEIN (CRP): ICD-10-CM

## 2024-07-05 DIAGNOSIS — K57.92 ACUTE DIVERTICULITIS: Primary | ICD-10-CM

## 2024-07-05 LAB
ALBUMIN SERPL-MCNC: 3.7 G/DL (ref 3.4–5)
ALBUMIN/GLOB SERPL: 0.9 {RATIO} (ref 1–2)
ALP LIVER SERPL-CCNC: 117 U/L
ALT SERPL-CCNC: 42 U/L
ANION GAP SERPL CALC-SCNC: 7 MMOL/L (ref 0–18)
AST SERPL-CCNC: 21 U/L (ref 15–37)
BASOPHILS # BLD AUTO: 0.07 X10(3) UL (ref 0–0.2)
BASOPHILS NFR BLD AUTO: 0.7 %
BILIRUB SERPL-MCNC: 0.5 MG/DL (ref 0.1–2)
BILIRUB UR QL STRIP.AUTO: NEGATIVE
BUN BLD-MCNC: 21 MG/DL (ref 9–23)
CALCIUM BLD-MCNC: 9.2 MG/DL (ref 8.5–10.1)
CHLORIDE SERPL-SCNC: 108 MMOL/L (ref 98–112)
CLARITY UR REFRACT.AUTO: CLEAR
CO2 SERPL-SCNC: 23 MMOL/L (ref 21–32)
CREAT BLD-MCNC: 0.94 MG/DL
EGFRCR SERPLBLD CKD-EPI 2021: 73 ML/MIN/1.73M2 (ref 60–?)
EOSINOPHIL # BLD AUTO: 0.1 X10(3) UL (ref 0–0.7)
EOSINOPHIL NFR BLD AUTO: 1 %
ERYTHROCYTE [DISTWIDTH] IN BLOOD BY AUTOMATED COUNT: 14.7 %
GLOBULIN PLAS-MCNC: 4.1 G/DL (ref 2.8–4.4)
GLUCOSE BLD-MCNC: 114 MG/DL (ref 70–99)
GLUCOSE UR STRIP.AUTO-MCNC: NORMAL MG/DL
HCT VFR BLD AUTO: 39.7 %
HGB BLD-MCNC: 13.6 G/DL
IMM GRANULOCYTES # BLD AUTO: 0.03 X10(3) UL (ref 0–1)
IMM GRANULOCYTES NFR BLD: 0.3 %
LEUKOCYTE ESTERASE UR QL STRIP.AUTO: NEGATIVE
LIPASE SERPL-CCNC: 42 U/L (ref 13–75)
LYMPHOCYTES # BLD AUTO: 2.27 X10(3) UL (ref 1–4)
LYMPHOCYTES NFR BLD AUTO: 22.5 %
MCH RBC QN AUTO: 30.3 PG (ref 26–34)
MCHC RBC AUTO-ENTMCNC: 34.3 G/DL (ref 31–37)
MCV RBC AUTO: 88.4 FL
MONOCYTES # BLD AUTO: 0.65 X10(3) UL (ref 0.1–1)
MONOCYTES NFR BLD AUTO: 6.4 %
NEUTROPHILS # BLD AUTO: 6.98 X10 (3) UL (ref 1.5–7.7)
NEUTROPHILS # BLD AUTO: 6.98 X10(3) UL (ref 1.5–7.7)
NEUTROPHILS NFR BLD AUTO: 69.1 %
NITRITE UR QL STRIP.AUTO: NEGATIVE
OSMOLALITY SERPL CALC.SUM OF ELEC: 290 MOSM/KG (ref 275–295)
PH UR STRIP.AUTO: 5.5 [PH] (ref 5–8)
PLATELET # BLD AUTO: 298 10(3)UL (ref 150–450)
POTASSIUM SERPL-SCNC: 3.8 MMOL/L (ref 3.5–5.1)
PROT SERPL-MCNC: 7.8 G/DL (ref 6.4–8.2)
PROT UR STRIP.AUTO-MCNC: 20 MG/DL
RBC # BLD AUTO: 4.49 X10(6)UL
RBC UR QL AUTO: NEGATIVE
SODIUM SERPL-SCNC: 138 MMOL/L (ref 136–145)
SP GR UR STRIP.AUTO: 1.03 (ref 1–1.03)
UROBILINOGEN UR STRIP.AUTO-MCNC: NORMAL MG/DL
WBC # BLD AUTO: 10.1 X10(3) UL (ref 4–11)

## 2024-07-05 PROCEDURE — 96361 HYDRATE IV INFUSION ADD-ON: CPT

## 2024-07-05 PROCEDURE — 96375 TX/PRO/DX INJ NEW DRUG ADDON: CPT

## 2024-07-05 PROCEDURE — 85025 COMPLETE CBC W/AUTO DIFF WBC: CPT

## 2024-07-05 PROCEDURE — 99214 OFFICE O/P EST MOD 30 MIN: CPT | Performed by: INTERNAL MEDICINE

## 2024-07-05 PROCEDURE — 3075F SYST BP GE 130 - 139MM HG: CPT | Performed by: INTERNAL MEDICINE

## 2024-07-05 PROCEDURE — 99284 EMERGENCY DEPT VISIT MOD MDM: CPT

## 2024-07-05 PROCEDURE — 83690 ASSAY OF LIPASE: CPT

## 2024-07-05 PROCEDURE — 96374 THER/PROPH/DIAG INJ IV PUSH: CPT

## 2024-07-05 PROCEDURE — 74177 CT ABD & PELVIS W/CONTRAST: CPT | Performed by: EMERGENCY MEDICINE

## 2024-07-05 PROCEDURE — 3078F DIAST BP <80 MM HG: CPT | Performed by: INTERNAL MEDICINE

## 2024-07-05 PROCEDURE — G2211 COMPLEX E/M VISIT ADD ON: HCPCS | Performed by: INTERNAL MEDICINE

## 2024-07-05 PROCEDURE — 80053 COMPREHEN METABOLIC PANEL: CPT | Performed by: EMERGENCY MEDICINE

## 2024-07-05 PROCEDURE — 3008F BODY MASS INDEX DOCD: CPT | Performed by: INTERNAL MEDICINE

## 2024-07-05 PROCEDURE — 85025 COMPLETE CBC W/AUTO DIFF WBC: CPT | Performed by: EMERGENCY MEDICINE

## 2024-07-05 PROCEDURE — 83690 ASSAY OF LIPASE: CPT | Performed by: EMERGENCY MEDICINE

## 2024-07-05 PROCEDURE — 80053 COMPREHEN METABOLIC PANEL: CPT

## 2024-07-05 PROCEDURE — 99285 EMERGENCY DEPT VISIT HI MDM: CPT

## 2024-07-05 PROCEDURE — 81001 URINALYSIS AUTO W/SCOPE: CPT | Performed by: EMERGENCY MEDICINE

## 2024-07-05 RX ORDER — ONDANSETRON 2 MG/ML
4 INJECTION INTRAMUSCULAR; INTRAVENOUS ONCE
Status: COMPLETED | OUTPATIENT
Start: 2024-07-05 | End: 2024-07-05

## 2024-07-05 RX ORDER — HYDROMORPHONE HYDROCHLORIDE 1 MG/ML
0.5 INJECTION, SOLUTION INTRAMUSCULAR; INTRAVENOUS; SUBCUTANEOUS EVERY 30 MIN PRN
Status: DISCONTINUED | OUTPATIENT
Start: 2024-07-05 | End: 2024-07-05

## 2024-07-05 RX ORDER — AMOXICILLIN AND CLAVULANATE POTASSIUM 875; 125 MG/1; MG/1
1 TABLET, FILM COATED ORAL 2 TIMES DAILY
Qty: 20 TABLET | Refills: 0 | Status: SHIPPED | OUTPATIENT
Start: 2024-07-05 | End: 2024-07-15

## 2024-07-05 RX ORDER — ONDANSETRON 2 MG/ML
INJECTION INTRAMUSCULAR; INTRAVENOUS
Status: COMPLETED
Start: 2024-07-05 | End: 2024-07-05

## 2024-07-05 RX ORDER — SODIUM CHLORIDE 9 MG/ML
125 INJECTION, SOLUTION INTRAVENOUS CONTINUOUS
Status: DISCONTINUED | OUTPATIENT
Start: 2024-07-05 | End: 2024-07-05

## 2024-07-05 NOTE — PROGRESS NOTES
RHEUMATOLOGY Follow up   Date of visit: 07/05/2024  ?  Chief Complaint   Patient presents with    Follow - Up     6 month visit. C/o pain to abdominal pain. Thinks it might be diverticulitis. C/o pain to bilateral elbows. Swelling to left foot.      ?  ASSESSMENT, DISCUSSION & PLAN   Assessment:  1. Undifferentiated connective tissue disease (HCC)    2. Polyarthralgia    3. Fibromyalgia    4. Primary osteoarthritis involving multiple joints    5. Elevated C-reactive protein (CRP)    6. DDD (degenerative disc disease), lumbar    7. Long-term use of Plaquenil        Discussion:  Ms. Rosa Shaver is a 52 yo woman with a hx of hypertension, hyperlipidemia, depression, reflux as well as obesity s/p bariatric surgery who presents for evaluation of worsened joint and muscle pain. She does have a significant family history of ankylosing spondylitis is her two brothers who were both diagnosed within the past one year.  She does have some signs of arthritis likely contributed/worsened by her previous obesity.      Her autoimmune testing was grossly negative with the exception of borderline LUIS ARMANDO/SSA positivity. She does have significant dry eyes, but unclear if truly Sjogren's or not.  Repeat testing on multiple occasions now have shown while LUIS ARMANDO has been equivocal, SSA SSB antibodies have been negative.     She has started plaquenil, last year, and feels like this is helping with some of her joint pain and fatigue.  She does have signs of fibromyalgia, now likely worsened due to recent social stressors. Offered amitriptyline again but pt has been feeling better overall since her last visit.    Due to recent worsened photosensitivity, repeated her autoimmune serologies and checked myositis panel. These were both negative. Previously reminded pt of importance of sun protection and also reminded that plaquenil can make more sun sensitive.   Otherwise, continue yearly eye exams to monitor for toxicities from plaquenil.      Due to the worsened pain overall, I recommended duloxetine to see if it will help.  However, patient did not tolerate.  Had worsened severe headache so medication was discontinued.  Was not on long enough to notice a difference.  We got updated x-rays which were most consistent with osteoarthritis/degenerative changes.    However since her last visit, she is dealing with worsen plantar and Achilles tendinitis.  Discussed that this could be on the spectrum of spondyloarthritis.  Briefly discussed potentially sulfasalazine or methotrexate.  Also unclear if worsened symptoms are related to recent antibiotic use.  She was diagnosed with diverticulitis in May.  She is still having significant left lower quadrant abdominal pain with some changes in her bowel habits.  I strongly recommended ER visit since she just had the diverticulitis and it does not seem like symptoms have completely resolved since that flare.    After flare subsides, will have her get updated labs including G6PD levels.  Handouts provided for both sulfasalazine and methotrexate.  She is familiar due to her 's disease.  Will consider adding 1 of these to the hydroxychloroquine based off of the labs.  If inflammatory markers and tendinitis has subsided by the time she gets the labs, will consider low-dose amitriptyline for her chronic pain syndrome/fibromyalgia.    Okay to follow back with me in 6months or sooner as needed.   We will be in communication once labs results.   She may need to be seen sooner depending on treatment course.     Patient verbalized understanding of above instructions. No further questions at this time.    Code selection for this visit was based on time spent (32min) on date of service in preparing to see the patient, obtaining and/or reviewing separately obtained history, performing a medically appropriate examination, counseling and educating the patient/family/caregiver, ordering medications or testing, referring  and communicating with other healthcare providers, documenting clinical information in the E HR, independently interpreting results and communicating results to the patient/family/caregiver and care coordination with the patient's other providers.       ?  Plan:  Diagnoses and all orders for this visit:    Undifferentiated connective tissue disease (HCC)  -     CBC With Differential With Platelet  -     Comp Metabolic Panel (14)  -     C-Reactive Protein  -     Sed Rate, Westergren (Automated)  -     G-6-Pd, Quant, Blood And Rbc    Polyarthralgia  -     CBC With Differential With Platelet  -     Comp Metabolic Panel (14)  -     C-Reactive Protein  -     Sed Rate, Westergren (Automated)  -     G-6-Pd, Quant, Blood And Rbc    Fibromyalgia    Primary osteoarthritis involving multiple joints    Elevated C-reactive protein (CRP)  -     CBC With Differential With Platelet  -     Comp Metabolic Panel (14)  -     C-Reactive Protein  -     Sed Rate, Westergren (Automated)  -     G-6-Pd, Quant, Blood And Rbc    DDD (degenerative disc disease), lumbar    Long-term use of Plaquenil            Return in about 6 months (around 1/5/2025).  ?  HPI   Rosa Shaver is a 53 year old female with the following active problems who is seen for medically necessary follow-up today. She was seen initially for evaluation of joint pain and abnormal labs.     Since her last visit, she has been doing okay.  Joint pain still present- now in the elbows, typically when putting weight/pressure. Denies swelling.   Has been noticing worsened ankle swelling b/l. Had bruised heel and told achilles and plantar tendonitis   + dealing with costochondritis of the chest  Given naproxen but has not started yet due to concerns for possible side effects  + worsened abdominal pain in the LLQ that started in Thursday. Last attack was in May. Pain worsened today. Denies constipation. Some diarrhea intermittently for the past month. Typically after breakfast- so  thought related to food exposure. States symptom present when eating frozen bfast sandwiches and not on days she has yogurt.     Left knee ain varies from day to day.   Although has multiple allergies, per chart has tolerated bactrim in the past.   Tightness of fingers/puffiness diffusely wit hsome tightness of the rings.     + continued bilateral hip and lower back pain, has difficulty with first few steps.     Tried duloxetine but had worsened migraines. Not sure if medication related at first but when she retried medication, she had severe headaches each time she tried.     NO recent rashes or headaches.     Still with some shortness of breath/chest pressure but better than summer. Attributes some of the symptoms to weight gain.   Was seen by cardiology and workup negative except has atrial tachycardia.   Increased abd pain- right sided, intermittent RLQ pain but recent cscope was grossly negative     Under increased stress.   recently required hip surgery earlier this week.  Admits to eating somewhat poorly over the past few weeks due to ease.    + dry eyes, using drops (systane balance)  + dry mouth, not using biotene products as much; but drinking water constantly; denies recent cavities     Currently taking tylenol as needed, taking more consistently due to the knee pain.  Avoid NSAIDs due to hx of gastric sleeve surgery   Continues with weight gain. Following with weight loss clinic. Could not tolerate mounjaro due to hypoglycemia and side effects. Not on anything at this time.     HPI from initial consultation  referred for rheumatologic evaluation due to joint, muscle and back pain.      States she has been suffering starting several months ago with pain that feels like bruise pain. Denies any visible bruise changes of the skin. States the bruise-like pain is located all over and not just over the joints. This can happen with different types of pressure- 's arm or dog sitting.      States the  joint pain has gotten worse over the past 2 months- worst over fingers, wrists, elbows, right shoulder and both knees. Denies obvious swelling over the joints.   Also suffers from low back pain along the spine and across the bottom there. Describes as a deep pain. States most severe in the mornings as well as at the end of the day. Does notice some improvement with movement and activity.   + hx of eczema which have been persistent despite the warmer weather recently. Applies topicals when she remembers. States areas she applies on a regular basis due respond and subside.      + morning stiffness, depends on activity level, can last at least hour   Takes tylenol as needed when pain severe which does help slightly . Avoids NSAIDs due to hx of bariatric surgery   Also tries different essential oils for her muscle aches   + hair thinning, thought to be related to bariatric surgery (Oct 2018); also admits to hair loss related to stress   + issues with blood sugars, feels very sensitive to low blood sugars or any elevations 20 over her fasting sugars - has not mentioned to PCP yet. As discussed with weight loss physician and was told to adjust diet, however despite diet changes still having some symptoms. Has not had any LOC but does get dizzy and some confusion.   + hx of one miscarriage during 1st trimester, able to conceive twice afterwards - relatively normal per pt   + rare difficulty swallowing  + blisters over fingertips when under stress - none currently   + follows with ophthalmology- has been diagnosed with early glaucoma as well as hx of vitreal detachment due to forceful vomiting. Also has dry eyes- uses Restasis, no other eye drops    + admits to frequent swollen glands in the neck since a child; gets about once yearly   + hx of tendon tear in her ankle requiring surgical intervention (happened 6 years ago and had micro-tears which did not require surgery then last year required surgery). - no issues with the  ankle since that time.   + hx of plantar fasciitis, years ago no recent issues. Does wear insoles.   Does admit to lose 91 since her bariatric surgery Oct 2018.   Was going through physical therapy and told that her gait was abnormal from years of obesity   Wake up at night due to hip pain, needs to reposition.   + nail changes of fingernails 2-3 fingers b/l, left pinky fingernail worst.   + easy bruising      Denies new skin nodule formation.  The patient denies oral or nasal ulcers, photosensitive rash, Raynaud's phenomenon, prior hematologic abnormality, prior renal or liver disease (other than kidney stones), or history of seizures.  No history of prior blood clot in the legs or lungs, strokes or ischemic phenomenon.  Denies nonhealing ulcers on the fingertips.  The patient denies any history of uveitis, crampy abdominal pain, constipation, diarrhea, nodular painful shin bruises, Achilles heel pain, psoriatic lesions, history of dactylitis.  There are no symptoms of severe dry mouth.  No fevers, chills, night sweats, or unexplained weakness.  Denies chronic sinus infections/disease or epistaxis.  Denies chronic cough or hemoptysis.   Denies obvious blood in urine      Family hx:   Both brothers formally diagnosed with ankylosing spondylitis this past year. Unclear if has psoriasis.   ?  Past Medical History:  Past Medical History:    Abdominal pain    Acute atopic conjunctivitis, bilateral    Yamil Vaughn M.D.    Acute meniscal tear, medial, right, initial encounter    MRI 5/21/2019: Mild undersurface tearing of the posterior root ligament of the medial meniscus.    Allergic rhinitis    Anxiety    Arrhythmia    paroxsymal atrial tachycardia    Arthritis    Atrial tachycardia (HCC)    Back pain    Benign paroxysmal positional vertigo    Bleeding nose    Bloating    Blood in urine    Body piercing    Calculus of kidney    Change in hair    Chest pain    Chest pain on exertion    Colon polyp    [place]  FRANK Katz    Constipation    Decorative tattoo    Diabetes (HCC)    Diarrhea, unspecified    Diverticulitis of sigmoid colon    Diverticulosis    Gail Katz M.D.    Dizziness    Easy bruising    Endometriosis    Esophageal reflux    Essential hypertension    Eye disease    Fibromyalgia    Food intolerance    Frequent urination    Glaucoma    Headache disorder    Heart palpitations    Heartburn    High cholesterol    History of COVID-19    History of nausea and vomiting    Hoarseness, chronic    Hyperlipidemia    Hypothyroidism    Injury of peroneal tendon of right foot    Itch of skin    Keratoconjunctivitis sicca not specified as Sjogren's, bilateral    Yamil Vaughn M.D.    Lateral epicondylitis of right elbow    Leaking of urine    Lung nodule    pt unsure of which side    Major depressive disorder, recurrent episode, mild with anxious distress ([facility])    Migraines    Morbid obesity with BMI of 45.0-49.9, adult ([facility])    Obesity    Open angle with borderline findings, high risk, bilateral    Yamil Vaughn M.D.    Osteoarthritis    back    Other vitreous opacities, left eye    Yamil Vaughn M.D.    Other vitreous opacities, right eye    Yamil Vaughn M.D.    Pain in joints    Painful urination    Polycystic ovaries    ovary embedded in pelvic wall--right side    PONV (postoperative nausea and vomiting)    slow to awaken    Posterior vitreous detachment of both eyes    Right > Left    Primary open angle glaucoma of both eyes, moderate stage    Rash    Sleep disturbance    Spitting up blood    Sputum production    Stress    Trochanteric bursitis of both hips    Left >>  Right     Visual impairment    glasses    Vitreous degeneration, right eye    Yamil Vaughn M.D.    Wears glasses    Weight gain     Past Surgical History:  Past Surgical History:   Procedure Laterality Date    Angiogram      2-24-12 [place]    Angioplasty (coronary)      Colonoscopy      Colonoscopy  07/19/2018     Colonoscopy N/A 2023    Procedure: COLONOSCOPY;  Surgeon: Rupa Cheatham DO;  Location:  ENDOSCOPY    D & c   &     Dilation & curettage cervical stump       and     Foot surgery Right 10/18/2019    Tendon repair     Gastric bypass,obesity,sb reconstruc  2018    gastric sleeve    Hernia surgery  10/18/2018    Hiatal hernia Dr. Terri Mooney     Hysterectomy  2006    partial hystero.    Lap sleeve gastrectomy  10/18/2018    Dr. terri mooney           Oophorectomy Bilateral     ovaries removed after partial.    Other  10/15/2018    Sleeve biatric surgery    Other surgical history      removal of right ovary    Removal of kidney stone      age 14, does not remember what kind of surgery. no abdominal or flank scar    Tilt table evaluation      veinogram 12 [facility]    Total abdom hysterectomy      Upper gi endoscopy,exam       Family History:  Family History   Problem Relation Age of Onset    Cancer Mother         basal cell X 3; LUNG    Heart Disorder Mother         SVT    Hypertension Mother     Dementia Mother         early signs     Depression Mother     Cancer Father         Colon & prostate cancers    Heart Disorder Father     Colon Cancer Father     Prostate Cancer Father     Diabetes Father     Hypertension Father     Heart Attack Father     Colon Polyps Brother     Alcohol and Other Disorders Associated Brother     Colon Polyps Brother     No Known Problems Son     No Known Problems Son     Alcohol and Other Disorders Associated Maternal Grandfather     Alcohol and Other Disorders Associated Paternal Grandfather     Cancer Maternal Aunt         breast, colon    Breast Cancer Maternal Aunt 50        In her 50's.    Ovarian Cancer Maternal Aunt 60        In her 60's.    Cancer Maternal Aunt         breast, basal cell    Breast Cancer Maternal Aunt 68        Late 60's.    Ovarian Cancer Maternal Aunt     Cancer Maternal Aunt         basal cell    Cancer  Maternal Aunt         basal cell    Cancer Maternal Aunt         bone    Cancer Paternal Aunt         lung    Breast Cancer Cousin 62    Cancer Cousin         esoph,adenocarcinoma    Cancer Cousin         Non Hodgkins Lymphoma    Cancer Cousin         basal cell     Social History:  Social History     Socioeconomic History    Marital status:    Tobacco Use    Smoking status: Never    Smokeless tobacco: Never   Vaping Use    Vaping status: Never Used   Substance and Sexual Activity    Alcohol use: Yes     Alcohol/week: 4.0 standard drinks of alcohol     Types: 4 Standard drinks or equivalent per week     Comment: weekly    Drug use: No    Sexual activity: Yes     Partners: Male     Birth control/protection: Hysterectomy   Other Topics Concern    Caffeine Concern Yes     Comment: 20-30 oz of coffee daily    Stress Concern Yes    Weight Concern Yes    Special Diet No    Exercise No    Seat Belt Yes     Social Determinants of Health     Physical Activity: Unknown (1/25/2021)    Received from [facility]    Exercise Vital Sign     Days of Exercise per Week: 4 days     Medications:  Outpatient Medications Marked as Taking for the 7/5/24 encounter (Office Visit) with Hilda Llamas DO   Medication Sig Dispense Refill    naproxen 500 MG Oral Tab Take 1 tablet (500 mg total) by mouth 2 (two) times daily as needed. 20 tablet 0    albuterol 108 (90 Base) MCG/ACT Inhalation Aero Soln Inhale 2 puffs into the lungs every 6 (six) hours as needed for Wheezing or Shortness of Breath (Cough). 1 each 0    LEVOTHYROXINE 112 MCG Oral Tab TAKE 1 TABLET(112 MCG) BY MOUTH BEFORE BREAKFAST 90 tablet 3    hydroxychloroquine 200 MG Oral Tab Take 1 tablet (200 mg total) by mouth 2 (two) times daily. 180 tablet 1    Microlet Lancets Does not apply Misc USE UP TO 6 TIMES A  each 11    HYDROcodone-acetaminophen 5-300 MG Oral Tab Take 1 tablet by mouth every 6 (six) hours as needed. 12 tablet 0     Glucose Blood (CONTOUR NEXT TEST) In Vitro Strip Use 1 test strip up to 6 times a day as needed 100 strip 11    LANSOPRAZOLE 30 MG Oral Capsule Delayed Release TAKE 1 CAPSULE(30 MG) BY MOUTH DAILY 90 capsule 1    CREON 67539-24032 units Oral Cap DR Particles TAKE 70069 UNTS BY MOUTH EVERY MORNING AND 70454 UNITS EVERY NIGHT AT BEDTIME. TAKE TWICE DAILY WITH SNACKS      simvastatin 20 MG Oral Tab Take 1 tablet (20 mg total) by mouth every evening. 90 tablet 3    Blood Glucose Monitoring Suppl Does not apply Device THIS IS FOR 1 CONTOUR GLUCOSE MONITOR 1 each 0    metoprolol succinate ER 25 MG Oral Tablet 24 Hr Take 0.5 tablets (12.5 mg total) by mouth daily.      meclizine 25 MG Oral Tab Take 1 tablet (25 mg total) by mouth 3 (three) times daily as needed for Dizziness. 30 tablet 2    Zinc 50 MG Oral Tab Take by mouth.      TURMERIC CURCUMIN OR Take by mouth.      ascorbic acid 500 MG Oral Chew Tab Chew 1 tablet (500 mg total) by mouth daily.      clotrimazole-betamethasone 1-0.05 % External Cream Apply 1 Application topically 2 (two) times daily as needed. 60 g 3    CALCIUM OR Take 2,400 mg by mouth daily.      saccharomyces boulardii 250 MG Oral Cap Pro-biotic 1 capsule by mouth daily      tiZANidine HCl 4 MG Oral Tab Take 1 tablet (4 mg total) by mouth nightly as needed. 30 tablet 2    ALPRAZolam 0.5 MG Oral Tab 1/2 to one tab daily as needed 10 tablet 0    magnesium 250 MG Oral Tab Take 1 tablet (250 mg total) by mouth daily.      Multiple Vitamins-Minerals (BARIATRIC MULTIVITAMINS/IRON OR) Take 1 capsule by mouth daily.      PATIENT SUPPLIED MEDICATION Essential Oils for allergies      Cholecalciferol (VITAMIN D3) 2000 UNITS Oral Tab Take 1 tablet (2,000 Units total) by mouth daily. 2 tab a day to make 4,000 units       Modified Medications    No medications on file       ?  ?  Allergies:  Allergies   Allergen Reactions    Berries ANAPHYLAXIS    Casein ANAPHYLAXIS    Levofloxacin RASH     rash    Augmentin  [Amoxicillin-Pot Clavulanate] NAUSEA AND VOMITING    Avocado ITCHING    Cefdinir OTHER (SEE COMMENTS)     Tingling to lips and tongue     Darvocet [Propoxyphene N-Apap] NAUSEA AND VOMITING    Latex RASH and SWELLING    Metronidazole OTHER (SEE COMMENTS)     Tingling to lips and tongue    Nuts ANAPHYLAXIS     PECANS/CASHEWS  Water chestnuts-Itching    Ultram [Tramadol Hcl] RASH    Zithromax RASH    Codeine ITCHING     Not associated with rash    Oxycodone RASH     ONLY in GENERIC form per pt, Ok with brand form.    Radiology Contrast Iodinated Dyes NAUSEA ONLY     Patient instructed to take Zyrtec prior to administration of radiology contrast iodinated dye to prevent nausea.     ?  REVIEW OF SYSTEMS   ?  Review of Systems   Constitutional:  Positive for malaise/fatigue. Negative for chills, fever and weight loss.   HENT:  Positive for tinnitus. Negative for congestion, nosebleeds and sinus pain.    Eyes:  Positive for photophobia. Negative for blurred vision, pain and redness.        Dry eyes   Respiratory:  Positive for shortness of breath. Negative for cough and hemoptysis.    Cardiovascular:  Positive for chest pain (no change/stable). Negative for palpitations and leg swelling.   Gastrointestinal:  Positive for abdominal pain and heartburn. Negative for blood in stool, constipation, diarrhea and nausea.   Genitourinary:  Positive for dysuria and urgency. Negative for frequency and hematuria.        Intermittent   Musculoskeletal:  Positive for back pain, joint pain, myalgias and neck pain.   Skin:  Positive for rash. Negative for itching.   Neurological:  Positive for dizziness, tingling (left toes) and headaches.   Endo/Heme/Allergies:  Positive for environmental allergies. Bruises/bleeds easily.   Psychiatric/Behavioral:  Negative for depression. The patient is not nervous/anxious and does not have insomnia.      PHYSICAL EXAM   Today's Vitals:  Temperature Blood Pressure Heart Rate Resp Rate SpO2   Temp: 98.8  °F (37.1 °C) BP: 132/70 Pulse: 75   SpO2: 97 %   ?  Current Weight Height BMI BSA Pain   Wt Readings from Last 1 Encounters:   07/05/24 254 lb (115.2 kg)    Height: 5' 1\" (154.9 cm) Body mass index is 48.18 kg/m². Body surface area is 2.09 meters squared.         Physical Exam  Vitals and nursing note reviewed.   Constitutional:       General: She is not in acute distress.     Appearance: She is well-developed. She is obese. She is not diaphoretic.   HENT:      Head: Normocephalic and atraumatic.   Eyes:      General: No scleral icterus.     Extraocular Movements: Extraocular movements intact.      Conjunctiva/sclera: Conjunctivae normal.   Neck:      Vascular: No JVD.      Trachea: No tracheal deviation.   Cardiovascular:      Rate and Rhythm: Normal rate and regular rhythm.      Heart sounds: Normal heart sounds. No murmur heard.  Pulmonary:      Effort: No respiratory distress.      Breath sounds: Normal breath sounds. No wheezing.      Comments: Cannot tolerate full deep breaths d/t abd pain  Musculoskeletal:         General: Swelling and tenderness present.      Cervical back: Neck supple.      Comments: Tenderness throughout without active synovitis particularly over MCPs and MTPs diffusely - stable  No swelling, redness or restriction of motion of the DIPs, PIPs, MCPs, wrists, elbows, ankles, or joints of the feet.  Bilateral shoulders with full ROM.  (Prior exam) Lumbar Spinous process tenderness. B/L SI joint tenderness    (prior exam)  Posterior Tender Point Exam  Occiput +/+  Trapezius +/+  Supraspinatus +/+  Gluteal deferred   Greater trochanter +/+  Anterior Tender Point Exam:  Low cervical +/+  Second rib +/+  Lateral epicondyle +/+  Knee +/+  16 tender points positive   Lymphadenopathy:      Cervical: No cervical adenopathy.   Skin:     General: Skin is warm and dry.      Findings: No erythema or rash.      Comments: Multiple tattoos noted  No periungal erythema  Nails manicured   Neurological:       Mental Status: She is alert and oriented to person, place, and time.      Cranial Nerves: No cranial nerve deficit.   Psychiatric:         Mood and Affect: Mood normal.         Behavior: Behavior normal.       Radiology review:  XR CHEST AP PORTABLE  (CPT=71045)    Result Date: 6/27/2024  CONCLUSION:  Normal heart size and vascularity.  The lungs are clear.  No mass, pneumonia or CHF.  No effusion or lymphadenopathy.  Small hiatal hernia noted.  No acute disease.   LOCATION:  [facility]      Dictated by (CST): Vladimir Serna MD on 6/27/2024 at 4:28 PM     Finalized by (CST): Vladimir Serna MD on 6/27/2024 at 4:29 PM       Narrative   PROCEDURE:  CT ABDOMEN+PELVIS (CONTRAST ONLY) (CPT=74177)     COMPARISON:  [facility] , MR, MRI ABDOMEN&MRCP W/3D (W+W0)(CPT=74183/80021), 5/18/2023, 8:41 AM.     INDICATIONS:  LLQ pain     TECHNIQUE:  CT scanning was performed from the dome of the diaphragm to the pubic symphysis with non-ionic intravenous contrast material. Post contrast coronal MPR imaging was performed.  Dose reduction techniques were used. Dose information is  transmitted to the ACR (American College of Radiology) NRDR (National Radiology Data Registry) which includes the Dose Index Registry.     PATIENT STATED HISTORY:(As transcribed by Technologist)  Pain left lowr quadrant with low grade fever since yesterday.      CONTRAST USED:  100cc of Isovue 370     FINDINGS:    Acute diverticulitis involving the descending colon, with a segment measuring about 6 cm in length, showing thickening of the bowel, pericolonic stranding, and diverticula.  No evidence for pericolonic abscess, bowel obstruction, free air or ascites.  No   other acute process identified.  Hiatal hernia.  Postsurgical changes are present.  Nonobstructing stone lower pole left kidney.      Impression   CONCLUSION:  Acute diverticulitis descending colon.        LOCATION:  [id number]        Dictated by (CST): Alexander Scruggs MD on 5/06/2024 at 7:30 PM       Finalized by (CST): Alexander Scruggs MD on 5/06/2024 at 7:31 PM       Narrative   PROCEDURE:  XR FOOT WEIGHTBEARING (3 VIEWS), RIGHT (CPT=73630)     INDICATIONS:  M79.671 Acute foot pain, right     COMPARISON:  [facility], XR, XR FOOT, COMPLETE (MIN 3 VIEWS), RIGHT (CPT=73630), 12/12/2018, 11:22 AM.     PATIENT STATED HISTORY: (As transcribed by Technologist)  Patient states tripping and now has dorsal/lateral pain to the right foot. History of ankle/tendon repair.                       Impression   CONCLUSION:       Negative for acute fracture or malalignment.  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  No periarticular erosions.  Normal calcaneal pitch with small Achilles and large plantar calcaneal spurs.  Mild soft tissue swelling of the dorsal forefoot.        LOCATION:  [facility]        Dictated by (CST): Umberto Horowitz MD on 3/06/2024 at 6:44 PM      Finalized by (CST): Umberto Horowitz MD on 3/06/2024 at 6:45 PM       Narrative   PROCEDURE:  XR LUMBAR SPINE (MIN 4 VIEWS) (CPT=72110)     TECHNIQUE:  AP, lateral, oblique, and coned down L5-S1 views were obtained.     COMPARISON:  None.     INDICATIONS:  M51.36 DDD (degenerative disc disease), lumbar     PATIENT STATED HISTORY: (As transcribed by Technologist)   No known injury. Blat hip and back pain for years.         FINDINGS:      Normal alignment of the lumbar spine.  No acute osseous injuries are noted.  Extensive lumbar disc disease at L4-5 and L5-S1 with vacuum disc changes and extensive disc height loss.  The remainder of the lumbar spine reveals a minimal degree of disc  disease.  Mild facet arthropathy at L4-5 and L5-S1.  No evidence of spondylolysis.     The paraspinal soft tissues reveal no acute process.  There are multiple vascular coils along the distribution of the right ovarian vein.                   Impression   CONCLUSION:    1. Extensive lumbar disc disease at L4-5 and L5-S1 with superimposed mild bilateral facet arthropathy at both levels.  2. No  acute process.        LOCATION:  [facility]        Dictated by (CST): Mallory Keith DO on 2/29/2024 at 1:37 PM      Finalized by (CST): Mallory Keith DO on 2/29/2024 at 1:38 PM      Narrative   PROCEDURE:  XR HIP W OR WO PELVIS (MIN 5 VIEWS), BILAT (CPT=73523)     TECHNIQUE:  Bilateral min 5 views of the hip and pelvis if performed.     COMPARISON:  None.     INDICATIONS:  M25.552 Bilateral hip pain M25.551 Bilateral hip pain     PATIENT STATED HISTORY: (As transcribed by Technologist)   No known injury. Blat hip and back pain for years.           FINDINGS:    BONES:  Normal.  No significant arthropathy or acute abnormality.  SOFT TISSUES:  Negative.  No visible soft tissue swelling.  EFFUSION:  None visible.  OTHER:  Negative.                   Impression   CONCLUSION:  Negative exam.        LOCATION:  [facility]        Dictated by (CST): Mallory Keith DO on 2/29/2024 at 1:38 PM      Finalized by (CST): Mallory Keith DO on 2/29/2024 at 1:38 PM         Narrative   PROCEDURE:  MRI KNEE, LEFT (XGM=43624)     COMPARISON:  None.     INDICATIONS:  M25.562 Acute pain of left knee     TECHNIQUE:  Axial, coronal, and sagittal proton density with and without fat saturation images were obtained.     PATIENT STATED HISTORY: (As transcribed by Technologist)  Left lateral knee pain since twisting injury one month ago.         FINDINGS:    LIGAMENTS:          The ACL, PCL, patellar retinacula, and lateral collateral ligament are intact.  Grade 1 sprain of the medial collateral ligament is present.  MENISCI:            Radial tear involving the posterior horn of the medial meniscus is noted.  TENDONS:            The tendinous insertions about the knee are intact without significant tendinosis or tears.  MUSCULATURE:        No evidence of strain, edema, or atrophy.  BONY COMPARTMENTS:  Mild fissuring of the trochlear cartilage is noted.  No chondral defects are noted.  Mild chondromalacia is noted  SYNOVIUM:           No evidence for  effusion, synovitis, or intraarticular bodies.                      Impression   CONCLUSION:  Radial tear involving the posterior horn of the medial meniscus.        LOCATION:  [facility]                   Dictated by (CST): Shashank Linder MD on 8/17/2023 at 11:33 AM      Finalized by (CST): Shashank Linder MD on 8/17/2023 at 11:48 AM         PROCEDURE:  CT CHEST (CPT=71250)       LOCATION:  [facility]         COMPARISON:  [facility] , CT, CT ANGIOGRAPHY, CHEST (CPT=71275), 3/21/2016, 0:11 AM.  [facility] ([facility] 12/22/2021, 8:56 AM.       INDICATIONS:  J12.82 Pneumonia due to COVID-19 virus U07.1 Pneumonia due to COVID-19 virus       TECHNIQUE:  Unenhanced multislice CT scanning is performed through the chest.  Dose reduction techniques were used. Dose information is transmitted to the ACR (American College of Radiology) NRDR (National Radiology Data Registry) which includes the Dose    Index Registry.       PATIENT STATED HISTORY: (As transcribed by Technologist)  Patient states to have ongoing chest pain. She has a history of pneumonia due to Covid-19.            FINDINGS:     LUNGS:  Resolved ground-glass opacities in left lower lobe.  Subpleural micro nodules in the right lower lobe on images 68, 86, 95, 108 and 123 are stable dating back to 3/21/2016.  No new pulmonary opacity..   VASCULATURE:  Thrombus cannot be excluded without intravenous contrast.     BRENDA:  No mass or adenopathy.     MEDIASTINUM:  No mass or adenopathy.  Hiatal hernia and changes of gastric sleeve again demonstrated.   CARDIAC:  No enlargement, pericardial thickening, or significant calcification.  Resolved pericardial thickening.   PLEURA:  No mass or effusion.     THORACIC AORTA:  Unremarkable as seen on non-contrast imaging.   CHEST WALL:  No mass or axillary adenopathy.     LIMITED ABDOMEN:  Uncomplicated diverticula of visualized hepatic flexure of colon.   BONES:  Mild degenerative changes of spine.                         Impression   CONCLUSION:     1. Interval resolution of ground-glass opacities left lower lobe consistent with resolved pneumonia.   2. Resolved pericardial thickening/fluid.   3. No new chest pathology.  Stability of right lower lobe micronodules dating back to 2016 indicates benign etiology.           Dictated by (CST): Farhan Carter MD on 6/30/2022 at 2:45 PM       Finalized by (CST): Farhan Carter MD on 6/30/2022 at 2:56 PM        PROCEDURE:  MRI SPINE LUMBAR (CPT=72148)     COMPARISON:  [facility], CT, CT ABDOMEN+PELVIS(CONTRAST ONLY)(CPT=74177), 2/05/2019, 12:11 PM.  [facility], XR, XR LUMBAR SPINE (MIN 4 VIEWS) (CPT=72110), 1/21/2020, 10:30 AM.     INDICATIONS:  M19.90 Unspecified osteoarthritis, unspecified site Z82.69 Family history of other diseases of the musculoskeletal system and connective tissue G89.29 Other ch*     TECHNIQUE:  Multiplanar T1 and T2 weighted images including fat suppression sequences.  Images acquired in sagittal and axial planes.       PATIENT STATED HISTORY: (As transcribed by Technologist)  The patient has had low back pain for months with no known injury.         FINDINGS:    There is lumbar lordosis.  The vertebral body heights are maintained.  There is scattered disc desiccation.  There is moderate disc height loss at L4-5 and L5-S1.  There are scattered endplate degenerative changes including endplate edema at L4-5.  No   suspicious focal osseous lesion is evident.  The conus and cauda equina nerve roots are unremarkable in caliber and signal.  There is sclerosis along the SI joints, left greater than right, better characterized on the concurrent MRI of the SI joints.     T12-L1:  Unremarkable.     L1-L2:  Mild facet hypertrophy.  Mild ligamentum flavum thickening.  No spinal canal or foraminal narrowing.     L2-L3:  Mild facet hypertrophy.  Mild ligamentum flavum thickening.  No spinal canal or foraminal narrowing.     L3-L4:  Mild to moderate facet hypertrophy.  Mild  ligamentum flavum thickening.  Mild disc bulge.  Post small left foraminal/extra-foraminal disc protrusion.  No spinal canal stenosis.  Mild to moderate left and no right foraminal narrowing.     L4-5:  Moderate degenerative disc disease.  Mild to moderate posterior disc osteophyte complex extending into the far right lateral region.  Mild to moderate facet hypertrophy.  Mild ligamentum flavum thickening.  Mild to moderate spinal canal stenosis.    Bilateral subarticular zone narrowing.  Mild left and moderate right foraminal narrowing.     L5-S1:  Mild to moderate posterior disc osteophyte complex with moderate facet hypertrophy.  No spinal canal stenosis.  Mild right and moderate left foraminal narrowing.     CONCLUSION:       1. Moderate degenerative changes in primarily the lower lumbar spine.  Mild to moderate spinal canal stenosis is seen at L4-5.  Moderate right foraminal narrowing is seen at L4-5.  Moderate left foraminal narrowing is seen at L5-S1.  Moderate   degenerative disc disease noted at L4-5.  Please see above for further details.  2. There is sclerosis along the SI joints, left greater than right, better characterized on the concurrent MRI of the SI joints.     Dictated by: Stromberg, LeRoy, MD on 5/21/2020 at 9:00 AM     PROCEDURE:  MRI SI JOINTS(CPT=72195)     COMPARISON:  [facility], MRI SPINE LUMBAR (CPT=72148), 5/21/2020, 8:11 AM.     INDICATIONS:  Low back pain for several months.     TECHNIQUE:    MRI of the sacroiliac joints was performed with axial T1, axial STIR, coronal T1,                                      coronal STIR, sagittal T1, and sagittal STIR images without contrast.        PATIENT STATED HISTORY: (As transcribed by Technologist)  The patient has had low back pain for months with no known injury.         FINDINGS:       SI JOINTS:      There is bilateral sacroiliac joint osteoarthritis noted, appearing moderate in degree on the left and mild on the right.  Regarding the  left SI joint, there are moderate regions of subchondral sclerosis, subchondral cystic change and   subchondral edematous changes.  The right SI joint reveals a minimal degree of subchondral sclerosis and edema.      BONY STRUCTURES:      No fracture, pars defect, significant scoliosis, or osseous lesion.       INTRAPELVIC STRUCTURES:  The visualized intrapelvic structures are unremarkable.  No visualized muscular abnormalities are noted.     CONCLUSION:    1. Bilateral SI joint osteoarthritis, mild on the right and moderate on the left.  2. No additional findings.        Dictated by: Mallory Keith DO on 5/21/2020 at 9:27 AM         Labs:  Lab Results   Component Value Date    WBC 7.4 06/27/2024    RBC 4.35 06/27/2024    HGB 13.2 06/27/2024    HCT 39.0 06/27/2024    .0 06/27/2024    MPV 8.7 10/16/2018    MCV 89.7 06/27/2024    MCH 30.3 06/27/2024    MCHC 33.8 06/27/2024    RDW 14.9 06/27/2024    NEPRELIM 3.88 06/27/2024    NEPERCENT 52.8 06/27/2024    LYPERCENT 37.0 06/27/2024    MOPERCENT 6.9 06/27/2024    EOPERCENT 2.0 06/27/2024    BAPERCENT 1.0 06/27/2024    NE 3.88 06/27/2024    LYMABS 2.72 06/27/2024    MOABSO 0.51 06/27/2024    EOABSO 0.15 06/27/2024    BAABSO 0.07 06/27/2024     Lab Results   Component Value Date     (H) 06/27/2024    BUN 21 06/27/2024    BUNCREA SEE NOTE: 03/05/2024    CREATSERUM 0.94 06/27/2024    ANIONGAP 5 06/27/2024    GFR 84 11/09/2017    GFRNAA 82 05/26/2022    GFRAA 95 05/26/2022    CA 9.1 06/27/2024    OSMOCALC 288 06/27/2024    ALKPHO 106 06/27/2024    AST 26 06/27/2024    ALT 39 06/27/2024    BILT 0.3 06/27/2024    TP 7.4 06/27/2024    ALB 3.5 06/27/2024    GLOBULIN 3.9 06/27/2024    AGRATIO 1.3 03/05/2024     06/27/2024    K 4.0 06/27/2024     06/27/2024    CO2 22.0 06/27/2024       Additional Labs:  02/2024  Vitamin D 41 normal  CRP 3.4 normal  ESR 17 normal  Ferritin 20 normal  Iron studies normal  B12 1114 elevated    09/2023  CRP normal  ESR 11  normal    07/2023  LUIS ARMANDO screen negative  Myositis antibody panel negative    04/2023  ESR 20 normal  CRP 0.51 borderline elevated    01/2023  LUIS ARMANDO screen negative  LUIS ARMANDO by IFA 1: 80 speckled  ESR 26  CRP 0.56 borderline  CBC grossly normal  CMP grossly normal    08/2022   LUIS ARMANDO by IFA 1:160 speckled   CK normal   B12 1107 elevated  C3 and C4 normal   ESR 12 normal  CRP 0.52 borderline elevated    05/2022  LUIS ARMANDO by IFA 1:160 speckled  SHAILA panel negative   B12 1048 high   Vit D 45.1 normal   ESR 10 normal   CRP 0.41 borderline     09/2021  CRP 0.32  ESR 8 normal    03/2021  LUIS ARMANDO 1:160 speckled  LUIS ARMANDO screen negative  SHAILA panel negative   CRP 6.58 elevated   ESR normal    01/2021  CRP 0.76  CCP negative  RF negative    12/2020  LUIS ARMANDO 1:160 homogenous  C4 24.3  C3 136  CRP 1.06 borderline  ESR normal  SHAILA panel negative     04/2020    SSB, Lomax, RNP, SCL 70, Kelley 1, dsDNA, centromere, histone negative  CK normal  RF negative  CCP negative  HLA-B27 negative  ESR normal  CRP normal   LUIS ARMANDO 1: 160 speckled    05/2017  LUIS ARMANDO negative  CRP normal   ESR normal     Hilda Llamas, DO  [facility]  07/05/2024    ?

## 2024-07-05 NOTE — ED INITIAL ASSESSMENT (HPI)
PT PRESENTS TO ED WITH LEFT SIDED ABD PAIN THAT STARTED YESTERDAY, HAS HX OF DIVERTICULITIS, PT STATES SHE HAS HAD INTERMITTENT DIARRHEA FOR THE LAST MONTH.  LAST BM THIS MORNING AND WAS NORMAL.  DENIES N/V, DENIES FEVERS   General

## 2024-07-06 NOTE — ED PROVIDER NOTES
Patient Seen in: [facility] Emergency Department      History     Chief Complaint   Patient presents with    Abdominal Pain     Stated Complaint: Left-sided abd pain since yesterday. hx of diverticulitis    Subjective:   HPI    53-year-old female presents to the emergency room for chief complaint of left lower quadrant abdominal pain. Patient states symptoms have been present for the last 2 days.  States she did have diverticulitis in May.  Denies any fevers or chills.  Denies nausea vomiting or diarrhea.  Denies melena or hematochezia.  Denies urinary symptoms.    Objective:   Past Medical History:    Abdominal pain    Acute atopic conjunctivitis, bilateral    Yamil Vaughn M.D.    Acute meniscal tear, medial, right, initial encounter    MRI 5/21/2019: Mild undersurface tearing of the posterior root ligament of the medial meniscus.    Allergic rhinitis    Anxiety    Arrhythmia    paroxsymal atrial tachycardia    Arthritis    Atrial tachycardia (HCC)    Back pain    Benign paroxysmal positional vertigo    Bleeding nose    Bloating    Blood in urine    Body piercing    Calculus of kidney    Change in hair    Chest pain    Chest pain on exertion    Colon polyp    Gail Katz M.D.    Constipation    Decorative tattoo    Diabetes (HCC)    Diarrhea, unspecified    Diverticulitis of sigmoid colon    Diverticulosis    Gail Katz M.D.    Dizziness    Easy bruising    Endometriosis    Esophageal reflux    Essential hypertension    Eye disease    Fibromyalgia    Food intolerance    Frequent urination    Glaucoma    Headache disorder    Heart palpitations    Heartburn    High cholesterol    History of COVID-19    History of nausea and vomiting    Hoarseness, chronic    Hyperlipidemia    Hypothyroidism    Injury of peroneal tendon of right foot    Itch of skin    Keratoconjunctivitis sicca not specified as Sjogren's, bilateral    Yamil Vaughn M.D.    Lateral epicondylitis of right elbow    Leaking  of urine    Lung nodule    pt unsure of which side    Major depressive disorder, recurrent episode, mild with anxious distress (HCC)    Migraines    Morbid obesity with BMI of 45.0-49.9, adult (HCC)    Obesity    Open angle with borderline findings, high risk, bilateral    Yamil Vaughn M.D.    Osteoarthritis    back    Other vitreous opacities, left eye    Yamil Vaughn M.D.    Other vitreous opacities, right eye    Yamil Vaughn M.D.    Pain in joints    Painful urination    Polycystic ovaries    ovary embedded in pelvic wall--right side    PONV (postoperative nausea and vomiting)    slow to awaken    Posterior vitreous detachment of both eyes    Right > Left    Primary open angle glaucoma of both eyes, moderate stage    Rash    Sleep disturbance    Spitting up blood    Sputum production    Stress    Trochanteric bursitis of both hips    Left >>  Right     Visual impairment    glasses    Vitreous degeneration, right eye    Yamil Vaughn M.D.    Wears glasses    Weight gain              Past Surgical History:   Procedure Laterality Date    Angiogram      12 [place]    Angioplasty (coronary)      Colonoscopy      Colonoscopy  2018    Colonoscopy N/A 2023    Procedure: COLONOSCOPY;  Surgeon: Rupa Cheatham DO;  Location:  ENDOSCOPY    D & c   &     Dilation & curettage cervical stump       and     Foot surgery Right 10/18/2019    Tendon repair     Gastric bypass,obesity,sb reconstruc  2018    gastric sleeve    Hernia surgery  10/18/2018    Hiatal hernia Dr. Terri Avila     Hysterectomy  2006    partial hystero.    Lap sleeve gastrectomy  10/18/2018    Dr. terri avila           Oophorectomy Bilateral     ovaries removed after partial.    Other  10/15/2018    Sleeve biatric surgery    Other surgical history      removal of right ovary    Removal of kidney stone      age 14, does not remember what kind of surgery. no abdominal or flank scar    Tilt table  evaluation      veinogram 7-6-12 [place]    Total abdom hysterectomy      Upper gi endoscopy,exam                  Social History     Socioeconomic History    Marital status:    Tobacco Use    Smoking status: Never    Smokeless tobacco: Never   Vaping Use    Vaping status: Never Used   Substance and Sexual Activity    Alcohol use: Yes     Alcohol/week: 4.0 standard drinks of alcohol     Types: 4 Standard drinks or equivalent per week     Comment: weekly    Drug use: No    Sexual activity: Yes     Partners: Male     Birth control/protection: Hysterectomy   Other Topics Concern    Caffeine Concern Yes     Comment: 20-30 oz of coffee daily    Stress Concern Yes    Weight Concern Yes    Special Diet No    Exercise No    Seat Belt Yes     Social Determinants of Health     Physical Activity: Unknown (1/25/2021)    [facility] [employer] [employer]    Exercise Vital Sign     Days of Exercise per Week: 4 days              Review of Systems    Positive for stated Chief Complaint: Abdominal Pain    Other systems are as noted in HPI.  Constitutional and vital signs reviewed.      All other systems reviewed and negative except as noted above.    Physical Exam     ED Triage Vitals [07/05/24 1410]   /79   Pulse 85   Resp 19   Temp 98 °F (36.7 °C)   Temp src Oral   SpO2 98 %   O2 Device None (Room air)       Current Vitals:   Vital Signs  BP: 147/68  Pulse: 78  Resp: 18  Temp: 98 °F (36.7 °C)  Temp src: Oral  MAP (mmHg): 89    Oxygen Therapy  SpO2: 99 %  O2 Device: None (Room air)            Physical Exam    GENERAL: Patient is awake, alert, well-appearing, in no acute distress.  HEENT:  no scleral icterus.  Mucous membranes are moist,  HEART: Regular rate and rhythm, no murmurs.  LUNGS: Clear to auscultation bilaterally.    ABDOMEN: Soft, nondistended, left lower quadrant tender, bowel sounds are present, no rebound, no rigidity, no guarding.no pulsatile masses.       ED Course      Labs Reviewed   URINALYSIS WITH CULTURE REFLEX - Abnormal; Notable for the following components:       Result Value    Ketones Urine Trace (*)     Protein Urine 20 (*)     All other components within normal limits   COMP METABOLIC PANEL (14) - Abnormal; Notable for the following components:    Glucose 114 (*)     Alkaline Phosphatase 117 (*)     A/G Ratio 0.9 (*)     All other components within normal limits   LIPASE - Normal   CBC WITH DIFFERENTIAL WITH PLATELET    Narrative:     The following orders were created for panel order CBC With Differential With Platelet.  Procedure                               Abnormality         Status                     ---------                               -----------         ------                     CBC W/ DIFFERENTIAL[[id number]]                              Final result                 Please view results for these tests on the individual orders.   RAINBOW DRAW LAVENDER   RAINBOW DRAW LIGHT GREEN   CBC W/ DIFFERENTIAL                      MDM        Differential diagnosis before testing includes but not limited to diverticulitis, perforated viscus, intra-abdominal abscess, which is a medical condition that poses a threat to life/function    Radiographic images  I personally reviewed the radiographs and my individual interpretation shows CT abdomen no free air  I also reviewed the official reports that showed findings consistent with acute diverticulitis of the proximal sigmoid colon, no abscess or free air.    Medications Provided: IV normal saline, Dilaudid    Course of Events during Emergency Room Visit include IV established blood work obtained.  Patient was given pain medication.  CBC and chemistry unremarkable.  Urinalysis negative nitrate and leukocyte esterase.  CT was consistent with acute diverticulitis.  Discussed results with the patient, patient reports she was treated in May with Cipro/Flagyl but had allergic reaction, it was unclear which medication was  causing the reaction so she was put on Augmentin.  We will once again prescribe Augmentin and have patient follow-up with primary care and her gastroenterologist.  Return to ER if any change or worsening symptoms.  Abdomen soft nonsurgical reevaluation vital signs are stable.  Patient agrees to plan was discharged good condition    Shared decision making was utilized           Discharge  I have discussed with the patient the results of test, differential diagnosis, treatment plan, warning signs and symptoms which should prompt immediate return.  They expressed understanding of these instructions and agrees to the following plan provided.  They were given written discharge instructions and agrees to return for any concerns and voiced understanding and all questions were answered.    Note to patient: The 21st Century Cures Act makes medical notes like these available to patients in the interest of transparency. However, this is a medical document intended as peer to peer communication. It is written in medical language and may contain abbreviations or verbiage that are unfamiliar. It may appear blunt or direct. Medical documents are intended to carry relevant information, facts as evident, and the clinical opinion of the practitioner.                                            Medical Decision Making      Disposition and Plan     Clinical Impression:  1. Acute diverticulitis         Disposition:  Discharge  7/5/2024  5:25 pm    Follow-up:  Oneyda Moulton DO  [address] [address] [id number] [phone]    Follow up in 2 day(s)      Rupa Cheatham DO  [address] [facility] [address] [phone]    Follow up in 2 day(s)            Medications Prescribed:  Discharge Medication List as of 7/5/2024  5:43 PM

## 2024-07-15 DIAGNOSIS — Z76.89 ENCOUNTER FOR NEW MEDICATION PRESCRIPTION: ICD-10-CM

## 2024-07-15 DIAGNOSIS — R06.02 SHORTNESS OF BREATH: ICD-10-CM

## 2024-07-16 NOTE — TELEPHONE ENCOUNTER
Please call patient and ask her about her symptoms and why she is requesting a refill on the albuterol.  According to the [employer] medication data interface the albuterol was dispensed to her on 6/20/2024 and it was for 200 puffs.  Uncertain why patient is needing this medication, also, as far as I know she has not been diagnosed with asthma.  The refill request for the albuterol has not yet been approved.

## 2024-07-17 ENCOUNTER — PATIENT MESSAGE (OUTPATIENT)
Dept: FAMILY MEDICINE CLINIC | Facility: CLINIC | Age: 54
End: 2024-07-17

## 2024-07-17 RX ORDER — ALBUTEROL SULFATE 90 UG/1
2 AEROSOL, METERED RESPIRATORY (INHALATION) EVERY 6 HOURS PRN
Qty: 8.5 G | Refills: 0 | OUTPATIENT
Start: 2024-07-17

## 2024-07-17 NOTE — TELEPHONE ENCOUNTER
From: Rosa Shaver  To: Oneyda Moulton  Sent: 7/17/2024 3:13 PM CDT  Subject: Inhaler refill    Hello - I got a notification from the pharmacy that I was do for a refill and just automatically clicked ok. I have not used up the one just prescribed, but wouldn't mind having 1 extra on hand in case I'm at my camper and leave my purse at home (which is where I keep my current one)

## 2024-07-17 NOTE — TELEPHONE ENCOUNTER
Patient is asking to have an inhaler refill \"on hand\" as she does not need one currently  (see 7/15/24 refill request)

## 2024-08-01 NOTE — TELEPHONE ENCOUNTER
Dr Perlita Duran advise
Per discussion this AM with Dr. Jeromy Tong she said that she had already talked to patient last night about this issue.
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 Elizabeth Hernandez. Recommend stay off the antibiotic.   How she doing with
Pt called back and said when she closes her eyes she is having flashing and this is since she threw up.
Pt's  called and said Ace Donohue took a dose of Augmentin last night and this morning and she feels awful and she has a horrible headache. She tried to eat some Ramen but threw it up. Not feeling any better.
Spoke with the patient and she states that she has a really bad headache, she did take the zofran and it did help some. Per Dr. German Lugo, pt is advised to stop taking the augmentin, take zofran as needed and try to push fluids.   Pt informed that Dr. Marylee Collie
[Negative] : Heme/Lymph

## 2024-09-11 LAB
ABSOLUTE BASOPHILS: 62 CELLS/UL (ref 0–200)
ABSOLUTE EOSINOPHILS: 190 CELLS/UL (ref 15–500)
ABSOLUTE LYMPHOCYTES: 1904 CELLS/UL (ref 850–3900)
ABSOLUTE MONOCYTES: 381 CELLS/UL (ref 200–950)
ABSOLUTE NEUTROPHILS: 3063 CELLS/UL (ref 1500–7800)
ALBUMIN/GLOBULIN RATIO: 1.3 (CALC) (ref 1–2.5)
ALBUMIN/GLOBULIN RATIO: 1.3 (CALC) (ref 1–2.5)
ALBUMIN: 3.9 G/DL (ref 3.6–5.1)
ALBUMIN: 3.9 G/DL (ref 3.6–5.1)
ALKALINE PHOSPHATASE: 98 U/L (ref 37–153)
ALKALINE PHOSPHATASE: 98 U/L (ref 37–153)
ALT: 22 U/L (ref 6–29)
ALT: 22 U/L (ref 6–29)
AST: 22 U/L (ref 10–35)
AST: 22 U/L (ref 10–35)
BASOPHILS: 1.1 %
BILIRUBIN, DIRECT: 0.1 MG/DL
BILIRUBIN, INDIRECT: 0.3 MG/DL (CALC) (ref 0.2–1.2)
BILIRUBIN, TOTAL: 0.4 MG/DL (ref 0.2–1.2)
BILIRUBIN, TOTAL: 0.4 MG/DL (ref 0.2–1.2)
BUN: 18 MG/DL (ref 7–25)
C-REACTIVE PROTEIN: 5.3 MG/L
CALCIUM: 9 MG/DL (ref 8.6–10.4)
CARBON DIOXIDE: 24 MMOL/L (ref 20–32)
CHLORIDE: 106 MMOL/L (ref 98–110)
CREATININE: 0.78 MG/DL (ref 0.5–1.03)
EGFR: 91 ML/MIN/1.73M2
EOSINOPHILS: 3.4 %
GLOBULIN: 2.9 G/DL (CALC) (ref 1.9–3.7)
GLOBULIN: 2.9 G/DL (CALC) (ref 1.9–3.7)
GLUCOSE 6: 17.3 U/G HGB (ref 7–20.5)
GLUCOSE: 103 MG/DL (ref 65–99)
HEMATOCRIT: 42.1 % (ref 35–45)
HEMOGLOBIN: 13.3 G/DL (ref 11.7–15.5)
LYMPHOCYTES: 34 %
MCH: 29.8 PG (ref 27–33)
MCHC: 31.6 G/DL (ref 32–36)
MCV: 94.4 FL (ref 80–100)
MONOCYTES: 6.8 %
MPV: 9.6 FL (ref 7.5–12.5)
NEUTROPHILS: 54.7 %
PLATELET COUNT: 300 THOUSAND/UL (ref 140–400)
POTASSIUM: 4.1 MMOL/L (ref 3.5–5.3)
PROTEIN, TOTAL: 6.8 G/DL (ref 6.1–8.1)
PROTEIN, TOTAL: 6.8 G/DL (ref 6.1–8.1)
RDW: 13.9 % (ref 11–15)
RED BLOOD CELL COUNT: 4.46 MILLION/UL (ref 3.8–5.1)
SED RATE BY MODIFIED$WESTERGREN: 11 MM/H
SODIUM: 142 MMOL/L (ref 135–146)
WHITE BLOOD CELL COUNT: 5.6 THOUSAND/UL (ref 3.8–10.8)

## 2024-09-14 RX ORDER — LANSOPRAZOLE 30 MG/1
30 CAPSULE, DELAYED RELEASE ORAL DAILY
Qty: 90 CAPSULE | Refills: 0 | Status: SHIPPED | OUTPATIENT
Start: 2024-09-14 | End: 2024-12-16

## 2024-09-18 ENCOUNTER — ANESTHESIA EVENT (OUTPATIENT)
Dept: ENDOSCOPY | Facility: HOSPITAL | Age: 54
End: 2024-09-18
Payer: COMMERCIAL

## 2024-09-18 ENCOUNTER — HOSPITAL ENCOUNTER (OUTPATIENT)
Facility: HOSPITAL | Age: 54
Setting detail: HOSPITAL OUTPATIENT SURGERY
Discharge: HOME | End: 2024-09-18
Attending: INTERNAL MEDICINE | Admitting: INTERNAL MEDICINE
Payer: COMMERCIAL

## 2024-09-18 ENCOUNTER — ANESTHESIA (OUTPATIENT)
Dept: ENDOSCOPY | Facility: HOSPITAL | Age: 54
End: 2024-09-18
Payer: COMMERCIAL

## 2024-09-18 VITALS
DIASTOLIC BLOOD PRESSURE: 71 MMHG | HEART RATE: 66 BPM | TEMPERATURE: 99 F | SYSTOLIC BLOOD PRESSURE: 105 MMHG | RESPIRATION RATE: 18 BRPM | BODY MASS INDEX: 47.2 KG/M2 | WEIGHT: 250 LBS | OXYGEN SATURATION: 100 % | HEIGHT: 61 IN

## 2024-09-18 DIAGNOSIS — K57.32 DIVERTICULITIS OF LARGE INTESTINE WITHOUT PERFORATION OR ABSCESS WITHOUT BLEEDING: ICD-10-CM

## 2024-09-18 DIAGNOSIS — K86.89 PANCREATIC INSUFFICIENCY (HCC): ICD-10-CM

## 2024-09-18 DIAGNOSIS — Z80.0 FAMILY HISTORY OF COLON CANCER: ICD-10-CM

## 2024-09-18 LAB — GLUCOSE BLD-MCNC: 99 MG/DL (ref 70–99)

## 2024-09-18 PROCEDURE — 88305 TISSUE EXAM BY PATHOLOGIST: CPT | Performed by: INTERNAL MEDICINE

## 2024-09-18 PROCEDURE — 0DBM8ZX EXCISION OF DESCENDING COLON, VIA NATURAL OR ARTIFICIAL OPENING ENDOSCOPIC, DIAGNOSTIC: ICD-10-PCS | Performed by: INTERNAL MEDICINE

## 2024-09-18 PROCEDURE — 0DBK8ZX EXCISION OF ASCENDING COLON, VIA NATURAL OR ARTIFICIAL OPENING ENDOSCOPIC, DIAGNOSTIC: ICD-10-PCS | Performed by: INTERNAL MEDICINE

## 2024-09-18 PROCEDURE — 82962 GLUCOSE BLOOD TEST: CPT

## 2024-09-18 PROCEDURE — 0DBF8ZX EXCISION OF RIGHT LARGE INTESTINE, VIA NATURAL OR ARTIFICIAL OPENING ENDOSCOPIC, DIAGNOSTIC: ICD-10-PCS | Performed by: INTERNAL MEDICINE

## 2024-09-18 RX ORDER — MEPERIDINE HYDROCHLORIDE 25 MG/ML
12.5 INJECTION INTRAMUSCULAR; INTRAVENOUS; SUBCUTANEOUS AS NEEDED
Status: DISCONTINUED | OUTPATIENT
Start: 2024-09-18 | End: 2024-09-18

## 2024-09-18 RX ORDER — METOPROLOL TARTRATE 1 MG/ML
2.5 INJECTION, SOLUTION INTRAVENOUS ONCE
Status: DISCONTINUED | OUTPATIENT
Start: 2024-09-18 | End: 2024-09-18

## 2024-09-18 RX ORDER — SODIUM CHLORIDE, SODIUM LACTATE, POTASSIUM CHLORIDE, CALCIUM CHLORIDE 600; 310; 30; 20 MG/100ML; MG/100ML; MG/100ML; MG/100ML
INJECTION, SOLUTION INTRAVENOUS CONTINUOUS
Status: DISCONTINUED | OUTPATIENT
Start: 2024-09-18 | End: 2024-09-18

## 2024-09-18 RX ORDER — HYDROMORPHONE HYDROCHLORIDE 1 MG/ML
0.2 INJECTION, SOLUTION INTRAMUSCULAR; INTRAVENOUS; SUBCUTANEOUS EVERY 5 MIN PRN
Status: DISCONTINUED | OUTPATIENT
Start: 2024-09-18 | End: 2024-09-18

## 2024-09-18 RX ORDER — NICOTINE POLACRILEX 4 MG
30 LOZENGE BUCCAL
Status: DISCONTINUED | OUTPATIENT
Start: 2024-09-18 | End: 2024-09-18

## 2024-09-18 RX ORDER — MIDAZOLAM HYDROCHLORIDE 1 MG/ML
1 INJECTION INTRAMUSCULAR; INTRAVENOUS EVERY 5 MIN PRN
Status: DISCONTINUED | OUTPATIENT
Start: 2024-09-18 | End: 2024-09-18

## 2024-09-18 RX ORDER — NICOTINE POLACRILEX 4 MG
15 LOZENGE BUCCAL
Status: DISCONTINUED | OUTPATIENT
Start: 2024-09-18 | End: 2024-09-18

## 2024-09-18 RX ORDER — HYDROCODONE BITARTRATE AND ACETAMINOPHEN 5; 325 MG/1; MG/1
2 TABLET ORAL ONCE AS NEEDED
Status: DISCONTINUED | OUTPATIENT
Start: 2024-09-18 | End: 2024-09-18

## 2024-09-18 RX ORDER — HYDROMORPHONE HYDROCHLORIDE 1 MG/ML
0.4 INJECTION, SOLUTION INTRAMUSCULAR; INTRAVENOUS; SUBCUTANEOUS EVERY 5 MIN PRN
Status: DISCONTINUED | OUTPATIENT
Start: 2024-09-18 | End: 2024-09-18

## 2024-09-18 RX ORDER — HYDROCODONE BITARTRATE AND ACETAMINOPHEN 5; 325 MG/1; MG/1
1 TABLET ORAL ONCE AS NEEDED
Status: DISCONTINUED | OUTPATIENT
Start: 2024-09-18 | End: 2024-09-18

## 2024-09-18 RX ORDER — LIDOCAINE HYDROCHLORIDE 10 MG/ML
INJECTION, SOLUTION EPIDURAL; INFILTRATION; INTRACAUDAL; PERINEURAL AS NEEDED
Status: DISCONTINUED | OUTPATIENT
Start: 2024-09-18 | End: 2024-09-18 | Stop reason: SURG

## 2024-09-18 RX ORDER — PROCHLORPERAZINE EDISYLATE 5 MG/ML
5 INJECTION INTRAMUSCULAR; INTRAVENOUS EVERY 8 HOURS PRN
Status: DISCONTINUED | OUTPATIENT
Start: 2024-09-18 | End: 2024-09-18

## 2024-09-18 RX ORDER — ACETAMINOPHEN 500 MG
1000 TABLET ORAL ONCE AS NEEDED
Status: DISCONTINUED | OUTPATIENT
Start: 2024-09-18 | End: 2024-09-18

## 2024-09-18 RX ORDER — DEXTROSE MONOHYDRATE 25 G/50ML
50 INJECTION, SOLUTION INTRAVENOUS
Status: DISCONTINUED | OUTPATIENT
Start: 2024-09-18 | End: 2024-09-18

## 2024-09-18 RX ORDER — NALOXONE HYDROCHLORIDE 0.4 MG/ML
80 INJECTION, SOLUTION INTRAMUSCULAR; INTRAVENOUS; SUBCUTANEOUS AS NEEDED
Status: DISCONTINUED | OUTPATIENT
Start: 2024-09-18 | End: 2024-09-18

## 2024-09-18 RX ORDER — ONDANSETRON 2 MG/ML
4 INJECTION INTRAMUSCULAR; INTRAVENOUS EVERY 6 HOURS PRN
Status: DISCONTINUED | OUTPATIENT
Start: 2024-09-18 | End: 2024-09-18

## 2024-09-18 RX ORDER — LABETALOL HYDROCHLORIDE 5 MG/ML
5 INJECTION, SOLUTION INTRAVENOUS EVERY 5 MIN PRN
Status: DISCONTINUED | OUTPATIENT
Start: 2024-09-18 | End: 2024-09-18

## 2024-09-18 RX ORDER — HYDROMORPHONE HYDROCHLORIDE 1 MG/ML
0.6 INJECTION, SOLUTION INTRAMUSCULAR; INTRAVENOUS; SUBCUTANEOUS EVERY 5 MIN PRN
Status: DISCONTINUED | OUTPATIENT
Start: 2024-09-18 | End: 2024-09-18

## 2024-09-18 RX ADMIN — LIDOCAINE HYDROCHLORIDE 25 MG: 10 INJECTION, SOLUTION EPIDURAL; INFILTRATION; INTRACAUDAL; PERINEURAL at 12:37:00

## 2024-09-18 NOTE — ANESTHESIA POSTPROCEDURE EVALUATION
[facility]    Rosa Shaver Patient Status:  Hospital Outpatient Surgery   Age/Gender 53 year old female MRN [id number]   Location [facility] Attending Rupa Cheatham DO   Hosp Day # 0 PCP Oneyda Moulton DO       Anesthesia Post-op Note    COLONOSCOPY with cold snare polypectomy    Procedure Summary       Date: 09/18/24 Room / Location:  ENDOSCOPY 02 /  ENDOSCOPY    Anesthesia Start: 1235 Anesthesia Stop: 1316    Procedure: COLONOSCOPY with cold snare polypectomy Diagnosis:       Diverticulitis of large intestine without perforation or abscess without bleeding      Pancreatic insufficiency (HCC)      Family history of colon cancer      (polyps,diverticulosis)    Surgeons: Rupa Cheatham DO Anesthesiologist: Eliseo Lucas MD    Anesthesia Type: MAC ASA Status: 3            Anesthesia Type: MAC    Vitals Value Taken Time   /56 09/18/24 1315   Temp  09/18/24 1318   Pulse 66 09/18/24 1317   Resp 18 09/18/24 1310   SpO2 98 % 09/18/24 1317   Vitals shown include unfiled device data.    Patient Location: Endoscopy    Anesthesia Type: MAC    Airway Patency: patent    Postop Pain Control: adequate    Mental Status: mildly sedated but able to meaningfully participate in the post-anesthesia evaluation    Nausea/Vomiting: none    Cardiopulmonary/Hydration status: stable euvolemic    Complications: no apparent anesthesia related complications    Postop vital signs: stable    Dental Exam: Unchanged from Preop    Patient to be discharged home when criteria met.

## 2024-09-18 NOTE — OPERATIVE REPORT
Operative Report-Colonoscopy with snare polypectomy and cold biopsies    Rosa Shaver 11/30/1970   [facility] [id number] MRN [id number]   Admission Date 9/18/2024 Operation Date 9/18/2024   Attending Physician Rupa Cheatham DO Operating Physician Rupa Cheatham DO     PREOPERATIVE DIAGNOSIS/INDICATION: H/o polyps, diverticulitis, diarrhea  POSTOPERTATIVE DIAGNOSIS: Diverticulosis, Polyps, Internal hemorrhoids  PROCEDURE PERFORMED: COLONOSCOPY  INFORMED CONSENT:  Once a brief history and physical examination was performed, the risks, benefits and alternatives to the procedure were discussed with the patient and/or family and informed consent was obtained. The risks of sedation, perforation, missed lesions and need for surgery were all discussed.  Patient expressed understanding of the risks and agreed to proceed.    PROCEDURE DESCRIPTION:The patient was then brought to the endoscopy suite where the patient's pulse, pulse oximetry and blood pressure were monitored. The patient was placed in the left lateral decubitus position and deep sedation was administered. Once adequate sedation was achieved, a rectal exam was performed which was normal. A lubricated tip of an Olympus video colonoscope was then inserted through the rectum and gently manipulated under direct visualization to the cecal pole and the terminal ileum. The quality of the preparation was good. Upon withdrawal of the colonoscope, careful visualization of the mucosa was performed.   [place] Prep Score: Right Colon 3 Transverse colon 3 Left colon 3  FINDINGS/THERAPEUTICS:  TERMINAL ILEUM: The Terminal Ileum was normal.   COLON: There were two 4-6 mm, sessile polyps in the ascending colon, which were removed with the cold snare. There were two 4-6 mm, sessile polyps in the descending colon, which were removed with the cold snare. There was diverticulosis in the left colon. Two passes were made to the right colon. Random biopsies obtained from the right  colon with a cold biopsy forceps to rule out microscopic colitis.   RECTUM: Grade I Internal hemorrhoids.  RECOMMENDATIONS:   Post Colonoscopy/polypectomy precautions, watch for bleeding, infection, perforation, adverse drug reactions   Follow biopsies.  Repeat colonoscopy in 3 years.  CC Report:   Rupa Cheatham DO  9/18/2024  1:10 PM

## 2024-09-18 NOTE — ANESTHESIA PREPROCEDURE EVALUATION
PRE-OP EVALUATION    Patient Name: Rosa Shaver    Admit Diagnosis: Diverticulitis of large intestine without perforation or abscess without bleeding [K57.32]  Pancreatic insufficiency (HCC) [K86.89]  Family history of colon cancer [Z80.0]    Pre-op Diagnosis: Diverticulitis of large intestine without perforation or abscess without bleeding [K57.32]  Pancreatic insufficiency (HCC) [K86.89]  Family history of colon cancer [Z80.0]    COLONOSCOPY    Anesthesia Procedure: COLONOSCOPY    Surgeons and Role:     * Rupa Cheatham, DO - Primary    Pre-op vitals reviewed.  Temp: 99.3 °F (37.4 °C)  Pulse: 77  Resp: 16  BP: 150/70  SpO2: 98 %  Body mass index is 47.24 kg/m².    Current medications reviewed.  Hospital Medications:   glucose (Dex4) 15 GM/59ML oral liquid 15 g  15 g Oral Q15 Min PRN    Or    glucose (Glutose) 40% oral gel 15 g  15 g Oral Q15 Min PRN    Or    glucose-vitamin C (Dex-4) chewable tab 4 tablet  4 tablet Oral Q15 Min PRN    Or    dextrose 50% injection 50 mL  50 mL Intravenous Q15 Min PRN    Or    glucose (Dex4) 15 GM/59ML oral liquid 30 g  30 g Oral Q15 Min PRN    Or    glucose (Glutose) 40% oral gel 30 g  30 g Oral Q15 Min PRN    Or    glucose-vitamin C (Dex-4) chewable tab 8 tablet  8 tablet Oral Q15 Min PRN    lactated ringers infusion   Intravenous Continuous       Outpatient Medications:     Medications Prior to Admission   Medication Sig Dispense Refill Last Dose    lansoprazole 30 MG Oral Capsule Delayed Release Take 1 capsule (30 mg total) by mouth daily. 90 capsule 0 2024    [] cyclobenzaprine 10 MG Oral Tab Take 1 tablet (10 mg total) by mouth 3 (three) times daily as needed for Muscle spasms. 20 tablet 0     LEVOTHYROXINE 112 MCG Oral Tab TAKE 1 TABLET(112 MCG) BY MOUTH BEFORE BREAKFAST 90 tablet 3 2024    hydroxychloroquine 200 MG Oral Tab Take 1 tablet (200 mg total) by mouth 2 (two) times daily. 180 tablet 1 2024    CREON 55155-13204 units Oral Cap DR Particles  TAKE 43243 UNTS BY MOUTH EVERY MORNING AND 56749 UNITS EVERY NIGHT AT BEDTIME. TAKE TWICE DAILY WITH SNACKS   2024    simvastatin 20 MG Oral Tab Take 1 tablet (20 mg total) by mouth every evening. 90 tablet 3 2024    metoprolol succinate ER 25 MG Oral Tablet 24 Hr Take 1 tablet (25 mg total) by mouth daily.   2024 at 1800    Zinc 50 MG Oral Tab Take by mouth daily.   2024    TURMERIC CURCUMIN OR Take by mouth daily.   2 weeks ago    ascorbic acid 500 MG Oral Chew Tab Chew 1 tablet (500 mg total) by mouth daily.   2024    clotrimazole-betamethasone 1-0.05 % External Cream Apply 1 Application topically 2 (two) times daily as needed. 60 g 3 2024    CALCIUM OR Take 2,400 mg by mouth daily.   2024    saccharomyces boulardii 250 MG Oral Cap Pro-biotic 1 capsule by mouth daily   2024    magnesium 250 MG Oral Tab Take 1 tablet (250 mg total) by mouth daily.   2024    Multiple Vitamins-Minerals (BARIATRIC MULTIVITAMINS/IRON OR) Take 1 capsule by mouth daily.   2024    PATIENT SUPPLIED MEDICATION Essential Oils for allergies   2024    Cholecalciferol (VITAMIN D3) 2000 UNITS Oral Tab Take 1 tablet (2,000 Units total) by mouth daily. 2 tab a day to make 4,000 units   2024    [] polyethylene glycol, PEG 3350-KCl-NaBcb-NaCl-NaSulf, 236 g Oral Recon Soln Take 4,000 mL by mouth once for 1 dose. 4000 mL 0     albuterol 108 (90 Base) MCG/ACT Inhalation Aero Soln Inhale 2 puffs into the lungs every 6 (six) hours as needed for Wheezing or Shortness of Breath (Cough). 1 each 0 More than a month    Microlet Lancets Does not apply Misc USE UP TO 6 TIMES A  each 11     HYDROcodone-acetaminophen 5-300 MG Oral Tab Take 1 tablet by mouth every 6 (six) hours as needed. 12 tablet 0 More than a month    Glucose Blood (CONTOUR NEXT TEST) In Vitro Strip Use 1 test strip up to 6 times a day as needed 100 strip 11     Blood Glucose Monitoring Suppl Does not apply Device THIS  IS FOR 1 CONTOUR GLUCOSE MONITOR 1 each 0     meclizine 25 MG Oral Tab Take 1 tablet (25 mg total) by mouth 3 (three) times daily as needed for Dizziness. 30 tablet 2 More than a month    tiZANidine HCl 4 MG Oral Tab Take 1 tablet (4 mg total) by mouth nightly as needed. 30 tablet 2 More than a month    ALPRAZolam 0.5 MG Oral Tab 1/2 to one tab daily as needed 10 tablet 0 More than a month       Allergies: Berries, Casein, Levofloxacin, Avocado, Casein, Cefdinir, Darvocet [propoxyphene n-apap], Latex, Metronidazole, Nuts, Amoxicillin-pot clavulanate, Codeine, Oxycodone, Radiology contrast iodinated dyes, Tramadol hcl, and Zithromax      Anesthesia Evaluation    Patient summary reviewed.    Anesthetic Complications  (+) history of anesthetic complications  History of: PONV       GI/Hepatic/Renal      (+) GERD                           Cardiovascular        Exercise tolerance: good     MET: >4    (+) obesity  (+) hypertension   (+) hyperlipidemia                                  Endo/Other      (+) diabetes     (+) hypothyroidism                       Pulmonary    Negative pulmonary ROS.                       Neuro/Psych                   (+) headaches           Patient Active Problem List:     Allergic rhinitis     Eczema     GERD (gastroesophageal reflux disease)     Chronic low back pain     Morbid obesity with BMI of 45.0-49.9, adult (HCC)     Mixed hyperlipidemia     Right inguinal pain     Low HDL (under 40)     Lower abdominal adhesions     Influenza vaccination declined     Acquired hypothyroidism     S/P laparoscopy 7 from 94-99     Primary open angle glaucoma of both eyes, moderate stage     Bursitis     Family history of skin cancer     Encounter for long-term current use of medication     Chronic pain of both knees     Chondromalacia patellae, right knee     Migraine without aura and without status migrainosus, not intractable     Family history of CHF (congestive heart failure)     Family history of  cardiomyopathy     B12 deficiency     Pelvic pain     Microscopic hematuria     Posterior vitreous detachment of both eyes     Heartburn     Family history of colon cancer     Personal history of colonic polyps     Cafe au lait spots     S/P laparoscopic sleeve gastrectomy     Calcaneal spur of right foot     Metatarsalgia of right foot     Patellofemoral arthritis of right knee     Glaucoma suspect of both eyes     Intraocular pressure increase, bilateral     Encounter for medication monitoring     Surgical Menopause 2015 age 44 yrs old - on ERT     Hx of laparoscopy - RSO 2012     H/O laparoscopy - LSO with lysis 2015     H/O: hysterectomy - 2006, abdominal for endometriosis.      Family history of breast cancer     BRCA negative     Excess skin of abdomen     Excess skin of breast     Panniculitis     Family history of ankylosing spondylitis     Postoperative malabsorption (HCC)     Hypoglycemia     Acute pain of left wrist     Left foot pain     Vertigo     Elevated C-reactive protein (CRP)     COVID-19 vaccination refused     Pneumococcal vaccine refused     Laryngitis     COVID-19 virus infection     History of 2019 novel coronavirus disease (COVID-19)     Vocal cord edema     Atypical chest pain     Chronic pericarditis (HCC)     Atrial tachycardia (HCC)     Acute pain of right wrist     Injury of right wrist     Pain of left heel     Acute foot pain, right     Dyspareunia in female     Prediabetes     Elevated ALT measurement     History of nausea and vomiting           Past Surgical History:   Procedure Laterality Date    Angiogram      2-24-12 [facility]    Angioplasty (coronary)      Colonoscopy      Colonoscopy  07/19/2018    Colonoscopy N/A 12/6/2023    Procedure: COLONOSCOPY;  Surgeon: Rupa Cheatham DO;  Location:  ENDOSCOPY    D & c  1996 & 1997    Dilation & curettage cervical stump      1996 and 1997    Foot surgery Right 10/18/2019    Tendon repair     Gastric bypass,obesity,sb  reconstruc  2018    gastric sleeve    Hernia surgery  10/18/2018    Hiatal hernia Dr. Terri Mooney     Hysterectomy  2006    partial hystero.    Lap sleeve gastrectomy  10/18/2018    Dr. terri mooney           Oophorectomy Bilateral 2015    ovaries removed after partial.    Other  10/15/2018    Sleeve biatric surgery    Other surgical history      removal of right ovary    Removal of kidney stone      age 14, does not remember what kind of surgery. no abdominal or flank scar    Tilt table evaluation      veinogram 7--12 [facility]    Total abdom hysterectomy      Upper gi endoscopy,exam       Social History     Socioeconomic History    Marital status:    Tobacco Use    Smoking status: Never    Smokeless tobacco: Never   Vaping Use    Vaping status: Never Used   Substance and Sexual Activity    Alcohol use: Yes     Alcohol/week: 4.0 standard drinks of alcohol     Types: 4 Standard drinks or equivalent per week     Comment: weekly    Drug use: No    Sexual activity: Yes     Partners: Male     Birth control/protection: Hysterectomy   Other Topics Concern    Caffeine Concern Yes     Comment: 20-30 oz of coffee daily    Stress Concern Yes    Weight Concern Yes    Special Diet No    Exercise No    Seat Belt Yes     History   Drug Use No     Available pre-op labs reviewed.  Lab Results   Component Value Date    WBC 5.6 09/10/2024    RBC 4.46 09/10/2024    HGB 13.3 09/10/2024    HCT 42.1 09/10/2024    MCV 94.4 09/10/2024    MCH 29.8 09/10/2024    MCHC 31.6 (L) 09/10/2024    RDW 13.9 09/10/2024     09/10/2024     Lab Results   Component Value Date     09/10/2024    K 4.1 09/10/2024     09/10/2024    CO2 24 09/10/2024    BUN 18 09/10/2024    CREATSERUM 0.78 09/10/2024     (H) 09/10/2024    CA 9.0 09/10/2024            Airway      Mallampati: II  Mouth opening: >3 FB  TM distance: > 6 cm  Neck ROM: full Cardiovascular    Cardiovascular exam normal.         Dental    Dentition appears  grossly intact         Pulmonary    Pulmonary exam normal.                 Other findings              ASA: 3   Plan: MAC  NPO status verified and patient meets guidelines.        Comment: Plan is MAC anesthesia, which likely will include deep sedation.  Implied that memory of procedure is unlikely although intraop recall, if it occurs, may be a reasonable and comfortable experience with this anesthetic.  Aware that general anesthesia is not intended though deep sedation may include brief moments of general anesthesia.   Questions answered. Accepts. The consent was signed without further questions.      Plan/risks discussed with: patient                Present on Admission:  **None**

## 2024-09-18 NOTE — H&P
History & Physical Examination    Patient Name: Rosa Shaver  MRN: [id number]  [facility]: [id number]  YOB: 1970    Diagnosis: h/o diverticulitis, diarrhea     Present Illness: as above    Medications Prior to Admission   Medication Sig Dispense Refill Last Dose    lansoprazole 30 MG Oral Capsule Delayed Release Take 1 capsule (30 mg total) by mouth daily. 90 capsule 0 2024    [] cyclobenzaprine 10 MG Oral Tab Take 1 tablet (10 mg total) by mouth 3 (three) times daily as needed for Muscle spasms. 20 tablet 0     LEVOTHYROXINE 112 MCG Oral Tab TAKE 1 TABLET(112 MCG) BY MOUTH BEFORE BREAKFAST 90 tablet 3 2024    hydroxychloroquine 200 MG Oral Tab Take 1 tablet (200 mg total) by mouth 2 (two) times daily. 180 tablet 1 2024    CREON 68791-74966 units Oral Cap DR Particles TAKE 21173 UNTS BY MOUTH EVERY MORNING AND 09893 UNITS EVERY NIGHT AT BEDTIME. TAKE TWICE DAILY WITH SNACKS   2024    simvastatin 20 MG Oral Tab Take 1 tablet (20 mg total) by mouth every evening. 90 tablet 3 2024    metoprolol succinate ER 25 MG Oral Tablet 24 Hr Take 1 tablet (25 mg total) by mouth daily.   2024 at 1800    Zinc 50 MG Oral Tab Take by mouth daily.   2024    TURMERIC CURCUMIN OR Take by mouth daily.   2 weeks ago    ascorbic acid 500 MG Oral Chew Tab Chew 1 tablet (500 mg total) by mouth daily.   2024    clotrimazole-betamethasone 1-0.05 % External Cream Apply 1 Application topically 2 (two) times daily as needed. 60 g 3 2024    CALCIUM OR Take 2,400 mg by mouth daily.   2024    saccharomyces boulardii 250 MG Oral Cap Pro-biotic 1 capsule by mouth daily   2024    magnesium 250 MG Oral Tab Take 1 tablet (250 mg total) by mouth daily.   2024    Multiple Vitamins-Minerals (BARIATRIC MULTIVITAMINS/IRON OR) Take 1 capsule by mouth daily.   2024    PATIENT SUPPLIED MEDICATION Essential Oils for allergies   2024    Cholecalciferol (VITAMIN D3) 2000 UNITS  Oral Tab Take 1 tablet (2,000 Units total) by mouth daily. 2 tab a day to make 4,000 units   2024    [] polyethylene glycol, PEG 3350-KCl-NaBcb-NaCl-NaSulf, 236 g Oral Recon Soln Take 4,000 mL by mouth once for 1 dose. 4000 mL 0     albuterol 108 (90 Base) MCG/ACT Inhalation Aero Soln Inhale 2 puffs into the lungs every 6 (six) hours as needed for Wheezing or Shortness of Breath (Cough). 1 each 0 More than a month    Microlet Lancets Does not apply Misc USE UP TO 6 TIMES A  each 11     HYDROcodone-acetaminophen 5-300 MG Oral Tab Take 1 tablet by mouth every 6 (six) hours as needed. 12 tablet 0 More than a month    Glucose Blood (CONTOUR NEXT TEST) In Vitro Strip Use 1 test strip up to 6 times a day as needed 100 strip 11     Blood Glucose Monitoring Suppl Does not apply Device THIS IS FOR 1 CONTOUR GLUCOSE MONITOR 1 each 0     meclizine 25 MG Oral Tab Take 1 tablet (25 mg total) by mouth 3 (three) times daily as needed for Dizziness. 30 tablet 2 More than a month    tiZANidine HCl 4 MG Oral Tab Take 1 tablet (4 mg total) by mouth nightly as needed. 30 tablet 2 More than a month    ALPRAZolam 0.5 MG Oral Tab 1/2 to one tab daily as needed 10 tablet 0 More than a month     Current Facility-Administered Medications   Medication Dose Route Frequency    glucose (Dex4) 15 GM/59ML oral liquid 15 g  15 g Oral Q15 Min PRN    Or    glucose (Glutose) 40% oral gel 15 g  15 g Oral Q15 Min PRN    Or    glucose-vitamin C (Dex-4) chewable tab 4 tablet  4 tablet Oral Q15 Min PRN    Or    dextrose 50% injection 50 mL  50 mL Intravenous Q15 Min PRN    Or    glucose (Dex4) 15 GM/59ML oral liquid 30 g  30 g Oral Q15 Min PRN    Or    glucose (Glutose) 40% oral gel 30 g  30 g Oral Q15 Min PRN    Or    glucose-vitamin C (Dex-4) chewable tab 8 tablet  8 tablet Oral Q15 Min PRN    lactated ringers infusion   Intravenous Continuous       Allergies:   Allergies   Allergen Reactions    Berries ANAPHYLAXIS    Casein  ANAPHYLAXIS    Levofloxacin RASH     rash    Avocado ITCHING    Casein OTHER (SEE COMMENTS)    Cefdinir OTHER (SEE COMMENTS)     Tingling to lips and tongue     Darvocet [Propoxyphene N-Apap] NAUSEA AND VOMITING    Latex RASH and SWELLING    Metronidazole OTHER (SEE COMMENTS)     Tingling to lips and tongue    Nuts ANAPHYLAXIS     PECANS/CASHEWS  Water chestnuts-Itching    Amoxicillin-Pot Clavulanate NAUSEA AND VOMITING and NAUSEA ONLY    Codeine ITCHING     Not associated with rash    Oxycodone RASH     ONLY in GENERIC form per pt, Ok with brand form.    Radiology Contrast Iodinated Dyes NAUSEA ONLY     Patient instructed to take Zyrtec prior to administration of radiology contrast iodinated dye to prevent nausea.    Tramadol Hcl RASH    Zithromax RASH       Past Medical History:    Abdominal pain    Acute atopic conjunctivitis, bilateral    Yamil Vaughn M.D.    Acute meniscal tear, medial, right, initial encounter    MRI 5/21/2019: Mild undersurface tearing of the posterior root ligament of the medial meniscus.    Allergic rhinitis    Anxiety    Arrhythmia    paroxsymal atrial tachycardia    Arthritis    Atrial tachycardia (HCC)    Back pain    Benign paroxysmal positional vertigo    Bleeding nose    Bloating    Blood in urine    Body piercing    Calculus of kidney    Change in hair    Chest pain    Chest pain on exertion    Colon polyp    Gail Katz M.D.    Constipation    Decorative tattoo    Diabetes (HCC)    Denies    Diarrhea, unspecified    Disorder of thyroid    Diverticulitis of sigmoid colon    Diverticulosis    Gail Katz M.D.    Dizziness    Easy bruising    Endometriosis    Esophageal reflux    Essential hypertension    Eye disease    Fibromyalgia    Food intolerance    Frequent urination    Glaucoma    Headache disorder    Heart palpitations    Heartburn    High cholesterol    History of COVID-19    History of nausea and vomiting    Hoarseness, chronic    Hx of motion sickness     Hyperlipidemia    Hypothyroidism    Injury of peroneal tendon of right foot    Itch of skin    Keratoconjunctivitis sicca not specified as Sjogren's, bilateral    Yamil Vaughn M.D.    Lateral epicondylitis of right elbow    Leaking of urine    Lung nodule    pt unsure of which side    Major depressive disorder, recurrent episode, mild with anxious distress (HCC)    Migraines    Morbid obesity with BMI of 45.0-49.9, adult (HCC)    Obesity    Open angle with borderline findings, high risk, bilateral    Yamil Vaughn M.D.    Osteoarthritis    back    Other vitreous opacities, left eye    Yamil Vaughn M.D.    Other vitreous opacities, right eye    Yamil Vaughn M.D.    Pain in joints    Painful urination    Polycystic ovaries    ovary embedded in pelvic wall--right side    PONV (postoperative nausea and vomiting)    slow to awaken    Posterior vitreous detachment of both eyes    Right > Left    Primary open angle glaucoma of both eyes, moderate stage    Rash    Sleep disturbance    Spitting up blood    Sputum production    Stress    Trochanteric bursitis of both hips    Left >>  Right     Visual impairment    glasses    Vitreous degeneration, right eye    Yamil Vaughn M.D.    Wears glasses    Weight gain     Past Surgical History:   Procedure Laterality Date    Angiogram      12 [facility]    Angioplasty (coronary)      Colonoscopy      Colonoscopy  2018    Colonoscopy N/A 2023    Procedure: COLONOSCOPY;  Surgeon: Rupa Cheatham DO;  Location:  ENDOSCOPY    D & c   &     Dilation & curettage cervical stump       and     Foot surgery Right 10/18/2019    Tendon repair     Gastric bypass,obesity,sb reconstruc  2018    gastric sleeve    Hernia surgery  10/18/2018    Hiatal hernia Dr. Terri Avila     Hysterectomy  2006    partial hystero.    Lap sleeve gastrectomy  10/18/2018    Dr. terri avila           Oophorectomy Bilateral 2015    ovaries removed after  partial.    Other  10/15/2018    Sleeve biatric surgery    Other surgical history  2012    removal of right ovary    Removal of kidney stone      age 14, does not remember what kind of surgery. no abdominal or flank scar    Tilt table evaluation      veinogram 7-6-12 [facility]    Total abdom hysterectomy      Upper gi endoscopy,exam       Family History   Problem Relation Age of Onset    Cancer Mother         basal cell X 3; LUNG    Heart Disorder Mother         SVT    Hypertension Mother     Dementia Mother         early signs 2022    Depression Mother     Cancer Father         Colon & prostate cancers    Heart Disorder Father     Colon Cancer Father     Prostate Cancer Father     Diabetes Father     Hypertension Father     Heart Attack Father     Colon Polyps Brother     Alcohol and Other Disorders Associated Brother     Colon Polyps Brother     No Known Problems Son     No Known Problems Son     Alcohol and Other Disorders Associated Maternal Grandfather     Alcohol and Other Disorders Associated Paternal Grandfather     Cancer Maternal Aunt         breast, colon    Breast Cancer Maternal Aunt 50        In her 50's.    Ovarian Cancer Maternal Aunt 60        In her 60's.    Cancer Maternal Aunt         breast, basal cell    Breast Cancer Maternal Aunt 68        Late 60's.    Ovarian Cancer Maternal Aunt     Cancer Maternal Aunt         basal cell    Cancer Maternal Aunt         basal cell    Cancer Maternal Aunt         bone    Cancer Paternal Aunt         lung    Breast Cancer Cousin 62    Cancer Cousin         esoph,adenocarcinoma    Cancer Cousin         Non Hodgkins Lymphoma    Cancer Cousin         basal cell     Social History     Tobacco Use    Smoking status: Never    Smokeless tobacco: Never   Substance Use Topics    Alcohol use: Yes     Alcohol/week: 4.0 standard drinks of alcohol     Types: 4 Standard drinks or equivalent per week     Comment: weekly       SYSTEM Check if Review is Normal Check if  Physical Exam is Normal If not normal, please explain:   HEENT [ x] [ x]    NECK & BACK [ x] [ x]    HEART [ x] [ x]    LUNGS [ x] [x ]    ABDOMEN [ ] [ ] See hpi   UROGENITAL [x ] [ ] Exam declined   EXTREMITIES [x ] [x ]    OTHER        [ x ] I have discussed the risks and benefits and alternatives with the patient/family.  They understand and agree to proceed with plan of care.  [ x ] I have reviewed the History and Physical done within the last 30 days.  Any changes noted above.    Rupa Cheatham DO  9/18/2024  12:38 PM

## 2024-09-18 NOTE — DISCHARGE INSTRUCTIONS
Home Care Instructions for Colonoscopy with Sedation    Diet:  - Resume your regular diet   - Start with light meals to minimize bloating.  - Do not drink alcohol today.    Medication:  - If you have questions about resuming your normal medications, please contact your Primary Care Physician.    Activities:  - Take it easy today. Do not return to work today.  - Do not drive today.  - Do not operate any machinery today (including kitchen equipment).    Colonoscopy:  - You may notice some rectal \"spotting\" (a little blood on the toilet tissue) for a day or two after the exam. This is normal.  - If you experience any rectal bleeding (not spotting), persistent tenderness or sharp severe abdominal pains, oral temperature over 100 degrees Fahrenheit, light-headedness or dizziness, or any other problems, contact your doctor.    **If unable to reach your doctor, please go to the [facility] Emergency Room**    - Your referring physician will receive a full report of your examination.  - If you do not hear from your doctor's office within two weeks of your biopsy, please call them for your results.

## 2024-09-24 ENCOUNTER — OFFICE VISIT (OUTPATIENT)
Facility: LOCATION | Age: 54
End: 2024-09-24
Payer: COMMERCIAL

## 2024-09-24 VITALS
DIASTOLIC BLOOD PRESSURE: 75 MMHG | OXYGEN SATURATION: 98 % | HEART RATE: 67 BPM | TEMPERATURE: 97 F | SYSTOLIC BLOOD PRESSURE: 145 MMHG

## 2024-09-24 DIAGNOSIS — K57.92 DIVERTICULITIS: Primary | ICD-10-CM

## 2024-09-24 PROCEDURE — 99244 OFF/OP CNSLTJ NEW/EST MOD 40: CPT | Performed by: SURGERY

## 2024-09-24 PROCEDURE — 3078F DIAST BP <80 MM HG: CPT | Performed by: SURGERY

## 2024-09-24 PROCEDURE — 3077F SYST BP >= 140 MM HG: CPT | Performed by: SURGERY

## 2024-09-24 NOTE — H&P
Rosa Shaver is a 53 year old female  Chief Complaint   Patient presents with    New Patient     NP- Right Inguinal pain/ Diverticulitis, CT Abd/Pel on 7/5/24        REFERRED BY    Patient presents with right groin discomfort.  Patient denies bulge.  She states she has felt it in the past couple of months particularly when she is doing twisting maneuvers.  Patient states that she also has a history of repeated episodes of diverticulitis.  Patient states she has been started on antibiotics 3 times since January.  Patient has had 2 CAT scans most recently in July which did show mild sigmoid diverticulitis.  Patient denies pain at this time.  She states she is not eating any fiber       .           Past Medical History:    Abdominal pain    Acute atopic conjunctivitis, bilateral    Yamil Vaughn M.D.    Acute meniscal tear, medial, right, initial encounter    MRI 5/21/2019: Mild undersurface tearing of the posterior root ligament of the medial meniscus.    Allergic rhinitis    Anxiety    Arrhythmia    paroxsymal atrial tachycardia    Arthritis    Atrial tachycardia (HCC)    Back pain    Benign paroxysmal positional vertigo    Bleeding nose    Bloating    Blood in urine    Body piercing    Calculus of kidney    Change in hair    Chest pain    Chest pain on exertion    Colon polyp    Gail Katz M.D.    Constipation    Decorative tattoo    Diabetes (HCC)    Denies    Diarrhea, unspecified    Disorder of thyroid    Diverticulitis of sigmoid colon    Diverticulosis    Gail Katz M.D.    Dizziness    Easy bruising    Endometriosis    Esophageal reflux    Essential hypertension    Eye disease    Fibromyalgia    Food intolerance    Frequent urination    Glaucoma    Headache disorder    Heart palpitations    Heartburn    High cholesterol    History of COVID-19    History of nausea and vomiting    Hoarseness, chronic    Hx of motion sickness    Hyperlipidemia    Hypothyroidism    Injury of peroneal  tendon of right foot    Itch of skin    Keratoconjunctivitis sicca not specified as Sjogren's, bilateral    Yamil Vaughn M.D.    Lateral epicondylitis of right elbow    Leaking of urine    Lung nodule    pt unsure of which side    Major depressive disorder, recurrent episode, mild with anxious distress (HCC)    Migraines    Morbid obesity with BMI of 45.0-49.9, adult (HCC)    Obesity    Open angle with borderline findings, high risk, bilateral    Yamil Vaughn M.D.    Osteoarthritis    back    Other vitreous opacities, left eye    Yamil Vaughn M.D.    Other vitreous opacities, right eye    Yamil Vaughn M.D.    Pain in joints    Painful urination    Polycystic ovaries    ovary embedded in pelvic wall--right side    PONV (postoperative nausea and vomiting)    slow to awaken    Posterior vitreous detachment of both eyes    Right > Left    Primary open angle glaucoma of both eyes, moderate stage    Rash    Sleep disturbance    Spitting up blood    Sputum production    Stress    Trochanteric bursitis of both hips    Left >>  Right     Visual impairment    glasses    Vitreous degeneration, right eye    Yamil Vaughn M.D.    Wears glasses    Weight gain     Past Surgical History:   Procedure Laterality Date    Angiogram      2-24-12 [facility]    Angioplasty (coronary)      Colonoscopy      Colonoscopy  07/19/2018    Colonoscopy N/A 12/6/2023    Procedure: COLONOSCOPY;  Surgeon: Rupa Cheatham DO;  Location:  ENDOSCOPY    Colonoscopy N/A 9/18/2024    Procedure: COLONOSCOPY with cold snare polypectomy;  Surgeon: Rupa Cheatham DO;  Location:  ENDOSCOPY    D & c  1996 & 1997    Dilation & curettage cervical stump      1996 and 1997    Foot surgery Right 10/18/2019    Tendon repair     Gastric bypass,obesity,sb reconstruc  2018    gastric sleeve    Hernia surgery  10/18/2018    Hiatal hernia Dr. Fuentes Avila     Hysterectomy  2006    partial hystero.    Lap sleeve gastrectomy  10/18/2018      terri mooney           Oophorectomy Bilateral     ovaries removed after partial.    Other  10/15/2018    Sleeve biatric surgery    Other surgical history      removal of right ovary    Removal of kidney stone      age 14, does not remember what kind of surgery. no abdominal or flank scar    Tilt table evaluation      veinogram [facility]    Total abdom hysterectomy      Upper gi endoscopy,exam       Current Outpatient Medications on File Prior to Visit   Medication Sig Dispense Refill    lansoprazole 30 MG Oral Capsule Delayed Release Take 1 capsule (30 mg total) by mouth daily. 90 capsule 0    albuterol 108 (90 Base) MCG/ACT Inhalation Aero Soln Inhale 2 puffs into the lungs every 6 (six) hours as needed for Wheezing or Shortness of Breath (Cough). 1 each 0    LEVOTHYROXINE 112 MCG Oral Tab TAKE 1 TABLET(112 MCG) BY MOUTH BEFORE BREAKFAST 90 tablet 3    hydroxychloroquine 200 MG Oral Tab Take 1 tablet (200 mg total) by mouth 2 (two) times daily. 180 tablet 1    Microlet Lancets Does not apply Misc USE UP TO 6 TIMES A  each 11    HYDROcodone-acetaminophen 5-300 MG Oral Tab Take 1 tablet by mouth every 6 (six) hours as needed. 12 tablet 0    Glucose Blood (CONTOUR NEXT TEST) In Vitro Strip Use 1 test strip up to 6 times a day as needed 100 strip 11    CREON 71022-97395 units Oral Cap DR Particles TAKE 70519 UNTS BY MOUTH EVERY MORNING AND 17372 UNITS EVERY NIGHT AT BEDTIME. TAKE TWICE DAILY WITH SNACKS      [DISCONTINUED] ondansetron (ZOFRAN ODT) 8 MG Oral Tablet Dispersible Take 1 tablet (8 mg total) by mouth every 8 (eight) hours as needed for Nausea. 20 tablet 1    simvastatin 20 MG Oral Tab Take 1 tablet (20 mg total) by mouth every evening. 90 tablet 3    Blood Glucose Monitoring Suppl Does not apply Device THIS IS FOR 1 CONTOUR GLUCOSE MONITOR 1 each 0    metoprolol succinate ER 25 MG Oral Tablet 24 Hr Take 1 tablet (25 mg total) by mouth daily.      meclizine 25 MG Oral Tab Take 1  tablet (25 mg total) by mouth 3 (three) times daily as needed for Dizziness. 30 tablet 2    Zinc 50 MG Oral Tab Take by mouth daily.      TURMERIC CURCUMIN OR Take by mouth daily.      ascorbic acid 500 MG Oral Chew Tab Chew 1 tablet (500 mg total) by mouth daily.      clotrimazole-betamethasone 1-0.05 % External Cream Apply 1 Application topically 2 (two) times daily as needed. 60 g 3    CALCIUM OR Take 2,400 mg by mouth daily.      saccharomyces boulardii 250 MG Oral Cap Pro-biotic 1 capsule by mouth daily      tiZANidine HCl 4 MG Oral Tab Take 1 tablet (4 mg total) by mouth nightly as needed. 30 tablet 2    ALPRAZolam 0.5 MG Oral Tab 1/2 to one tab daily as needed 10 tablet 0    magnesium 250 MG Oral Tab Take 1 tablet (250 mg total) by mouth daily.      Multiple Vitamins-Minerals (BARIATRIC MULTIVITAMINS/IRON OR) Take 1 capsule by mouth daily.      PATIENT SUPPLIED MEDICATION Essential Oils for allergies      Cholecalciferol (VITAMIN D3) 2000 UNITS Oral Tab Take 1 tablet (2,000 Units total) by mouth daily. 2 tab a day to make 4,000 units       No current facility-administered medications on file prior to visit.     @ALL@  Family History   Problem Relation Age of Onset    Cancer Mother         basal cell X 3; LUNG    Heart Disorder Mother         SVT    Hypertension Mother     Dementia Mother         early signs 2022    Depression Mother     Cancer Father         Colon & prostate cancers    Heart Disorder Father     Colon Cancer Father     Prostate Cancer Father     Diabetes Father     Hypertension Father     Heart Attack Father     Colon Polyps Brother     Alcohol and Other Disorders Associated Brother     Colon Polyps Brother     No Known Problems Son     No Known Problems Son     Alcohol and Other Disorders Associated Maternal Grandfather     Alcohol and Other Disorders Associated Paternal Grandfather     Cancer Maternal Aunt         breast, colon    Breast Cancer Maternal Aunt 50        In her 50's.    Ovarian  Cancer Maternal Aunt 60        In her 60's.    Cancer Maternal Aunt         breast, basal cell    Breast Cancer Maternal Aunt 68        Late 60's.    Ovarian Cancer Maternal Aunt     Cancer Maternal Aunt         basal cell    Cancer Maternal Aunt         basal cell    Cancer Maternal Aunt         bone    Cancer Paternal Aunt         lung    Breast Cancer Cousin 62    Cancer Cousin         esoph,adenocarcinoma    Cancer Cousin         Non Hodgkins Lymphoma    Cancer Cousin         basal cell     Social History     Socioeconomic History    Marital status:    Tobacco Use    Smoking status: Never    Smokeless tobacco: Never   Vaping Use    Vaping status: Never Used   Substance and Sexual Activity    Alcohol use: Yes     Alcohol/week: 4.0 standard drinks of alcohol     Types: 4 Standard drinks or equivalent per week     Comment: weekly    Drug use: No    Sexual activity: Yes     Partners: Male     Birth control/protection: Hysterectomy   Other Topics Concern    Caffeine Concern Yes     Comment: 20-30 oz of coffee daily    Stress Concern Yes    Weight Concern Yes    Special Diet No    Exercise No    Seat Belt Yes     Social Determinants of Health     Physical Activity: Unknown (1/25/2021)    [facility] [employer] [employer]    Exercise Vital Sign     Days of Exercise per Week: 4 days       ROS     GENERAL HEALTH: otherwise feels well, no weight loss, no fever or chills  SKIN: denies any unusual skin rashes or jaundice  HEENT: denies nasal congestion, sinus pain or sore throat; hearing loss negative,   EYES , no diplopia or vision changes  RESPIRATORY: denies shortness of breath, wheezing or cough   CARDIOVASCULAR: denies chest pain or POLK; no palpitations   GI: denies nausea, vomiting, constipation, diarrhea; no rectal bleeding; no heartburn  GENITAL/: no dysuria, urgency or frequency, no tea colored urine  MUSCULOSKELETAL: no joint complaints upper or lower extremities  HEMATOLOGY:  denies hx anemia; denies bruising or excessive bleeding  Endocrine: no weight gain or loss no hot or cold intolerance    EXAM     Blood pressure 145/75, pulse 67, temperature 96.6 °F (35.9 °C), temperature source Temporal, last menstrual period 01/01/2006, SpO2 98%, not currently breastfeeding.  GENERAL: well developed, well nourished female, in no apparent distress.    MENTAL STATUS :Alert, oriented x 3  PSYCH: normal mood and affect  SKIN: anicteric, no rashes, no bruising  EYES: PERRLA, EOMI, sclera anicteric,  conjunctiva without pallor  HEENT: normocephalic, atraumatic, TMs clear, nares patent, mouth moist, pharynx w/o erythema  NECK: supple, no JVD, no adenopathy, no thyromegaly  RESPIRATORY: clear to auscultation, no rales , rhonchi or wheezing  CARDIOVASCULAR: RRR, murmur negative  ABDOMEN: normal active BS, soft, nondistended  no HSM, no masses or hernias  LYMPHATIC: no axillary , supraclavicular or inguinal lymphadenopathy  EXTREMITIES: no cyanosis, clubbing or edema, no atrophy, full ROM    STUDIES:     Admission on 09/18/2024, Discharged on 09/18/2024   Component Date Value Ref Range Status    POC Glucose 09/18/2024 99  70 - 99 mg/dL Final    Case Report 09/18/2024    Final                    Value:Surgical Pathology                                Case: [id number]                                   Authorizing Provider:  Rupa Cheatham DO       Collected:           09/18/2024 12:51 PM          Ordering Location:     [facility]  Received:            09/18/2024 04:08 PM                                 [facility]                                                                  Pathologist:           Mena Hernandez                                                            Specimens:   A) - Colon ascending, polyp x2                                                                      B) - Colon, right colon bx                                                                          C) -  Colon descending, polyp x2                                                            Final Diagnosis: 09/18/2024    Final                    Value:This result contains rich text formatting which cannot be displayed here.    Clinical Information 09/18/2024    Final                    Value:This result contains rich text formatting which cannot be displayed here.    Gross Description 09/18/2024    Final                    Value:This result contains rich text formatting which cannot be displayed here.    Interpretation 09/18/2024 Benign    Final       ASSESSMENT   Imp: No evidence of inguinal hernia on standing Valsalva and recent CT scan of 2 months ago does not demonstrate inguinal hernia.  I suspect patient has groin strain or pull.  No further recommendations with regard to imaging at this time.  With regard to the diverticulitis by criteria patient is a candidate for sigmoidectomy.  Both of her episodes documented have been quite mild and have resolved easily with antibiotics.  I have advised the patient to start and increase her fiber intake to help prevent further attacks of diverticulitis.  Should she fail to improve then she should proceed with sigmoidectomy.        Meds & Refills for this Visit:  Requested Prescriptions      No prescriptions requested or ordered in this encounter       Imaging & Consults:  None

## 2024-11-29 DIAGNOSIS — R76.8 POSITIVE ANA (ANTINUCLEAR ANTIBODY): ICD-10-CM

## 2024-11-29 DIAGNOSIS — M35.9 UNDIFFERENTIATED CONNECTIVE TISSUE DISEASE (HCC): Primary | ICD-10-CM

## 2024-11-29 DIAGNOSIS — M25.50 POLYARTHRALGIA: ICD-10-CM

## 2024-11-29 NOTE — TELEPHONE ENCOUNTER
Last office visit: 7/5/2024    Next Rheum Apt:2/5/2025 Farhad DO Hilda    Last fill: 8/16/2024  170 tabs, 85 days supply     Labs:   Lab Results   Component Value Date    CREATSERUM 0.78 09/10/2024    GFR 84 11/09/2017    ALKPHO 93 09/30/2024    AST 32 09/30/2024    ALT 35 (H) 09/30/2024    BILT 0.4 09/30/2024    TP 6.7 09/30/2024    ALB 3.8 09/30/2024       Lab Results   Component Value Date    WBC 5.4 10/09/2024    HGB 13.5 10/09/2024     10/09/2024    NEPRELIM 6.98 07/05/2024    NEPERCENT 52 10/09/2024    LYPERCENT 35.8 10/09/2024    NE 2,808 10/09/2024    LYMABS 1,933 10/09/2024

## 2024-12-02 RX ORDER — HYDROXYCHLOROQUINE SULFATE 200 MG/1
200 TABLET, FILM COATED ORAL 2 TIMES DAILY
Qty: 180 TABLET | Refills: 0 | Status: SHIPPED | OUTPATIENT
Start: 2024-12-02

## 2024-12-04 RX ORDER — LANCETS
EACH MISCELLANEOUS
Qty: 100 EACH | Refills: 1 | Status: SHIPPED | OUTPATIENT
Start: 2024-12-04

## 2024-12-13 DIAGNOSIS — E78.2 MIXED HYPERLIPIDEMIA: ICD-10-CM

## 2024-12-13 DIAGNOSIS — Z51.81 ENCOUNTER FOR MEDICATION MONITORING: ICD-10-CM

## 2024-12-13 RX ORDER — SIMVASTATIN 20 MG
20 TABLET ORAL EVERY EVENING
Qty: 90 TABLET | Refills: 3 | Status: SHIPPED | OUTPATIENT
Start: 2024-12-13

## 2024-12-16 RX ORDER — LANSOPRAZOLE 30 MG/1
30 CAPSULE, DELAYED RELEASE ORAL DAILY
Qty: 90 CAPSULE | Refills: 0 | Status: SHIPPED | OUTPATIENT
Start: 2024-12-16

## 2024-12-18 ENCOUNTER — OFFICE VISIT (OUTPATIENT)
Dept: FAMILY MEDICINE CLINIC | Facility: CLINIC | Age: 54
End: 2024-12-18
Payer: COMMERCIAL

## 2024-12-18 VITALS
HEART RATE: 82 BPM | HEIGHT: 61 IN | DIASTOLIC BLOOD PRESSURE: 68 MMHG | BODY MASS INDEX: 50.6 KG/M2 | WEIGHT: 268 LBS | TEMPERATURE: 98 F | OXYGEN SATURATION: 95 % | SYSTOLIC BLOOD PRESSURE: 132 MMHG | RESPIRATION RATE: 15 BRPM

## 2024-12-18 DIAGNOSIS — I47.19 ATRIAL TACHYCARDIA (HCC): ICD-10-CM

## 2024-12-18 DIAGNOSIS — B96.89 ACUTE BACTERIAL SINUSITIS: Primary | ICD-10-CM

## 2024-12-18 DIAGNOSIS — Z12.31 ENCOUNTER FOR SCREENING MAMMOGRAM FOR MALIGNANT NEOPLASM OF BREAST: ICD-10-CM

## 2024-12-18 DIAGNOSIS — J01.90 ACUTE BACTERIAL SINUSITIS: Primary | ICD-10-CM

## 2024-12-18 DIAGNOSIS — F40.243 FEAR OF FLYING: ICD-10-CM

## 2024-12-18 PROCEDURE — 99214 OFFICE O/P EST MOD 30 MIN: CPT | Performed by: FAMILY MEDICINE

## 2024-12-18 PROCEDURE — 3075F SYST BP GE 130 - 139MM HG: CPT | Performed by: FAMILY MEDICINE

## 2024-12-18 PROCEDURE — 3008F BODY MASS INDEX DOCD: CPT | Performed by: FAMILY MEDICINE

## 2024-12-18 PROCEDURE — 3078F DIAST BP <80 MM HG: CPT | Performed by: FAMILY MEDICINE

## 2024-12-18 RX ORDER — DOXYCYCLINE 100 MG/1
100 CAPSULE ORAL 2 TIMES DAILY
Qty: 14 CAPSULE | Refills: 0 | Status: SHIPPED | OUTPATIENT
Start: 2024-12-18 | End: 2024-12-25

## 2024-12-18 RX ORDER — ALPRAZOLAM 0.5 MG
TABLET ORAL
Qty: 20 TABLET | Refills: 0 | Status: SHIPPED | OUTPATIENT
Start: 2024-12-18

## 2024-12-18 NOTE — PROGRESS NOTES
Chief Complaint   Patient presents with    Hypertension    Upper Respiratory Infection     C/o past 2 weeks     Medication Request     xanax             HPI:   Rosa Shaver is a 54 year old female who presents for upper respiratory symptoms for  2  weeks.   Patient reports:  Sinus pressure and pain.  Postnasal drip.  Nasal congestion.    OTCs tried:  Mucinex All in One  with temporary partial relief.  Denies fever or chills.  Drinking plenty of water, about 60 oz daily.      Anxiety:  Fear of flying.  Requesting alprazolam to fly.      Atrial tachycardia:  Patient taking metoprolol succinate 25 mg daily as prescribed by her cardiologist.        Current Outpatient Medications   Medication Sig Dispense Refill    ALPRAZolam 0.5 MG Oral Tab 1/2 to one tab daily as needed 20 tablet 0    LANSOPRAZOLE 30 MG Oral Capsule Delayed Release TAKE 1 CAPSULE(30 MG) BY MOUTH DAILY 90 capsule 0    SIMVASTATIN 20 MG Oral Tab TAKE 1 TABLET(20 MG) BY MOUTH EVERY EVENING 90 tablet 3    Microlet Lancets Does not apply Misc USE UP TO 6 TIMES A  each 1    hydroxychloroquine 200 MG Oral Tab Take 1 tablet (200 mg total) by mouth 2 (two) times daily. 180 tablet 0    albuterol 108 (90 Base) MCG/ACT Inhalation Aero Soln Inhale 2 puffs into the lungs every 6 (six) hours as needed for Wheezing or Shortness of Breath (Cough). 1 each 0    LEVOTHYROXINE 112 MCG Oral Tab TAKE 1 TABLET(112 MCG) BY MOUTH BEFORE BREAKFAST 90 tablet 3    Glucose Blood (CONTOUR NEXT TEST) In Vitro Strip Use 1 test strip up to 6 times a day as needed 100 strip 11    CREON 35608-60078 units Oral Cap DR Particles TAKE 71880 UNTS BY MOUTH EVERY MORNING AND 29135 UNITS EVERY NIGHT AT BEDTIME. TAKE TWICE DAILY WITH SNACKS      Blood Glucose Monitoring Suppl Does not apply Device THIS IS FOR 1 CONTOUR GLUCOSE MONITOR 1 each 0    metoprolol succinate ER 25 MG Oral Tablet 24 Hr Take 1 tablet (25 mg total) by mouth daily.      meclizine 25 MG Oral Tab Take 1 tablet (25  mg total) by mouth 3 (three) times daily as needed for Dizziness. 30 tablet 2    Zinc 50 MG Oral Tab Take by mouth daily.      TURMERIC CURCUMIN OR Take by mouth daily.      ascorbic acid 500 MG Oral Chew Tab Chew 1 tablet (500 mg total) by mouth daily.      clotrimazole-betamethasone 1-0.05 % External Cream Apply 1 Application topically 2 (two) times daily as needed. 60 g 3    CALCIUM OR Take 2,400 mg by mouth daily.      saccharomyces boulardii 250 MG Oral Cap Pro-biotic 1 capsule by mouth daily      tiZANidine HCl 4 MG Oral Tab Take 1 tablet (4 mg total) by mouth nightly as needed. 30 tablet 2    magnesium 250 MG Oral Tab Take 1 tablet (250 mg total) by mouth daily.      Multiple Vitamins-Minerals (BARIATRIC MULTIVITAMINS/IRON OR) Take 1 capsule by mouth daily.      PATIENT SUPPLIED MEDICATION Essential Oils for allergies      Cholecalciferol (VITAMIN D3) 2000 UNITS Oral Tab Take 1 tablet (2,000 Units total) by mouth daily. 2 tab a day to make 4,000 units        Past Medical History:    Abdominal pain    Acute atopic conjunctivitis, bilateral    Yamil Vaughn M.D.    Acute meniscal tear, medial, right, initial encounter    MRI 5/21/2019: Mild undersurface tearing of the posterior root ligament of the medial meniscus.    Allergic rhinitis    Anxiety    Arrhythmia    paroxsymal atrial tachycardia    Arthritis    Atrial tachycardia (HCC)    Back pain    Benign paroxysmal positional vertigo    Bleeding nose    Bloating    Blood in urine    Body piercing    Calculus of kidney    Change in hair    Chest pain    Chest pain on exertion    Chronic pericarditis (HCC)    Suspect mild chronic pericarditis possibly secondary to history of SARS-CoV-2/COVID-19 based on findings of CT of chest 12/22/2021 not present on previous CTs      Colon polyp    Gail Katz M.D.    Constipation    Decorative tattoo    Diabetes (HCC)    Denies    Diarrhea, unspecified    Disorder of thyroid    Diverticulitis of sigmoid colon     Diverticulosis    Gail Katz M.D.    Dizziness    Easy bruising    Endometriosis    Esophageal reflux    Essential hypertension    Eye disease    Fibromyalgia    Food intolerance    Frequent urination    Glaucoma    Headache disorder    Heart palpitations    Heartburn    High cholesterol    History of COVID-19    History of nausea and vomiting    Hoarseness, chronic    Hx of motion sickness    Hyperlipidemia    Hypothyroidism    Injury of peroneal tendon of right foot    Itch of skin    Keratoconjunctivitis sicca not specified as Sjogren's, bilateral    Yamil Vaughn M.D.    Lateral epicondylitis of right elbow    Leaking of urine    Lung nodule    pt unsure of which side    Major depressive disorder, recurrent episode, mild with anxious distress ([facility])    Migraines    Morbid obesity with BMI of 45.0-49.9, adult ([facility])    Obesity    Open angle with borderline findings, high risk, bilateral    Yamil Vaughn M.D.    Osteoarthritis    back    Other vitreous opacities, left eye    Yamil Vaughn M.D.    Other vitreous opacities, right eye    Yamil Vaughn M.D.    Pain in joints    Painful urination    Polycystic ovaries    ovary embedded in pelvic wall--right side    PONV (postoperative nausea and vomiting)    slow to awaken    Posterior vitreous detachment of both eyes    Right > Left    Primary open angle glaucoma of both eyes, moderate stage    Rash    Sleep disturbance    Spitting up blood    Sputum production    Stress    Trochanteric bursitis of both hips    Left >>  Right     Visual impairment    glasses    Vitreous degeneration, right eye    Yamil Vaughn M.D.    Wears glasses    Weight gain      Past Surgical History:   Procedure Laterality Date    Angiogram      2-24-12 [facility]    Angioplasty (coronary)      Colonoscopy      Colonoscopy  07/19/2018    Colonoscopy N/A 12/6/2023    Procedure: COLONOSCOPY;  Surgeon: Rupa Cheatham DO;  Location:  ENDOSCOPY    Colonoscopy N/A 9/18/2024     Procedure: COLONOSCOPY with cold snare polypectomy;  Surgeon: Rupa Cheatham DO;  Location:  ENDOSCOPY    D & c   &     Dilation & curettage cervical stump       and     Foot surgery Right 10/18/2019    Tendon repair     Gastric bypass,obesity,sb reconstruc  2018    gastric sleeve    Hernia surgery  10/18/2018    Hiatal hernia Dr. Terri Mooney     Hysterectomy  2006    partial hystero.    Lap sleeve gastrectomy  10/18/2018    Dr. terri mooney           Oophorectomy Bilateral     ovaries removed after partial.    Other  10/15/2018    Sleeve biatric surgery    Other surgical history      removal of right ovary    Removal of kidney stone      age 14, does not remember what kind of surgery. no abdominal or flank scar    Tilt table evaluation      veinogram [facility]    Total abdom hysterectomy      Upper gi endoscopy,exam        Family History   Problem Relation Age of Onset    Cancer Mother         basal cell X 3; LUNG    Heart Disorder Mother         SVT    Hypertension Mother     Dementia Mother         early signs     Depression Mother     Cancer Father         Colon & prostate cancers    Heart Disorder Father     Colon Cancer Father     Prostate Cancer Father     Diabetes Father     Hypertension Father     Heart Attack Father     Colon Polyps Brother     Alcohol and Other Disorders Associated Brother     Colon Polyps Brother     No Known Problems Son     No Known Problems Son     Alcohol and Other Disorders Associated Maternal Grandfather     Alcohol and Other Disorders Associated Paternal Grandfather     Cancer Maternal Aunt         breast, colon    Breast Cancer Maternal Aunt 50        In her 50's.    Ovarian Cancer Maternal Aunt 60        In her 60's.    Cancer Maternal Aunt         breast, basal cell    Breast Cancer Maternal Aunt 68        Late 60's.    Ovarian Cancer Maternal Aunt     Cancer Maternal Aunt         basal cell    Cancer Maternal Aunt          basal cell    Cancer Maternal Aunt         bone    Cancer Paternal Aunt         lung    Breast Cancer Cousin 62    Cancer Cousin         esoph,adenocarcinoma    Cancer Cousin         Non Hodgkins Lymphoma    Cancer Cousin         basal cell      Social History     Socioeconomic History    Marital status:    Tobacco Use    Smoking status: Never    Smokeless tobacco: Never   Vaping Use    Vaping status: Never Used   Substance and Sexual Activity    Alcohol use: Yes     Alcohol/week: 4.0 standard drinks of alcohol     Types: 4 Standard drinks or equivalent per week     Comment: weekly    Drug use: No    Sexual activity: Yes     Partners: Male     Birth control/protection: Hysterectomy   Other Topics Concern    Caffeine Concern Yes     Comment: 20-30 oz of coffee daily    Stress Concern Yes    Weight Concern Yes    Special Diet No    Exercise No    Seat Belt Yes     Social Drivers of Health     Physical Activity: Unknown (1/25/2021)    [place]    Exercise Vital Sign     Days of Exercise per Week: 4 days         REVIEW OF SYSTEMS:   GENERAL: Positive for mild fatigue. No fevers, No chills, no body aches, no lethargy  SKIN: no rashes  EYES: denies change in vision  HENT: Positive for nasal congestion, post nasal drip, runny nose.  No ST  LUNGS: Denies JOSE or wheezing.   CARDIOVASCULAR: denies chest pain  GI: denies GI symptoms  NEURO: denies headache/weakness    EXAM:   /68   Pulse 82   Temp 97.7 °F (36.5 °C) (Temporal)   Resp 15   Ht 5' 1\" (1.549 m)   Wt 268 lb (121.6 kg)   LMP 01/01/2006 (Approximate)   SpO2 95%   BMI 50.64 kg/m²   GENERAL: NAD, pleasant, not acutely ill appearing, nontoxic  SKIN: no visible rashes  EYES: EOMI, conjunctiva are non-injected  HENT: NCAT, EACs clear b/l, TMs normal b/l  NECK: supple, no cervical or supraclavicular adenopathy, no masses, no thyromegaly  LUNGS: CTA A/P, no wheezes, ronchi, rales or crackles  CARDIO:  RRR no murmur  NEURO: A&Ox3  PSYCH:  Affect normal/appropriate    ASSESSMENT AND PLAN:   Rosa Shaver is a 54 year old female     Encounter Diagnoses   Name Primary?    Acute bacterial sinusitis Yes    Fear of flying     Atrial tachycardia (HCC)     Encounter for screening mammogram for malignant neoplasm of breast          1. Acute bacterial sinusitis  Prescription for doxycycline.  However, recommend try to wait a few more days before taking the antibiotic and if slowly improving do not take the doxycycline.    - doxycycline 100 MG Oral Cap; Take 1 capsule (100 mg total) by mouth 2 (two) times daily for 7 days.  Dispense: 14 capsule; Refill: 0    2. Fear of flying  Prescription for alprazolam to take as needed for flying.  Patient aware of the precautions when taking this medication, do not drink alcohol, drive, or operate machinery when taking this medication.    - ALPRAZolam 0.5 MG Oral Tab; 1/2 to one tab daily as needed  Dispense: 20 tablet; Refill: 0    3. Atrial tachycardia (HCC)  Patient taking metoprolol succinate 25 mg daily as prescribed by her cardiologist which she will continue.  Follow-up with cardiologist as planned.    4. Encounter for screening mammogram for malignant neoplasm of breast  - [facility] 2D+3D SCREENING BILAT (CPT=77067/84983); Future          Meds & Refills for this Visit:  Requested Prescriptions     Signed Prescriptions Disp Refills    doxycycline 100 MG Oral Cap 14 capsule 0     Sig: Take 1 capsule (100 mg total) by mouth 2 (two) times daily for 7 days.    ALPRAZolam 0.5 MG Oral Tab 20 tablet 0     Si/2 to one tab daily as needed       Imaging & Consults:  [facility] 2D+3D SCREENING BILAT (CPT=77067/93761)        Return in about 2 months (around 3/3/2025) for Annual wellness visit, hypothyroidism, hyperlipidemia.  Sooner if needed.

## 2024-12-18 NOTE — PATIENT INSTRUCTIONS
-Try to wait a few more days before taking the antibiotic and if slowly improving do not take the antibiotic, doxycycline.

## 2024-12-27 ENCOUNTER — OFFICE VISIT (OUTPATIENT)
Dept: ORTHOPEDICS CLINIC | Facility: CLINIC | Age: 54
End: 2024-12-27
Payer: COMMERCIAL

## 2024-12-27 VITALS — BODY MASS INDEX: 47.2 KG/M2 | WEIGHT: 250 LBS | HEIGHT: 61 IN

## 2024-12-27 DIAGNOSIS — G56.21 CUBITAL TUNNEL SYNDROME ON RIGHT: Primary | ICD-10-CM

## 2024-12-27 PROCEDURE — 99214 OFFICE O/P EST MOD 30 MIN: CPT | Performed by: ORTHOPAEDIC SURGERY

## 2024-12-27 PROCEDURE — 3008F BODY MASS INDEX DOCD: CPT | Performed by: ORTHOPAEDIC SURGERY

## 2024-12-27 NOTE — PROGRESS NOTES
Clinic Note       Assessment/Plan:  54 year old female    Right cubital tunnel syndrome-will initiate elbow extension bracing at nighttime.  If that fails to improve the symptoms significantly we will order a EMG/NCV and we will order for both sized and she does have similar but to lesser degree symptoms on the left side.  Patient does also note intermittent entire hand falling asleep which could represent carpal tunnel syndrome bilaterally.  Right ECU tendinitis and ECU tendon instability/subluxation-patient continues to have clicking and pain.  Patient has failed nonsurgical management includes bracing activity modification and steroid injection treatment.  Further nonsurgical management will not result in resolution of ECU instability/subluxation.  Surgical treatment is the only way to permanently resolve this issue.  Patient is interested in proceeding with surgery.  I did discuss that there are some signal changes in the TFC adjacent to the ECU tendon which may represent a degenerative central tear versus a peripheral tear.  If a peripheral tear is visualized repair would be required and postoperatively may require no pronation supination for 6 weeks.  Risks associated with surgery, expected outcomes, time recovery and the need for therapy postoperatively for reviewed with the patient.  Right thumb CMC joint arthritis- status post third injection with resolution of symptoms  Right scapholunate ligament tear of the membranous and volar band with dorsal band intact-observe  Right radial scaphoid chondromalacia- would recommend observation for the time being  Right de Quervain's tenosynovitis- minimally to nonsymptomatic at this time.  Patient a gastric sleeve procedure done she is she cannot tolerate NSAIDs    Follow Up: 6 weeks by message on whether an EMG/NCV's needed    Diagnostic Studies:     XR Right Wrist: No fractures dislocations or osseous abnormalities  MRI right wrist: There are some mild  degenerative changes of the radial scaphoid articulation.  There is a membranous and volar band partial-thickness tear of the scapholunate ligament.  Dorsal band does appear to be intact.  Mild chondromalacia of the thumb CMC joint.  Signal changes in the TFC adjacent to the ECU tendon       Physical Exam:     Ht 5' 1\" (1.549 m)   Wt 250 lb (113.4 kg)   LMP 01/01/2006 (Approximate)   BMI 47.24 kg/m²     Constitutional: NAD. AOx3. Well-developed and Well-nourished.   Psychiatric: Normal mood/ affect/ behavior. Judgment and thought content normal.     Right Upper Extremity:     Inspection    Skin intact. No joint effusion. No obvious mass visualized.   Palpation    Tenderness to palpation over the ECU tendon      ROM    Full composite fist., Normal symmetric wrist motion. and Normal symmetric elbow motion.     Neurovascular    The hand is normally sensate and normally perfused     Special    Ulnar Sided Wrist Exam    Fovea Sign neg Piano Key neg   TFC Compression neg DRUJ Compression neg   TFC Tension neg LT Shear neg   ECU Synergy neg Hook Hamate neg   ECU Subluxation  increased translation when compared to the contralateral side Pisotriquetral Grind  neg     No pain with thumb CMC seesaw test.  Mild pain with DRUJ testing    Positive elbow flexion test and pain on palpation of the ulnar nerve on the right side no gross intrinsic weakness noted          CC: Right wrist pain    HPI: This 54 year old RHD female presents with right wrist pain that started about a month ago.  Something popped that she was holding a handle and exiting a camper when she felt a pop.  Her pain has been mild to severe depending on the activity.  She describes pain as sharp constant and aching in nature.  He tried a brace and sling without significant improvement in her symptoms      Interval Hx (12/27/2024): Patient reports numbness and tingling in the right hand predominantly in the right small finger and ring finger.  She to a lesser  degree has in the left side.  Started recently after she tried to lift something.  She had immediate pain traveling down the ulnar nerve distribution.    Occupation: N/A      History/Other:   Past Medical History:    Abdominal pain    Acute atopic conjunctivitis, bilateral    Yamil Vaughn M.D.    Acute meniscal tear, medial, right, initial encounter    MRI 5/21/2019: Mild undersurface tearing of the posterior root ligament of the medial meniscus.    Allergic rhinitis    Anxiety    Arrhythmia    paroxsymal atrial tachycardia    Arthritis    Atrial tachycardia (HCC)    Back pain    Benign paroxysmal positional vertigo    Bleeding nose    Bloating    Blood in urine    Body piercing    Calculus of kidney    Change in hair    Chest pain    Chest pain on exertion    Colon polyp    Gail Katz M.D.    Constipation    Decorative tattoo    Diabetes ([facility])    Denies    Diarrhea, unspecified    Disorder of thyroid    Diverticulitis of sigmoid colon    Diverticulosis    Gail Katz M.D.    Dizziness    Easy bruising    Endometriosis    Esophageal reflux    Essential hypertension    Eye disease    Fibromyalgia    Food intolerance    Frequent urination    Glaucoma    Headache disorder    Heart palpitations    Heartburn    High cholesterol    History of COVID-19    History of nausea and vomiting    Hoarseness, chronic    Hx of motion sickness    Hyperlipidemia    Hypothyroidism    Injury of peroneal tendon of right foot    Itch of skin    Keratoconjunctivitis sicca not specified as Sjogren's, bilateral    Yamil Vaughn M.D.    Lateral epicondylitis of right elbow    Leaking of urine    Lung nodule    pt unsure of which side    Major depressive disorder, recurrent episode, mild with anxious distress ([facility])    Migraines    Morbid obesity with BMI of 45.0-49.9, adult ([facility])    Obesity    Open angle with borderline findings, high risk, bilateral    Yamil Vaughn M.D.    Osteoarthritis    back    Other vitreous  opacities, left eye    Yamil Vaughn M.D.    Other vitreous opacities, right eye    Yamil Vaughn M.D.    Pain in joints    Painful urination    Polycystic ovaries    ovary embedded in pelvic wall--right side    PONV (postoperative nausea and vomiting)    slow to awaken    Posterior vitreous detachment of both eyes    Right > Left    Primary open angle glaucoma of both eyes, moderate stage    Rash    Sleep disturbance    Spitting up blood    Sputum production    Stress    Trochanteric bursitis of both hips    Left >>  Right     Visual impairment    glasses    Vitreous degeneration, right eye    Yamil Vaughn M.D.    Wears glasses    Weight gain     Past Surgical History:   Procedure Laterality Date    Angiogram      12 [place]    Angioplasty (coronary)      Colonoscopy      Colonoscopy  2018    Colonoscopy N/A 2023    Procedure: COLONOSCOPY;  Surgeon: Rupa Cheatham DO;  Location:  ENDOSCOPY    Colonoscopy N/A 2024    Procedure: COLONOSCOPY with cold snare polypectomy;  Surgeon: Rupa Cheatham DO;  Location:  ENDOSCOPY    D & c   &     Dilation & curettage cervical stump       and     Foot surgery Right 10/18/2019    Tendon repair     Gastric bypass,obesity,sb reconstruc  2018    gastric sleeve    Hernia surgery  10/18/2018    Hiatal hernia Dr. Terri Avila     Hysterectomy  2006    partial hystero.    Lap sleeve gastrectomy  10/18/2018    Dr. terri avila           Oophorectomy Bilateral     ovaries removed after partial.    Other  10/15/2018    Sleeve biatric surgery    Other surgical history      removal of right ovary    Removal of kidney stone      age 14, does not remember what kind of surgery. no abdominal or flank scar    Tilt table evaluation      veinogram 12 [facility]    Total abdom hysterectomy      Upper gi endoscopy,exam       Current Outpatient Medications   Medication Sig Dispense Refill    ALPRAZolam 0.5 MG Oral Tab  1/2 to one tab daily as needed 20 tablet 0    LANSOPRAZOLE 30 MG Oral Capsule Delayed Release TAKE 1 CAPSULE(30 MG) BY MOUTH DAILY 90 capsule 0    SIMVASTATIN 20 MG Oral Tab TAKE 1 TABLET(20 MG) BY MOUTH EVERY EVENING 90 tablet 3    Microlet Lancets Does not apply Misc USE UP TO 6 TIMES A  each 1    hydroxychloroquine 200 MG Oral Tab Take 1 tablet (200 mg total) by mouth 2 (two) times daily. 180 tablet 0    albuterol 108 (90 Base) MCG/ACT Inhalation Aero Soln Inhale 2 puffs into the lungs every 6 (six) hours as needed for Wheezing or Shortness of Breath (Cough). 1 each 0    LEVOTHYROXINE 112 MCG Oral Tab TAKE 1 TABLET(112 MCG) BY MOUTH BEFORE BREAKFAST 90 tablet 3    Glucose Blood (CONTOUR NEXT TEST) In Vitro Strip Use 1 test strip up to 6 times a day as needed 100 strip 11    CREON 51973-85783 units Oral Cap DR Particles TAKE 57197 UNTS BY MOUTH EVERY MORNING AND 63095 UNITS EVERY NIGHT AT BEDTIME. TAKE TWICE DAILY WITH SNACKS      Blood Glucose Monitoring Suppl Does not apply Device THIS IS FOR 1 CONTOUR GLUCOSE MONITOR 1 each 0    metoprolol succinate ER 25 MG Oral Tablet 24 Hr Take 1 tablet (25 mg total) by mouth daily.      meclizine 25 MG Oral Tab Take 1 tablet (25 mg total) by mouth 3 (three) times daily as needed for Dizziness. 30 tablet 2    Zinc 50 MG Oral Tab Take by mouth daily.      TURMERIC CURCUMIN OR Take by mouth daily.      ascorbic acid 500 MG Oral Chew Tab Chew 1 tablet (500 mg total) by mouth daily.      clotrimazole-betamethasone 1-0.05 % External Cream Apply 1 Application topically 2 (two) times daily as needed. 60 g 3    CALCIUM OR Take 2,400 mg by mouth daily.      saccharomyces boulardii 250 MG Oral Cap Pro-biotic 1 capsule by mouth daily      tiZANidine HCl 4 MG Oral Tab Take 1 tablet (4 mg total) by mouth nightly as needed. 30 tablet 2    magnesium 250 MG Oral Tab Take 1 tablet (250 mg total) by mouth daily.      Multiple Vitamins-Minerals (BARIATRIC MULTIVITAMINS/IRON OR) Take 1  capsule by mouth daily.      PATIENT SUPPLIED MEDICATION Essential Oils for allergies      Cholecalciferol (VITAMIN D3) 2000 UNITS Oral Tab Take 1 tablet (2,000 Units total) by mouth daily. 2 tab a day to make 4,000 units       Allergies   Allergen Reactions    Berries ANAPHYLAXIS    Casein ANAPHYLAXIS    Levofloxacin RASH     rash    Avocado ITCHING    Casein OTHER (SEE COMMENTS)    Cefdinir OTHER (SEE COMMENTS)     Tingling to lips and tongue     Darvocet [Propoxyphene N-Apap] NAUSEA AND VOMITING    Latex RASH and SWELLING    Metronidazole OTHER (SEE COMMENTS)     Tingling to lips and tongue    Nuts ANAPHYLAXIS     PECANS/CASHEWS  Water chestnuts-Itching    Amoxicillin-Pot Clavulanate NAUSEA AND VOMITING and NAUSEA ONLY    Codeine ITCHING     Not associated with rash    Oxycodone RASH     ONLY in GENERIC form per pt, Ok with brand form.    Radiology Contrast Iodinated Dyes NAUSEA ONLY     Patient instructed to take Zyrtec prior to administration of radiology contrast iodinated dye to prevent nausea.    Tramadol Hcl RASH    Zithromax RASH     Family History   Problem Relation Age of Onset    Cancer Mother         basal cell X 3; LUNG    Heart Disorder Mother         SVT    Hypertension Mother     Dementia Mother         early signs 2022    Depression Mother     Cancer Father         Colon & prostate cancers    Heart Disorder Father     Colon Cancer Father     Prostate Cancer Father     Diabetes Father     Hypertension Father     Heart Attack Father     Colon Polyps Brother     Alcohol and Other Disorders Associated Brother     Colon Polyps Brother     No Known Problems Son     No Known Problems Son     Alcohol and Other Disorders Associated Maternal Grandfather     Alcohol and Other Disorders Associated Paternal Grandfather     Cancer Maternal Aunt         breast, colon    Breast Cancer Maternal Aunt 50        In her 50's.    Ovarian Cancer Maternal Aunt 60        In her 60's.    Cancer Maternal Aunt          breast, basal cell    Breast Cancer Maternal Aunt 68        Late 60's.    Ovarian Cancer Maternal Aunt     Cancer Maternal Aunt         basal cell    Cancer Maternal Aunt         basal cell    Cancer Maternal Aunt         bone    Cancer Paternal Aunt         lung    Breast Cancer Cousin 62    Cancer Cousin         esoph,adenocarcinoma    Cancer Cousin         Non Hodgkins Lymphoma    Cancer Cousin         basal cell     Social History     Occupational History    Not on file   Tobacco Use    Smoking status: Never    Smokeless tobacco: Never   Vaping Use    Vaping status: Never Used   Substance and Sexual Activity    Alcohol use: Yes     Alcohol/week: 4.0 standard drinks of alcohol     Types: 4 Standard drinks or equivalent per week     Comment: weekly    Drug use: No    Sexual activity: Yes     Partners: Male     Birth control/protection: Hysterectomy      Assessment       Review of Systems (negative unless bolded):  General: fevers, chills, fatigue  CV:  chest pain, palpitations, leg swelling  Msk: bodyaches, neck pain, neck stiffness  Skin: rashes, open wounds, nonhealing ulcers  Hem: bleeds easily, bruise easily, immunocompromised  Neuro: dizziness, light headedness, headaches  Psych: anxious, depressed, anger issues        Avelino Linder MD  Hand, Wrist, & Elbow Surgery  [facility]  [address] [place] [address], Suite [facility], [email or web address]  [email or web address]  t: [phone]  f: [phone]

## 2024-12-28 PROBLEM — I31.9: Status: RESOLVED | Noted: 2022-01-30 | Resolved: 2024-12-28

## 2025-01-03 RX ORDER — LANCETS
EACH MISCELLANEOUS
Qty: 100 EACH | Refills: 1 | Status: SHIPPED | OUTPATIENT
Start: 2025-01-03

## 2025-01-27 ENCOUNTER — TELEPHONE (OUTPATIENT)
Dept: FAMILY MEDICINE CLINIC | Facility: CLINIC | Age: 55
End: 2025-01-27

## 2025-01-27 ENCOUNTER — HOSPITAL ENCOUNTER (OUTPATIENT)
Age: 55
Discharge: HOME | End: 2025-01-27
Payer: COMMERCIAL

## 2025-01-27 ENCOUNTER — PATIENT MESSAGE (OUTPATIENT)
Dept: RHEUMATOLOGY | Facility: CLINIC | Age: 55
End: 2025-01-27

## 2025-01-27 ENCOUNTER — APPOINTMENT (OUTPATIENT)
Dept: GENERAL RADIOLOGY | Age: 55
End: 2025-01-27
Attending: PHYSICIAN ASSISTANT
Payer: COMMERCIAL

## 2025-01-27 VITALS
OXYGEN SATURATION: 98 % | SYSTOLIC BLOOD PRESSURE: 146 MMHG | RESPIRATION RATE: 18 BRPM | DIASTOLIC BLOOD PRESSURE: 68 MMHG | TEMPERATURE: 98 F | BODY MASS INDEX: 47.2 KG/M2 | HEIGHT: 61 IN | WEIGHT: 250 LBS | HEART RATE: 73 BPM

## 2025-01-27 DIAGNOSIS — M35.9 UNDIFFERENTIATED CONNECTIVE TISSUE DISEASE (HCC): Primary | ICD-10-CM

## 2025-01-27 DIAGNOSIS — R05.8 POST-VIRAL COUGH SYNDROME: ICD-10-CM

## 2025-01-27 DIAGNOSIS — R76.8 POSITIVE ANA (ANTINUCLEAR ANTIBODY): ICD-10-CM

## 2025-01-27 DIAGNOSIS — M25.50 POLYARTHRALGIA: ICD-10-CM

## 2025-01-27 DIAGNOSIS — R04.2 BLOOD-TINGED SPUTUM: Primary | ICD-10-CM

## 2025-01-27 DIAGNOSIS — J98.01 ACUTE BRONCHOSPASM: ICD-10-CM

## 2025-01-27 PROCEDURE — 71046 X-RAY EXAM CHEST 2 VIEWS: CPT | Performed by: PHYSICIAN ASSISTANT

## 2025-01-27 PROCEDURE — 99214 OFFICE O/P EST MOD 30 MIN: CPT

## 2025-01-27 PROCEDURE — 94640 AIRWAY INHALATION TREATMENT: CPT

## 2025-01-27 RX ORDER — ALBUTEROL SULFATE 90 UG/1
2 INHALANT RESPIRATORY (INHALATION) EVERY 4 HOURS PRN
Qty: 1 EACH | Refills: 0 | Status: SHIPPED | OUTPATIENT
Start: 2025-01-27 | End: 2025-02-26

## 2025-01-27 RX ORDER — PREDNISONE 20 MG/1
40 TABLET ORAL DAILY
Qty: 10 TABLET | Refills: 0 | Status: SHIPPED | OUTPATIENT
Start: 2025-01-27 | End: 2025-02-01

## 2025-01-27 RX ORDER — IPRATROPIUM BROMIDE AND ALBUTEROL SULFATE 2.5; .5 MG/3ML; MG/3ML
3 SOLUTION RESPIRATORY (INHALATION) ONCE
Status: COMPLETED | OUTPATIENT
Start: 2025-01-27 | End: 2025-01-27

## 2025-01-27 NOTE — ED PROVIDER NOTES
Patient Seen in: [facility]      History     Chief Complaint   Patient presents with    Cough     Head and chest congestion with blood in my sputum - Entered by patient     Stated Complaint: Cough - Head and chest congestion with blood in my sputum    Subjective:   HPI    64-year-old female with known history of glaucoma, osteoarthritis fibromyalgia hypothyroidism hyperlipidemia and diabetes who comes in today complaining of persistent cough and bronchospasm after having influenza on the 13th.  Patient has ongoing cough and now some blood-tinged sputum with bronchospasm.    Objective:     Past Medical History:    Abdominal pain    Acute atopic conjunctivitis, bilateral    Yamil Vaughn M.D.    Acute meniscal tear, medial, right, initial encounter    MRI 5/21/2019: Mild undersurface tearing of the posterior root ligament of the medial meniscus.    Allergic rhinitis    Anxiety    Arrhythmia    paroxsymal atrial tachycardia    Arthritis    Atrial tachycardia (HCC)    Back pain    Benign paroxysmal positional vertigo    Bleeding nose    Bloating    Blood in urine    Body piercing    Calculus of kidney    Change in hair    Chest pain    Chest pain on exertion    Chronic pericarditis (HCC)    Suspect mild chronic pericarditis possibly secondary to history of SARS-CoV-2/COVID-19 based on findings of CT of chest 12/22/2021 not present on previous CTs      Colon polyp    Gail Katz M.D.    Constipation    Decorative tattoo    Diabetes (HCC)    Denies    Diarrhea, unspecified    Disorder of thyroid    Diverticulitis of sigmoid colon    Diverticulosis    Gail Katz M.D.    Dizziness    Easy bruising    Endometriosis    Esophageal reflux    Essential hypertension    Eye disease    Fibromyalgia    Food intolerance    Frequent urination    Glaucoma    Headache disorder    Heart palpitations    Heartburn    High cholesterol    History of COVID-19    History of nausea and vomiting     Hoarseness, chronic    Hx of motion sickness    Hyperlipidemia    Hypothyroidism    Injury of peroneal tendon of right foot    Itch of skin    Keratoconjunctivitis sicca not specified as Sjogren's, bilateral    Yamil Vaughn M.D.    Lateral epicondylitis of right elbow    Leaking of urine    Lung nodule    pt unsure of which side    Major depressive disorder, recurrent episode, mild with anxious distress (HCC)    Migraines    Morbid obesity with BMI of 45.0-49.9, adult (HCC)    Obesity    Open angle with borderline findings, high risk, bilateral    Yamil Vaughn M.D.    Osteoarthritis    back    Other vitreous opacities, left eye    Yamil Vaughn M.D.    Other vitreous opacities, right eye    Yamil Vaughn M.D.    Pain in joints    Painful urination    Polycystic ovaries    ovary embedded in pelvic wall--right side    PONV (postoperative nausea and vomiting)    slow to awaken    Posterior vitreous detachment of both eyes    Right > Left    Primary open angle glaucoma of both eyes, moderate stage    Rash    Sleep disturbance    Spitting up blood    Sputum production    Stress    Trochanteric bursitis of both hips    Left >>  Right     Visual impairment    glasses    Vitreous degeneration, right eye    Yamil Vaughn M.D.    Wears glasses    Weight gain              Past Surgical History:   Procedure Laterality Date    Angiogram      2-24-12 [place]    Angioplasty (coronary)      Colonoscopy      Colonoscopy  07/19/2018    Colonoscopy N/A 12/6/2023    Procedure: COLONOSCOPY;  Surgeon: Rupa Cheatham DO;  Location:  ENDOSCOPY    Colonoscopy N/A 9/18/2024    Procedure: COLONOSCOPY with cold snare polypectomy;  Surgeon: Rupa Cheatham DO;  Location:  ENDOSCOPY    D & c  1996 & 1997    Dilation & curettage cervical stump      1996 and 1997    Foot surgery Right 10/18/2019    Tendon repair     Gastric bypass,obesity,sb reconstruc  2018    gastric sleeve    Hernia surgery  10/18/2018    Hiatal  hernia Dr. Terri Mooney     Hysterectomy  2006    partial hystero.    Lap sleeve gastrectomy  10/18/2018    Dr. terri mooney           Oophorectomy Bilateral 2015    ovaries removed after partial.    Other  10/15/2018    Sleeve biatric surgery    Other surgical history      removal of right ovary    Removal of kidney stone      age 14, does not remember what kind of surgery. no abdominal or flank scar    Tilt table evaluation      veinogram 7-6-[facility]    Total abdom hysterectomy      Upper gi endoscopy,exam                  Social History     Socioeconomic History    Marital status:    Tobacco Use    Smoking status: Never    Smokeless tobacco: Never   Vaping Use    Vaping status: Never Used   Substance and Sexual Activity    Alcohol use: Yes     Alcohol/week: 4.0 standard drinks of alcohol     Types: 4 Standard drinks or equivalent per week     Comment: weekly    Drug use: No    Sexual activity: Yes     Partners: Male     Birth control/protection: Hysterectomy   Other Topics Concern    Caffeine Concern Yes     Comment: 20-30 oz of coffee daily    Stress Concern Yes    Weight Concern Yes    Special Diet No    Exercise No    Seat Belt Yes     Social Drivers of Health     Physical Activity: Unknown (2021)    [facility]    Exercise Vital Sign     Days of Exercise per Week: 4 days              Review of Systems    Positive for stated complaint: Cough - Head and chest congestion with blood in my sputum  Other systems are as noted in HPI.  Constitutional and vital signs reviewed.      All other systems reviewed and negative except as noted above.    Physical Exam     ED Triage Vitals [25 1414]   /68   Pulse 73   Resp 18   Temp 97.8 °F (36.6 °C)   Temp src Oral   SpO2 98 %   O2 Device None (Room air)       Current Vitals:   Vital Signs  BP: 146/68  Pulse: 73  Resp: 18  Temp: 97.8 °F (36.6 °C)  Temp src: Oral    Oxygen Therapy  SpO2: 98  %  O2 Device: None (Room air)        Physical Exam  General Appearance: Alert, cooperative, no distress, appropriate for age   Head: Normocephalic, without obvious abnormality   Eyes: PERRL,  conjunctiva and cornea clear, both eyes   Ears: TM pearly gray color and semitransparent, external ear canals normal, both ears   Nose: Nares symmetrical, septum midline, mucosa normal, clear watery discharge; no sinus tenderness   Throat: Lips, tongue, and mucosa are moist, pink, and intact; teeth intact. No erythema, no exudates or tonsillar hypertrophy, uvula midline, no trismus or drooling no phonation changes, patient handling secretions well   Neck: Supple; no anterior or posterior cervical adenopathy, no neck rigidity or meningeal signs  Lungs: Clear to auscultation bilaterally, respirations unlabored. No wheezing, rales or rhonchi.+ bronchospasm   Heart: NSR, S1, S2 present. No murmurs, rubs or gallops.  Skin: no rash         ED Course   Labs Reviewed - No data to display     XR CHEST PA + LAT CHEST (CPT=71046)    Result Date: 1/27/2025  PROCEDURE:  XR CHEST PA + LAT CHEST (CPT=71046)  INDICATIONS:  Cough - Head and chest congestion with blood in my sputum  COMPARISON:  [facility], XR, XR CHEST AP PORTABLE  (CPT=71045), 6/27/2024, 4:09 PM.  TECHNIQUE:  PA and lateral chest radiographs were obtained.  PATIENT STATED HISTORY: (As transcribed by Technologist)  cough, blood in sputum   FINDINGS:  Normal heart size and pulmonary vascularity. No pleural effusion or pneumothorax. No lobar consolidation.            CONCLUSION:  No active cardiopulmonary process identified.  If the patient's hemoptysis persists or worsens, consider chest CT for further assessment.   LOCATION:  [facility]   Dictated by (CST): Stromberg, LeRoy, MD on 1/27/2025 at 3:10 PM     Finalized by (CST): Stromberg, LeRoy, MD on 1/27/2025 at 3:12 PM             [facility]        Medical Decision Making  54-year-old female who comes in today with recent influenza now  with some bronchospasm and blood-tinged sputum and cough over the past 3 weeks    Problems Addressed:  Acute bronchospasm: acute illness or injury  Blood-tinged sputum: acute illness or injury  Post-viral cough syndrome: acute illness or injury    Amount and/or Complexity of Data Reviewed  Radiology: ordered and independent interpretation performed. Decision-making details documented in ED Course.     Details: Personally viewed the patient's chest x-ray no evidence of acute consolidation or pleural effusion  ECG/medicine tests: ordered and independent interpretation performed. Decision-making details documented in ED Course.     Details: DuoNeb treatment, bronchospasm is improved    Risk  OTC drugs.  Prescription drug management.  Risk Details: Clinical Impression: Bronchitis, bronchospasm      The differential diagnosis before testing included Pneumonia, COVID-19, Bronchitis , which is a medical condition that poses a threat to life/function.    Albuterol inhaler every 4-6 hours, and monitor your blood sugar        Disposition and Plan     Clinical Impression:  1. Blood-tinged sputum    2. Post-viral cough syndrome    3. Acute bronchospasm         Disposition:  Discharge  1/27/2025  3:45 pm    Follow-up:  Oneyda Moulton DO  [id number] [address] [address]  [phone]    Schedule an appointment as soon as possible for a visit   If symptoms worsen          Medications Prescribed:  Discharge Medication List as of 1/27/2025  4:01 PM        START taking these medications    Details   !! albuterol 108 (90 Base) MCG/ACT Inhalation Aero Soln Inhale 2 puffs into the lungs every 4 (four) hours as needed for Wheezing., Normal, Disp-1 each, R-0      predniSONE 20 MG Oral Tab Take 2 tablets (40 mg total) by mouth daily for 5 days., Normal, Disp-10 tablet, R-0       !! - Potential duplicate medications found. Please discuss with provider.              Supplementary Documentation:

## 2025-01-27 NOTE — TELEPHONE ENCOUNTER
Rosa Shaver  LOV: 12/18/2024       Calling for New g condition (choose one)  Patient experiencing: Chest & sinus Congestion Blood in Sputum had flu a couple weeks ago (list symptoms/concerns)  Duration/Onset of symptom: couple weeks   Appointment Offered: Did not want to go to IC/WIC

## 2025-01-27 NOTE — DISCHARGE INSTRUCTIONS
Close follow up with your primary care physician especially if blood-tinged sputum continues, if any shortness of breath or worsening symptoms be reevaluated    Albuterol inhaler every 4-6 hours, take oral steroids as directed this will help with the bronchospastic cough monitor your blood sugars closely as the prednisone can raise your blood sugars

## 2025-01-27 NOTE — TELEPHONE ENCOUNTER
Spoke with patient. Patient was positive home Flu 1/12. Had fevers. Head and chest congestion still present. When patient coughs and has blood in sputum-more than normal for her. No fevers for 1.5 wk. Advised patient to immediate care or emergency room for further evaluation. Patient voiced understanding and agreeable.

## 2025-01-28 NOTE — TELEPHONE ENCOUNTER
MCM sent to pt stating request has been faxed, stated to pt fasting is not required. Provided office number if any further questions needed.

## 2025-01-29 LAB
ABSOLUTE BASOPHILS: 50 CELLS/UL (ref 0–200)
ABSOLUTE EOSINOPHILS: 140 CELLS/UL (ref 15–500)
ABSOLUTE LYMPHOCYTES: 1540 CELLS/UL (ref 850–3900)
ABSOLUTE MONOCYTES: 390 CELLS/UL (ref 200–950)
ABSOLUTE NEUTROPHILS: 2880 CELLS/UL (ref 1500–7800)
ALBUMIN/GLOBULIN RATIO: 1.5 (CALC) (ref 1–2.5)
ALBUMIN: 3.8 G/DL (ref 3.6–5.1)
ALKALINE PHOSPHATASE: 97 U/L (ref 37–153)
ALT: 25 U/L (ref 6–29)
AST: 22 U/L (ref 10–35)
BASOPHILS: 1 %
BILIRUBIN, TOTAL: 0.4 MG/DL (ref 0.2–1.2)
BUN: 16 MG/DL (ref 7–25)
C-REACTIVE PROTEIN: <3 MG/L
CALCIUM: 9.1 MG/DL (ref 8.6–10.4)
CARBON DIOXIDE: 25 MMOL/L (ref 20–32)
CHLORIDE: 107 MMOL/L (ref 98–110)
CREATININE: 0.79 MG/DL (ref 0.5–1.03)
EGFR: 89 ML/MIN/1.73M2
EOSINOPHILS: 2.8 %
GLOBULIN: 2.6 G/DL (CALC) (ref 1.9–3.7)
GLUCOSE: 142 MG/DL (ref 65–99)
HEMATOCRIT: 39.4 % (ref 35–45)
HEMOGLOBIN: 12.8 G/DL (ref 11.7–15.5)
LYMPHOCYTES: 30.8 %
MCH: 29.7 PG (ref 27–33)
MCHC: 32.5 G/DL (ref 32–36)
MCV: 91.4 FL (ref 80–100)
MONOCYTES: 7.8 %
MPV: 10 FL (ref 7.5–12.5)
NEUTROPHILS: 57.6 %
PLATELET COUNT: 381 THOUSAND/UL (ref 140–400)
POTASSIUM: 4.1 MMOL/L (ref 3.5–5.3)
PROTEIN, TOTAL: 6.4 G/DL (ref 6.1–8.1)
RDW: 13.8 % (ref 11–15)
RED BLOOD CELL COUNT: 4.31 MILLION/UL (ref 3.8–5.1)
SED RATE BY MODIFIED$WESTERGREN: 11 MM/H
SODIUM: 141 MMOL/L (ref 135–146)
WHITE BLOOD CELL COUNT: 5 THOUSAND/UL (ref 3.8–10.8)

## 2025-02-05 ENCOUNTER — OFFICE VISIT (OUTPATIENT)
Dept: RHEUMATOLOGY | Facility: CLINIC | Age: 55
End: 2025-02-05
Payer: COMMERCIAL

## 2025-02-05 VITALS
OXYGEN SATURATION: 98 % | BODY MASS INDEX: 50.41 KG/M2 | HEIGHT: 61 IN | WEIGHT: 267 LBS | HEART RATE: 74 BPM | TEMPERATURE: 97 F | SYSTOLIC BLOOD PRESSURE: 148 MMHG | RESPIRATION RATE: 16 BRPM | DIASTOLIC BLOOD PRESSURE: 80 MMHG

## 2025-02-05 DIAGNOSIS — M25.552 BILATERAL HIP PAIN: ICD-10-CM

## 2025-02-05 DIAGNOSIS — R76.8 POSITIVE ANA (ANTINUCLEAR ANTIBODY): ICD-10-CM

## 2025-02-05 DIAGNOSIS — M35.9 UNDIFFERENTIATED CONNECTIVE TISSUE DISEASE (HCC): Primary | ICD-10-CM

## 2025-02-05 DIAGNOSIS — R04.2 HEMOPTYSIS: ICD-10-CM

## 2025-02-05 DIAGNOSIS — M25.50 POLYARTHRALGIA: ICD-10-CM

## 2025-02-05 DIAGNOSIS — R06.02 SHORTNESS OF BREATH: ICD-10-CM

## 2025-02-05 DIAGNOSIS — M25.551 BILATERAL HIP PAIN: ICD-10-CM

## 2025-02-05 DIAGNOSIS — M79.7 FIBROMYALGIA: ICD-10-CM

## 2025-02-05 DIAGNOSIS — M51.360 DEGENERATION OF INTERVERTEBRAL DISC OF LUMBAR REGION WITH DISCOGENIC BACK PAIN: ICD-10-CM

## 2025-02-05 DIAGNOSIS — M15.0 PRIMARY OSTEOARTHRITIS INVOLVING MULTIPLE JOINTS: ICD-10-CM

## 2025-02-05 PROCEDURE — G2211 COMPLEX E/M VISIT ADD ON: HCPCS | Performed by: INTERNAL MEDICINE

## 2025-02-05 PROCEDURE — 3079F DIAST BP 80-89 MM HG: CPT | Performed by: INTERNAL MEDICINE

## 2025-02-05 PROCEDURE — 99213 OFFICE O/P EST LOW 20 MIN: CPT | Performed by: INTERNAL MEDICINE

## 2025-02-05 PROCEDURE — 3008F BODY MASS INDEX DOCD: CPT | Performed by: INTERNAL MEDICINE

## 2025-02-05 PROCEDURE — 3077F SYST BP >= 140 MM HG: CPT | Performed by: INTERNAL MEDICINE

## 2025-02-05 RX ORDER — AMITRIPTYLINE HYDROCHLORIDE 10 MG/1
10 TABLET ORAL NIGHTLY
Qty: 90 TABLET | Refills: 0 | Status: SHIPPED | OUTPATIENT
Start: 2025-02-05

## 2025-02-05 RX ORDER — METHYLPREDNISOLONE 4 MG/1
TABLET ORAL
Qty: 1 EACH | Refills: 0 | Status: SHIPPED | OUTPATIENT
Start: 2025-02-05

## 2025-02-05 NOTE — PROGRESS NOTES
RHEUMATOLOGY Follow up   Date of visit: 02/05/2025  ?  Chief Complaint   Patient presents with    Undifferentiated Connective Tissue Disease     7 month f/u. States that everything hurts more. Increased knuckle pain. Swelling in feet. Flank pain. No new symptoms. Converted rapid score of 3.3     ?  ASSESSMENT, DISCUSSION & PLAN   Assessment:  1. Undifferentiated connective tissue disease (HCC)    2. Hemoptysis    3. Polyarthralgia    4. Fibromyalgia    5. Positive LUIS ARMANDO (antinuclear antibody)    6. Primary osteoarthritis involving multiple joints    7. Degeneration of intervertebral disc of lumbar region with discogenic back pain    8. Shortness of breath    9. Bilateral hip pain          Discussion:  Ms. Rosa Shaver is a 53 yo woman with a hx of hypertension, hyperlipidemia, depression, reflux as well as obesity s/p bariatric surgery who presents for evaluation of worsened joint and muscle pain. She does have a significant family history of ankylosing spondylitis is her two brothers who were both diagnosed within the past one year.  She does have some signs of arthritis likely contributed/worsened by her previous obesity.      Her autoimmune testing was grossly negative with the exception of borderline LUIS ARMANDO/SSA positivity. She does have significant dry eyes, but unclear if truly Sjogren's or not.  Repeat testing on multiple occasions now have shown while LUIS ARMANDO has been equivocal, SSA SSB antibodies have been negative.     She has started plaquenil, last year, and feels like this is helping with some of her joint pain and fatigue.  She does have signs of fibromyalgia, now likely worsened due to recent social stressors. Offered amitriptyline again but pt has been feeling better overall since her last visit.    Due to recent worsened photosensitivity, repeated her autoimmune serologies and checked myositis panel. These were both negative. Previously reminded pt of importance of sun protection and also reminded that  plaquenil can make more sun sensitive.   Otherwise, continue yearly eye exams to monitor for toxicities from plaquenil.     Due to the worsened pain overall, I recommended duloxetine to see if it will help.  However, patient did not tolerate.  Had worsened severe headache so medication was discontinued.  Was not on long enough to notice a difference.  We got updated x-rays which were most consistent with osteoarthritis/degenerative changes.  She is willing to try amitripyline so a long dose was ordered  First, since she is still dealing with symptoms post-URI, will do steroid pack but encouraged her to follow back with PCP vs pulmonology evaluation. She had some coughing up blood so I added anca studies and cryoglobulin. She lacks other obvious signs of vasculitis on exam.  She will also start PT for the pain.     Okay to follow back with me in 6months or sooner as needed.   We will be in communication once labs results.   She may need to be seen sooner depending on treatment course.     Patient verbalized understanding of above instructions. No further questions at this time.    Code selection for this visit was based on time spent (26min) on date of service in preparing to see the patient, obtaining and/or reviewing separately obtained history, performing a medically appropriate examination, counseling and educating the patient/family/caregiver, ordering medications or testing, referring and communicating with other healthcare providers, documenting clinical information in the E HR, independently interpreting results and communicating results to the patient/family/caregiver and care coordination with the patient's other providers.       ?  Plan:  Diagnoses and all orders for this visit:    Undifferentiated connective tissue disease (HCC)    Hemoptysis  -     ANCA Panel Vasculitis; Future  -     Cryoglobulin; Future    Polyarthralgia  -     methylPREDNISolone (MEDROL) 4 MG Oral Tablet Therapy Pack; As  directed.    Fibromyalgia  -     amitriptyline 10 MG Oral Tab; Take 1 tablet (10 mg total) by mouth nightly.    Positive LUIS ARMANDO (antinuclear antibody)    Primary osteoarthritis involving multiple joints  -     amitriptyline 10 MG Oral Tab; Take 1 tablet (10 mg total) by mouth nightly.  -     Physical Therapy Referral - External    Degeneration of intervertebral disc of lumbar region with discogenic back pain  -     Physical Therapy Referral - External    Shortness of breath  -     methylPREDNISolone (MEDROL) 4 MG Oral Tablet Therapy Pack; As directed.    Bilateral hip pain  -     Physical Therapy Referral - External              Return in about 6 months (around 8/5/2025).  ?  HPI   Rosa Shaver is a 54 year old female with the following active problems who is seen for medically necessary follow-up today. She was seen initially for evaluation of joint pain and abnormal labs.     Since her last visit, she has been doing okay.  Having worsened pain overall  Was off hcq due to issues with getting refill. Had worsened pain across the PIPs with some swelling which has improved but still having some worsened pain compared to before.   Continues with lower back and hip pain (stable)  Knees are off- feels affected by certain positions.  Feels like her tendons and joints don't \"play nice\"   Is constantly having to move at night to get into somewhat comfortable position  Is open to going back to PT. Has been trying to do home exercises in Nov-Dec.   Still getting tired easily.   Was sick with high fevers that lasted longer than expected.     Still having ankle swelling but towards end of the day but still with achilles and plantar fascia tendonitis   Costochondritis was better but then got aggravated with recent URI.     Dx with diverticulitis and has 4 in the past year. Following with GI and evaluated by general surgeon. Recommended adjusting diet to try to control before considering surgery. Has been doing different  essential oils.     + weight gain but thinks related to the diet to help her colon.     Denies rashes aside from eczema- flaring with weather     Tried duloxetine but had worsened migraines. Not sure if medication related at first but when she retried medication, she had severe headaches each time she tried.   Never tried amitriptyline     + dry eyes, using drops (systane balance). Feels worsened lately with the weather changes.   + dry mouth, not using biotene products as much; but drinking water constantly;     Currently taking tylenol as needed, taking more consistently due to the knee pain.  Avoid NSAIDs due to hx of gastric sleeve surgery   Continues with weight gain. Following with weight loss clinic. Could not tolerate mounjaro due to hypoglycemia and side effects. Not on anything at this time.     HPI from initial consultation  referred for rheumatologic evaluation due to joint, muscle and back pain.      States she has been suffering starting several months ago with pain that feels like bruise pain. Denies any visible bruise changes of the skin. States the bruise-like pain is located all over and not just over the joints. This can happen with different types of pressure- 's arm or dog sitting.      States the joint pain has gotten worse over the past 2 months- worst over fingers, wrists, elbows, right shoulder and both knees. Denies obvious swelling over the joints.   Also suffers from low back pain along the spine and across the bottom there. Describes as a deep pain. States most severe in the mornings as well as at the end of the day. Does notice some improvement with movement and activity.   + hx of eczema which have been persistent despite the warmer weather recently. Applies topicals when she remembers. States areas she applies on a regular basis due respond and subside.      + morning stiffness, depends on activity level, can last at least hour   Takes tylenol as needed when pain severe which  does help slightly . Avoids NSAIDs due to hx of bariatric surgery   Also tries different essential oils for her muscle aches   + hair thinning, thought to be related to bariatric surgery (Oct 2018); also admits to hair loss related to stress   + issues with blood sugars, feels very sensitive to low blood sugars or any elevations 20 over her fasting sugars - has not mentioned to PCP yet. As discussed with weight loss physician and was told to adjust diet, however despite diet changes still having some symptoms. Has not had any LOC but does get dizzy and some confusion.   + hx of one miscarriage during 1st trimester, able to conceive twice afterwards - relatively normal per pt   + rare difficulty swallowing  + blisters over fingertips when under stress - none currently   + follows with ophthalmology- has been diagnosed with early glaucoma as well as hx of vitreal detachment due to forceful vomiting. Also has dry eyes- uses Restasis, no other eye drops    + admits to frequent swollen glands in the neck since a child; gets about once yearly   + hx of tendon tear in her ankle requiring surgical intervention (happened 6 years ago and had micro-tears which did not require surgery then last year required surgery). - no issues with the ankle since that time.   + hx of plantar fasciitis, years ago no recent issues. Does wear insoles.   Does admit to lose 91 since her bariatric surgery Oct 2018.   Was going through physical therapy and told that her gait was abnormal from years of obesity   Wake up at night due to hip pain, needs to reposition.   + nail changes of fingernails 2-3 fingers b/l, left pinky fingernail worst.   + easy bruising      Denies new skin nodule formation.  The patient denies oral or nasal ulcers, photosensitive rash, Raynaud's phenomenon, prior hematologic abnormality, prior renal or liver disease (other than kidney stones), or history of seizures.  No history of prior blood clot in the legs or lungs,  strokes or ischemic phenomenon.  Denies nonhealing ulcers on the fingertips.  The patient denies any history of uveitis, crampy abdominal pain, constipation, diarrhea, nodular painful shin bruises, Achilles heel pain, psoriatic lesions, history of dactylitis.  There are no symptoms of severe dry mouth.  No fevers, chills, night sweats, or unexplained weakness.  Denies chronic sinus infections/disease or epistaxis.  Denies chronic cough or hemoptysis.   Denies obvious blood in urine      Family hx:   Both brothers formally diagnosed with ankylosing spondylitis this past year. Unclear if has psoriasis.   ?  Past Medical History:  Past Medical History:    Abdominal pain    Acute atopic conjunctivitis, bilateral    Yamil Vaughn M.D.    Acute meniscal tear, medial, right, initial encounter    MRI 5/21/2019: Mild undersurface tearing of the posterior root ligament of the medial meniscus.    Allergic rhinitis    Anxiety    Arrhythmia    paroxsymal atrial tachycardia    Arthritis    Atrial tachycardia (HCC)    Back pain    Benign paroxysmal positional vertigo    Bleeding nose    Bloating    Blood in urine    Body piercing    Calculus of kidney    Change in hair    Chest pain    Chest pain on exertion    Chronic pericarditis (HCC)    Suspect mild chronic pericarditis possibly secondary to history of SARS-CoV-2/COVID-19 based on findings of CT of chest 12/22/2021 not present on previous CTs      Colon polyp    Gail Katz M.D.    Constipation    Decorative tattoo    Diabetes (HCC)    Denies    Diarrhea, unspecified    Disorder of thyroid    Diverticulitis of sigmoid colon    Diverticulosis    Gail Katz M.D.    Dizziness    Easy bruising    Endometriosis    Esophageal reflux    Essential hypertension    Eye disease    Fibromyalgia    Food intolerance    Frequent urination    Glaucoma    Headache disorder    Heart palpitations    Heartburn    High cholesterol    History of COVID-19    History of nausea  and vomiting    Hoarseness, chronic    Hx of motion sickness    Hyperlipidemia    Hypothyroidism    Injury of peroneal tendon of right foot    Itch of skin    Keratoconjunctivitis sicca not specified as Sjogren's, bilateral    Yamil Vaughn M.D.    Lateral epicondylitis of right elbow    Leaking of urine    Lung nodule    pt unsure of which side    Major depressive disorder, recurrent episode, mild with anxious distress    Migraines    Morbid obesity with BMI of 45.0-49.9, adult (HCC)    Obesity    Open angle with borderline findings, high risk, bilateral    Yamil Vaughn M.D.    Osteoarthritis    back    Other vitreous opacities, left eye    Yamil Vaughn M.D.    Other vitreous opacities, right eye    Yamil Vaughn M.D.    Pain in joints    Painful urination    Polycystic ovaries    ovary embedded in pelvic wall--right side    PONV (postoperative nausea and vomiting)    slow to awaken    Posterior vitreous detachment of both eyes    Right > Left    Primary open angle glaucoma of both eyes, moderate stage    Rash    Sleep disturbance    Spitting up blood    Sputum production    Stress    Trochanteric bursitis of both hips    Left >>  Right     Visual impairment    glasses    Vitreous degeneration, right eye    Yamil Vaughn M.D.    Wears glasses    Weight gain     Past Surgical History:  Past Surgical History:   Procedure Laterality Date    Angiogram      2-24-12 [facility]    Angioplasty (coronary)      Colonoscopy      Colonoscopy  07/19/2018    Colonoscopy N/A 12/6/2023    Procedure: COLONOSCOPY;  Surgeon: Rupa Cheatham DO;  Location:  ENDOSCOPY    Colonoscopy N/A 9/18/2024    Procedure: COLONOSCOPY with cold snare polypectomy;  Surgeon: Rupa Cheatham DO;  Location:  ENDOSCOPY    D & c  1996 & 1997    Dilation & curettage cervical stump      1996 and 1997    Foot surgery Right 10/18/2019    Tendon repair     Gastric bypass,obesity,sb reconstruc  2018    gastric sleeve    Hernia  surgery  10/18/2018    Hiatal hernia Dr. Terri Mooney     Hysterectomy  2006    partial hystero.    Lap sleeve gastrectomy  10/18/2018    Dr. terri mooney           Oophorectomy Bilateral 2015    ovaries removed after partial.    Other  10/15/2018    Sleeve biatric surgery    Other surgical history      removal of right ovary    Removal of kidney stone      age 14, does not remember what kind of surgery. no abdominal or flank scar    Tilt table evaluation      veinogram 12 [facility]    Total abdom hysterectomy      Upper gi endoscopy,exam       Family History:  Family History   Problem Relation Age of Onset    Cancer Mother         basal cell X 3; LUNG    Heart Disorder Mother         SVT    Hypertension Mother     Dementia Mother         early signs     Depression Mother     Cancer Father         Colon & prostate cancers    Heart Disorder Father     Colon Cancer Father     Prostate Cancer Father     Diabetes Father     Hypertension Father     Heart Attack Father     Colon Polyps Brother     Alcohol and Other Disorders Associated Brother     Colon Polyps Brother     No Known Problems Son     No Known Problems Son     Alcohol and Other Disorders Associated Maternal Grandfather     Alcohol and Other Disorders Associated Paternal Grandfather     Cancer Maternal Aunt         breast, colon    Breast Cancer Maternal Aunt 50        In her 50's.    Ovarian Cancer Maternal Aunt 60        In her 60's.    Cancer Maternal Aunt         breast, basal cell    Breast Cancer Maternal Aunt 68        Late 60's.    Ovarian Cancer Maternal Aunt     Cancer Maternal Aunt         basal cell    Cancer Maternal Aunt         basal cell    Cancer Maternal Aunt         bone    Cancer Paternal Aunt         lung    Breast Cancer Cousin 62    Cancer Cousin         esoph,adenocarcinoma    Cancer Cousin         Non Hodgkins Lymphoma    Cancer Cousin         basal cell     Social History:  Social History     Socioeconomic History     Marital status:    Tobacco Use    Smoking status: Never    Smokeless tobacco: Never   Vaping Use    Vaping status: Never Used   Substance and Sexual Activity    Alcohol use: Yes     Alcohol/week: 4.0 standard drinks of alcohol     Types: 4 Standard drinks or equivalent per week     Comment: weekly    Drug use: No    Sexual activity: Yes     Partners: Male     Birth control/protection: Hysterectomy   Other Topics Concern    Caffeine Concern Yes     Comment: 20-30 oz of coffee daily    Stress Concern Yes    Weight Concern Yes    Special Diet No    Exercise No    Seat Belt Yes     Medications:  Outpatient Medications Marked as Taking for the 2/5/25 encounter (Office Visit) with Hilda Llamas DO   Medication Sig Dispense Refill    methylPREDNISolone (MEDROL) 4 MG Oral Tablet Therapy Pack As directed. 1 each 0    amitriptyline 10 MG Oral Tab Take 1 tablet (10 mg total) by mouth nightly. 90 tablet 0    ALPRAZolam 0.5 MG Oral Tab 1/2 to one tab daily as needed 20 tablet 0    LANSOPRAZOLE 30 MG Oral Capsule Delayed Release TAKE 1 CAPSULE(30 MG) BY MOUTH DAILY 90 capsule 0    SIMVASTATIN 20 MG Oral Tab TAKE 1 TABLET(20 MG) BY MOUTH EVERY EVENING 90 tablet 3    hydroxychloroquine 200 MG Oral Tab Take 1 tablet (200 mg total) by mouth 2 (two) times daily. 180 tablet 0    albuterol 108 (90 Base) MCG/ACT Inhalation Aero Soln Inhale 2 puffs into the lungs every 6 (six) hours as needed for Wheezing or Shortness of Breath (Cough). 1 each 0    LEVOTHYROXINE 112 MCG Oral Tab TAKE 1 TABLET(112 MCG) BY MOUTH BEFORE BREAKFAST 90 tablet 3    CREON 22230-07445 units Oral Cap DR Particles TAKE 85102 UNTS BY MOUTH EVERY MORNING AND 13522 UNITS EVERY NIGHT AT BEDTIME. TAKE TWICE DAILY WITH SNACKS      metoprolol succinate ER 25 MG Oral Tablet 24 Hr Take 1 tablet (25 mg total) by mouth daily.      meclizine 25 MG Oral Tab Take 1 tablet (25 mg total) by mouth 3 (three) times daily as needed for Dizziness. 30 tablet 2    Zinc 50 MG Oral  Tab Take by mouth daily.      TURMERIC CURCUMIN OR Take by mouth daily.      ascorbic acid 500 MG Oral Chew Tab Chew 1 tablet (500 mg total) by mouth daily.      clotrimazole-betamethasone 1-0.05 % External Cream Apply 1 Application topically 2 (two) times daily as needed. 60 g 3    CALCIUM OR Take 2,400 mg by mouth daily.      saccharomyces boulardii 250 MG Oral Cap Pro-biotic 1 capsule by mouth daily      tiZANidine HCl 4 MG Oral Tab Take 1 tablet (4 mg total) by mouth nightly as needed. 30 tablet 2    magnesium 250 MG Oral Tab Take 1 tablet (250 mg total) by mouth daily.      Multiple Vitamins-Minerals (BARIATRIC MULTIVITAMINS/IRON OR) Take 1 capsule by mouth daily.      PATIENT SUPPLIED MEDICATION Essential Oils for allergies      Cholecalciferol (VITAMIN D3) 2000 UNITS Oral Tab Take 1 tablet (2,000 Units total) by mouth daily. 2 tab a day to make 4,000 units       Modified Medications    Modified Medication Previous Medication    MICROLET LANCETS DOES NOT APPLY MISC MICROLET LANCETS Does not apply Misc       USE UP TO 6 TIMES A DAY    USE UP TO 6 TIMES A DAY       ?  ?  Allergies:  Allergies   Allergen Reactions    Berries ANAPHYLAXIS    Casein ANAPHYLAXIS    Levofloxacin RASH     rash    Avocado ITCHING    Casein OTHER (SEE COMMENTS)    Cefdinir OTHER (SEE COMMENTS)     Tingling to lips and tongue     Darvocet [Propoxyphene N-Apap] NAUSEA AND VOMITING    Latex RASH and SWELLING    Metronidazole OTHER (SEE COMMENTS)     Tingling to lips and tongue    Nuts ANAPHYLAXIS     PECANS/CASHEWS  Water chestnuts-Itching    Amoxicillin-Pot Clavulanate NAUSEA AND VOMITING and NAUSEA ONLY    Codeine ITCHING     Not associated with rash    Oxycodone RASH     ONLY in GENERIC form per pt, Ok with brand form.    Radiology Contrast Iodinated Dyes NAUSEA ONLY     Patient instructed to take Zyrtec prior to administration of radiology contrast iodinated dye to prevent nausea.    Tramadol Hcl RASH    Zithromax RASH     ?  REVIEW OF  SYSTEMS   ?  Review of Systems   Constitutional:  Positive for malaise/fatigue. Negative for chills, fever and weight loss.   HENT:  Positive for tinnitus.    Eyes:  Positive for photophobia. Negative for pain and redness.        Dry eyes   Respiratory:  Positive for cough and shortness of breath (worsened since flu, using inhaler).    Cardiovascular:  Positive for chest pain (no change/stable) and leg swelling. Negative for palpitations.   Gastrointestinal:  Positive for heartburn. Negative for abdominal pain, blood in stool, constipation, diarrhea and nausea.   Genitourinary:  Positive for dysuria and urgency. Negative for frequency and hematuria.        Intermittent   Musculoskeletal:  Positive for back pain, joint pain, myalgias and neck pain.   Skin:  Positive for rash (eczema). Negative for itching.   Neurological:  Positive for dizziness, tingling (left toes) and headaches.   Endo/Heme/Allergies:  Positive for environmental allergies. Bruises/bleeds easily.   Psychiatric/Behavioral:  The patient has insomnia (hard to get comfortable).      PHYSICAL EXAM   Today's Vitals:  Temperature Blood Pressure Heart Rate Resp Rate SpO2   Temp: 97.1 °F (36.2 °C) BP: 148/80 Pulse: 74 Resp: 16 SpO2: 98 %   ?  Current Weight Height BMI BSA Pain   Wt Readings from Last 1 Encounters:   02/05/25 267 lb (121.1 kg)    Height: 5' 1\" (154.9 cm) Body mass index is 50.45 kg/m². Body surface area is 2.14 meters squared.         Physical Exam  Vitals and nursing note reviewed.   Constitutional:       General: She is not in acute distress.     Appearance: She is well-developed. She is obese. She is not diaphoretic.   HENT:      Head: Normocephalic.   Eyes:      General: No scleral icterus.     Extraocular Movements: Extraocular movements intact.      Conjunctiva/sclera: Conjunctivae normal.   Neck:      Vascular: No JVD.      Trachea: No tracheal deviation.   Cardiovascular:      Rate and Rhythm: Normal rate and regular rhythm.       Heart sounds: Normal heart sounds. No murmur heard.  Pulmonary:      Effort: No respiratory distress.      Breath sounds: Wheezing present.      Comments: Cannot tolerate full deep breaths  bronchospasm  Musculoskeletal:         General: Swelling and tenderness present.      Cervical back: Neck supple.      Comments: Tenderness throughout without active synovitis particularly over MCPs and MTPs diffusely - stable  No swelling, redness or restriction of motion of the DIPs, PIPs, MCPs, wrists, elbows, ankles, or joints of the feet.  Bilateral shoulders with full ROM.  (Prior exam) Lumbar Spinous process tenderness. B/L SI joint tenderness    (prior exam)  Posterior Tender Point Exam  Occiput +/+  Trapezius +/+  Supraspinatus +/+  Gluteal deferred   Greater trochanter +/+  Anterior Tender Point Exam:  Low cervical +/+  Second rib +/+  Lateral epicondyle +/+  Knee +/+  16 tender points positive   Lymphadenopathy:      Cervical: No cervical adenopathy.   Skin:     General: Skin is warm and dry.      Findings: No erythema or rash.      Comments: Multiple tattoos noted  No periungal erythema  Nails manicured   Neurological:      Mental Status: She is alert and oriented to person, place, and time.      Cranial Nerves: No cranial nerve deficit.   Psychiatric:         Mood and Affect: Mood normal.         Behavior: Behavior normal.       Radiology review:       Narrative   PROCEDURE:  CT ABDOMEN+PELVIS (CONTRAST ONLY) (CPT=74177)     COMPARISON:  [facility] , MR, MRI ABDOMEN&MRCP W/3D (W+W0)(CPT=74183/08054), 5/18/2023, 8:41 AM.     INDICATIONS:  LLQ pain     TECHNIQUE:  CT scanning was performed from the dome of the diaphragm to the pubic symphysis with non-ionic intravenous contrast material. Post contrast coronal MPR imaging was performed.  Dose reduction techniques were used. Dose information is  transmitted to the ACR (American College of Radiology) NRDR (National Radiology Data Registry) which includes the Dose Index  Registry.     PATIENT STATED HISTORY:(As transcribed by Technologist)  Pain left lowr quadrant with low grade fever since yesterday.      CONTRAST USED:  100cc of Isovue 370     FINDINGS:    Acute diverticulitis involving the descending colon, with a segment measuring about 6 cm in length, showing thickening of the bowel, pericolonic stranding, and diverticula.  No evidence for pericolonic abscess, bowel obstruction, free air or ascites.  No   other acute process identified.  Hiatal hernia.  Postsurgical changes are present.  Nonobstructing stone lower pole left kidney.      Impression   CONCLUSION:  Acute diverticulitis descending colon.        LOCATION:  [id number]        Dictated by (CST): Alexander Scruggs MD on 5/06/2024 at 7:30 PM      Finalized by (CST): Alexander Scruggs MD on 5/06/2024 at 7:31 PM       Narrative   PROCEDURE:  XR FOOT WEIGHTBEARING (3 VIEWS), RIGHT (CPT=73630)     INDICATIONS:  M79.671 Acute foot pain, right     COMPARISON:  PATRICIA MANZANARES, XR FOOT, COMPLETE (MIN 3 VIEWS), RIGHT (CPT=73630), 12/12/2018, 11:22 AM.     PATIENT STATED HISTORY: (As transcribed by Technologist)  Patient states tripping and now has dorsal/lateral pain to the right foot. History of ankle/tendon repair.                       Impression   CONCLUSION:       Negative for acute fracture or malalignment.  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  No periarticular erosions.  Normal calcaneal pitch with small Achilles and large plantar calcaneal spurs.  Mild soft tissue swelling of the dorsal forefoot.        LOCATION:  [facility]        Dictated by (CST): Umberto Horowitz MD on 3/06/2024 at 6:44 PM      Finalized by (CST): Umberto Horowizt MD on 3/06/2024 at 6:45 PM       Narrative   PROCEDURE:  XR LUMBAR SPINE (MIN 4 VIEWS) (CPT=72110)     TECHNIQUE:  AP, lateral, oblique, and coned down L5-S1 views were obtained.     COMPARISON:  None.     INDICATIONS:  M51.36 DDD (degenerative disc disease), lumbar     PATIENT STATED HISTORY: (As  transcribed by Technologist)   No known injury. Blat hip and back pain for years.         FINDINGS:      Normal alignment of the lumbar spine.  No acute osseous injuries are noted.  Extensive lumbar disc disease at L4-5 and L5-S1 with vacuum disc changes and extensive disc height loss.  The remainder of the lumbar spine reveals a minimal degree of disc  disease.  Mild facet arthropathy at L4-5 and L5-S1.  No evidence of spondylolysis.     The paraspinal soft tissues reveal no acute process.  There are multiple vascular coils along the distribution of the right ovarian vein.                   Impression   CONCLUSION:    1. Extensive lumbar disc disease at L4-5 and L5-S1 with superimposed mild bilateral facet arthropathy at both levels.  2. No acute process.        LOCATION:  [facility]        Dictated by (CST): Mallory Keith DO on 2/29/2024 at 1:37 PM      Finalized by (CST): Mallory Keith DO on 2/29/2024 at 1:38 PM      Narrative   PROCEDURE:  XR HIP W OR WO PELVIS (MIN 5 VIEWS), BILAT (CPT=73523)     TECHNIQUE:  Bilateral min 5 views of the hip and pelvis if performed.     COMPARISON:  None.     INDICATIONS:  M25.552 Bilateral hip pain M25.551 Bilateral hip pain     PATIENT STATED HISTORY: (As transcribed by Technologist)   No known injury. Blat hip and back pain for years.           FINDINGS:    BONES:  Normal.  No significant arthropathy or acute abnormality.  SOFT TISSUES:  Negative.  No visible soft tissue swelling.  EFFUSION:  None visible.  OTHER:  Negative.                   Impression   CONCLUSION:  Negative exam.        LOCATION:  [facility]        Dictated by (CST): Mallory Keith DO on 2/29/2024 at 1:38 PM      Finalized by (CST): Mallory Keith DO on 2/29/2024 at 1:38 PM         Narrative   PROCEDURE:  MRI KNEE, LEFT (KIQ=96173)     COMPARISON:  None.     INDICATIONS:  M25.562 Acute pain of left knee     TECHNIQUE:  Axial, coronal, and sagittal proton density with and without fat saturation images were  obtained.     PATIENT STATED HISTORY: (As transcribed by Technologist)  Left lateral knee pain since twisting injury one month ago.         FINDINGS:    LIGAMENTS:          The ACL, PCL, patellar retinacula, and lateral collateral ligament are intact.  Grade 1 sprain of the medial collateral ligament is present.  MENISCI:            Radial tear involving the posterior horn of the medial meniscus is noted.  TENDONS:            The tendinous insertions about the knee are intact without significant tendinosis or tears.  MUSCULATURE:        No evidence of strain, edema, or atrophy.  BONY COMPARTMENTS:  Mild fissuring of the trochlear cartilage is noted.  No chondral defects are noted.  Mild chondromalacia is noted  SYNOVIUM:           No evidence for effusion, synovitis, or intraarticular bodies.                      Impression   CONCLUSION:  Radial tear involving the posterior horn of the medial meniscus.        LOCATION:  [facility]                   Dictated by (CST): Shashank Linder MD on 8/17/2023 at 11:33 AM      Finalized by (CST): Shashank Linder MD on 8/17/2023 at 11:48 AM         PROCEDURE:  CT CHEST (CPT=71250)       LOCATION:  [facility]         COMPARISON:  [facility] , CT, CT ANGIOGRAPHY, CHEST (CPT=71275), 3/21/2016, 0:11 AM.  [facility], CT, CT CHEST (CPT=71250), 12/22/2021, 8:56 AM.       INDICATIONS:  J12.82 Pneumonia due to COVID-19 virus U07.1 Pneumonia due to COVID-19 virus       TECHNIQUE:  Unenhanced multislice CT scanning is performed through the chest.  Dose reduction techniques were used. Dose information is transmitted to the ACR (American College of Radiology) NRDR (National Radiology Data Registry) which includes the Dose    Index Registry.       PATIENT STATED HISTORY: (As transcribed by Technologist)  Patient states to have ongoing chest pain. She has a history of pneumonia due to Covid-19.            FINDINGS:     LUNGS:  Resolved ground-glass opacities in left lower lobe.  Subpleural micro nodules in  the right lower lobe on images 68, 86, 95, 108 and 123 are stable dating back to 3/21/2016.  No new pulmonary opacity..   VASCULATURE:  Thrombus cannot be excluded without intravenous contrast.     BRENDA:  No mass or adenopathy.     MEDIASTINUM:  No mass or adenopathy.  Hiatal hernia and changes of gastric sleeve again demonstrated.   CARDIAC:  No enlargement, pericardial thickening, or significant calcification.  Resolved pericardial thickening.   PLEURA:  No mass or effusion.     THORACIC AORTA:  Unremarkable as seen on non-contrast imaging.   CHEST WALL:  No mass or axillary adenopathy.     LIMITED ABDOMEN:  Uncomplicated diverticula of visualized hepatic flexure of colon.   BONES:  Mild degenerative changes of spine.                        Impression   CONCLUSION:     1. Interval resolution of ground-glass opacities left lower lobe consistent with resolved pneumonia.   2. Resolved pericardial thickening/fluid.   3. No new chest pathology.  Stability of right lower lobe micronodules dating back to 2016 indicates benign etiology.           Dictated by (CST): Farhan Carter MD on 6/30/2022 at 2:45 PM       Finalized by (CST): Farhan Carter MD on 6/30/2022 at 2:56 PM        PROCEDURE:  MRI SPINE LUMBAR (CPT=72148)     COMPARISON:  [facility], CT, CT ABDOMEN+PELVIS(CONTRAST ONLY)(CPT=74177), 2/05/2019, 12:11 PM.  [facility], XR, XR LUMBAR SPINE (MIN 4 VIEWS) (CPT=72110), 1/21/2020, 10:30 AM.     INDICATIONS:  M19.90 Unspecified osteoarthritis, unspecified site Z82.69 Family history of other diseases of the musculoskeletal system and connective tissue G89.29 Other ch*     TECHNIQUE:  Multiplanar T1 and T2 weighted images including fat suppression sequences.  Images acquired in sagittal and axial planes.       PATIENT STATED HISTORY: (As transcribed by Technologist)  The patient has had low back pain for months with no known injury.         FINDINGS:    There is lumbar lordosis.  The vertebral body heights are  maintained.  There is scattered disc desiccation.  There is moderate disc height loss at L4-5 and L5-S1.  There are scattered endplate degenerative changes including endplate edema at L4-5.  No   suspicious focal osseous lesion is evident.  The conus and cauda equina nerve roots are unremarkable in caliber and signal.  There is sclerosis along the SI joints, left greater than right, better characterized on the concurrent MRI of the SI joints.     T12-L1:  Unremarkable.     L1-L2:  Mild facet hypertrophy.  Mild ligamentum flavum thickening.  No spinal canal or foraminal narrowing.     L2-L3:  Mild facet hypertrophy.  Mild ligamentum flavum thickening.  No spinal canal or foraminal narrowing.     L3-L4:  Mild to moderate facet hypertrophy.  Mild ligamentum flavum thickening.  Mild disc bulge.  Post small left foraminal/extra-foraminal disc protrusion.  No spinal canal stenosis.  Mild to moderate left and no right foraminal narrowing.     L4-5:  Moderate degenerative disc disease.  Mild to moderate posterior disc osteophyte complex extending into the far right lateral region.  Mild to moderate facet hypertrophy.  Mild ligamentum flavum thickening.  Mild to moderate spinal canal stenosis.    Bilateral subarticular zone narrowing.  Mild left and moderate right foraminal narrowing.     L5-S1:  Mild to moderate posterior disc osteophyte complex with moderate facet hypertrophy.  No spinal canal stenosis.  Mild right and moderate left foraminal narrowing.     CONCLUSION:       1. Moderate degenerative changes in primarily the lower lumbar spine.  Mild to moderate spinal canal stenosis is seen at L4-5.  Moderate right foraminal narrowing is seen at L4-5.  Moderate left foraminal narrowing is seen at L5-S1.  Moderate   degenerative disc disease noted at L4-5.  Please see above for further details.  2. There is sclerosis along the SI joints, left greater than right, better characterized on the concurrent MRI of the SI joints.      Dictated by: Stromberg, LeRoy, MD on 5/21/2020 at 9:00 AM     PROCEDURE:  MRI SI JOINTS(CPT=72195)     COMPARISON:  [facility], , MRI SPINE LUMBAR (CPT=72148), 5/21/2020, 8:11 AM.     INDICATIONS:  Low back pain for several months.     TECHNIQUE:    MRI of the sacroiliac joints was performed with axial T1, axial STIR, coronal T1,                                      coronal STIR, sagittal T1, and sagittal STIR images without contrast.        PATIENT STATED HISTORY: (As transcribed by Technologist)  The patient has had low back pain for months with no known injury.         FINDINGS:       SI JOINTS:      There is bilateral sacroiliac joint osteoarthritis noted, appearing moderate in degree on the left and mild on the right.  Regarding the left SI joint, there are moderate regions of subchondral sclerosis, subchondral cystic change and   subchondral edematous changes.  The right SI joint reveals a minimal degree of subchondral sclerosis and edema.      BONY STRUCTURES:      No fracture, pars defect, significant scoliosis, or osseous lesion.       INTRAPELVIC STRUCTURES:  The visualized intrapelvic structures are unremarkable.  No visualized muscular abnormalities are noted.     CONCLUSION:    1. Bilateral SI joint osteoarthritis, mild on the right and moderate on the left.  2. No additional findings.        Dictated by: Mallory eKith DO on 5/21/2020 at 9:27 AM         Labs:  Lab Results   Component Value Date    WBC 5.0 01/28/2025    RBC 4.31 01/28/2025    HGB 12.8 01/28/2025    HCT 39.4 01/28/2025     01/28/2025    MPV 8.7 10/16/2018    MCV 91.4 01/28/2025    MCH 29.7 01/28/2025    MCHC 32.5 01/28/2025    RDW 13.8 01/28/2025    NEPRELIM 6.98 07/05/2024    NEPERCENT 57.6 01/28/2025    LYPERCENT 30.8 01/28/2025    MOPERCENT 7.8 01/28/2025    EOPERCENT 2.8 01/28/2025    BAPERCENT 1.0 01/28/2025    NE 2,880 01/28/2025    LYMABS 1,540 01/28/2025    MOABSO 390 01/28/2025    EOABSO 140 01/28/2025    BAABSO 50  01/28/2025     Lab Results   Component Value Date     (H) 01/28/2025    BUN 16 01/28/2025    BUNCREA SEE NOTE: 01/28/2025    CREATSERUM 0.79 01/28/2025    ANIONGAP 7 07/05/2024    GFR 84 11/09/2017    GFRNAA 82 05/26/2022    GFRAA 95 05/26/2022    CA 9.1 01/28/2025    OSMOCALC 290 07/05/2024    ALKPHO 97 01/28/2025    AST 22 01/28/2025    ALT 25 01/28/2025    BILT 0.4 01/28/2025    TP 6.4 01/28/2025    ALB 3.8 01/28/2025    GLOBULIN 2.6 01/28/2025    AGRATIO 1.5 01/28/2025     01/28/2025    K 4.1 01/28/2025     01/28/2025    CO2 25 01/28/2025       Additional Labs:  01/2025  ESR 11 normal  CRP normal     09/2024  ESR 11 normal  CRP normal  G6PD normal     02/2024  Vitamin D 41 normal  CRP 3.4 normal  ESR 17 normal  Ferritin 20 normal  Iron studies normal  B12 1114 elevated    09/2023  CRP normal  ESR 11 normal    07/2023  [facility] screen negative  Myositis antibody panel negative    04/2023  ESR 20 normal  CRP 0.51 borderline elevated    01/2023  [facility] screen negative  LUIS ARMANDO by IFA 1: 80 speckled  ESR 26  CRP 0.56 borderline  CBC grossly normal  CMP grossly normal    08/2022   LU[facility] ARMANDO by IFA 1:160 speckled   CK normal   B12 1107 elevated  C3 and C4 normal   ESR 12 normal  CRP 0.52 borderline elevated    05/2022  LUIS ARMANDO by IFA 1:160 speckled  SHAILA panel negative   B12 1048 high   Vit D 45.1 normal   ESR 10 normal   CRP 0.41 borderline     09/2021  CRP 0.32  ESR 8 normal    03/2021  [facility] ARM[facility]O 1:160 speckled  [facility] screen negative  SHAILA panel negative   CRP 6.58 elevated   ESR normal    01/2021  CRP 0.76  CCP negative  RF negative    12/2020  [facility] ARMANDO 1:160 homogenous  C4 24.3  C3 136  CRP 1.06 borderline  ESR normal  SHAILA panel negative     04/2020    [facility], Lomax, RNP, SCL 70, Kelley 1, dsDNA, centromere, histone negative  CK normal  RF negative  CCP negative  HLA-B27 negative  ESR normal  CRP normal   LUIS ARMANDO 1: 160 speckled    05/2017  LUIS ARMANDO negative  CRP normal   ESR normal     Hilda Llamas,   [facility]  Rheumatology  02/05/2025    ?

## 2025-02-10 RX ORDER — LANCETS
EACH MISCELLANEOUS
Qty: 100 EACH | Refills: 1 | Status: SHIPPED | OUTPATIENT
Start: 2025-02-10

## 2025-02-20 ENCOUNTER — OFFICE VISIT (OUTPATIENT)
Dept: FAMILY MEDICINE CLINIC | Facility: CLINIC | Age: 55
End: 2025-02-20
Payer: COMMERCIAL

## 2025-02-20 VITALS
OXYGEN SATURATION: 99 % | HEART RATE: 73 BPM | SYSTOLIC BLOOD PRESSURE: 138 MMHG | BODY MASS INDEX: 49.47 KG/M2 | TEMPERATURE: 98 F | HEIGHT: 61 IN | DIASTOLIC BLOOD PRESSURE: 70 MMHG | RESPIRATION RATE: 17 BRPM | WEIGHT: 262 LBS

## 2025-02-20 DIAGNOSIS — E78.2 MIXED HYPERLIPIDEMIA: ICD-10-CM

## 2025-02-20 DIAGNOSIS — E03.9 ACQUIRED HYPOTHYROIDISM: ICD-10-CM

## 2025-02-20 DIAGNOSIS — R03.0 ELEVATED BLOOD PRESSURE READING WITHOUT DIAGNOSIS OF HYPERTENSION: ICD-10-CM

## 2025-02-20 DIAGNOSIS — J31.0 RHINITIS, UNSPECIFIED TYPE: ICD-10-CM

## 2025-02-20 DIAGNOSIS — R05.8 POST-VIRAL COUGH SYNDROME: Primary | ICD-10-CM

## 2025-02-20 DIAGNOSIS — R73.03 PREDIABETES: ICD-10-CM

## 2025-02-20 DIAGNOSIS — J01.00 ACUTE NON-RECURRENT MAXILLARY SINUSITIS: ICD-10-CM

## 2025-02-20 PROCEDURE — 3075F SYST BP GE 130 - 139MM HG: CPT | Performed by: FAMILY MEDICINE

## 2025-02-20 PROCEDURE — 3078F DIAST BP <80 MM HG: CPT | Performed by: FAMILY MEDICINE

## 2025-02-20 PROCEDURE — 99214 OFFICE O/P EST MOD 30 MIN: CPT | Performed by: FAMILY MEDICINE

## 2025-02-20 PROCEDURE — 3008F BODY MASS INDEX DOCD: CPT | Performed by: FAMILY MEDICINE

## 2025-02-20 RX ORDER — BENZONATATE 200 MG/1
200 CAPSULE ORAL 3 TIMES DAILY PRN
Qty: 15 CAPSULE | Refills: 0 | Status: SHIPPED | OUTPATIENT
Start: 2025-02-20

## 2025-02-20 RX ORDER — FLUTICASONE PROPIONATE 50 MCG
2 SPRAY, SUSPENSION (ML) NASAL DAILY
Qty: 16 G | Refills: 0 | Status: SHIPPED | OUTPATIENT
Start: 2025-02-20

## 2025-02-20 NOTE — PROGRESS NOTES
Rosa Shaver is a 54 year old female.     Chief Complaint   Patient presents with    Cough    Blood Pressure    Hyperlipidemia    Hypothyroidism    Sinus Problem         HPI:       Positive for influenza middle of January.  Overall improved from acute illness.  However, nasal congestion and cough.  Dry to moist cough and sometimes productive.  Finished oral corticosteroid on Monday 3 days ago.  No longer having blood in sputum.  She has awakened a couple of times wheezing.  Albuterol helps a little with the cough, definitely helps with the wheezing.  Positive for nasal congestion.          Wt Readings from Last 6 Encounters:   02/20/25 262 lb (118.8 kg)   02/05/25 267 lb (121.1 kg)   01/27/25 250 lb (113.4 kg)   12/27/24 250 lb (113.4 kg)   12/18/24 268 lb (121.6 kg)   10/07/24 255 lb (115.7 kg)      Body mass index is 49.5 kg/m².        Current Outpatient Medications   Medication Sig Dispense Refill    benzonatate 200 MG Oral Cap Take 1 capsule (200 mg total) by mouth 3 (three) times daily as needed for cough. 15 capsule 0    fluticasone propionate 50 MCG/ACT Nasal Suspension 2 sprays by Each Nare route daily. 16 g 0    MICROLET LANCETS Does not apply Misc USE UP TO 6 TIMES A  each 1    methylPREDNISolone (MEDROL) 4 MG Oral Tablet Therapy Pack As directed. 1 each 0    amitriptyline 10 MG Oral Tab Take 1 tablet (10 mg total) by mouth nightly. 90 tablet 0    albuterol 108 (90 Base) MCG/ACT Inhalation Aero Soln Inhale 2 puffs into the lungs every 4 (four) hours as needed for Wheezing. 1 each 0    ALPRAZolam 0.5 MG Oral Tab 1/2 to one tab daily as needed 20 tablet 0    LANSOPRAZOLE 30 MG Oral Capsule Delayed Release TAKE 1 CAPSULE(30 MG) BY MOUTH DAILY 90 capsule 0    SIMVASTATIN 20 MG Oral Tab TAKE 1 TABLET(20 MG) BY MOUTH EVERY EVENING 90 tablet 3    hydroxychloroquine 200 MG Oral Tab Take 1 tablet (200 mg total) by mouth 2 (two) times daily. 180 tablet 0    albuterol 108 (90 Base) MCG/ACT Inhalation Aero  Soln Inhale 2 puffs into the lungs every 6 (six) hours as needed for Wheezing or Shortness of Breath (Cough). 1 each 0    LEVOTHYROXINE 112 MCG Oral Tab TAKE 1 TABLET(112 MCG) BY MOUTH BEFORE BREAKFAST 90 tablet 3    Glucose Blood (CONTOUR NEXT TEST) In Vitro Strip Use 1 test strip up to 6 times a day as needed 100 strip 11    CREON 45228-27567 units Oral Cap DR Particles TAKE 64878 UNTS BY MOUTH EVERY MORNING AND 03797 UNITS EVERY NIGHT AT BEDTIME. TAKE TWICE DAILY WITH SNACKS      Blood Glucose Monitoring Suppl Does not apply Device THIS IS FOR 1 CONTOUR GLUCOSE MONITOR 1 each 0    metoprolol succinate ER 25 MG Oral Tablet 24 Hr Take 1 tablet (25 mg total) by mouth daily.      meclizine 25 MG Oral Tab Take 1 tablet (25 mg total) by mouth 3 (three) times daily as needed for Dizziness. 30 tablet 2    Zinc 50 MG Oral Tab Take by mouth daily.      TURMERIC CURCUMIN OR Take by mouth daily.      ascorbic acid 500 MG Oral Chew Tab Chew 1 tablet (500 mg total) by mouth daily.      CALCIUM OR Take 2,400 mg by mouth daily.      saccharomyces boulardii 250 MG Oral Cap Pro-biotic 1 capsule by mouth daily      tiZANidine HCl 4 MG Oral Tab Take 1 tablet (4 mg total) by mouth nightly as needed. 30 tablet 2    magnesium 250 MG Oral Tab Take 1 tablet (250 mg total) by mouth daily.      Multiple Vitamins-Minerals (BARIATRIC MULTIVITAMINS/IRON OR) Take 1 capsule by mouth daily.      PATIENT SUPPLIED MEDICATION Essential Oils for allergies      Cholecalciferol (VITAMIN D3) 2000 UNITS Oral Tab Take 1 tablet (2,000 Units total) by mouth daily. 2 tab a day to make 4,000 units        Past Medical History:    Abdominal pain    Acute atopic conjunctivitis, bilateral    Yamil Vaughn M.D.    Acute meniscal tear, medial, right, initial encounter    MRI 5/21/2019: Mild undersurface tearing of the posterior root ligament of the medial meniscus.    Allergic rhinitis    Anxiety    Arrhythmia    paroxsymal atrial tachycardia    Arthritis     Atrial tachycardia (HCC)    Back pain    Benign paroxysmal positional vertigo    Bleeding nose    Bloating    Blood in urine    Body piercing    Calculus of kidney    Change in hair    Chest pain    Chest pain on exertion    Chronic pericarditis (HCC)    Suspect mild chronic pericarditis possibly secondary to history of SARS-CoV-2/COVID-19 based on findings of CT of chest 12/22/2021 not present on previous CTs      Colon polyp    Gail Katz M.D.    Constipation    Decorative tattoo    Diabetes (HCC)    Denies    Diarrhea, unspecified    Disorder of thyroid    Diverticulitis of sigmoid colon    Diverticulosis    Gail Katz M.D.    Dizziness    Easy bruising    Endometriosis    Esophageal reflux    Essential hypertension    Eye disease    Fibromyalgia    Food intolerance    Frequent urination    Glaucoma    Headache disorder    Heart palpitations    Heartburn    High cholesterol    History of COVID-19    History of nausea and vomiting    Hoarseness, chronic    Hx of motion sickness    Hyperlipidemia    Hypothyroidism    Injury of peroneal tendon of right foot    Itch of skin    Keratoconjunctivitis sicca not specified as Sjogren's, bilateral    Yamil Vaughn M.D.    Lateral epicondylitis of right elbow    Leaking of urine    Lung nodule    pt unsure of which side    Major depressive disorder, recurrent episode, mild with anxious distress    Migraines    Morbid obesity with BMI of 45.0-49.9, adult (HCC)    Obesity    Open angle with borderline findings, high risk, bilateral    Yamil Vaughn M.D.    Osteoarthritis    back    Other vitreous opacities, left eye    Yamil Vaughn M.D.    Other vitreous opacities, right eye    Yamil Vaughn M.D.    Pain in joints    Painful urination    Polycystic ovaries    ovary embedded in pelvic wall--right side    PONV (postoperative nausea and vomiting)    slow to awaken    Posterior vitreous detachment of both eyes    Right > Left    Primary open angle  glaucoma of both eyes, moderate stage    Rash    Sleep disturbance    Spitting up blood    Sputum production    Stress    Trochanteric bursitis of both hips    Left >>  Right     Visual impairment    glasses    Vitreous degeneration, right eye    Yamil Vaughn M.D.    Wears glasses    Weight gain      Past Surgical History:   Procedure Laterality Date    Angiogram      [address] [place]    Angioplasty (coronary)      Colonoscopy      Colonoscopy  2018    Colonoscopy N/A 2023    Procedure: COLONOSCOPY;  Surgeon: Rupa Cheatham DO;  Location:  ENDOSCOPY    Colonoscopy N/A 2024    Procedure: COLONOSCOPY with cold snare polypectomy;  Surgeon: Rupa Cheatham DO;  Location:  ENDOSCOPY    D & c   &     Dilation & curettage cervical stump       and     Foot surgery Right 10/18/2019    Tendon repair     Gastric bypass,obesity,sb reconstruc      gastric sleeve    Hernia surgery  10/18/2018    Hiatal hernia Dr. Terri Avila     Hysterectomy  2006    partial hystero.    Lap sleeve gastrectomy  10/18/2018    Dr. terri avila           Oophorectomy Bilateral     ovaries removed after partial.    Other  10/15/2018    Sleeve biatric surgery    Other surgical history      removal of right ovary    Removal of kidney stone      age 14, does not remember what kind of surgery. no abdominal or flank scar    Tilt table evaluation      veinogram [facility]    Total abdom hysterectomy      Upper gi endoscopy,exam        Social History:    Social History     Socioeconomic History    Marital status:    Tobacco Use    Smoking status: Never    Smokeless tobacco: Never   Vaping Use    Vaping status: Never Used   Substance and Sexual Activity    Alcohol use: Yes     Alcohol/week: 4.0 standard drinks of alcohol     Types: 4 Standard drinks or equivalent per week     Comment: weekly    Drug use: No    Sexual activity: Yes     Partners: Male     Birth  control/protection: Hysterectomy   Other Topics Concern    Caffeine Concern Yes     Comment: 20-30 oz of coffee daily    Stress Concern Yes    Weight Concern Yes    Special Diet No    Exercise No    Seat Belt Yes         Family History   Problem Relation Age of Onset    Cancer Mother         basal cell X 3; LUNG    Heart Disorder Mother         SVT    Hypertension Mother     Dementia Mother         early signs 2022    Depression Mother     Cancer Father         Colon & prostate cancers    Heart Disorder Father     Colon Cancer Father     Prostate Cancer Father     Diabetes Father     Hypertension Father     Heart Attack Father     Colon Polyps Brother     Alcohol and Other Disorders Associated Brother     Colon Polyps Brother     No Known Problems Son     No Known Problems Son     Alcohol and Other Disorders Associated Maternal Grandfather     Alcohol and Other Disorders Associated Paternal Grandfather     Cancer Maternal Aunt         breast, colon    Breast Cancer Maternal Aunt 50        In her 50's.    Ovarian Cancer Maternal Aunt 60        In her 60's.    Cancer Maternal Aunt         breast, basal cell    Breast Cancer Maternal Aunt 68        Late 60's.    Ovarian Cancer Maternal Aunt     Cancer Maternal Aunt         basal cell    Cancer Maternal Aunt         basal cell    Cancer Maternal Aunt         bone    Cancer Paternal Aunt         lung    Breast Cancer Cousin 62    Cancer Cousin         esoph,adenocarcinoma    Cancer Cousin         Non Hodgkins Lymphoma    Cancer Cousin         basal cell     REVIEW OF SYSTEMS:   GENERAL HEALTH: Overall feels she is not continuing to get better  INTEGUMENT: denies any unusual skin lesions or rashes   RESPIRATORY: As in HPI  CARDIOVASCULAR: Denies CP/palpitations  VASCULAR: Denies LE edema  GI: No complaints of abdominal pain/nausea/vomiting/blood in stool/black stool/bloating/constipation/diarrhea  : No complaints of urinary symptoms  NEURO: denies  headaches/dizziness  PSYCH: denies depression and anxiety    Immunization History   Administered Date(s) Administered    TDAP 05/14/2013    Tetanus 04/28/2003   Pended Date(s) Pended    Influenza Vaccine Refused 01/30/2022       EXAM:   /70   Pulse 73   Temp 97.8 °F (36.6 °C) (Temporal)   Resp 17   Ht 5' 1\" (1.549 m)   Wt 262 lb (118.8 kg)   LMP 01/01/2006 (Approximate)   SpO2 99%   BMI 49.50 kg/m²   GENERAL: NAD, pleasant obese  female.  Occasional dry to moist cough that is not productive.  No increased work of breathing.  Voice is denasally.  INTEGUMENT: no visible rashes  HEAD: NCAT  OP: MMM.  Posterior OP normal without erythema or exudate and no cobblestoning.  NECK: supple, no adenopathy, no thyromegaly, no masses  LUNGS: Scattered end expiratory wheezes anteriorly and posteriorly bilaterally, no rhonchi, no crackles, no rales  CARDIO: RRR, +S1/S2, no mm  VASCULAR: No lower extremity pitting edema  GI: NT/ND, no pulsations, no r/r/g, no masses appreciated, no HSM appreciated  EXTREMITIES: no cyanosis or clubbing  NEURO: Alert and Oriented x3.  No gross motor or gross sensory abnormalities.  PSYCH: Affect normal.  Normal thought content.        DATA:      Lab Results   Component Value Date    A1C 6.0 (H) 03/05/2024    A1C 5.5 01/05/2023    A1C 5.5 01/10/2022     01/05/2023     01/10/2022     09/08/2021     Lab Results   Component Value Date    CHOLEST 161 03/05/2024    CHOLEST 151 04/25/2023    CHOLEST 173 08/31/2022     Lab Results   Component Value Date    HDL 87 03/05/2024    HDL 88 (H) 04/25/2023    HDL 91 (H) 08/31/2022     Lab Results   Component Value Date    LDL 57 03/05/2024    LDL 48 04/25/2023    LDL 63 08/31/2022     Lab Results   Component Value Date    TRIG 87 03/05/2024    TRIG 83 04/25/2023    TRIG 107 08/31/2022     Lab Results   Component Value Date    AST 22 01/28/2025    AST 32 09/30/2024    AST 22 09/10/2024    AST 22 09/10/2024     Lab Results    Component Value Date    ALT 25 01/28/2025    ALT 35 (H) 09/30/2024    ALT 22 09/10/2024    ALT 22 09/10/2024     Thyroid:    Lab Results   Component Value Date    TSH 1.54 03/05/2024    TSH 1.34 10/12/2023    TSH 0.227 (L) 06/21/2023    T4F 1.2 03/05/2024    T4F 1.3 10/12/2023    T4F 1.4 06/21/2023           ASSESSMENT AND PLAN:       Encounter Diagnoses   Name Primary?    Post-viral cough syndrome Yes    Acute non-recurrent maxillary sinusitis     Rhinitis, unspecified type     Mixed hyperlipidemia     Acquired hypothyroidism     Prediabetes     Elevated blood pressure reading without diagnosis of hypertension            1. Post-viral cough syndrome  Continue albuterol MDI every 4-6 hours as needed.  Trial of benzonatate as below.  Recommend maintain good hydration.  Patient has appointment scheduled to see Dr. Hernández, pulmonologist, tomorrow, 2/21/2025.    - benzonatate 200 MG Oral Cap; Take 1 capsule (200 mg total) by mouth 3 (three) times daily as needed for cough.  Dispense: 15 capsule; Refill: 0    2. Acute non-recurrent maxillary sinusitis  3. Rhinitis, unspecified type  Recommend trial of Flonase as below.  Okay to take OTC antihistamine, recommend avoid decongestants which may have been raising your blood pressure.  -If your sinus/nasal symptoms are no better at all or worse next week, call to let Dr. Moulton know, we would likely prescribe doxycycline.    - fluticasone propionate 50 MCG/ACT Nasal Suspension; 2 sprays by Each Nare route daily.  Dispense: 16 g; Refill: 0      4. Mixed hyperlipidemia  Lipids have been to goal.  Recommend reevaluate lipid panel for stability.  Continue simvastatin 20 mg nightly, we will adjust dose in the future as indicated.  Follow-up in 4 weeks.    - Lipid Panel    5. Acquired hypothyroidism  Reevaluate TFTs for stability.  Continue levothyroxine 112 mcg p.o. every morning, we will adjust dose in the future as indicated.  Follow-up in approximately 4 weeks.    - Assay,  Thyroid Stim Hormone  - Free T4, (Free Thyroxine)    6. Prediabetes  Reevaluate A1c, then follow-up office visit.    - HGB A1C [496] [Q]    7. Elevated blood pressure reading without diagnosis of hypertension  I discussed with patient today possible diagnosis of hypertension at next appointment if blood pressure still elevated as systolic is in stage I hypertensive range.  Currently she is taking metoprolol succinate 25 mg daily for tachycardia.          Orders Placed This Encounter   Procedures    HGB A1C [496] [Q]    Lipid Panel    Assay, Thyroid Stim Hormone    Free T4, (Free Thyroxine)       Meds & Refills for this Visit:  Requested Prescriptions     Signed Prescriptions Disp Refills    benzonatate 200 MG Oral Cap 15 capsule 0     Sig: Take 1 capsule (200 mg total) by mouth 3 (three) times daily as needed for cough.    fluticasone propionate 50 MCG/ACT Nasal Suspension 16 g 0     Si sprays by Each Nare route daily.         Return in about 4 weeks (around 3/20/2025) for Annual wellness visit.

## 2025-02-20 NOTE — PATIENT INSTRUCTIONS
-If you are sinus/nasal symptoms are no better at all or worse next week, call to let Dr. Moulton know, we would likely prescribe doxycycline.

## 2025-02-21 ENCOUNTER — OFFICE VISIT (OUTPATIENT)
Facility: CLINIC | Age: 55
End: 2025-02-21
Payer: COMMERCIAL

## 2025-02-21 VITALS
HEIGHT: 61 IN | WEIGHT: 262 LBS | SYSTOLIC BLOOD PRESSURE: 142 MMHG | RESPIRATION RATE: 16 BRPM | HEART RATE: 70 BPM | BODY MASS INDEX: 49.47 KG/M2 | OXYGEN SATURATION: 98 % | DIASTOLIC BLOOD PRESSURE: 90 MMHG

## 2025-02-21 DIAGNOSIS — R04.2 HEMOPTYSIS: Primary | ICD-10-CM

## 2025-02-21 DIAGNOSIS — M35.9 UNDIFFERENTIATED CONNECTIVE TISSUE DISEASE (HCC): ICD-10-CM

## 2025-02-21 DIAGNOSIS — M25.50 POLYARTHRALGIA: ICD-10-CM

## 2025-02-21 DIAGNOSIS — R76.8 POSITIVE ANA (ANTINUCLEAR ANTIBODY): ICD-10-CM

## 2025-02-21 PROCEDURE — 99204 OFFICE O/P NEW MOD 45 MIN: CPT | Performed by: INTERNAL MEDICINE

## 2025-02-21 PROCEDURE — 3077F SYST BP >= 140 MM HG: CPT | Performed by: INTERNAL MEDICINE

## 2025-02-21 PROCEDURE — 3008F BODY MASS INDEX DOCD: CPT | Performed by: INTERNAL MEDICINE

## 2025-02-21 PROCEDURE — 3080F DIAST BP >= 90 MM HG: CPT | Performed by: INTERNAL MEDICINE

## 2025-02-21 RX ORDER — HYDROXYCHLOROQUINE SULFATE 200 MG/1
200 TABLET, FILM COATED ORAL 2 TIMES DAILY
Qty: 180 TABLET | Refills: 0 | Status: SHIPPED | OUTPATIENT
Start: 2025-02-21

## 2025-02-21 RX ORDER — FLUTICASONE FUROATE AND VILANTEROL 100; 25 UG/1; UG/1
1 POWDER RESPIRATORY (INHALATION) DAILY
Qty: 1 EACH | Refills: 11 | Status: SHIPPED | OUTPATIENT
Start: 2025-02-21

## 2025-02-21 NOTE — H&P
[facility] Interventional Pulmonary Consult Note  History of Present Illness:  Rosa Shaver is a 54 year old female never smoker with significant PMH of undifferentiated CTD, DM, HTN, GERD who presents today for evaluation of hemoptysis. She reports having developed small volume hemoptysis last month, describes as red blood about size of a pea, several episodes a day, associated with dyspnea, wheeze. She was treated recently with prednisone with DR. Llamas and feels much improved now, but still with dyspnea.  Hemoptysis has resolved. No pain. No fevers    Past Medical History:   Past Medical History:    Abdominal pain    Acute atopic conjunctivitis, bilateral    Yamil Vaughn M.D.    Acute meniscal tear, medial, right, initial encounter    MRI 5/21/2019: Mild undersurface tearing of the posterior root ligament of the medial meniscus.    Allergic rhinitis    Anxiety    Arrhythmia    paroxsymal atrial tachycardia    Arthritis    Atrial tachycardia (HCC)    Back pain    Benign paroxysmal positional vertigo    Bleeding nose    Bloating    Blood in urine    Body piercing    Calculus of kidney    Change in hair    Chest pain    Chest pain on exertion    Chronic pericarditis (HCC)    Suspect mild chronic pericarditis possibly secondary to history of SARS-CoV-2/COVID-19 based on findings of CT of chest 12/22/2021 not present on previous CTs      Colon polyp    Gali Katz M.D.    Constipation    Decorative tattoo    Diabetes (HCC)    Denies    Diarrhea, unspecified    Disorder of thyroid    Diverticulitis of sigmoid colon    Diverticulosis    Gail Katz M.D.    Dizziness    Easy bruising    Endometriosis    Esophageal reflux    Essential hypertension    Eye disease    Fibromyalgia    Food intolerance    Frequent urination    Glaucoma    Headache disorder    Heart palpitations    Heartburn    High cholesterol    History of COVID-19    History of nausea and vomiting    Hoarseness, chronic    Hx of motion  sickness    Hyperlipidemia    Hypothyroidism    Injury of peroneal tendon of right foot    Itch of skin    Keratoconjunctivitis sicca not specified as Sjogren's, bilateral    Yamil Vaughn M.D.    Lateral epicondylitis of right elbow    Leaking of urine    Lung nodule    pt unsure of which side    Major depressive disorder, recurrent episode, mild with anxious distress    Migraines    Morbid obesity with BMI of 45.0-49.9, adult (HCC)    Obesity    Open angle with borderline findings, high risk, bilateral    Yamil Vaughn M.D.    Osteoarthritis    back    Other vitreous opacities, left eye    Yamil Vaughn M.D.    Other vitreous opacities, right eye    Yamil Vaughn M.D.    Pain in joints    Painful urination    Polycystic ovaries    ovary embedded in pelvic wall--right side    PONV (postoperative nausea and vomiting)    slow to awaken    Posterior vitreous detachment of both eyes    Right > Left    Primary open angle glaucoma of both eyes, moderate stage    Rash    Sleep disturbance    Spitting up blood    Sputum production    Stress    Trochanteric bursitis of both hips    Left >>  Right     Visual impairment    glasses    Vitreous degeneration, right eye    Yamil Vaughn M.D.    Wears glasses    Weight gain        Past Surgical History:   Past Surgical History:   Procedure Laterality Date    Angiogram      2-24-12 [facility]    Angioplasty (coronary)      Colonoscopy      Colonoscopy  07/19/2018    Colonoscopy N/A 12/6/2023    Procedure: COLONOSCOPY;  Surgeon: Rupa Cheatham DO;  Location:  ENDOSCOPY    Colonoscopy N/A 9/18/2024    Procedure: COLONOSCOPY with cold snare polypectomy;  Surgeon: Rupa Cheatham DO;  Location:  ENDOSCOPY    D & c  1996 & 1997    Dilation & curettage cervical stump      1996 and 1997    Foot surgery Right 10/18/2019    Tendon repair     Gastric bypass,obesity,sb reconstruc  2018    gastric sleeve    Hernia surgery  10/18/2018    Hiatal hernia Dr. Fuentes Avila      Hysterectomy  2006    partial hystero.    Lap sleeve gastrectomy  10/18/2018    Dr. terri mooney           Oophorectomy Bilateral 2015    ovaries removed after partial.    Other  10/15/2018    Sleeve biatric surgery    Other surgical history      removal of right ovary    Removal of kidney stone      age 14, does not remember what kind of surgery. no abdominal or flank scar    Tilt table evaluation      veinogram [facility]    Total abdom hysterectomy      Upper gi endoscopy,exam           Family Medical History:   Family History   Problem Relation Age of Onset    Cancer Mother         basal cell X 3; LUNG    Heart Disorder Mother         SVT    Hypertension Mother     Dementia Mother         early signs     Depression Mother     Cancer Father         Colon & prostate cancers    Heart Disorder Father     Colon Cancer Father     Prostate Cancer Father     Diabetes Father     Hypertension Father     Heart Attack Father     Colon Polyps Brother     Alcohol and Other Disorders Associated Brother     Colon Polyps Brother     No Known Problems Son     No Known Problems Son     Alcohol and Other Disorders Associated Maternal Grandfather     Alcohol and Other Disorders Associated Paternal Grandfather     Cancer Maternal Aunt         breast, colon    Breast Cancer Maternal Aunt 50        In her 50's.    Ovarian Cancer Maternal Aunt 60        In her 60's.    Cancer Maternal Aunt         breast, basal cell    Breast Cancer Maternal Aunt 68        Late 60's.    Ovarian Cancer Maternal Aunt     Cancer Maternal Aunt         basal cell    Cancer Maternal Aunt         basal cell    Cancer Maternal Aunt         bone    Cancer Paternal Aunt         lung    Breast Cancer Cousin 62    Cancer Cousin         esoph,adenocarcinoma    Cancer Cousin         Non Hodgkins Lymphoma    Cancer Cousin         basal cell        Social History:   Social History     Socioeconomic History    Marital status:      Spouse  name: Not on file    Number of children: Not on file    Years of education: Not on file    Highest education level: Not on file   Occupational History    Not on file   Tobacco Use    Smoking status: Never    Smokeless tobacco: Never   Vaping Use    Vaping status: Never Used   Substance and Sexual Activity    Alcohol use: Yes     Alcohol/week: 4.0 standard drinks of alcohol     Types: 4 Standard drinks or equivalent per week     Comment: weekly    Drug use: No    Sexual activity: Yes     Partners: Male     Birth control/protection: Hysterectomy   Other Topics Concern     Service Not Asked    Blood Transfusions Not Asked    Caffeine Concern Yes     Comment: 20-30 oz of coffee daily    Occupational Exposure Not Asked    Hobby Hazards Not Asked    Sleep Concern Not Asked    Stress Concern Yes    Weight Concern Yes    Special Diet No    Back Care Not Asked    Exercise No    Bike Helmet Not Asked    Seat Belt Yes    Self-Exams Not Asked   Social History Narrative    Not on file     Social Drivers of Health     Food Insecurity: Not on file   Transportation Needs: Not on file   Stress: Not on file   Housing Stability: Not on file          Allergies: Berries, Casein, Levofloxacin, Avocado, Casein, Cefdinir, Darvocet [propoxyphene n-apap], Latex, Metronidazole, Nuts, Amoxicillin-pot clavulanate, Codeine, Oxycodone, Radiology contrast iodinated dyes, Tramadol hcl, and Zithromax     Medications:   Current Outpatient Medications   Medication Sig Dispense Refill    fluticasone furoate-vilanterol (BREO ELLIPTA) 100-25 MCG/ACT Inhalation Aerosol Powder, Breath Activated Inhale 1 puff into the lungs daily. 1 each 11    benzonatate 200 MG Oral Cap Take 1 capsule (200 mg total) by mouth 3 (three) times daily as needed for cough. 15 capsule 0    fluticasone propionate 50 MCG/ACT Nasal Suspension 2 sprays by Each Nare route daily. 16 g 0    MICROLET LANCETS Does not apply Misc USE UP TO 6 TIMES A  each 1    amitriptyline  10 MG Oral Tab Take 1 tablet (10 mg total) by mouth nightly. 90 tablet 0    ALPRAZolam 0.5 MG Oral Tab 1/2 to one tab daily as needed 20 tablet 0    LANSOPRAZOLE 30 MG Oral Capsule Delayed Release TAKE 1 CAPSULE(30 MG) BY MOUTH DAILY 90 capsule 0    SIMVASTATIN 20 MG Oral Tab TAKE 1 TABLET(20 MG) BY MOUTH EVERY EVENING 90 tablet 3    hydroxychloroquine 200 MG Oral Tab Take 1 tablet (200 mg total) by mouth 2 (two) times daily. 180 tablet 0    albuterol 108 (90 Base) MCG/ACT Inhalation Aero Soln Inhale 2 puffs into the lungs every 6 (six) hours as needed for Wheezing or Shortness of Breath (Cough). 1 each 0    LEVOTHYROXINE 112 MCG Oral Tab TAKE 1 TABLET(112 MCG) BY MOUTH BEFORE BREAKFAST 90 tablet 3    Glucose Blood (CONTOUR NEXT TEST) In Vitro Strip Use 1 test strip up to 6 times a day as needed 100 strip 11    CREON 55041-80099 units Oral Cap DR Particles TAKE 42444 UNTS BY MOUTH EVERY MORNING AND 07140 UNITS EVERY NIGHT AT BEDTIME. TAKE TWICE DAILY WITH SNACKS      Blood Glucose Monitoring Suppl Does not apply Device THIS IS FOR 1 CONTOUR GLUCOSE MONITOR 1 each 0    metoprolol succinate ER 25 MG Oral Tablet 24 Hr Take 1 tablet (25 mg total) by mouth daily.      meclizine 25 MG Oral Tab Take 1 tablet (25 mg total) by mouth 3 (three) times daily as needed for Dizziness. 30 tablet 2    Zinc 50 MG Oral Tab Take by mouth daily.      TURMERIC CURCUMIN OR Take by mouth daily.      ascorbic acid 500 MG Oral Chew Tab Chew 1 tablet (500 mg total) by mouth daily.      CALCIUM OR Take 2,400 mg by mouth daily.      saccharomyces boulardii 250 MG Oral Cap Pro-biotic 1 capsule by mouth daily      tiZANidine HCl 4 MG Oral Tab Take 1 tablet (4 mg total) by mouth nightly as needed. 30 tablet 2    magnesium 250 MG Oral Tab Take 1 tablet (250 mg total) by mouth daily.      Multiple Vitamins-Minerals (BARIATRIC MULTIVITAMINS/IRON OR) Take 1 capsule by mouth daily.      PATIENT SUPPLIED MEDICATION Essential Oils for allergies       Cholecalciferol (VITAMIN D3) 2000 UNITS Oral Tab Take 1 tablet (2,000 Units total) by mouth daily. 2 tab a day to make 4,000 units      methylPREDNISolone (MEDROL) 4 MG Oral Tablet Therapy Pack As directed. (Patient not taking: Reported on 2/21/2025) 1 each 0    albuterol 108 (90 Base) MCG/ACT Inhalation Aero Soln Inhale 2 puffs into the lungs every 4 (four) hours as needed for Wheezing. (Patient not taking: Reported on 2/21/2025) 1 each 0       Review of Systems: Review of Systems   Constitutional: Negative.    HENT: Negative.     Respiratory:  Positive for cough and shortness of breath.         See HPI   Cardiovascular: Negative.    All other systems reviewed and are negative.      Physical Exam:  /90 (BP Location: Left arm, Patient Position: Sitting, Cuff Size: adult)   Pulse 70   Resp 16   Ht 5' 1\" (1.549 m)   Wt 262 lb (118.8 kg)   LMP 01/01/2006 (Approximate)   SpO2 98%   BMI 49.50 kg/m²      Constitutional: alert, cooperative. No acute distress.  HEENT: Head NC/AT.   Cardio: Regular rate and rhythm. Normal S1 and S2. No murmurs.   Respiratory: Thorax symmetrical with no labored breathing. Clear to ausculation bilaterally with symmetrical breath sounds. No wheezing, rhonchi, rales, or crackles.   GI: NABS. Abd soft, non-tender.  Extremities: No clubbing or cyanosis. No LE edema.    Neurologic: A&Ox3. No gross motor deficits.  Skin: Warm, dry  Psych: Calm, cooperative. Pleasant affect.    Results:  Personally reviewed    WBC: 5, done on 1/28/2025.  HGB: 12.8, done on 1/28/2025.  PLT: 381, done on 1/28/2025.     Glucose: 142, done on 1/28/2025.  Cr: 0.79, done on 1/28/2025.  Last eGFR was 89 on 1/28/2025.  CA: 9.1, done on 1/28/2025.  Na: 141, done on 1/28/2025.  K: 4.1, done on 1/28/2025.  Cl: 107, done on 1/28/2025.  CO2: 25, done on 1/28/2025.  Last ALB was 3.8% done on 1/28/2025.     XR CHEST PA + LAT CHEST (CPT=71046)    Result Date: 1/27/2025  CONCLUSION:  No active cardiopulmonary process  identified.  If the patient's hemoptysis persists or worsens, consider chest CT for further assessment.   LOCATION:  [facility]   Dictated by (CST): Stromberg, LeRoy, MD on 1/27/2025 at 3:10 PM     Finalized by (CST): Stromberg, LeRoy, MD on 1/27/2025 at 3:12 PM         Assessment/Plan:  #1. Hemoptysis, nonlifethreatening  Likely due to bronchitis, but also with underlying CTD making bronchiectasis possible  Now improved.   Will start ICS/LABA - to price breo  Obtain HRCT in 2-3months  If hemoptysis recurs/worsens, then will need to obtain CT sooner and consider bronchoscopy    Lei Hernández MD  2/21/2025

## 2025-02-21 NOTE — TELEPHONE ENCOUNTER
LOV: 02/05/2025    Future Appointments   Date Time Provider Department Center   3/4/2025  9:20 AM [facility] [place]   3/13/2025  1:30 PM Oneyda Moulton DO [id number] [place]   5/22/2025  9:00 AM Lei Hernández MD [id number] [place]   10/8/2025 12:15 PM Hilda Llamas DO [id number] [place]     LF: 12/02/2024    QTY: 180    Refills: 0    Eye exam: 04/11/2024

## 2025-02-21 NOTE — PATIENT INSTRUCTIONS
Obtain a CT chest scan in May 2025. Call central scheduling at [phone] to schedule the CT scan   Start breo inhaler - one puff once a day. Rinse your mouth out after using inhaler  Call/message with questions/concerns

## 2025-03-04 ENCOUNTER — HOSPITAL ENCOUNTER (OUTPATIENT)
Dept: MAMMOGRAPHY | Age: 55
Discharge: HOME | End: 2025-03-04
Attending: FAMILY MEDICINE
Payer: COMMERCIAL

## 2025-03-04 DIAGNOSIS — Z12.31 ENCOUNTER FOR SCREENING MAMMOGRAM FOR MALIGNANT NEOPLASM OF BREAST: ICD-10-CM

## 2025-03-04 PROCEDURE — 77067 SCR MAMMO BI INCL CAD: CPT | Performed by: FAMILY MEDICINE

## 2025-03-04 PROCEDURE — 77063 BREAST TOMOSYNTHESIS BI: CPT | Performed by: FAMILY MEDICINE

## 2025-03-06 LAB
Lab: NEGATIVE
MYELOPEROXIDASE ANTIBODY: <1 AI
PROTEINASE-3 ANTIBODY: <1 AI

## 2025-03-12 ENCOUNTER — TELEPHONE (OUTPATIENT)
Dept: INTERNAL MEDICINE CLINIC | Facility: CLINIC | Age: 55
End: 2025-03-12

## 2025-03-13 ENCOUNTER — OFFICE VISIT (OUTPATIENT)
Dept: FAMILY MEDICINE CLINIC | Facility: CLINIC | Age: 55
End: 2025-03-13
Payer: COMMERCIAL

## 2025-03-13 VITALS
OXYGEN SATURATION: 96 % | HEART RATE: 95 BPM | SYSTOLIC BLOOD PRESSURE: 120 MMHG | HEIGHT: 61 IN | WEIGHT: 262 LBS | RESPIRATION RATE: 20 BRPM | DIASTOLIC BLOOD PRESSURE: 72 MMHG | TEMPERATURE: 98 F | BODY MASS INDEX: 49.47 KG/M2

## 2025-03-13 DIAGNOSIS — I47.19 ATRIAL TACHYCARDIA (HCC): ICD-10-CM

## 2025-03-13 DIAGNOSIS — K21.9 GASTROESOPHAGEAL REFLUX DISEASE, UNSPECIFIED WHETHER ESOPHAGITIS PRESENT: ICD-10-CM

## 2025-03-13 DIAGNOSIS — J31.0 RHINITIS, UNSPECIFIED TYPE: ICD-10-CM

## 2025-03-13 DIAGNOSIS — J30.9 ALLERGIC RHINITIS, UNSPECIFIED SEASONALITY, UNSPECIFIED TRIGGER: ICD-10-CM

## 2025-03-13 DIAGNOSIS — E66.01 MORBID OBESITY WITH BMI OF 45.0-49.9, ADULT (HCC): ICD-10-CM

## 2025-03-13 DIAGNOSIS — J01.00 ACUTE NON-RECURRENT MAXILLARY SINUSITIS: ICD-10-CM

## 2025-03-13 DIAGNOSIS — E78.2 MIXED HYPERLIPIDEMIA: ICD-10-CM

## 2025-03-13 DIAGNOSIS — Z00.00 ROUTINE GENERAL MEDICAL EXAMINATION AT A HEALTH CARE FACILITY: Primary | ICD-10-CM

## 2025-03-13 DIAGNOSIS — E03.9 ACQUIRED HYPOTHYROIDISM: ICD-10-CM

## 2025-03-13 DIAGNOSIS — R06.09 DOE (DYSPNEA ON EXERTION): ICD-10-CM

## 2025-03-13 PROBLEM — R07.9 CHEST PAIN, UNSPECIFIED: Status: ACTIVE | Noted: 2025-03-03

## 2025-03-13 PROBLEM — I65.23 BILATERAL CAROTID ARTERY STENOSIS: Status: ACTIVE | Noted: 2024-07-18

## 2025-03-13 RX ORDER — LANSOPRAZOLE 30 MG/1
30 CAPSULE, DELAYED RELEASE ORAL DAILY
Qty: 90 CAPSULE | Refills: 1 | Status: SHIPPED | OUTPATIENT
Start: 2025-03-13

## 2025-03-13 RX ORDER — METOPROLOL TARTRATE 100 MG/1
100 TABLET ORAL
COMMUNITY
Start: 2025-03-12

## 2025-03-13 RX ORDER — FLUTICASONE PROPIONATE 50 MCG
2 SPRAY, SUSPENSION (ML) NASAL DAILY
Qty: 16 G | Refills: 0 | Status: CANCELLED | OUTPATIENT
Start: 2025-03-13

## 2025-03-13 RX ORDER — LANSOPRAZOLE 30 MG/1
30 CAPSULE, DELAYED RELEASE ORAL DAILY
Qty: 90 CAPSULE | Refills: 0 | Status: SHIPPED | OUTPATIENT
Start: 2025-03-13 | End: 2025-03-13

## 2025-03-13 RX ORDER — FLUTICASONE PROPIONATE 50 MCG
2 SPRAY, SUSPENSION (ML) NASAL DAILY
Qty: 16 G | Refills: 0 | OUTPATIENT
Start: 2025-03-13

## 2025-03-13 NOTE — PROGRESS NOTES
Chief Complaint   Patient presents with    Physical    Hyperlipidemia    Hypothyroidism         HPI:   Rosa Shaver is a 54 year old female     Dyspnea on exertion and tachycardia:  Using the albuterol 1-2 times a day now which is less than previously.  C/o POLK and increased heart rate with walking and ADLs such as washing dishes.    GERD:  Symptoms controlled on lansoprazole 30 mg daily.  Denies adverse effects to the lansoprazole.    Hypothyroidism:  Patient taking levothyroxine 112 mcg p.o. every morning.  Denies adverse effects to the levothyroxine such as chest pain, palpitations, changes in hair skin or nails.    Hypercholesterolemia:  Patient taking simvastatin 20 mg nightly.  Denies adverse effects of the simvastatin such as muscle aches or pains.        Symptoms: denies discharge, itching, burning or dysuria.   H/o hysterectomy for benign issues.      Last colonoscopy: UTD  Last mammogram: UTD      Wt Readings from Last 6 Encounters:   03/13/25 262 lb (118.8 kg)   02/21/25 262 lb (118.8 kg)   02/20/25 262 lb (118.8 kg)   02/05/25 267 lb (121.1 kg)   01/27/25 250 lb (113.4 kg)   12/27/24 250 lb (113.4 kg)     Body mass index is 49.5 kg/m².       Patient Active Problem List   Diagnosis    Allergic rhinitis    Eczema    GERD (gastroesophageal reflux disease)    Chronic low back pain    Morbid obesity with BMI of 45.0-49.9, adult (HCC)    Mixed hyperlipidemia    Low HDL (under 40)    Lower abdominal adhesions    Influenza vaccination declined    Acquired hypothyroidism    S/P laparoscopy 7 from 94-99    Primary open angle glaucoma of both eyes, moderate stage    Family history of skin cancer    Encounter for long-term current use of medication    Chronic pain of both knees    Chondromalacia patellae, right knee    Migraine without aura and without status migrainosus, not intractable    Family history of CHF (congestive heart failure)    Family history of cardiomyopathy    B12 deficiency    Pelvic pain     Posterior vitreous detachment of both eyes    Family history of colon cancer    S/P laparoscopic sleeve gastrectomy    Calcaneal spur of right foot    Patellofemoral arthritis of right knee    Glaucoma suspect of both eyes    Intraocular pressure increase, bilateral    Encounter for medication monitoring    Hx of laparoscopy - RSO 2012    H/O laparoscopy - LSO with lysis 2015    H/O: hysterectomy - 2006, abdominal for endometriosis.     Family history of breast cancer    BRCA negative    Family history of ankylosing spondylitis    Postoperative malabsorption (HCC)    Elevated C-reactive protein (CRP)    COVID-19 vaccination refused    Pneumococcal vaccine refused    Atrial tachycardia (HCC)    Dyspareunia in female    Prediabetes    History of nausea and vomiting    Fear of flying    Bilateral carotid artery stenosis    Chronic dyspnea     Current Outpatient Medications   Medication Sig Dispense Refill    fluticasone propionate 50 MCG/ACT Nasal Suspension 2 sprays by Each Nare route daily. 48 g 0    metoprolol tartrate 100 MG Oral Tab 1 tablet (100 mg total).      lansoprazole 30 MG Oral Capsule Delayed Release Take 1 capsule (30 mg total) by mouth daily. 90 capsule 1    fluticasone furoate-vilanterol (BREO ELLIPTA) 100-25 MCG/ACT Inhalation Aerosol Powder, Breath Activated Inhale 1 puff into the lungs daily. 1 each 11    HYDROXYCHLOROQUINE 200 MG Oral Tab TAKE 1 TABLET(200 MG) BY MOUTH TWICE DAILY 180 tablet 0    CREON 48633-19664 units Oral Cap DR Particles TAKE 1 CAPSULE BY MOUTH EVERY MORNING, AT NOON, AND EVERY NIGHT AT BEDTIME. TAKE WITH SNACKS 360 capsule 1    benzonatate 200 MG Oral Cap Take 1 capsule (200 mg total) by mouth 3 (three) times daily as needed for cough. 15 capsule 0    MICROLET LANCETS Does not apply Misc USE UP TO 6 TIMES A  each 1    amitriptyline 10 MG Oral Tab Take 1 tablet (10 mg total) by mouth nightly. 90 tablet 0    ALPRAZolam 0.5 MG Oral Tab 1/2 to one tab daily as needed 20  tablet 0    SIMVASTATIN 20 MG Oral Tab TAKE 1 TABLET(20 MG) BY MOUTH EVERY EVENING 90 tablet 3    albuterol 108 (90 Base) MCG/ACT Inhalation Aero Soln Inhale 2 puffs into the lungs every 6 (six) hours as needed for Wheezing or Shortness of Breath (Cough). 1 each 0    LEVOTHYROXINE 112 MCG Oral Tab TAKE 1 TABLET(112 MCG) BY MOUTH BEFORE BREAKFAST 90 tablet 3    Glucose Blood (CONTOUR NEXT TEST) In Vitro Strip Use 1 test strip up to 6 times a day as needed 100 strip 11    Blood Glucose Monitoring Suppl Does not apply Device THIS IS FOR 1 CONTOUR GLUCOSE MONITOR 1 each 0    metoprolol succinate ER 25 MG Oral Tablet 24 Hr Take 1 tablet (25 mg total) by mouth daily.      meclizine 25 MG Oral Tab Take 1 tablet (25 mg total) by mouth 3 (three) times daily as needed for Dizziness. 30 tablet 2    Zinc 50 MG Oral Tab Take by mouth daily.      TURMERIC CURCUMIN OR Take by mouth daily.      ascorbic acid 500 MG Oral Chew Tab Chew 1 tablet (500 mg total) by mouth daily.      CALCIUM OR Take 2,400 mg by mouth daily.      saccharomyces boulardii 250 MG Oral Cap Pro-biotic 1 capsule by mouth daily      tiZANidine HCl 4 MG Oral Tab Take 1 tablet (4 mg total) by mouth nightly as needed. 30 tablet 2    magnesium 250 MG Oral Tab Take 1 tablet (250 mg total) by mouth daily.      Multiple Vitamins-Minerals (BARIATRIC MULTIVITAMINS/IRON OR) Take 1 capsule by mouth daily.      PATIENT SUPPLIED MEDICATION Essential Oils for allergies      Cholecalciferol (VITAMIN D3) 2000 UNITS Oral Tab Take 1 tablet (2,000 Units total) by mouth daily. 2 tab a day to make 4,000 units        Past Medical History:    Abdominal pain    Acute atopic conjunctivitis, bilateral    Yamil Vaughn M.D.    Acute meniscal tear, medial, right, initial encounter    MRI 5/21/2019: Mild undersurface tearing of the posterior root ligament of the medial meniscus.    Allergic rhinitis    Anxiety    Arrhythmia    paroxsymal atrial tachycardia    Arthritis    Atrial  tachycardia (HCC)    Atypical chest pain    Suspect mild chronic pericarditis possibly secondary to history of SARS-CoV-2/COVID-19 based on findings of CT of chest 12/22/2021 not present on previous CTs      Back pain    Benign paroxysmal positional vertigo    Bilateral carotid artery stenosis    Bleeding nose    Bloating    Blood in urine    Body piercing    Cafe au lait spots    Calculus of kidney    Change in hair    Chest pain    Chest pain on exertion    Chest pain, unspecified    Chronic pericarditis (HCC)    Suspect mild chronic pericarditis possibly secondary to history of SARS-CoV-2/COVID-19 based on findings of CT of chest 12/22/2021 not present on previous CTs      Colon polyp    Gail Katz M.D.    Constipation    COVID-19 vaccination refused    COVID-19 virus infection    Detected 11/24/2021      Decorative tattoo    Diabetes (HCC)    Denies    Diarrhea, unspecified    Disorder of thyroid    Diverticulitis of sigmoid colon    Diverticulosis    Gail Katz M.D.    Dizziness    Easy bruising    Endometriosis    Esophageal reflux    Essential hypertension    Eye disease    Family history of CHF (congestive heart failure)    Fibromyalgia    Food intolerance    Frequent urination    Glaucoma    Headache disorder    Heart palpitations    Heartburn    High cholesterol    History of 2019 novel coronavirus disease (COVID-19)    History of COVID-19    History of nausea and vomiting    Hoarseness, chronic    Hx of motion sickness    Hyperlipidemia    Hypoglycemia    Symptomatic in the low 90s      Hypothyroidism    Influenza vaccination declined    Injury of peroneal tendon of right foot    Injury of right wrist    Itch of skin    Keratoconjunctivitis sicca not specified as Sjogren's, bilateral    Yamil Vaughn M.D.    Lateral epicondylitis of right elbow    Leaking of urine    Lung nodule    pt unsure of which side    Major depressive disorder, recurrent episode, mild with anxious distress     Migraines    Morbid obesity with BMI of 45.0-49.9, adult (HCC)    Obesity    Open angle with borderline findings, high risk, bilateral    Yamil Vaughn M.D.    Osteoarthritis    back    Other vitreous opacities, left eye    Yamil Vaughn M.D.    Other vitreous opacities, right eye    Yamil Vaughn M.D.    Pain in joints    Painful urination    Panniculitis    Pneumococcal vaccine refused    Polycystic ovaries    ovary embedded in pelvic wall--right side    PONV (postoperative nausea and vomiting)    slow to awaken    Posterior vitreous detachment of both eyes    Right > Left    Primary open angle glaucoma of both eyes, moderate stage    Rash    Sleep disturbance    Spitting up blood    Sputum production    Stress    Surgical Menopause 2015 age 44 yrs old - on ERT    Trochanteric bursitis of both hips    Left >>  Right     Vertigo    Visual impairment    glasses    Vitreous degeneration, right eye    Yamil Vaughn M.D.    Vocal cord edema    Associated with history of SARS-CoV-2/COVID-19      Wears glasses    Weight gain      Past Surgical History:   Procedure Laterality Date    Angiogram      12 [facility]    Angioplasty (coronary)      Colonoscopy      Colonoscopy  2018    Colonoscopy N/A 2023    Procedure: COLONOSCOPY;  Surgeon: Rupa Cheatham DO;  Location:  ENDOSCOPY    Colonoscopy N/A 2024    Procedure: COLONOSCOPY with cold snare polypectomy;  Surgeon: Rupa Cheatham DO;  Location:  ENDOSCOPY    D & c   &     Dilation & curettage cervical stump       and     Foot surgery Right 10/18/2019    Tendon repair     Gastric bypass,obesity,sb reconstruc  2018    gastric sleeve    Hernia surgery  10/18/2018    Hiatal hernia Dr. Terri Avila     Hysterectomy  2006    partial hystero.    Lap sleeve gastrectomy  10/18/2018    Dr. terri avila           Oophorectomy Bilateral 2015    ovaries removed after partial.    Other  10/15/2018    Sleeve biatric surgery     Other surgical history  2012    removal of right ovary    Removal of kidney stone      age 14, does not remember what kind of surgery. no abdominal or flank scar    Tilt table evaluation      veinogram 7-6-12 [facility]    Total abdom hysterectomy      Upper gi endoscopy,exam        Family History   Problem Relation Age of Onset    Cancer Mother         basal cell X 3; LUNG    Heart Disorder Mother         SVT    Hypertension Mother     Dementia Mother         early signs 2022    Depression Mother     Cancer Father         Colon & prostate cancers    Heart Disorder Father     Colon Cancer Father     Prostate Cancer Father     Diabetes Father     Hypertension Father     Heart Attack Father     Colon Polyps Brother     Alcohol and Other Disorders Associated Brother     Colon Polyps Brother     No Known Problems Son     No Known Problems Son     Alcohol and Other Disorders Associated Maternal Grandfather     Alcohol and Other Disorders Associated Paternal Grandfather     Cancer Maternal Aunt         breast, colon    Breast Cancer Maternal Aunt 50        In her 50's.    Ovarian Cancer Maternal Aunt 60        In her 60's.    Cancer Maternal Aunt         breast, basal cell    Breast Cancer Maternal Aunt 68        Late 60's.    Ovarian Cancer Maternal Aunt     Cancer Maternal Aunt         basal cell    Cancer Maternal Aunt         basal cell    Cancer Maternal Aunt         bone    Cancer Paternal Aunt         lung    Breast Cancer Cousin 65    Cancer Cousin         esoph,adenocarcinoma    Cancer Cousin         Non Hodgkins Lymphoma    Cancer Cousin         basal cell      Social History:   Social History     Socioeconomic History    Marital status:    Tobacco Use    Smoking status: Never    Smokeless tobacco: Never   Vaping Use    Vaping status: Never Used   Substance and Sexual Activity    Alcohol use: Yes     Alcohol/week: 5.0 standard drinks of alcohol     Types: 5 Standard drinks or equivalent per week      Comment: weekly    Drug use: No    Sexual activity: Yes     Partners: Male     Birth control/protection: Hysterectomy   Other Topics Concern    Caffeine Concern Yes    Stress Concern Yes    Weight Concern Yes    Special Diet No    Exercise No    Seat Belt Yes     Occ: currently not working. Marital Status: . Children: 2.   Exercise: none  Diet: doesn't watch     REVIEW OF SYSTEMS:   GENERAL: Overall feels well  INTEGUMENT: denies any unusual skin lesions or rashes  EYES: denies vision changes  HEENT: denies any new upper respiratory symptoms  LUNGS: Lower respiratory tract symptoms improving  CHEST:  denies breast changes or pain  CARDIOVASCULAR: denies chest pain or tightness on exertion  VASCULAR: No lower extremity swelling  GI: denies abdominal pain, bowel movement changes, blood in stool  : No complaint of urinary problems, vaginal discharge or discomfort  MUSCULOSKELETAL: denies any new joint pains or new muscle aches.  NEURO: denies headaches or dizziness  PSYCH: denies depression or anxiety  ENDOCRINE: No complaints of temperature intolerance, polyuria, or excessive sweating.  LYMPHATICS: No complaints of swollen glands      EXAM:   /72   Pulse 95   Temp 98 °F (36.7 °C) (Temporal)   Resp 20   Ht 5' 1\" (1.549 m)   Wt 262 lb (118.8 kg)   LMP 01/01/2006 (Approximate)   SpO2 96%   BMI 49.50 kg/m²   Body mass index is 49.5 kg/m².   GENERAL: NAD, Pleasant obese  female.  INTEGUMENT: No visible rashes or suspicious lesions.  HEENT: atraumatic, normocephalic, EACs and TMs clear normal bilaterally.  Nose: No nasal discharge.  OP: MMM.  Posteriorly no exudate or erythema.  EYES: EOMI, sclera, conjunctiva are clear  NECK: supple, no adenopathy/thyromegaly/masses  CHEST: no chest tenderness  BREAST: Symmetric. No suspicious mass, no peau d'orange, no nipple retraction or nipple discharge.  No tenderness to palpation.  LUNGS: Clear to auscultation bilateral, no rales, rhonchi or  wheezing  CARDIO: RRR without murmur normal S1S2  ABD: Obese. Soft, non tender to palpation.  No masses, HSM, or pulsations appreciated.  MUSCULOSKELETAL: gait normal, no gross M/S defect.  EXTREMITIES: no clubbing, cyanosis, or edema  NEURO: Alert and oriented x3.  No gross motor or sensory deficit.  PSYCH: normal affect      Immunization History   Administered Date(s) Administered    TDAP 05/14/2013    Tetanus 04/28/2003   Pended Date(s) Pended    Influenza Vaccine Refused 01/30/2022       DATA:    Diabetes:    Lab Results   Component Value Date    A1C 6.0 (H) 03/05/2024    A1C 5.5 01/05/2023    A1C 5.5 01/10/2022     01/05/2023     01/10/2022     09/08/2021     Lab Results   Component Value Date    CHOLEST 161 03/05/2024    CHOLEST 151 04/25/2023    CHOLEST 173 08/31/2022     Lab Results   Component Value Date    HDL 87 03/05/2024    HDL 88 (H) 04/25/2023    HDL 91 (H) 08/31/2022     Lab Results   Component Value Date    LDL 57 03/05/2024    LDL 48 04/25/2023    LDL 63 08/31/2022     Lab Results   Component Value Date    TRIG 87 03/05/2024    TRIG 83 04/25/2023    TRIG 107 08/31/2022     Lab Results   Component Value Date    AST 22 01/28/2025    AST 32 09/30/2024    AST 22 09/10/2024    AST 22 09/10/2024     Lab Results   Component Value Date    ALT 25 01/28/2025    ALT 35 (H) 09/30/2024    ALT 22 09/10/2024    ALT 22 09/10/2024           ASSESSMENT AND PLAN:   Rosa Shaver is a 54 year old female who presents for a complete physical exam.        Encounter Diagnoses   Name Primary?    Routine general medical examination at a health care facility Yes    Mixed hyperlipidemia     Allergic rhinitis, unspecified seasonality, unspecified trigger     Gastroesophageal reflux disease, unspecified whether esophagitis present     Acquired hypothyroidism     Atrial tachycardia (HCC)     Morbid obesity with BMI of 45.0-49.9, adult (HCC)     POLK (dyspnea on exertion)        Patient encouraged to  complete outstanding fasting labs at her earliest convenience.      1. Routine general medical examination at a health care facility  Patient provided handout on women's health and prevention.       2. Mixed hyperlipidemia  Patient encouraged to complete outstanding fasting labs at her earliest convenience.  Recommend continue simvastatin 20 mg p.o. nightly, we will adjust dose in the future as indicated.  Follow-up in 6 months.    3. Allergic rhinitis, unspecified seasonality, unspecified trigger  Fair control with Flonase which patient will continue.  Okay to take OTC antihistamine.  Follow-up in 6 months, sooner if needed.    - fluticasone propionate 50 MCG/ACT Nasal Suspension; 2 sprays by Each Nare route daily.  Dispense: 48 g; Refill: 0    4. Gastroesophageal reflux disease, unspecified whether esophagitis present  Symptoms controlled on lansoprazole.  Recommend continue lansoprazole as below.  Avoid known triggers.  Recommend follow antireflux measures.    - lansoprazole 30 MG Oral Capsule Delayed Release; Take 1 capsule (30 mg total) by mouth daily.  Dispense: 90 capsule; Refill: 1    5. Acquired hypothyroidism  Patient encouraged to complete outstanding fasting labs at her earliest convenience.  Recommend continue levothyroxine 112 mcg p.o. every morning, we will adjust dose in the future as indicated.  Follow-up on hypothyroidism in 6 months.    6. Atrial tachycardia (HCC)  Improved on metoprolol succinate 25 mg daily which patient will continue.      7. Morbid obesity with BMI of 45.0-49.9, adult (HCC)  Recommend healthy diet including green leafy vegetables, fresh fruits and lean protein.  Aerobic exercise for total of 150 minutes/week is recommended for cardiovascular fitness, and 45-60 minutes 6-7 days a week to help obtain weight loss.     8. POLK (dyspnea on exertion)  Patient to follow-up with the pulmonologist as planned.          Meds & Refills for this Visit:  Requested Prescriptions     Signed  Prescriptions Disp Refills    lansoprazole 30 MG Oral Capsule Delayed Release 90 capsule 1     Sig: Take 1 capsule (30 mg total) by mouth daily.    fluticasone propionate 50 MCG/ACT Nasal Suspension 48 g 0     Si sprays by Each Nare route daily.         Return in about 6 months (around 2025) for Hypothyroidism, GERD, and hyperlipidemia.  Sooner if needed.

## 2025-03-15 RX ORDER — FLUTICASONE PROPIONATE 50 MCG
2 SPRAY, SUSPENSION (ML) NASAL DAILY
Qty: 48 G | Refills: 0 | Status: SHIPPED | OUTPATIENT
Start: 2025-03-15

## 2025-03-16 PROBLEM — R07.89 ATYPICAL CHEST PAIN: Status: RESOLVED | Noted: 2022-01-30 | Resolved: 2025-03-16

## 2025-03-16 PROBLEM — L98.7 EXCESS SKIN OF BREAST: Status: RESOLVED | Noted: 2019-07-18 | Resolved: 2025-03-16

## 2025-03-16 PROBLEM — J38.4 VOCAL CORD EDEMA: Status: RESOLVED | Noted: 2022-01-30 | Resolved: 2025-03-16

## 2025-03-16 PROBLEM — S69.91XA INJURY OF RIGHT WRIST: Status: RESOLVED | Noted: 2023-02-22 | Resolved: 2025-03-16

## 2025-03-16 PROBLEM — E16.2 HYPOGLYCEMIA: Status: RESOLVED | Noted: 2020-06-01 | Resolved: 2025-03-16

## 2025-03-16 PROBLEM — M79.3 PANNICULITIS: Status: RESOLVED | Noted: 2019-10-16 | Resolved: 2025-03-16

## 2025-03-16 PROBLEM — M79.672 LEFT FOOT PAIN: Status: RESOLVED | Noted: 2020-11-12 | Resolved: 2025-03-16

## 2025-03-16 PROBLEM — R31.29 MICROSCOPIC HEMATURIA: Status: RESOLVED | Noted: 2017-11-26 | Resolved: 2025-03-16

## 2025-03-16 PROBLEM — M79.671 ACUTE FOOT PAIN, RIGHT: Status: RESOLVED | Noted: 2024-03-06 | Resolved: 2025-03-16

## 2025-03-16 PROBLEM — R07.9 CHEST PAIN, UNSPECIFIED: Status: RESOLVED | Noted: 2025-03-03 | Resolved: 2025-03-16

## 2025-03-16 PROBLEM — R12 HEARTBURN: Status: RESOLVED | Noted: 2018-05-31 | Resolved: 2025-03-16

## 2025-03-16 PROBLEM — N95.1 VASOMOTOR SYMPTOMS DUE TO MENOPAUSE: Status: RESOLVED | Noted: 2019-06-14 | Resolved: 2025-03-16

## 2025-03-16 PROBLEM — R74.01 ELEVATED ALT MEASUREMENT: Status: RESOLVED | Noted: 2024-03-06 | Resolved: 2025-03-16

## 2025-03-16 PROBLEM — J04.0 LARYNGITIS: Status: RESOLVED | Noted: 2021-12-10 | Resolved: 2025-03-16

## 2025-03-16 PROBLEM — L81.3 CAFE AU LAIT SPOTS: Status: RESOLVED | Noted: 2018-08-25 | Resolved: 2025-03-16

## 2025-03-16 PROBLEM — M25.531 ACUTE PAIN OF RIGHT WRIST: Status: RESOLVED | Noted: 2023-02-22 | Resolved: 2025-03-16

## 2025-03-16 PROBLEM — U07.1 COVID-19 VIRUS INFECTION: Status: RESOLVED | Noted: 2021-12-10 | Resolved: 2025-03-16

## 2025-03-16 PROBLEM — M25.532 ACUTE PAIN OF LEFT WRIST: Status: RESOLVED | Noted: 2020-11-12 | Resolved: 2025-03-16

## 2025-03-16 PROBLEM — Z86.16 HISTORY OF 2019 NOVEL CORONAVIRUS DISEASE (COVID-19): Status: RESOLVED | Noted: 2022-01-30 | Resolved: 2025-03-16

## 2025-03-16 PROBLEM — R42 VERTIGO: Status: RESOLVED | Noted: 2021-04-08 | Resolved: 2025-03-16

## 2025-03-16 PROBLEM — Z86.0100 PERSONAL HISTORY OF COLONIC POLYPS: Status: RESOLVED | Noted: 2018-05-31 | Resolved: 2025-03-16

## 2025-03-16 PROBLEM — M79.672 PAIN OF LEFT HEEL: Status: RESOLVED | Noted: 2023-11-16 | Resolved: 2025-03-16

## 2025-03-16 PROBLEM — L98.7 EXCESS SKIN OF ABDOMEN: Status: RESOLVED | Noted: 2019-07-18 | Resolved: 2025-03-16

## 2025-03-16 PROBLEM — M77.41 METATARSALGIA OF RIGHT FOOT: Status: RESOLVED | Noted: 2018-12-20 | Resolved: 2025-03-16

## 2025-03-19 RX ORDER — LANCETS
EACH MISCELLANEOUS
Qty: 100 EACH | Refills: 1 | Status: SHIPPED | OUTPATIENT
Start: 2025-03-19

## 2025-03-20 ENCOUNTER — TELEPHONE (OUTPATIENT)
Dept: FAMILY MEDICINE CLINIC | Facility: CLINIC | Age: 55
End: 2025-03-20

## 2025-03-20 NOTE — TELEPHONE ENCOUNTER
Incoming Fax  3/20/2025  Received From:  [facility] [place]   Documentation printed and placed MA basket.

## 2025-03-25 ENCOUNTER — TELEPHONE (OUTPATIENT)
Facility: CLINIC | Age: 55
End: 2025-03-25

## 2025-03-25 NOTE — TELEPHONE ENCOUNTER
Patient called asking if she could have her CT scan sooner.  She is not coughing up any blood but has been noticing more shortness of breath with walking and doing basic house hold chores. No URI symptoms.  Does notice some chest tightness and wheezing on and off.  Albuterol helps at times. She is only taking Breo 2-3 times a week due to forgetting to take it.  States she saw a cardiologist for palpitations and just had a Cardiac CTA on 3/13 but does not know if It shows any part of the lungs.  Instructed patient to take Breo every day and will consult Dr. Hernández.

## 2025-03-27 ENCOUNTER — OFFICE VISIT (OUTPATIENT)
Facility: CLINIC | Age: 55
End: 2025-03-27
Payer: COMMERCIAL

## 2025-03-27 VITALS
WEIGHT: 265 LBS | SYSTOLIC BLOOD PRESSURE: 118 MMHG | HEIGHT: 61 IN | HEART RATE: 78 BPM | RESPIRATION RATE: 16 BRPM | BODY MASS INDEX: 50.03 KG/M2 | OXYGEN SATURATION: 95 % | DIASTOLIC BLOOD PRESSURE: 72 MMHG

## 2025-03-27 DIAGNOSIS — R06.00 DYSPNEA, UNSPECIFIED TYPE: Primary | ICD-10-CM

## 2025-03-27 DIAGNOSIS — R04.2 HEMOPTYSIS: ICD-10-CM

## 2025-03-27 PROCEDURE — 3074F SYST BP LT 130 MM HG: CPT

## 2025-03-27 PROCEDURE — 3008F BODY MASS INDEX DOCD: CPT

## 2025-03-27 PROCEDURE — 3078F DIAST BP <80 MM HG: CPT

## 2025-03-27 PROCEDURE — 99213 OFFICE O/P EST LOW 20 MIN: CPT

## 2025-03-27 RX ORDER — TIOTROPIUM BROMIDE INHALATION SPRAY 3.12 UG/1
1 SPRAY, METERED RESPIRATORY (INHALATION) DAILY
Qty: 1 EACH | Refills: 5 | Status: SHIPPED | OUTPATIENT
Start: 2025-03-27

## 2025-03-27 RX ORDER — FLUTICASONE FUROATE AND VILANTEROL 100; 25 UG/1; UG/1
1 POWDER RESPIRATORY (INHALATION) DAILY
Qty: 1 EACH | Refills: 11 | Status: SHIPPED | OUTPATIENT
Start: 2025-03-27

## 2025-03-27 NOTE — PATIENT INSTRUCTIONS
Call office with any questions or concerns  Call office if develop any new or worsening respiratory symptoms.   Call office if your inhalers are more than $50 after co-pay  Continue to use  Breo  Trial spiriva   Continue lasnoprazole       Call central scheduling at [phone] to schedule the CT scan and PFT. If you get ill (sinus infection, cold, respiratory illness or other illness) you should postpone the scan for at least 4-6 weeks after you have recovered. Please call with any questions.

## 2025-03-27 NOTE — PROGRESS NOTES
[facility] General Pulmonary Progress Note    History of Present Illness:  Rosa Shaver is a 54 year old female never smoker with significant PMH of undifferentiated CTD, DM, HTN, GERD  who presents today for follow up. Since last visit has not had any episodes of hemoptysis. Stared breo with slight relief of SOB, but noticed her HR was elevated so she stopped using it, when it did not make a difference she resumed it.  Wears an \"ora\" ring and noticed her HR was elevated, saw cardiology- completed CTA and a heart monitor for 7 days. Is on metoprolol and a PRN dose. Reports feels dizzy when she laughs, SOB at all times, dry cough. Denies chest pain/pressure, wheezing, no fevers, chills, fatigue.      Previously 2/2025 Dr Hernández  Rosa Shaver is a 54 year old female never smoker with significant PMH of undifferentiated CTD, DM, HTN, GERD who presents today for evaluation of hemoptysis. She reports having developed small volume hemoptysis last month, describes as red blood about size of a pea, several episodes a day, associated with dyspnea, wheeze. She was treated recently with prednisone with DR. Llamas and feels much improved now, but still with dyspnea.  Hemoptysis has resolved. No pain. No fevers     Past Medical History:   Past Medical History:    Abdominal pain    Acute atopic conjunctivitis, bilateral    Yamil Vaughn M.D.    Acute meniscal tear, medial, right, initial encounter    MRI 5/21/2019: Mild undersurface tearing of the posterior root ligament of the medial meniscus.    Allergic rhinitis    Anxiety    Arrhythmia    paroxsymal atrial tachycardia    Arthritis    Atrial tachycardia (HCC)    Atypical chest pain    Suspect mild chronic pericarditis possibly secondary to history of SARS-CoV-2/COVID-19 based on findings of CT of chest 12/22/2021 not present on previous CTs      Back pain    Benign paroxysmal positional vertigo    Bilateral carotid artery stenosis    Bleeding nose    Bloating    Blood in  urine    Body piercing    Cafe au lait spots    Calculus of kidney    Change in hair    Chest pain    Chest pain on exertion    Chest pain, unspecified    Chronic pericarditis (HCC)    Suspect mild chronic pericarditis possibly secondary to history of SARS-CoV-2/COVID-19 based on findings of CT of chest 12/22/2021 not present on previous CTs      Colon polyp    Gail Katz M.D.    Constipation    COVID-19 vaccination refused    COVID-19 virus infection    Detected 11/24/2021      Decorative tattoo    Diabetes (HCC)    Denies    Diarrhea, unspecified    Disorder of thyroid    Diverticulitis of sigmoid colon    Diverticulosis    Gail Katz M.D.    Dizziness    Easy bruising    Endometriosis    Esophageal reflux    Essential hypertension    Eye disease    Family history of CHF (congestive heart failure)    Fibromyalgia    Food intolerance    Frequent urination    Glaucoma    Headache disorder    Heart palpitations    Heartburn    High cholesterol    History of 2019 novel coronavirus disease (COVID-19)    History of COVID-19    History of nausea and vomiting    Hoarseness, chronic    Hx of motion sickness    Hyperlipidemia    Hypoglycemia    Symptomatic in the low 90s      Hypothyroidism    Influenza vaccination declined    Injury of peroneal tendon of right foot    Injury of right wrist    Itch of skin    Keratoconjunctivitis sicca not specified as Sjogren's, bilateral    Yamil Vaughn M.D.    Lateral epicondylitis of right elbow    Leaking of urine    Lung nodule    pt unsure of which side    Major depressive disorder, recurrent episode, mild with anxious distress    Migraines    Morbid obesity with BMI of 45.0-49.9, adult (HCC)    Obesity    Open angle with borderline findings, high risk, bilateral    Yamil Vaughn M.D.    Osteoarthritis    back    Other vitreous opacities, left eye    Yamil Vaughn M.D.    Other vitreous opacities, right eye    Yamil Vaughn M.D.    Pain in joints     Painful urination    Panniculitis    Pneumococcal vaccine refused    Polycystic ovaries    ovary embedded in pelvic wall--right side    PONV (postoperative nausea and vomiting)    slow to awaken    Posterior vitreous detachment of both eyes    Right > Left    Primary open angle glaucoma of both eyes, moderate stage    Rash    Sleep disturbance    Spitting up blood    Sputum production    Stress    Surgical Menopause 2015 age 44 yrs old - on ERT    Trochanteric bursitis of both hips    Left >>  Right     Vertigo    Visual impairment    glasses    Vitreous degeneration, right eye    Yamil Vaughn M.D.    Vocal cord edema    Associated with history of SARS-CoV-2/COVID-19      Wears glasses    Weight gain        Past Surgical History:   Past Surgical History:   Procedure Laterality Date    Angiogram      12 [place]    Angioplasty (coronary)      Colonoscopy      Colonoscopy  2018    Colonoscopy N/A 2023    Procedure: COLONOSCOPY;  Surgeon: Rupa Cheatham DO;  Location:  ENDOSCOPY    Colonoscopy N/A 2024    Procedure: COLONOSCOPY with cold snare polypectomy;  Surgeon: Rupa Cheatham DO;  Location:  ENDOSCOPY    D & c   &     Dilation & curettage cervical stump       and     Foot surgery Right 10/18/2019    Tendon repair     Gastric bypass,obesity,sb reconstruc  2018    gastric sleeve    Hernia surgery  10/18/2018    Hiatal hernia Dr. Terri Avila     Hysterectomy  2006    partial hystero.    Lap sleeve gastrectomy  10/18/2018    Dr. terri avila           Oophorectomy Bilateral     ovaries removed after partial.    Other  10/15/2018    Sleeve biatric surgery    Other surgical history      removal of right ovary    Removal of kidney stone      age 14, does not remember what kind of surgery. no abdominal or flank scar    Tilt table evaluation      veinogram 12 [facility]    Total abdom hysterectomy      Upper gi endoscopy,exam         Family  Medical History:   Family History   Problem Relation Age of Onset    Cancer Mother         basal cell X 3; LUNG    Heart Disorder Mother         SVT    Hypertension Mother     Dementia Mother         early signs 2022    Depression Mother     Cancer Father         Colon & prostate cancers    Heart Disorder Father     Colon Cancer Father     Prostate Cancer Father     Diabetes Father     Hypertension Father     Heart Attack Father     Colon Polyps Brother     Alcohol and Other Disorders Associated Brother     Colon Polyps Brother     No Known Problems Son     No Known Problems Son     Alcohol and Other Disorders Associated Maternal Grandfather     Alcohol and Other Disorders Associated Paternal Grandfather     Cancer Maternal Aunt         breast, colon    Breast Cancer Maternal Aunt 50        In her 50's.    Ovarian Cancer Maternal Aunt 60        In her 60's.    Cancer Maternal Aunt         breast, basal cell    Breast Cancer Maternal Aunt 68        Late 60's.    Ovarian Cancer Maternal Aunt     Cancer Maternal Aunt         basal cell    Cancer Maternal Aunt         basal cell    Cancer Maternal Aunt         bone    Cancer Paternal Aunt         lung    Breast Cancer Cousin 65    Cancer Cousin         esoph,adenocarcinoma    Cancer Cousin         Non Hodgkins Lymphoma    Cancer Cousin         basal cell        Social History:   Social History     Socioeconomic History    Marital status:      Spouse name: Not on file    Number of children: Not on file    Years of education: Not on file    Highest education level: Not on file   Occupational History    Not on file   Tobacco Use    Smoking status: Never    Smokeless tobacco: Never   Vaping Use    Vaping status: Never Used   Substance and Sexual Activity    Alcohol use: Yes     Alcohol/week: 5.0 standard drinks of alcohol     Types: 5 Standard drinks or equivalent per week     Comment: weekly    Drug use: No    Sexual activity: Yes     Partners: Male     Birth  control/protection: Hysterectomy   Other Topics Concern     Service Not Asked    Blood Transfusions Not Asked    Caffeine Concern Yes    Occupational Exposure Not Asked    Hobby Hazards Not Asked    Sleep Concern Not Asked    Stress Concern Yes    Weight Concern Yes    Special Diet No    Back Care Not Asked    Exercise No    Bike Helmet Not Asked    Seat Belt Yes    Self-Exams Not Asked   Social History Narrative    Not on file     Social Drivers of Health     Food Insecurity: Not on file   Transportation Needs: Not on file   Stress: Not on file   Housing Stability: Not on file        Medications:   Current Outpatient Medications   Medication Sig Dispense Refill    MICROLET LANCETS Does not apply Misc USE UP TO 6 TIMES A  each 1    fluticasone propionate 50 MCG/ACT Nasal Suspension 2 sprays by Each Nare route daily. 48 g 0    metoprolol tartrate 100 MG Oral Tab 1 tablet (100 mg total).      lansoprazole 30 MG Oral Capsule Delayed Release Take 1 capsule (30 mg total) by mouth daily. 90 capsule 1    fluticasone furoate-vilanterol (BREO ELLIPTA) 100-25 MCG/ACT Inhalation Aerosol Powder, Breath Activated Inhale 1 puff into the lungs daily. 1 each 11    HYDROXYCHLOROQUINE 200 MG Oral Tab TAKE 1 TABLET(200 MG) BY MOUTH TWICE DAILY 180 tablet 0    CREON 81406-77216 units Oral Cap DR Particles TAKE 1 CAPSULE BY MOUTH EVERY MORNING, AT NOON, AND EVERY NIGHT AT BEDTIME. TAKE WITH SNACKS 360 capsule 1    benzonatate 200 MG Oral Cap Take 1 capsule (200 mg total) by mouth 3 (three) times daily as needed for cough. 15 capsule 0    amitriptyline 10 MG Oral Tab Take 1 tablet (10 mg total) by mouth nightly. 90 tablet 0    ALPRAZolam 0.5 MG Oral Tab 1/2 to one tab daily as needed 20 tablet 0    SIMVASTATIN 20 MG Oral Tab TAKE 1 TABLET(20 MG) BY MOUTH EVERY EVENING 90 tablet 3    albuterol 108 (90 Base) MCG/ACT Inhalation Aero Soln Inhale 2 puffs into the lungs every 6 (six) hours as needed for Wheezing or Shortness of  Breath (Cough). 1 each 0    LEVOTHYROXINE 112 MCG Oral Tab TAKE 1 TABLET(112 MCG) BY MOUTH BEFORE BREAKFAST 90 tablet 3    Glucose Blood (CONTOUR NEXT TEST) In Vitro Strip Use 1 test strip up to 6 times a day as needed 100 strip 11    Blood Glucose Monitoring Suppl Does not apply Device THIS IS FOR 1 CONTOUR GLUCOSE MONITOR 1 each 0    metoprolol succinate ER 25 MG Oral Tablet 24 Hr Take 1 tablet (25 mg total) by mouth daily.      meclizine 25 MG Oral Tab Take 1 tablet (25 mg total) by mouth 3 (three) times daily as needed for Dizziness. 30 tablet 2    Zinc 50 MG Oral Tab Take by mouth daily.      TURMERIC CURCUMIN OR Take by mouth daily.      ascorbic acid 500 MG Oral Chew Tab Chew 1 tablet (500 mg total) by mouth daily.      CALCIUM OR Take 2,400 mg by mouth daily.      saccharomyces boulardii 250 MG Oral Cap Pro-biotic 1 capsule by mouth daily      tiZANidine HCl 4 MG Oral Tab Take 1 tablet (4 mg total) by mouth nightly as needed. 30 tablet 2    magnesium 250 MG Oral Tab Take 1 tablet (250 mg total) by mouth daily.      Multiple Vitamins-Minerals (BARIATRIC MULTIVITAMINS/IRON OR) Take 1 capsule by mouth daily.      PATIENT SUPPLIED MEDICATION Essential Oils for allergies      Cholecalciferol (VITAMIN D3) 2000 UNITS Oral Tab Take 1 tablet (2,000 Units total) by mouth daily. 2 tab a day to make 4,000 units         Review of Systems: Review of Systems   Constitutional: Negative.    HENT: Negative.     Respiratory:  Positive for cough and shortness of breath.    Cardiovascular: Negative.    Gastrointestinal: Negative.         Physical Exam:  [facility] 01/01/2006 (Approximate)      Constitutional: alert, cooperative. No acute distress.  HEENT: Head NC/AT.   Cardio: Regular rate and rhythm. Normal S1 and S2. No murmurs.   Respiratory: Thorax symmetrical with no labored breathing. Clear to ausculation bilaterally with symmetrical breath sounds. No wheezing, rhonchi, rales, or crackles.   Extremities: No clubbing or cyanosis.  No BLE edema.    Neurologic: A&Ox3. No gross motor deficits.  Skin: Warm, dry  Psych: Calm, cooperative. Pleasant affect.    Results:  Personally reviewed    WBC: 5, done on 1/28/2025.  HGB: 12.8, done on 1/28/2025.  PLT: 381, done on 1/28/2025.     Glucose: 142, done on 1/28/2025.  Cr: 0.79, done on 1/28/2025.  Last eGFR was 89 on 1/28/2025.  CA: 9.1, done on 1/28/2025.  Na: 141, done on 1/28/2025.  K: 4.1, done on 1/28/2025.  Cl: 107, done on 1/28/2025.  CO2: 25, done on 1/28/2025.  Last ALB was 3.8% done on 1/28/2025.     [facility] AMMON 2D+3D SCREENING BILAT (CPT=77067/08042)    Result Date: 3/4/2025  CONCLUSION:  No suspicious change from prior mammography.  No mammographic evidence of malignancy.  BI-RADS CATEGORY:  DIAGNOSTIC CATEGORY 1 - NEGATIVE ASSESSMENT.   RECOMMENDATIONS:  ROUTINE MAMMOGRAM AND CLINICAL EVALUATION IN 12 MONTHS.       A letter explaining the results in lay terms has been sent to the patient.  This exam was evaluated with a computer-aided device.  This patient's information has been entered into a reminder system with a target due date for the next mammogram.   LOCATION:  [place]   Dictated by (CST): Jalyn Hoffman MD on 3/04/2025 at 12:18 PM     Finalized by (CST): Jalyn Hoffman MD on 3/04/2025 at 12:20 PM   [facility] Radiology [address] [place] [address] [phone]    XR CHEST PA + LAT CHEST (CPT=71046)    Result Date: 1/27/2025  CONCLUSION:  No active cardiopulmonary process identified.  If the patient's hemoptysis persists or worsens, consider chest CT for further assessment.   LOCATION:  [id number]   Dictated by (CST): Stromberg, LeRoy, MD on 1/27/2025 at 3:10 PM     Finalized by (CST): Stromberg, LeRoy, MD on 1/27/2025 at 3:12 PM         Assessment/Plan:  #1. Hemoptysis, nonlifethreatening  Likely due to bronchitis, but also with underlying CTD making bronchiectasis possible  Now improved    #2. Dyspnea  Related to above?   Continue Breo  Trial spiriva    Obtain PFT  Obtain HRCT and follow up with Dr Hernández    #3 CAD  Originally saw [facility] for elevated HR  CTA coronaries- mild, non obstructive CAD  Continue following with [facility]  Continue simvastatin   Continue metoprolol     Hina Kimble [facility]  Pulmonary Medicine   3/27/2025

## 2025-03-27 NOTE — TELEPHONE ENCOUNTER
Per Hina NEFF, If she's not feeling good then the CT would not be conclusive. She should make an appointment first.  Left recorded message for patient to call back. Patient returned call and appointment made to see AISHWARYA Aranda.

## 2025-03-31 ENCOUNTER — HOSPITAL ENCOUNTER (OUTPATIENT)
Dept: CT IMAGING | Age: 55
Discharge: HOME | End: 2025-03-31
Payer: COMMERCIAL

## 2025-03-31 DIAGNOSIS — R06.00 DYSPNEA, UNSPECIFIED TYPE: ICD-10-CM

## 2025-03-31 PROCEDURE — 71250 CT THORAX DX C-: CPT

## 2025-04-04 ENCOUNTER — RT VISIT (OUTPATIENT)
Dept: RESPIRATORY THERAPY | Facility: HOSPITAL | Age: 55
End: 2025-04-04
Payer: COMMERCIAL

## 2025-04-04 ENCOUNTER — APPOINTMENT (OUTPATIENT)
Dept: RESPIRATORY THERAPY | Facility: HOSPITAL | Age: 55
End: 2025-04-04
Payer: COMMERCIAL

## 2025-04-04 DIAGNOSIS — R06.00 DYSPNEA, UNSPECIFIED TYPE: ICD-10-CM

## 2025-04-08 PROCEDURE — 94726 PLETHYSMOGRAPHY LUNG VOLUMES: CPT | Performed by: INTERNAL MEDICINE

## 2025-04-08 PROCEDURE — 94010 BREATHING CAPACITY TEST: CPT | Performed by: INTERNAL MEDICINE

## 2025-04-08 PROCEDURE — 94729 DIFFUSING CAPACITY: CPT | Performed by: INTERNAL MEDICINE

## 2025-04-08 NOTE — PROCEDURES
Pulmonary Function Test:     Rosa Shaver is a 54 year old female who presents for evaluation of dyspnea      Findings:  Spirometry: FEV1 is 2.02 L, 89% predicted.  With a Z-score of -0.70 FVC is 2.47 L, 89% predicted and FEV1/ FVC ratio is 0.82.  The flow-volume loop demonstrates a normal pattern.   MVV is    66 L/min or 74% predicted  Lung Volumes:                                                                     The TLC is 4.40 L, 95% predicted.  With a Z-score of -0.46  The residual volume 1.67 L, 118% predicted.  RV/TLC ratio is     38% (>40% suggests air trapping)   Diffusion Capacity:  The diffusion capacity is 17.28 or 92% predicted with a Z-score of -0.56 and 98% predicted when corrected for alveolar volume.     Impression:  There is no airway obstruction or restrictive process  on spirometry and visualized on flow-volume loop.    Lung volumes appear within normal limits     Diffusion capacity appears within normal limits      There are no previous pulmonary function tests available for comparison.     The above testing demonstrates normal pulmonary function. Given the associated history of dyspnea, may consider further assessment with bronchoprovocation challenge test such as mannitol challenge test to evaluate for asthma or reactive airways.   Disclaimer: This PFT has been interpreted in accordance to ATS/ERS interpretation guidelines 2022, with the use of upper and lower limits of normal as well as z-score references. Previous testing (before March 2024) was not performed at [facility] using z-scores and so comparison to previous testing should be taken with caution.    Tu Padilla MD

## 2025-04-16 NOTE — TELEPHONE ENCOUNTER
Requested Prescriptions     Pending Prescriptions Disp Refills    Glucose Blood (CONTOUR NEXT TEST) In Vitro Strip [Pharmacy Med Name: CONTOUR NEXT TEST STRIPS 100S] 100 strip 11     Sig: USE 1 STRIP TO TEST BLOOD SUGAR UP TI SIX TIMES DAILY AS DIRECTED       Last Refill: 4/10/24    Last OV: 3/13/25

## 2025-04-16 NOTE — TELEPHONE ENCOUNTER
Please call patient and instruct her to complete outstanding fasting labs at her earliest convenience.  Also, refill request for diabetic test supplies was declined, uncertain why patient is using diabetic test supplies at this point, please call patient to get more information.

## 2025-04-17 RX ORDER — LANCETS
EACH MISCELLANEOUS
Qty: 100 EACH | Refills: 1 | OUTPATIENT
Start: 2025-04-17

## 2025-04-22 ENCOUNTER — OFFICE VISIT (OUTPATIENT)
Facility: CLINIC | Age: 55
End: 2025-04-22
Payer: COMMERCIAL

## 2025-04-22 VITALS
BODY MASS INDEX: 50 KG/M2 | HEIGHT: 61 IN | HEART RATE: 69 BPM | OXYGEN SATURATION: 100 % | DIASTOLIC BLOOD PRESSURE: 90 MMHG | SYSTOLIC BLOOD PRESSURE: 138 MMHG | RESPIRATION RATE: 16 BRPM

## 2025-04-22 DIAGNOSIS — R06.09 DOE (DYSPNEA ON EXERTION): Primary | ICD-10-CM

## 2025-04-22 DIAGNOSIS — R06.02 SHORTNESS OF BREATH: ICD-10-CM

## 2025-04-22 DIAGNOSIS — Z76.89 ENCOUNTER FOR NEW MEDICATION PRESCRIPTION: ICD-10-CM

## 2025-04-22 PROCEDURE — 99214 OFFICE O/P EST MOD 30 MIN: CPT | Performed by: INTERNAL MEDICINE

## 2025-04-22 PROCEDURE — 3075F SYST BP GE 130 - 139MM HG: CPT | Performed by: INTERNAL MEDICINE

## 2025-04-22 PROCEDURE — 3080F DIAST BP >= 90 MM HG: CPT | Performed by: INTERNAL MEDICINE

## 2025-04-22 RX ORDER — ALBUTEROL SULFATE 90 UG/1
2 INHALANT RESPIRATORY (INHALATION) EVERY 6 HOURS PRN
Qty: 1 EACH | Refills: 11 | Status: SHIPPED | OUTPATIENT
Start: 2025-04-22

## 2025-04-22 NOTE — PATIENT INSTRUCTIONS
Obtain a mannitol challenge test - Call central scheduling at [phone] to schedule the test  Continue the  breo inhaler - one puff once a day. Rinse your mouth out after using inhaler  Continue to use albuterol 2 puffs every 6 hours as needed for your breathing or cough  Hold Breo 5 days before the mannitol challenge test   Hold albuterol 24 hours before the mannitol challenge test   Call/message with questions/concerns

## 2025-04-22 NOTE — PROGRESS NOTES
[facility] General Pulmonary Progress Note    History of Present Illness:  Rosa Shaver is a 54 year old female never smoker with significant PMH of undifferentiated CTD, DM, HTN, GERD who presents today for follow up. She denies new concerns.  Still with POLK.  Thinks breo has helped. Spiriva caused mouth sores and stopped. Minimal cough. No pain    3/2025 previously AISHWARYA Kimble  Rosa Shaver is a 54 year old female never smoker with significant PMH of undifferentiated CTD, DM, HTN, GERD  who presents today for follow up. Since last visit has not had any episodes of hemoptysis. Stared breo with slight relief of SOB, but noticed her HR was elevated so she stopped using it, when it did not make a difference she resumed it.  Wears an \"ora\" ring and noticed her HR was elevated, saw cardiology- completed CTA and a heart monitor for 7 days. Is on metoprolol and a PRN dose. Reports feels dizzy when she laughs, SOB at all times, dry cough. Denies chest pain/pressure, wheezing, no fevers, chills, fatigue.       Previously 2/2025 Dr Hernández  Rosa Shaver is a 54 year old female never smoker with significant PMH of undifferentiated CTD, DM, HTN, GERD who presents today for evaluation of hemoptysis. She reports having developed small volume hemoptysis last month, describes as red blood about size of a pea, several episodes a day, associated with dyspnea, wheeze. She was treated recently with prednisone with DR. Llamas and feels much improved now, but still with dyspnea.  Hemoptysis has resolved. No pain. No fevers   Past Medical History:   Past Medical History[1]     Past Surgical History: Past Surgical History[2]    Family Medical History: Family History[3]     Social History:   Social History     Socioeconomic History    Marital status:      Spouse name: Not on file    Number of children: Not on file    Years of education: Not on file    Highest education level: Not on file   Occupational History    Not on file    Tobacco Use    Smoking status: Never     Passive exposure: Past    Smokeless tobacco: Never   Vaping Use    Vaping status: Never Used   Substance and Sexual Activity    Alcohol use: Yes     Alcohol/week: 5.0 standard drinks of alcohol     Types: 5 Standard drinks or equivalent per week     Comment: weekly    Drug use: No    Sexual activity: Yes     Partners: Male     Birth control/protection: Hysterectomy   Other Topics Concern     Service Not Asked    Blood Transfusions Not Asked    Caffeine Concern Yes    Occupational Exposure Not Asked    Hobby Hazards Not Asked    Sleep Concern Not Asked    Stress Concern Yes    Weight Concern Yes    Special Diet No    Back Care Not Asked    Exercise No    Bike Helmet Not Asked    Seat Belt Yes    Self-Exams Not Asked   Social History Narrative    Not on file     Social Drivers of Health     Food Insecurity: Not on file   Transportation Needs: Not on file   Stress: Not on file   Housing Stability: Not on file        Medications: Current Medications[4]    Review of Systems: Review of Systems   Constitutional: Negative.    HENT: Negative.     Respiratory:  Positive for cough and shortness of breath. Negative for wheezing and stridor.    Cardiovascular: Negative.    All other systems reviewed and are negative.       Physical Exam:  /90 (BP Location: Right arm, Patient Position: Sitting, Cuff Size: adult)   Pulse 69   Resp 16   Ht 5' 1\" (1.549 m)   LMP 01/01/2006 (Approximate)   SpO2 100%   BMI 50.07 kg/m²      Constitutional: alert, cooperative. No acute distress.  HEENT: Head NC/AT.   Cardio: Regular rate and rhythm. Normal S1 and S2. No murmurs.   Respiratory: Thorax symmetrical with no labored breathing. Clear to ausculation bilaterally with symmetrical breath sounds. No wheezing, rhonchi, rales, or crackles.   GI: NABS. Abd soft, non-tender.  Extremities: No clubbing or cyanosis. No BLE edema.    Neurologic: A&Ox3. No gross motor deficits.  Skin: Warm,  dry  Psych: Calm, cooperative. Pleasant affect.    Results:  Personally reviewed    WBC: 5, done on 1/28/2025.  HGB: 12.8, done on 1/28/2025.  PLT: 381, done on 1/28/2025.     Glucose: 142, done on 1/28/2025.  Cr: 0.79, done on 1/28/2025.  Last eGFR was 89 on 1/28/2025.  CA: 9.1, done on 1/28/2025.  Na: 141, done on 1/28/2025.  K: 4.1, done on 1/28/2025.  Cl: 107, done on 1/28/2025.  CO2: 25, done on 1/28/2025.  Last ALB was 3.8% done on 1/28/2025.     CT CHEST HI RESOLUTION (CPT=71250)  Result Date: 3/31/2025  CONCLUSION:  A few scattered tiny nonspecific pulmonary nodules.  Please correlate with risk factors to guide follow-up.  If no risk factors are identified, current Fleischner guidelines do not recommend follow up based on size.     LOCATION:  [facility]    Dictated by (CST): Newton Jordan MD on 3/31/2025 at 4:46 PM     Finalized by (CST): Newton Jordan MD on 3/31/2025 at 4:49 PM       [facility] 2D+3D SCREENING BILAT (CPT=77067/27651)  Result Date: 3/4/2025  CONCLUSION:  No suspicious change from prior mammography.  No mammographic evidence of malignancy.  BI-RADS CATEGORY:  DIAGNOSTIC CATEGORY 1 - NEGATIVE ASSESSMENT.   RECOMMENDATIONS:  ROUTINE MAMMOGRAM AND CLINICAL EVALUATION IN 12 MONTHS.       A letter explaining the results in lay terms has been sent to the patient.  This exam was evaluated with a computer-aided device.  This patient's information has been entered into a reminder system with a target due date for the next mammogram.   LOCATION:  [facility]   Dictated by (CST): Jalyn Hoffman MD on 3/04/2025 at 12:18 PM     Finalized by (CST): Jalyn Hoffman MD on 3/04/2025 at 12:20 PM   [facility] [address] [place] [address] [phone]    XR CHEST PA + LAT CHEST (GJO=24197)  Result Date: 1/27/2025  CONCLUSION:  No active cardiopulmonary process identified.  If the patient's hemoptysis persists or worsens, consider chest CT for further assessment.   LOCATION:  [id number]    Dictated by (CST): Stromberg, LeRoy, MD on 1/27/2025 at 3:10 PM     Finalized by (CST): Stromberg, LeRoy, MD on 1/27/2025 at 3:12 PM          Assessment/Plan:  #1. Dyspnea on exertion  Suspect multifactorial, possibly post infectious bronchospasm, ?asthma, with underlying obesity/deconditioning, CAD  4/2025 PFTs normal  4/2025 HRCT normal - tiny 1-3mm nodules all stable to 2022  We reviewed her sx and she does feel better with breo and albuterol. Will continue for now  Obtain mannitol challenge.  If negative, then plan on CPET.  If positive, then should escalate her breo inhaler to 200 and/or nebs    #2. Hemoptysis, nonlifethreatening  Likely due to bronchitis, but also with underlying CTD making bronchiectasis possible  Now improved    #3. Lung nodules  4/2025 HRCT normal - tiny 1-3mm nodules all stable to 2022 4/22/2025         [1]   Past Medical History:   Abdominal pain    Acute atopic conjunctivitis, bilateral    Yamil Vaughn M.D.    Acute meniscal tear, medial, right, initial encounter    MRI 5/21/2019: Mild undersurface tearing of the posterior root ligament of the medial meniscus.    Allergic rhinitis    Anxiety    Arrhythmia    paroxsymal atrial tachycardia    Arthritis    Atrial tachycardia (HCC)    Atypical chest pain    Suspect mild chronic pericarditis possibly secondary to history of SARS-CoV-2/COVID-19 based on findings of CT of chest 12/22/2021 not present on previous CTs      Back pain    Benign paroxysmal positional vertigo    Bilateral carotid artery stenosis    Bleeding nose    Bloating    Blood in urine    Body piercing    Cafe au lait spots    Calculus of kidney    Change in hair    Chest pain    Chest pain on exertion    Chest pain, unspecified    Chronic pericarditis (HCC)    Suspect mild chronic pericarditis possibly secondary to history of SARS-CoV-2/COVID-19 based on findings of CT of chest 12/22/2021 not present on previous CTs      Colon polyp    Gail Katz M.D.     Constipation    COVID-19 vaccination refused    COVID-19 virus infection    Detected 11/24/2021      Decorative tattoo    Diabetes (HCC)    Denies    Diarrhea, unspecified    Disorder of thyroid    Diverticulitis of sigmoid colon    Diverticulosis    Gail Katz M.D.    Dizziness    Easy bruising    Endometriosis    Esophageal reflux    Essential hypertension    Eye disease    Family history of CHF (congestive heart failure)    Fibromyalgia    Food intolerance    Frequent urination    Glaucoma    Headache disorder    Heart palpitations    Heartburn    High cholesterol    History of 2019 novel coronavirus disease (COVID-19)    History of COVID-19    History of nausea and vomiting    Hoarseness, chronic    Hx of motion sickness    Hyperlipidemia    Hypoglycemia    Symptomatic in the low 90s      Hypothyroidism    Influenza vaccination declined    Injury of peroneal tendon of right foot    Injury of right wrist    Itch of skin    Keratoconjunctivitis sicca not specified as Sjogren's, bilateral    Yamil Vaughn M.D.    Lateral epicondylitis of right elbow    Leaking of urine    Lung nodule    pt unsure of which side    Major depressive disorder, recurrent episode, mild with anxious distress    Migraines    Morbid obesity with BMI of 45.0-49.9, adult ([facility])    Obesity    Open angle with borderline findings, high risk, bilateral    Yamil Vaughn M.D.    Osteoarthritis    back    Other vitreous opacities, left eye    Yamil Vaughn M.D.    Other vitreous opacities, right eye    Yamil Vaughn M.D.    Pain in joints    Painful urination    Panniculitis    Pneumococcal vaccine refused    Polycystic ovaries    ovary embedded in pelvic wall--right side    PONV (postoperative nausea and vomiting)    slow to awaken    Posterior vitreous detachment of both eyes    Right > Left    Primary open angle glaucoma of both eyes, moderate stage    Rash    Sleep disturbance    Spitting up blood    Sputum production    Stress     Surgical Menopause 2015 age 44 yrs old - on ERT    Trochanteric bursitis of both hips    Left >>  Right     Vertigo    Visual impairment    glasses    Vitreous degeneration, right eye    Yamil Vaughn M.D.    Vocal cord edema    Associated with history of SARS-CoV-2/COVID-19      Wears glasses    Weight gain   [2]   Past Surgical History:  Procedure Laterality Date    Angiogram      12 [place]    Angioplasty (coronary)      Colonoscopy      Colonoscopy  2018    Colonoscopy N/A 2023    Procedure: COLONOSCOPY;  Surgeon: Rupa Cheatham DO;  Location:  ENDOSCOPY    Colonoscopy N/A 2024    Procedure: COLONOSCOPY with cold snare polypectomy;  Surgeon: Rupa Cheatham DO;  Location:  ENDOSCOPY    D & c   &     Dilation & curettage cervical stump       and     Foot surgery Right 10/18/2019    Tendon repair     Gastric bypass,obesity,sb reconstruc      gastric sleeve    Hernia surgery  10/18/2018    Hiatal hernia Dr. Terri Avila     Hysterectomy  2006    partial hystero.    Lap sleeve gastrectomy  10/18/2018    Dr. terri avila           Oophorectomy Bilateral     ovaries removed after partial.    Other  10/15/2018    Sleeve biatric surgery    Other surgical history      removal of right ovary    Removal of kidney stone      age 14, does not remember what kind of surgery. no abdominal or flank scar    Tilt table evaluation      veinogram [facility]    Total abdom hysterectomy      Upper gi endoscopy,exam     [3]   Family History  Problem Relation Age of Onset    Cancer Mother         basal cell X 3; LUNG    Heart Disorder Mother         SVT    Hypertension Mother     Dementia Mother         early signs     Depression Mother     Cancer Father         Colon & prostate cancers    Heart Disorder Father     Colon Cancer Father     Prostate Cancer Father     Diabetes Father     Hypertension Father     Heart Attack Father     Colon Polyps  Brother     Alcohol and Other Disorders Associated Brother     Colon Polyps Brother     No Known Problems Son     No Known Problems Son     Alcohol and Other Disorders Associated Maternal Grandfather     Alcohol and Other Disorders Associated Paternal Grandfather     Cancer Maternal Aunt         breast, colon    Breast Cancer Maternal Aunt 50        In her 50's.    Ovarian Cancer Maternal Aunt 60        In her 60's.    Cancer Maternal Aunt         breast, basal cell    Breast Cancer Maternal Aunt 68        Late 60's.    Ovarian Cancer Maternal Aunt     Cancer Maternal Aunt         basal cell    Cancer Maternal Aunt         basal cell    Cancer Maternal Aunt         bone    Cancer Paternal Aunt         lung    Breast Cancer Cousin 65    Cancer Cousin         esoph,adenocarcinoma    Cancer Cousin         Non Hodgkins Lymphoma    Cancer Cousin         basal cell   [4]   Current Outpatient Medications   Medication Sig Dispense Refill    albuterol 108 (90 Base) MCG/ACT Inhalation Aero Soln Inhale 2 puffs into the lungs every 6 (six) hours as needed for Wheezing or Shortness of Breath (Cough). 1 each 11    fluticasone furoate-vilanterol (BREO ELLIPTA) 100-25 MCG/ACT Inhalation Aerosol Powder, Breath Activated Inhale 1 puff into the lungs daily. 1 each 11    MICROLET LANCETS Does not apply Misc USE UP TO 6 TIMES A  each 1    fluticasone propionate 50 MCG/ACT Nasal Suspension 2 sprays by Each Nare route daily. 48 g 0    lansoprazole 30 MG Oral Capsule Delayed Release Take 1 capsule (30 mg total) by mouth daily. 90 capsule 1    HYDROXYCHLOROQUINE 200 MG Oral Tab TAKE 1 TABLET(200 MG) BY MOUTH TWICE DAILY 180 tablet 0    CREON 90583-49148 units Oral Cap DR Particles TAKE 1 CAPSULE BY MOUTH EVERY MORNING, AT NOON, AND EVERY NIGHT AT BEDTIME. TAKE WITH SNACKS 360 capsule 1    amitriptyline 10 MG Oral Tab Take 1 tablet (10 mg total) by mouth nightly. 90 tablet 0    ALPRAZolam 0.5 MG Oral Tab 1/2 to one tab daily as needed  20 tablet 0    SIMVASTATIN 20 MG Oral Tab TAKE 1 TABLET(20 MG) BY MOUTH EVERY EVENING 90 tablet 3    LEVOTHYROXINE 112 MCG Oral Tab TAKE 1 TABLET(112 MCG) BY MOUTH BEFORE BREAKFAST 90 tablet 3    Glucose Blood (CONTOUR NEXT TEST) In Vitro Strip Use 1 test strip up to 6 times a day as needed 100 strip 11    Blood Glucose Monitoring Suppl Does not apply Device THIS IS FOR 1 CONTOUR GLUCOSE MONITOR 1 each 0    metoprolol succinate ER 25 MG Oral Tablet 24 Hr Take 1 tablet (25 mg total) by mouth daily.      meclizine 25 MG Oral Tab Take 1 tablet (25 mg total) by mouth 3 (three) times daily as needed for Dizziness. 30 tablet 2    Zinc 50 MG Oral Tab Take by mouth daily.      TURMERIC CURCUMIN OR Take by mouth daily.      ascorbic acid 500 MG Oral Chew Tab Chew 1 tablet (500 mg total) by mouth daily.      CALCIUM OR Take 2,400 mg by mouth daily.      saccharomyces boulardii 250 MG Oral Cap Pro-biotic 1 capsule by mouth daily      tiZANidine HCl 4 MG Oral Tab Take 1 tablet (4 mg total) by mouth nightly as needed. 30 tablet 2    magnesium 250 MG Oral Tab Take 1 tablet (250 mg total) by mouth daily.      Multiple Vitamins-Minerals (BARIATRIC MULTIVITAMINS/IRON OR) Take 1 capsule by mouth daily.      PATIENT SUPPLIED MEDICATION Essential Oils for allergies      Cholecalciferol (VITAMIN D3) 2000 UNITS Oral Tab Take 1 tablet (2,000 Units total) by mouth daily. 2 tab a day to make 4,000 units      Tiotropium Bromide Monohydrate (SPIRIVA RESPIMAT) 2.5 MCG/ACT Inhalation Aero Soln Inhale 1 puff into the lungs daily. (Patient not taking: Reported on 4/22/2025) 1 each 5    metoprolol tartrate 100 MG Oral Tab 1 tablet (100 mg total). (Patient not taking: Reported on 4/22/2025)      benzonatate 200 MG Oral Cap Take 1 capsule (200 mg total) by mouth 3 (three) times daily as needed for cough. (Patient not taking: Reported on 4/22/2025) 15 capsule 0

## 2025-04-27 DIAGNOSIS — M15.0 PRIMARY OSTEOARTHRITIS INVOLVING MULTIPLE JOINTS: ICD-10-CM

## 2025-04-27 DIAGNOSIS — M79.7 FIBROMYALGIA: ICD-10-CM

## 2025-04-28 NOTE — TELEPHONE ENCOUNTER
Amitriptyline 10 mg    Future Appointments   Date Time Provider Department Center   10/8/2025 12:15 PM Hilda Llamas DO [facility]     Last office visit: 2/5/2025    Last fill: 2/5/2025 90 tab, 0 refills

## 2025-04-30 DIAGNOSIS — R73.03 PREDIABETES: Primary | ICD-10-CM

## 2025-04-30 DIAGNOSIS — Z86.39 HISTORY OF HYPOGLYCEMIA: ICD-10-CM

## 2025-04-30 RX ORDER — LANCETS
EACH MISCELLANEOUS
Qty: 100 EACH | Refills: 0 | Status: SHIPPED | OUTPATIENT
Start: 2025-04-30

## 2025-04-30 NOTE — TELEPHONE ENCOUNTER
Refill request for lancets declined.  This patient has not been diagnosed with diabetes.  Is there another issue that patient has needed to check her blood sugar for?

## 2025-05-01 DIAGNOSIS — M15.0 PRIMARY OSTEOARTHRITIS INVOLVING MULTIPLE JOINTS: ICD-10-CM

## 2025-05-01 DIAGNOSIS — M79.7 FIBROMYALGIA: Primary | ICD-10-CM

## 2025-05-01 RX ORDER — AMITRIPTYLINE HYDROCHLORIDE 10 MG/1
10 TABLET ORAL NIGHTLY
Qty: 90 TABLET | Refills: 0 | Status: SHIPPED | OUTPATIENT
Start: 2025-05-01

## 2025-05-01 NOTE — TELEPHONE ENCOUNTER
Last office visit: 2/5/2025      Next Rheum Apt:10/8/2025 Hilda Llamas DO    Last fill: amitriptyline 10 mg  2/5/2025  90 tablets, 0 refills     Labs:   Lab Results   Component Value Date    CREATSERUM 0.79 01/28/2025    GFR 84 11/09/2017    ALKPHO 97 01/28/2025    AST 22 01/28/2025    ALT 25 01/28/2025    BILT 0.4 01/28/2025    TP 6.4 01/28/2025    ALB 3.8 01/28/2025       Lab Results   Component Value Date    WBC 5.0 01/28/2025    HGB 12.8 01/28/2025     01/28/2025    NEPRELIM 6.98 07/05/2024    NEPERCENT 57.6 01/28/2025    LYPERCENT 30.8 01/28/2025    NE 2,880 01/28/2025    LYMABS 1,540 01/28/2025

## 2025-05-01 NOTE — TELEPHONE ENCOUNTER
Patient requesting refill on lancets and test strips, patient has diabetes and hyperlipidemia, states she tests her self 6 times a day.   Please advise   Sending as high priority for refill to be sent to pharmacy

## 2025-05-01 NOTE — TELEPHONE ENCOUNTER
Patient called back. States strips were ordered for her a year ago and she is needing a refill. Patient states she has a \"long history\". She was hypoglycemic, then she was pre diabetic. She then lost weight again, and went back to being hypoglycemic. Patient states now she is back to being prediabetic. Most recent A1C was 3/5/24 at 6.0    Please advise

## 2025-05-02 NOTE — TELEPHONE ENCOUNTER
No need to monitor plasma glucose 6 times a day for prediabetes.  Please instruct patient to complete outstanding labs.    Recommend patient see endocrinologist Dr. Garrett, please provide patient with contact information as below, she can see Dr. Garrett or one of her colleagues, whoever is first available.    Provider Address Phone   Melodie Garrett  [address], [address]  [facility] [address] [phone]        Encounter Diagnoses   Name Primary?    Prediabetes Yes    History of hypoglycemia        No orders of the defined types were placed in this encounter.      Meds & Refills for this Visit:  Requested Prescriptions     Signed Prescriptions Disp Refills    Microlet Lancets Does not apply Misc 100 each 0     Sig: USE UP TO 6 TIMES A DAY     Authorizing Provider: ANTONETTE HARRIS     Ordering User: HEATH GUSTAFSON     Refused Prescriptions Disp Refills    Glucose Blood (CONTOUR NEXT TEST) In Vitro Strip [Pharmacy Med Name: CONTOUR NEXT TEST STRIPS 100S] 100 strip 11     Sig: USE 1 STRIP TO TEST BLOOD SUGAR UP TI SIX TIMES DAILY AS DIRECTED     Refused By: ANTONETTE HARRIS     Reason for Refusal: Refill not appropriate       Imaging & Consults:  ENDOCRINOLOGY - INTERNAL

## 2025-05-06 RX ORDER — AMITRIPTYLINE HYDROCHLORIDE 10 MG/1
10 TABLET ORAL NIGHTLY
Qty: 90 TABLET | Refills: 0 | OUTPATIENT
Start: 2025-05-06

## 2025-05-08 DIAGNOSIS — E78.2 MIXED HYPERLIPIDEMIA: ICD-10-CM

## 2025-05-08 DIAGNOSIS — Z51.81 ENCOUNTER FOR MEDICATION MONITORING: ICD-10-CM

## 2025-05-08 RX ORDER — SIMVASTATIN 20 MG
20 TABLET ORAL EVERY EVENING
Qty: 90 TABLET | Refills: 3 | OUTPATIENT
Start: 2025-05-08

## 2025-05-14 RX ORDER — LANCETS
EACH MISCELLANEOUS
Qty: 100 EACH | Refills: 0 | OUTPATIENT
Start: 2025-05-14

## 2025-05-15 DIAGNOSIS — M35.9 UNDIFFERENTIATED CONNECTIVE TISSUE DISEASE (HCC): Primary | ICD-10-CM

## 2025-05-15 DIAGNOSIS — R76.8 POSITIVE ANA (ANTINUCLEAR ANTIBODY): ICD-10-CM

## 2025-05-15 DIAGNOSIS — M25.50 POLYARTHRALGIA: ICD-10-CM

## 2025-05-15 RX ORDER — HYDROXYCHLOROQUINE SULFATE 200 MG/1
200 TABLET, FILM COATED ORAL 2 TIMES DAILY
Qty: 180 TABLET | Refills: 0 | Status: SHIPPED | OUTPATIENT
Start: 2025-05-15

## 2025-05-15 NOTE — TELEPHONE ENCOUNTER
Last office visit: 2/5/2025    Next Rheum Apt:10/8/2025 Hilda Llamas DO    Last fill: hydroxychloroquine 2/21/2025  180 tablets, 0 refills     Labs:   Lab Results   Component Value Date    CREATSERUM 0.79 01/28/2025    GFR 84 11/09/2017    ALKPHO 97 01/28/2025    AST 22 01/28/2025    ALT 25 01/28/2025    BILT 0.4 01/28/2025    TP 6.4 01/28/2025    ALB 3.8 01/28/2025       Lab Results   Component Value Date    WBC 5.0 01/28/2025    HGB 12.8 01/28/2025     01/28/2025    NEPRELIM 6.98 07/05/2024    NEPERCENT 57.6 01/28/2025    LYPERCENT 30.8 01/28/2025    NE 2,880 01/28/2025    LYMABS 1,540 01/28/2025

## 2025-06-05 NOTE — TELEPHONE ENCOUNTER
Please have the patient wear her cam boot which she can remove for sleeping driving and bathing.   Other than that if she has had on should provide enough mobility to give her relief
Pt has foot pain - her surgery is in October - asking if she should be seen or what can she do to help pain
S/w pt. Relayed WMN msg. She will plan to begin wearing a cam boot. Advised her to call with any further concerns or questions.
S/w pt. She is experiencing the same pain in her foot that she will be having surgery for. There hasn't been a change other than it has increased. When she walks the pain is 7/10, but constantly 3-5/10.  She has been using the knee scooter already and she i
yes

## 2025-06-07 DIAGNOSIS — E03.9 ACQUIRED HYPOTHYROIDISM: ICD-10-CM

## 2025-06-07 DIAGNOSIS — Z51.81 ENCOUNTER FOR MEDICATION MONITORING: ICD-10-CM

## 2025-06-09 RX ORDER — LEVOTHYROXINE SODIUM 112 UG/1
112 TABLET ORAL
Qty: 90 TABLET | Refills: 3 | Status: SHIPPED | OUTPATIENT
Start: 2025-06-09

## 2025-06-13 DIAGNOSIS — J30.9 ALLERGIC RHINITIS, UNSPECIFIED SEASONALITY, UNSPECIFIED TRIGGER: ICD-10-CM

## 2025-06-16 NOTE — TELEPHONE ENCOUNTER
Requested Prescriptions     Pending Prescriptions Disp Refills    FLUTICASONE PROPIONATE 50 MCG/ACT Nasal Suspension [Pharmacy Med Name: FLUTICASONE 50MCG NASAL SP (120) RX] 48 g 0     Sig: SHAKE LIQUID AND USE 2 SPRAYS IN EACH NOSTRIL DAILY       Last Refill: 3/15    Last OV: 3/13

## 2025-06-18 RX ORDER — FLUTICASONE PROPIONATE 50 MCG
2 SPRAY, SUSPENSION (ML) NASAL DAILY
Qty: 48 G | Refills: 0 | Status: SHIPPED | OUTPATIENT
Start: 2025-06-18

## 2025-08-01 DIAGNOSIS — M79.7 FIBROMYALGIA: Primary | ICD-10-CM

## 2025-08-01 DIAGNOSIS — M15.0 PRIMARY OSTEOARTHRITIS INVOLVING MULTIPLE JOINTS: ICD-10-CM

## 2025-08-01 RX ORDER — AMITRIPTYLINE HYDROCHLORIDE 10 MG/1
10 TABLET ORAL NIGHTLY
Qty: 90 TABLET | Refills: 0 | Status: SHIPPED | OUTPATIENT
Start: 2025-08-01

## 2025-08-02 DIAGNOSIS — M35.9 UNDIFFERENTIATED CONNECTIVE TISSUE DISEASE (HCC): ICD-10-CM

## 2025-08-02 DIAGNOSIS — R76.8 POSITIVE ANA (ANTINUCLEAR ANTIBODY): ICD-10-CM

## 2025-08-02 DIAGNOSIS — M25.50 POLYARTHRALGIA: ICD-10-CM

## 2025-08-04 RX ORDER — HYDROXYCHLOROQUINE SULFATE 200 MG/1
200 TABLET, FILM COATED ORAL 2 TIMES DAILY
Qty: 180 TABLET | Refills: 0 | Status: SHIPPED | OUTPATIENT
Start: 2025-08-04

## 2025-08-29 ENCOUNTER — APPOINTMENT (OUTPATIENT)
Dept: CT IMAGING | Age: 55
End: 2025-08-29
Attending: NURSE PRACTITIONER

## 2025-08-29 ENCOUNTER — HOSPITAL ENCOUNTER (OUTPATIENT)
Age: 55
Discharge: HOME | End: 2025-08-29

## 2025-08-29 VITALS
OXYGEN SATURATION: 97 % | SYSTOLIC BLOOD PRESSURE: 154 MMHG | HEART RATE: 72 BPM | DIASTOLIC BLOOD PRESSURE: 74 MMHG | TEMPERATURE: 98 F | RESPIRATION RATE: 18 BRPM

## 2025-08-29 DIAGNOSIS — K57.92 ACUTE DIVERTICULITIS: ICD-10-CM

## 2025-08-29 DIAGNOSIS — R10.32 ABDOMINAL PAIN, LLQ (LEFT LOWER QUADRANT): Primary | ICD-10-CM

## 2025-08-29 LAB
#MXD IC: 0.6 X10ˆ3/UL (ref 0.1–1)
BILIRUB UR QL STRIP: NEGATIVE
BUN BLD-MCNC: 12 MG/DL (ref 7–18)
CHLORIDE BLD-SCNC: 109 MMOL/L (ref 98–112)
CO2 BLD-SCNC: 21 MMOL/L (ref 21–32)
COLOR UR: YELLOW
CREAT BLD-MCNC: 1 MG/DL (ref 0.55–1.02)
EGFRCR SERPLBLD CKD-EPI 2021: 67 ML/MIN/1.73M2 (ref 60–?)
GLUCOSE BLD-MCNC: 115 MG/DL (ref 70–99)
GLUCOSE UR STRIP-MCNC: NEGATIVE MG/DL
HCT VFR BLD AUTO: 39.8 % (ref 35–48)
HCT VFR BLD CALC: 38 % (ref 34–50)
HGB BLD-MCNC: 13 G/DL (ref 12–16)
HGB UR QL STRIP: NEGATIVE
ISTAT IONIZED CALCIUM FOR CHEM 8: 1.16 MMOL/L (ref 1.12–1.32)
KETONES UR STRIP-MCNC: NEGATIVE MG/DL
LEUKOCYTE ESTERASE UR QL STRIP: NEGATIVE
LYMPHOCYTES # BLD AUTO: 1.9 X10ˆ3/UL (ref 1–4)
LYMPHOCYTES NFR BLD AUTO: 32.7 %
MCH RBC QN AUTO: 30.2 PG (ref 26–34)
MCHC RBC AUTO-ENTMCNC: 32.7 G/DL (ref 31–37)
MCV RBC AUTO: 92.6 FL (ref 80–100)
MIXED CELL %: 9.9 %
NEUTROPHILS # BLD AUTO: 3.4 X10ˆ3/UL (ref 1.5–7.7)
NEUTROPHILS NFR BLD AUTO: 57.4 %
NITRITE UR QL STRIP: NEGATIVE
PH UR STRIP: 6
PLATELET # BLD AUTO: 287 X10ˆ3/UL (ref 150–450)
POTASSIUM BLD-SCNC: 4.2 MMOL/L (ref 3.6–5.1)
PROT UR STRIP-MCNC: 30 MG/DL
RBC # BLD AUTO: 4.3 X10ˆ6/UL (ref 3.8–5.3)
SODIUM BLD-SCNC: 141 MMOL/L (ref 136–145)
SP GR UR STRIP: >=1.03
UROBILINOGEN UR STRIP-ACNC: <2 MG/DL
WBC # BLD AUTO: 5.9 X10ˆ3/UL (ref 4–11)

## 2025-08-29 PROCEDURE — 96375 TX/PRO/DX INJ NEW DRUG ADDON: CPT

## 2025-08-29 PROCEDURE — 99215 OFFICE O/P EST HI 40 MIN: CPT

## 2025-08-29 PROCEDURE — 96374 THER/PROPH/DIAG INJ IV PUSH: CPT

## 2025-08-29 PROCEDURE — 81002 URINALYSIS NONAUTO W/O SCOPE: CPT

## 2025-08-29 PROCEDURE — 99214 OFFICE O/P EST MOD 30 MIN: CPT

## 2025-08-29 PROCEDURE — 80047 BASIC METABLC PNL IONIZED CA: CPT

## 2025-08-29 PROCEDURE — 85025 COMPLETE CBC W/AUTO DIFF WBC: CPT | Performed by: NURSE PRACTITIONER

## 2025-08-29 PROCEDURE — 96361 HYDRATE IV INFUSION ADD-ON: CPT

## 2025-08-29 PROCEDURE — 74177 CT ABD & PELVIS W/CONTRAST: CPT | Performed by: NURSE PRACTITIONER

## 2025-08-29 RX ORDER — ONDANSETRON 2 MG/ML
4 INJECTION INTRAMUSCULAR; INTRAVENOUS ONCE
Status: COMPLETED | OUTPATIENT
Start: 2025-08-29 | End: 2025-08-29

## 2025-08-29 RX ORDER — ONDANSETRON 4 MG/1
4 TABLET, ORALLY DISINTEGRATING ORAL EVERY 4 HOURS PRN
Qty: 10 TABLET | Refills: 0 | Status: SHIPPED | OUTPATIENT
Start: 2025-08-29 | End: 2025-09-05

## 2025-08-29 RX ORDER — SODIUM CHLORIDE 9 MG/ML
1000 INJECTION, SOLUTION INTRAVENOUS ONCE
Status: COMPLETED | OUTPATIENT
Start: 2025-08-29 | End: 2025-08-29

## 2025-08-29 RX ORDER — KETOROLAC TROMETHAMINE 30 MG/ML
30 INJECTION, SOLUTION INTRAMUSCULAR; INTRAVENOUS ONCE
Status: COMPLETED | OUTPATIENT
Start: 2025-08-29 | End: 2025-08-29

## (undated) DIAGNOSIS — I31.9 CHRONIC PERICARDITIS, UNSPECIFIED COMPLICATION STATUS, UNSPECIFIED TYPE: ICD-10-CM

## (undated) DIAGNOSIS — R07.89 ATYPICAL CHEST PAIN: Primary | ICD-10-CM

## (undated) DIAGNOSIS — M35.9 UNDIFFERENTIATED CONNECTIVE TISSUE DISEASE (HCC): Primary | ICD-10-CM

## (undated) DEVICE — DRAPE SHEET LG

## (undated) DEVICE — 1200CC GUARDIAN II: Brand: GUARDIAN

## (undated) DEVICE — SOL  .9 1000ML BTL

## (undated) DEVICE — MASK,FACE,MAXFLUIDPROTECT,SHIELD/TIES: Brand: MEDLINE

## (undated) DEVICE — KIT VLV 5 PC AIR H2O SUCT BX ENDOGATOR CONN

## (undated) DEVICE — LIGASURE LAP MARYLAND 44CM

## (undated) DEVICE — VISIGI 3D®  CALIBRATION SYSTEM  SIZE 40FR STD W/ BULB: Brand: BOEHRINGER® VISIGI 3D™ SLEEVE GASTRECTOMY CALIBRATION SYSTEM, SIZE 40FR W/BULB

## (undated) DEVICE — STERILE POLYISOPRENE POWDER-FREE SURGICAL GLOVES: Brand: PROTEXIS

## (undated) DEVICE — ENDOSTITCH SURGIDAC 0

## (undated) DEVICE — FLUIDGARD® 160 ANTI-FOG SURGICAL MASK WITH ANTI-GLARE SHIELD: Brand: PRECEPT ®

## (undated) DEVICE — 3M™ RED DOT™ MONITORING ELECTRODE WITH FOAM TAPE AND STICKY GEL, 50/BAG, 20/CASE, 72/PLT 2570: Brand: RED DOT™

## (undated) DEVICE — ENDOPATH ECHELON ENDOSCOPIC LINEAR CUTTER RELOADS, GREEN, 60MM: Brand: ECHELON ENDOPATH

## (undated) DEVICE — TROCAR: Brand: KII FIOS FIRST ENTRY

## (undated) DEVICE — DERMABOND LIQUID ADHESIVE

## (undated) DEVICE — KIT MFLD FOR SPEC COLL

## (undated) DEVICE — ENDOPATH ECHELON ENDOSCOPIC LINEAR CUTTER RELOADS, BLACK, 60MM: Brand: ECHELON ENDOPATH

## (undated) DEVICE — 3M™ STERI-DRAPE™ INSTRUMENT POUCH 1018L: Brand: STERI-DRAPE™

## (undated) DEVICE — BITEBLOCK ENDOSCP 60FR MAXI STRP

## (undated) DEVICE — 10FT COMBINED O2 DELIVERY/CO2 MONITORING. FILTER WITH MICROSTREAM TYPE LUER: Brand: DUAL ADULT NASAL CANNULA

## (undated) DEVICE — STERILE LATEX POWDER-FREE SURGICAL GLOVESWITH NITRILE COATING: Brand: PROTEXIS

## (undated) DEVICE — UNDYED BRAIDED (POLYGLACTIN 910), SYNTHETIC ABSORBABLE SUTURE: Brand: COATED VICRYL

## (undated) DEVICE — KIT CUSTOM ENDOPROCEDURE STERIS

## (undated) DEVICE — SOL  .9 3000ML

## (undated) DEVICE — TRAP POLYP W/ 2 SPEC TY CLR MAGNIFYING WIND

## (undated) DEVICE — LAP CHOLE: Brand: MEDLINE INDUSTRIES, INC.

## (undated) DEVICE — SUTURE PDS II 1 CT-1

## (undated) DEVICE — LASSO POLYPECTOMY SNARE: Brand: LASSO

## (undated) DEVICE — GIJAW SINGLE-USE BIOPSY FORCEPS WITH NEEDLE: Brand: GIJAW

## (undated) DEVICE — [HIGH FLOW INSUFFLATOR,  DO NOT USE IF PACKAGE IS DAMAGED,  KEEP DRY,  KEEP AWAY FROM SUNLIGHT,  PROTECT FROM HEAT AND RADIOACTIVE SOURCES.]: Brand: PNEUMOSURE

## (undated) DEVICE — TROCAR: Brand: KII® SLEEVE

## (undated) DEVICE — APPLICATOR FBRTP 6IN STRL LF

## (undated) DEVICE — SEAM GUARD 60 ECHELON

## (undated) DEVICE — ECHELON FLEX POWERED PLUS LONG ARTICULATING ENDOSCOPIC LINEAR CUTTER, 60MM: Brand: ECHELON FLEX

## (undated) DEVICE — SUTURE VICRYL 3-0 SH

## (undated) DEVICE — HOVERMATT 34IN SINGLE USE

## (undated) DEVICE — 3M™ RED DOT™ MONITORING ELECTRODE WITH FOAM TAPE AND STICKY GEL 2570-3, 3/BAG, 200/CASE, 54/PLT: Brand: RED DOT™

## (undated) DEVICE — PROXIMATE SKIN STAPLERS (35 WIDE) CONTAINS 35 STAINLESS STEEL STAPLES (FIXED HEAD): Brand: PROXIMATE

## (undated) DEVICE — STERIS KITS

## (undated) NOTE — LETTER
Date & Time: 12/12/2018, 12:08 PM  Patient: Kraig Cast  Encounter Provider(s):    See, Lunette Alpers, PA       To Whom It May Concern:    Cyndi Lopez was seen and treated in our department on 12/12/2018.  She should not return to work until cleared by th

## (undated) NOTE — LETTER
08/10/18      [address]  [facility] [address]      Dear Ace Donohue,    [address] [facility] records indicate that you have outstanding lab work and or testing that was ordered for you and has not yet been completed:        Magnesium       Phosphorus

## (undated) NOTE — LETTER
Date: 4/19/2018    Patient Name: Linda Flores          To Whom it may concern: The above patient was seen at the [facility] for treatment of a medical condition on 4/19/2018.           Sincerely,    Jorge Luis, DO

## (undated) NOTE — MR AVS SNAPSHOT
[address] [facility]  [phone]               Thank you for choosing us for your health care visit with Jil Julian DO.   We are glad to serve you and happy to provide you with this s Habits don’t change overnight. Setting your goals too high can leave you feeling discouraged if you can’t reach them. Be realistic. Choose one or two small changes you can make now. Set an action plan for how you are going to make these changes.  When you c 138/88 mmHg 82 62.6\" 251 lb 12.8 oz 45.18 kg/m2         Current Medications          This list is accurate as of: 2/10/17 10:18 AM.  Always use your most recent med list.                ACCU-CHEK DARRICK PLUS Strp   Generic drug:  Glucose Blood   USE TO TE Vitamin C 500 MG Tabs   Take 500 mg by mouth daily. Commonly known as:  VITAMIN C           Vitamin D3 2000 units Tabs   Take 1 tablet by mouth daily. * Notice:   This list has 2 medication(s) that are the same as other medications prescribed f Follow-up Instructions     Return in about 4 weeks (around 3/10/2017) for Progress on weight loss and blood test results.          MyChart     Visit MyChart  You can access your MyChart to more actively manage your health care and view more details from [facility] 2 ½ hours per week – spread out over time Use a cesar to keep you motivated   Don’t forget strength training with weights and resistance Set goals and track your progress   You don’t need to join a gym. Home exercises work great.  Put more priority on exe

## (undated) NOTE — MR AVS SNAPSHOT
[facility] [place] VazquezGonzales salguero [place]  [phone]               Thank you for choosing us for your health care visit with Lor Blackman DO.   We are glad to serve you and happy to provide you with this s Family history of cardiomyopathy   Order:  Cardio - Internal    Woo Lopez MD   [address] [address] [place] [address]   Phone:  [phone]   Fax:  [phone] Irma Rao, Kita Fatima, [address] [place] [address] (congestive heart failure) [Z82.49], Family history of cardiomyopathy [Z82.49]        CHIROPRACTIC  - INTERNAL [[id number] CUSTOM]  Order #:  [id number]         **REFERRAL REQUEST**    Your physician has referred you to a specialist.  Your physician or the cl Assoc Dx:   Morbid obesity with BMI of 45.0-49.9, adult ([facility] [E66.01, Z68.42]          Reason for Today's Visit     Headache           Medical Issues Discussed Today     Chronic low back pain    Morbid obesity with BMI of 45.0-49.9, adult    New daily per there. [place] in malls by doing a fast lap or two before shopping. · At play. Go hiking with friends instead of sitting on a bench. Walk through a street fair instead of sitting in a movie.   Tips for sticking with it  · Plan your activity by writing it HYDROcodone-acetaminophen 5-325 MG Tabs   Take 1-2 tablets by mouth every 6 (six) hours as needed for Pain. Commonly known as:  NORCO           lansoprazole 30 MG Cpdr   TAKE 1 CAPSULE BY MOUTH EVERY MORNING BEFORE BREAKFAST.    What changed:  See the ne

## (undated) NOTE — LETTER
Date: 10/3/2018    Patient Name: Felicia Watts          To Whom it may concern: The above patient was seen at the [facility] for treatment of a medical condition.     This patient should be excused from attending work from 10/2/18 throug

## (undated) NOTE — MR AVS SNAPSHOT
[facility] [employer] [place] [phone]               Thank you for choosing us for your health care visit with Geri Mcnulty MD.  We are glad to serve you and happy to provide you with this summary of your vi estradiol 1 MG Tabs  Take 1 mg by mouth daily. Commonly known as:  ESTRACE        HYDROcodone-acetaminophen 7.5-325 MG/15ML Soln  Take 10 mL by mouth every 4 (four) hours as needed.         Ketoconazole 2 % Sham  Commonly known as:  NIZORAL        [email or web address]

## (undated) NOTE — MR AVS SNAPSHOT
[facility] [place] BradleyGonzales Dg  [phone]               Thank you for choosing us for your health care visit with Chelsey Naranjo DO.   We are glad to serve you and happy to provide you with this s clinic staff will provide you with the phone number you should call to schedule your appointment.      If you are confident that your benefit plan will not require a referral or authorization, such as [place], please feel free to schedule your ebonie Barrier 1: I don’t want to deny myself. Barrier Buster: You don’t have to! Moderation is the key:  · Watch portion sizes and know when you're eating more than one serving. · Plan to ask for a doggy bag when you eat out. · Have just one.   · Choose lower- professional's instructions.              Allergies as of Mar 10, 2017     Berries Anaphylaxis    Casein Anaphylaxis    Levofloxacin Rash    rash    Avocado Itching    Darvocet [Propoxyphene N-Apap] Nausea and vomiting    Latex Rash, Swelling    Nuts Anaphy What changed:  See the new instructions. Commonly known as:  GLUCOPHAGE           * MOTRIN  MG Tabs   Generic drug:  ibuprofen   Take 800 mg by mouth every 6 (six) hours as needed. What changed:  Another medication with the same name was added.  Sabina Samples - MetFORMIN HCl 1000 MG Tabs  - simvastatin 20 MG Tabs      You can get these medications from any pharmacy     Bring a paper prescription for each of these medications    - Phentermine HCl 37.5 MG Tabs            MyChart     Visit MyChart  You can access

## (undated) NOTE — Clinical Note
Saw patient for post op follow up. Had 1 hour onset of severe right lower quadrant abdominal pain, nausea, elevated blood pressure. I did send her over to the ER for evaluation with her history of recurrent abdominal surgeries.  Wanted to give you a heads u

## (undated) NOTE — MR AVS SNAPSHOT
[facility] [employer] [place] [phone]               Thank you for choosing us for your health care visit with Rodger David MD.  We are glad to serve you and happy to provide you with this summary of your vi Ultram [tramadol Hcl] RASH    Zithromax RASH                Today's Vital Signs     BP   174/90    Pulse   85    Height   62\"    Weight   243 lb 14.4 oz (110.6 kg)             Current Medications          Accurate as of 5/23/18 10:31 AM. Always use your Vitamin D3 2000 units Tabs  Take 1 tablet by mouth daily. MyChart    Visit VisTrackshart  You can access your MyChart to more actively manage your health care and view more details from this visit by going to [email or web address].   If you

## (undated) NOTE — LETTER
01/03/18        Dear: Felicia Watts     In an effort to provide you with the best possible care for your diabetes, we ask that you please schedule the following appointment(s) as indicated below.   Our records indicate that you are in need of the followi

## (undated) NOTE — LETTER
Date: 2018    Patient Name: Jennifer Ramirez    1970          To Whom it may concern:       This patient should be excused from attending work 18 thru 1/15/18  and may return to work on 18        Sincerely,    Alberto Taylor,

## (undated) NOTE — Clinical Note
Dr. Colleen Reyes! Thank you for referring Ms. Peter Roa for rheumatologic evaluation. Please see the discussion portion of my note and let me know if you have any questions.  SAMMY Gould Rheumatology4/28/2020

## (undated) NOTE — LETTER
10/01/24    Patient: Rosa Shaver  : 1970 Visit date: 2024    Dear  Oneyda Moulton DO    Thank you for referring Rosa Shaver to my practice.  Please find my assessment and plan below.        I saw Rosa in my office, she presents with complaint of right groin pain as well as to discuss her recent attack of diverticulitis.  In essence her recent CT scan did not demonstrate any evidence of hernia and I do not demonstrate hernia on examination today.  With regard to the diverticulitis she resolved her attack fairly quickly on antibiotics and as such I would not recommend sigmoidectomy at this time however if she should have recurrent attacks then recommendation would be for surgery.  Thank you Phillip  Sincerely,       Eleno Lepe DO   CC:   No Recipients

## (undated) NOTE — LETTER
Date: 2018    Patient Name: Alexia Smallwood    1970          To Whom it may concern: This patient should be excused from work today. May return to work 18 with no restrictions.       Sincerely,    Jennifer Lopez DO

## (undated) NOTE — MR AVS SNAPSHOT
[facility] [place] Bradley[facility]  [phone]               Thank you for choosing us for your health care visit with Sun Vides DO.   We are glad to serve you and happy to provide you with this s BP Pulse Height Weight BMI    122/84 mmHg 93 62.6\" 253 lb 12.8 oz 45.54 kg/m2         Current Medications          This list is accurate as of: 5/24/17  8:49 AM.  Always use your most recent med list.                Dawson Gomez dr Start 1 tab po qhs for 1 wk, then twice a day for 1 week, then one tab in the morning and 2 tabs qhs for week, then 2 tabs twice a day. Commonly known as: Topamax           Vitamin D3 2000 units Tabs   Take 1 tablet by mouth daily.                    [address] [facility]

## (undated) NOTE — LETTER
Patient Name: Jose Mcnulty  YOB: 1970          MRN :  [id number]  Date:  12/1/2020  Referring Physician:  Nel Ash    Progress Summary  Pt has attended 7 visits in Physical Therapy.    Subjective: Patient had a bad migraine last week Certification From: 75/0/6169  To:3/1/2021

## (undated) NOTE — LETTER
Date: 1/18/2018    Patient Name: Odalys Law          To Whom it may concern: The above patient was seen at the [facility] for treatment of a medical condition.     This patient should be excused from attending work/school from 1/15/20

## (undated) NOTE — LETTER
Date: 12/20/2018    Patient Name: Kian Tovar          To Whom it may concern: The above patient was seen at the [facility] for treatment of a medical condition. The patient may return to work 1/7/2019.         Sincerely,    Erica

## (undated) NOTE — MR AVS SNAPSHOT
[facility] [employer] [place] [phone]               Thank you for choosing us for your health care visit with Sienna Meneses MD.  We are glad to serve you and happy to provide you with this summary of your vi use of insulin     Instructions and Information about Your Health     None      Allergies as of Jun 20, 2018     Berries ANAPHYLAXIS    Casein ANAPHYLAXIS    Levofloxacin RASH    rash    Augmentin [amoxicillin-pot Clavulanate] NAUSEA AND VOMITING    Avocad Generic drug:  cycloSPORINE  Place 1 drop into both eyes 2 (two) times daily.         simvastatin 20 MG Tabs  TAKE 1 TABLET BY MOUTH EVERY EVENING  Commonly known as:  ZOCOR        TiZANidine HCl 4 MG Tabs  TAKE 1/2 TO 1 TABLET BY MOUTH DAILY AT BEDTIME AS

## (undated) NOTE — LETTER
Date: 12/4/2017    Patient Name: Binu Quintanilla          To Whom it may concern: This letter has been written at the patient's request. The above patient was seen at the [facility] for treatment of a medical condition.     This patient sh

## (undated) NOTE — MR AVS SNAPSHOT
[facility] [employer] [place] [phone]               Thank you for choosing us for your health care visit with Nabila Askew MD.  We are glad to serve you and happy to provide you with this summary of your vi use of insulin     Instructions and Information about Your Health     None      Allergies as of Apr 18, 2018     Berries Anaphylaxis    Casein Anaphylaxis    Levofloxacin Rash    rash    Augmentin [amoxicillin-pot Clavulanate] Nausea and vomiting    Avocad * simvastatin 20 MG Tabs  Take 1 tablet (20 mg total) by mouth nightly.   Commonly known as:  ZOCOR        * simvastatin 20 MG Tabs  (2/4)TAKE 1 TABLET BY MOUTH EVERY NIGHT  Commonly known as:  ZOCOR        TiZANidine HCl 4 MG Tabs  TAKE 1/2 TO 1 TABLET BY

## (undated) NOTE — MR AVS SNAPSHOT
[facility] [employer] [place] [phone]               Thank you for choosing us for your health care visit with Lilian Cristobal MD.  We are glad to serve you and happy to provide you with this summary of your vi Commonly known as:  ESTRACE        HYDROcodone-acetaminophen 7.5-325 MG/15ML Soln  Take 10 mL by mouth every 4 (four) hours as needed.         Ketoconazole 2 % Sham  Commonly known as:  NIZORAL        Levothyroxine Sodium 125 MCG Tabs  TAKE 1 TABLET (125 MC Visit [facility] online at  [email or web address]

## (undated) NOTE — LETTER
Date: 11/27/2017    Patient Name: Mehrdad Childress          To Whom it may concern: The above patient was seen at the [facility] for treatment of a medical condition.     This patient should be excused from attending work from 11/27/17 thro

## (undated) NOTE — Clinical Note
Marci Donis she is feeling much better. She decided to keep her appointment with me because she is interested in bariatric surgery and would have had to start over if she missed a monthly visit. Please let me know if you have any questions/ concerns.  Iesha Lomeli

## (undated) NOTE — LETTER
Date: 10/3/2018    Patient Name: Jose Mcnulty          To Whom it may concern: The above patient was seen at the [facility] for treatment of a medical condition. Patient is to be excused from work 10/2/18 through 10/4/18.  Patient may re

## (undated) NOTE — MR AVS SNAPSHOT
[facility] [place]la[place]  [phone]               Thank you for choosing us for your health care visit with Donta Salmon DO.   We are glad to serve you and happy to provide you with this s · Try taking a nutrition class. It can help you learn new shopping, cooking, and eating skills, and also meet new people. You might try a low-fat cooking or yoga class. · Don’t be hard on yourself or give up if you slip. Be patient.  Learn from your Aung Hedrick Today's Vital Signs     BP Pulse Height Weight BMI    136/82 mmHg 83 62.6\" 254 lb 6.4 oz 45.64 kg/m2         Current Medications          This list is accurate as of: 4/7/17  9:26 AM.  Always use your most recent med list.                Maria C Drew Take 1 tablet by mouth daily. Where to Get Your Medications      These medications were sent to [facility], [address]  [facility] , [phone], [address], [place]

## (undated) NOTE — LETTER
09/12/17        Dear: Stephie Yates    In an effort to provide you with the best possible care for your diabetes, we ask that you please schedule the following appointment(s) as indicated below.   Our records indicate that you are in need of the followin

## (undated) NOTE — LETTER
Date & Time: 4/30/2018, 2:43 PM  Patient: Odalys Law  Encounter Provider(s):    AISHWARYA Carson       To Whom It May Concern:    Cherelle Jimenez was seen and treated in our department on 4/30/2018. She should not return to work until 5/2/18.

## (undated) NOTE — MR AVS SNAPSHOT
[facility] [employer] [place] [phone]               Thank you for choosing us for your health care visit with Sally Briones MD.  We are glad to serve you and happy to provide you with this summary of your vi Take 1 capsule (30 mg total) by mouth every morning before breakfast.  Commonly known as:  PREVACID        Levothyroxine Sodium 125 MCG Tabs  TAKE 1 TABLET (125 MCG TOTAL) BY MOUTH BEFORE BREAKFAST.   Commonly known as:  Karolina Serna Summaries. If you've been to the Emergency Department or your doctor's office, you can view your past visit information in [employer] by going to Visits < Visit Summaries. [employer] questions? Call [phone] for help.   [employer] is NOT to be used for urge

## (undated) NOTE — MR AVS SNAPSHOT
After Visit Summary   9/26/2018    Lynda Parks    MRN: [id number]           Visit Information     Date & Time  9/26/2018  8:30 AM Provider  Paul Lacy, [address] [place] [address], [address] [place], [place] [address]  Phone  [phone] estradiol 1 MG Oral Tab Take 1 mg by mouth daily. PATIENT SUPPLIED MEDICATION Essential Oils for allergies    Cholecalciferol (VITAMIN D3) 2000 UNITS Oral Tab Take 1 tablet by mouth daily.       CycloSPORINE (RESTASIS) 0.05 % Ophthalmic Emulsion Place 1 2 ½ hours per week – spread out over time Use a cesar to keep you motivated   Don’t forget strength training with weights and resistance Set goals and track your progress   You don’t need to join a gym. Home exercises work great.  Put more priority on exe Monday - Friday  4:00 pm - 10:00 pm  Saturday - Sunday  10:00 am - 4:00 pm       Conditions needing urgent attention, but are not life-threatening.          Average cost  $120*       EMERGENCY ROOM         Life-threatening emergencies needing immediate inte

## (undated) NOTE — Clinical Note
05/16/2017              Dear: Bryan Shi      In an effort to provide you with the best possible care for your diabetes, we ask that you please schedule the following appointment(s) as indicated below.   Our records indicate that you are in need of th

## (undated) NOTE — MR AVS SNAPSHOT
[address] [facility] [place]  [place] [facility]  [phone]               Thank you for choosing us for your health care visit with Sandi Quinn DO.   We are glad to serve you and happy to provide you with this s Instructions and Information about Your Health      Heart Palpitations    Palpitations are the feeling that your heart is beating hard, fast, or irregular. Some describe it as \"pounding\" or \"skipped beats. \" Palpitations may occur in someone with heart Get prompt medical attention if you have palpitations and any of the following:  · Weakness  · Dizziness  · Lightheadedness  · Fainting  Date Last Reviewed: 4/27/2016  © 9677-9587 The [address] [place] [address].  All ri Take 1 tablet (125 mcg total) by mouth before breakfast.   Commonly known as:  SYNTHROID, LEVOTHROID           Losartan Potassium 50 MG Tabs   Take 1 tablet (50 mg total) by mouth every evening.    Commonly known as:  COZAAR           MetFORMIN HCl 1000 MG discharge instructions in [employer] by going to Visits < Admission Summaries. If you've been to the Emergency Department or your doctor's office, you can view your past visit information in [employer] by going to Visits < Visit Summaries. [employer] questions?

## (undated) NOTE — LETTER
[address] [facility], [address] [place] [address] [phone]            November 16, 2020      Kal Hampton  [address] [address]      Dear Ms. Ev Gallegos

## (undated) NOTE — LETTER
05/03/18          Dear:Rosa Sauceda     In an effort to provide you with the best possible care for your diabetes, we ask that you please schedule the following appointment(s) as indicated below.   Our records indicate that you are in need of the follow

## (undated) NOTE — LETTER
Date: 5/22/2018    Patient Name: Kal Hampton          To Whom it may concern: The above patient was seen at the [facility] for treatment of a medical condition.     This patient should be excused from attending work from 5/21/18 ko

## (undated) NOTE — Clinical Note
Patient has completed 6 months of visits with me and is attending the seminar this month. I have reached out to her PCP to place the referral She is looking forward to coming there, thanks! Very motivated patient and excited for surgery.  Gabriella

## (undated) NOTE — Clinical Note
Hi, patient has meet 6 months of lifestyle visits. Because she is IHP the referral must be through PCP, can you please place the referral for bariatric surgery. She is hoping for surgery for mid summer and I want to get her scheduled as soon as possible.  Radhames Ellis

## (undated) NOTE — MR AVS SNAPSHOT
[facility] [employer] [place] [phone]               Thank you for choosing us for your health care visit with Aleyda Dietrich MD.  We are glad to serve you and happy to provide you with this summary of your vi Ketoconazole 2 % Sham  Commonly known as:  NIZORAL        Levothyroxine Sodium 125 MCG Tabs  TAKE 1 TABLET (125 MCG TOTAL) BY MOUTH BEFORE BREAKFAST.   Commonly known as:  SYNTHROID, LEVOTHROID        Losartan Potassium 50 MG Tabs  TAKE 1 TABLET (50 MG TOTA

## (undated) NOTE — LETTER
Date: 2/28/2018    Patient Name: Alexia Smallwood          To Whom it may concern: The above patient was seen at the [facility] for treatment of a medical condition.     This patient should be excused from attending work/school from 2/27/20